# Patient Record
Sex: FEMALE | Race: WHITE | NOT HISPANIC OR LATINO | Employment: STUDENT | URBAN - METROPOLITAN AREA
[De-identification: names, ages, dates, MRNs, and addresses within clinical notes are randomized per-mention and may not be internally consistent; named-entity substitution may affect disease eponyms.]

---

## 2017-01-12 ENCOUNTER — ALLSCRIPTS OFFICE VISIT (OUTPATIENT)
Dept: OTHER | Facility: OTHER | Age: 27
End: 2017-01-12

## 2017-01-13 ENCOUNTER — LAB CONVERSION - ENCOUNTER (OUTPATIENT)
Dept: OTHER | Facility: OTHER | Age: 27
End: 2017-01-13

## 2017-01-13 LAB — 25(OH)D3 SERPL-MCNC: 34 NG/ML (ref 30–100)

## 2017-01-16 ENCOUNTER — ALLSCRIPTS OFFICE VISIT (OUTPATIENT)
Dept: OTHER | Facility: OTHER | Age: 27
End: 2017-01-16

## 2017-01-19 LAB — PAP (HISTORICAL): NORMAL

## 2017-03-09 ENCOUNTER — ALLSCRIPTS OFFICE VISIT (OUTPATIENT)
Dept: OTHER | Facility: OTHER | Age: 27
End: 2017-03-09

## 2017-03-23 ENCOUNTER — TRANSCRIBE ORDERS (OUTPATIENT)
Dept: ADMINISTRATIVE | Facility: HOSPITAL | Age: 27
End: 2017-03-23

## 2017-03-23 DIAGNOSIS — J32.9 UNSPECIFIED SINUSITIS (CHRONIC): Primary | ICD-10-CM

## 2017-04-03 ENCOUNTER — HOSPITAL ENCOUNTER (OUTPATIENT)
Dept: RADIOLOGY | Age: 27
Discharge: HOME | End: 2017-04-03
Payer: COMMERCIAL

## 2017-04-03 DIAGNOSIS — J32.9 UNSPECIFIED SINUSITIS (CHRONIC): ICD-10-CM

## 2017-04-03 PROCEDURE — 70486 CT MAXILLOFACIAL W/O DYE: CPT

## 2017-04-06 ENCOUNTER — ALLSCRIPTS OFFICE VISIT (OUTPATIENT)
Dept: OTHER | Facility: OTHER | Age: 27
End: 2017-04-06

## 2017-04-06 DIAGNOSIS — R00.0 TACHYCARDIA: ICD-10-CM

## 2017-05-10 ENCOUNTER — ALLSCRIPTS OFFICE VISIT (OUTPATIENT)
Dept: OTHER | Facility: OTHER | Age: 27
End: 2017-05-10

## 2017-05-31 ENCOUNTER — GENERIC CONVERSION - ENCOUNTER (OUTPATIENT)
Dept: OTHER | Facility: OTHER | Age: 27
End: 2017-05-31

## 2017-06-12 ENCOUNTER — ALLSCRIPTS OFFICE VISIT (OUTPATIENT)
Dept: OTHER | Facility: OTHER | Age: 27
End: 2017-06-12

## 2017-07-20 ENCOUNTER — GENERIC CONVERSION - ENCOUNTER (OUTPATIENT)
Dept: OTHER | Facility: OTHER | Age: 27
End: 2017-07-20

## 2017-10-13 ENCOUNTER — GENERIC CONVERSION - ENCOUNTER (OUTPATIENT)
Dept: OTHER | Facility: OTHER | Age: 27
End: 2017-10-13

## 2017-11-27 ENCOUNTER — ALLSCRIPTS OFFICE VISIT (OUTPATIENT)
Dept: OTHER | Facility: OTHER | Age: 27
End: 2017-11-27

## 2017-11-29 ENCOUNTER — TRANSCRIBE ORDERS (OUTPATIENT)
Dept: ADMINISTRATIVE | Facility: HOSPITAL | Age: 27
End: 2017-11-29

## 2017-11-29 ENCOUNTER — ALLSCRIPTS OFFICE VISIT (OUTPATIENT)
Dept: OTHER | Facility: OTHER | Age: 27
End: 2017-11-29

## 2017-11-29 DIAGNOSIS — R44.3 HALLUCINATIONS: Primary | ICD-10-CM

## 2017-11-30 NOTE — PROGRESS NOTES
Assessment    1  Hallucinations (780 1) (R44 3)    Plan  Hallucinations    · * CT HEAD WO CONTRAST; Status:Need Information - Financial Authorization; Requested for:29Nov2017;     Discussion/Summary    No clear etiology for visual hallucinations  Order placed for CT brain to rule out any obvious abnormalities  If symptoms persist, consider Neurology or Psychiatry evaluation  The patient was counseled regarding diagnostic results,-- instructions for management,-- risk factor reductions,-- prognosis,-- patient and family education,-- impressions,-- risks and benefits of treatment options,-- importance of compliance with treatment  Possible side effects of new medications were reviewed with the patient/guardian today  The treatment plan was reviewed with the patient/guardian  The patient/guardian understands and agrees with the treatment plan      Chief Complaint  Pt here c/o hallucinations and insomnia      History of Present Illness  HPI: Patient presents complaining of visual hallucinationsbegan about 2 months ago, worse the last 3-4 weeksthat she often sees âliving beingsâ while she is falling asleep or while she is waking upbeings are usually people or animalsvisions startle her, but she quickly realizes they are not realdoes not recall any recent change in medication that coincides with hallucinationsnot believe she is under any extreme stress at this timepatient denies tremors, ataxia, memory loss, aphasia, or headachesis adamant that she does not have these visions during normal waking hours      Review of Systems   Constitutional: No fever, no chills, feels well, no tiredness, no recent weight gain or loss  ENT: no ear ache, no loss of hearing, no nosebleeds or nasal discharge, no sore throat or hoarseness  Cardiovascular: no complaints of slow or fast heart rate, no chest pain, no palpitations, no leg claudication or lower extremity edema    Respiratory: no complaints of shortness of breath, no wheezing, no dyspnea on exertion, no orthopnea or PND  Gastrointestinal: no complaints of abdominal pain, no constipation, no nausea or diarrhea, no vomiting, no bloody stools  Genitourinary: no complaints of dysuria, no incontinence, no pelvic pain, no dysmenorrhea, no vaginal discharge or abnormal vaginal bleeding  Musculoskeletal: no complaints of arthralgia, no myalgia, no joint swelling or stiffness, no limb pain or swelling  Integumentary: no complaints of skin rash or lesion, no itching or dry skin, no skin wounds  Neurological: as noted in HPI  Active Problems    1  Acne (706 1) (L70 9)   2  Allergic rhinitis (477 9) (J30 9)   3  Atypical chest pain (786 59) (R07 89)   4  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)   5  Depression screen (V79 0) (Z13 89)   6  Dizziness (780 4) (R42)   7  Nia-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)   8  Encounter for gynecological examination without abnormal finding (V72 31) (Z01 419)   9  Hypokalemia (276 8) (E87 6)   10  Need for influenza vaccination (V04 81) (Z23)   11  Need for prophylactic vaccination and inoculation against bacterial diseases (V03 9)  (Z23)   12  Orthostatic hypotension (458 0) (I95 1)   13  Palpitations (785 1) (R00 2)   14  Pectus excavatum (754 81) (Q67 6)   15  POTS (postural orthostatic tachycardia syndrome) (427 89) (R00 0,I95 1)   16  Primary hypothyroidism (244 9) (E03 9)   17  Tachycardia (785 0) (R00 0)   18  Vitamin D deficiency (268 9) (E55 9)    Past Medical History  1  Acute sinusitis (461 9) (J01 90)   2  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)   3  History of Depression (311) (F32 9)   4  History of Pectus excavatum (754 81) (Q67 6)   5  History of Scoliosis (737 30) (M41 9)  Active Problems And Past Medical History Reviewed: The active problems and past medical history were reviewed and updated today  Family History  Mother    1  Family history of Asthma (V17 5)   2  Family history of Hyperlipidemia   3   Family history of Hypertension (V17 49)  Father    4  Family history of Hyperlipidemia   5  Family history of Hypertension (V17 49)  Family History    6  Family history of Amelanotic Melanoma Of The Skin   7  Family history of Hyperlipidemia   8  Family history of Hypertension (V17 49)   9  Family history of Malignant Prostatic Neoplasm G1   10  Family history of Varicose Veins Of Lower Extremities  Family History Reviewed: The family history was reviewed and updated today  Social History   · Currently In School   · Will be starting at [employer] in 1/2014   · Living With Parents   · Never A Smoker   · No drug use   · Single   · Social alcohol use (Z78 9)   · Unemployed  The social history was reviewed and updated today  The social history was reviewed and is unchanged  Surgical History    1  History of Acetabular Osteotomy  Surgical History Reviewed: The surgical history was reviewed and updated today  Current Meds   1  Aczone 5 % External Gel; apply BID; Therapy: (Recorded:63Lbj7534) to Recorded   2  Bystolic 5 MG Oral Tablet; Take 1 tablet daily; Therapy: 60IIS5428 to (Evaluate:02Apr2018)  Requested for: 05Oct2017; Last Rx:04Oct2017 Ordered   3  Cryselle-28 0 3-30 MG-MCG Oral Tablet; take 1 tablet by mouth every day; Therapy: 76QTE2017 to (Evaluate:11Jan2018)  Requested for: 82KJD5975; Last Rx:16Jan2017 Ordered   4  Desmopressin Acetate 0 2 MG Oral Tablet; Take 1 tablet twice daily; Therapy: 87Yyc1301 to (Evaluate:15Mar2018)  Requested for: 20Mar2017; Last Rx:20Mar2017 Ordered   5  Doryx 150 MG TBEC; Take 1 tablet daily; Therapy: (Recorded:21Prx3717) to Recorded   6  Fludrocortisone Acetate 0 1 MG Oral Tablet; take 1 tablet by mouth twice a day; Therapy: 63Jlj8895 to (Evaluate:81Jdz7551)  Requested for: 34Dwg4386; Last Rx:49Xnl9544 Ordered   7  Ibuprofen 400 MG Oral Tablet; TAKE 1 TABLET Twice daily Take for one week; Therapy: 05PQH8913 to (Evaluate:13Ozh9635); Last Rx:28Nov2016 Ordered   8  Levothyroxine Sodium 100 MCG Oral Tablet; TAKE 1 TABLET DAILY; Therapy: (151 957 346) to Recorded   9  Midodrine HCl - 5 MG Oral Tablet; TAKE 1 TABLET 2 TIMES DAILY; Therapy: 43Yzy2210-(Evaluate:48Wkt7976) Recorded   10  Naproxen 500 MG Oral Tablet; TAKE 1 TABLET TWICE DAILY WITH MEALS as needed; Therapy: 47VYW4696 to (Evaluate:82Eeu4851)  Requested for: 79Oip3414; Last  Rx:29Yte8102 Ordered   11  Potassium Chloride ER 10 MEQ Oral Capsule Extended Release; TAKE ONE CAPSULE  BY MOUTH EVERY DAY; Therapy: 86NHM8006 to (Evaluate:57Ufx5476)  Requested for: 19JIZ1094; Last  Rx:08Mar2017; Status: ACTIVE - Renewal Denied Ordered   12  Tramadol-Acetaminophen 37 5-325 MG Oral Tablet; TAKE ONE TABLET BY MOUTH  EVERY 6 HOURS AS NEEDED FOR PAIN; Last Rx:78Tny9341 Ordered   13  Vitamin D3 06832 UNIT Oral Capsule; ONE  A MONTH;  Therapy: 37VJX7358 to Recorded    The medication list was reviewed and updated today  Allergies  1  PROzac CAPS   2  Wellbutrin TABS    Vitals   Recorded: 03YFO3123 01:29PM   Temperature 98 8 F   Heart Rate 81   Systolic 187   Diastolic 82   Weight 531 lb    BMI Calculated 24 21   BSA Calculated 1 77   O2 Saturation 98       Physical Exam   Constitutional  General appearance: No acute distress, well appearing and well nourished  Eyes  Conjunctiva and lids: No swelling, erythema or discharge  Pupils and irises: Equal, round and reactive to light  Ears, Nose, Mouth, and Throat  External inspection of ears and nose: Normal    Oropharynx: Normal with no erythema, edema, exudate or lesions  Pulmonary  Respiratory effort: No increased work of breathing or signs of respiratory distress  Auscultation of lungs: Clear to auscultation  Cardiovascular  Auscultation of heart: Normal rate and rhythm, normal S1 and S2, without murmurs  Examination of extremities for edema and/or varicosities: Normal    Abdomen  Abdomen: Non-tender, no masses     Lymphatic  Palpation of lymph nodes in neck: No lymphadenopathy  Musculoskeletal  Gait and station: Normal    Skin  Skin and subcutaneous tissue: Normal without rashes or lesions  Neurologic  Cranial nerves: Cranial nerves 2-12 intact     Psychiatric  Orientation to person, place, and time: Normal    Mood and affect: Normal          Future Appointments    Date/Time Provider Specialty Site   02/05/2018 02:20 PM Joel Peterson [facility] Obstetrics/Gynecology [facility]   04/11/2018 02:00 PM Du Quevedo DO Family Medicine [facility]ILY [facility]       Signatures   Electronically signed by : Shara Schultz DO; Nov 29 2017  4:52PM EST                       (Author)

## 2017-12-01 ENCOUNTER — GENERIC CONVERSION - ENCOUNTER (OUTPATIENT)
Dept: OTHER | Facility: OTHER | Age: 27
End: 2017-12-01

## 2017-12-01 ENCOUNTER — HOSPITAL ENCOUNTER (OUTPATIENT)
Dept: RADIOLOGY | Age: 27
Discharge: HOME | End: 2017-12-01
Payer: COMMERCIAL

## 2017-12-01 DIAGNOSIS — R44.3 HALLUCINATIONS: ICD-10-CM

## 2017-12-01 PROCEDURE — 70450 CT HEAD/BRAIN W/O DYE: CPT

## 2017-12-04 ENCOUNTER — GENERIC CONVERSION - ENCOUNTER (OUTPATIENT)
Dept: OTHER | Facility: OTHER | Age: 27
End: 2017-12-04

## 2017-12-20 ENCOUNTER — TRANSCRIBE ORDERS (OUTPATIENT)
Dept: ADMINISTRATIVE | Facility: HOSPITAL | Age: 27
End: 2017-12-20

## 2017-12-20 DIAGNOSIS — R44.3 HALLUCINATION: Primary | ICD-10-CM

## 2018-01-11 NOTE — RESULT NOTES
Verified Results  (Q) CBC (INCLUDES DIFF/PLT) WITH SMEAR REVIEW 20Jan2016 12:09PM [facility]     Test Name Result Flag Reference   WHITE BLOOD CELL COUNT 7 0 Thousand/uL  3 8-10 8   RED BLOOD CELL COUNT 4 81 Million/uL  3 80-5 10   HEMOGLOBIN 13 3 g/dL  11 7-15 5   HEMATOCRIT 41 0 %  35 0-45 0   MCV 85 3 fL  80 0-100 0   MCH 27 6 pg  27 0-33 0   MCHC 32 4 g/dL  32 0-36 0   RDW 16 4 % H 11 0-15 0   PLATELET COUNT 860 Thousand/uL  140-400   MPV 10 4 fL  7 5-11 5   ABSOLUTE NEUTROPHILS 2359 cells/uL  0010-7596   ABSOLUTE LYMPHOCYTES 4144 cells/uL H 850-3900   ABSOLUTE MONOCYTES 427 cells/uL  200-950   ABSOLUTE EOSINOPHILS 70 cells/uL     ABSOLUTE BASOPHILS 0 cells/uL  0-200   NEUTROPHILS 33 7 %     LYMPHOCYTES 59 2 %     MONOCYTES 6 1 %     EOSINOPHILS 1 0 %     BASOPHILS 0 %       (1) COMPREHENSIVE METABOLIC PANEL 99RND7807 44:49GV [facility]     Test Name Result Flag Reference   GLUCOSE 85 mg/dL  65-99   Fasting reference interval   UREA NITROGEN (BUN) 11 mg/dL  7-25   CREATININE 0 69 mg/dL  0 50-1 10   eGFR NON-AFR   AMERICAN 123 mL/min/1 73m2  > OR = 60   eGFR AFRICAN AMERICAN 142 mL/min/1 73m2  > OR = 60   BUN/CREATININE RATIO   2-73   NOT APPLICABLE (calc)   SODIUM 141 mmol/L  135-146   POTASSIUM 3 9 mmol/L  3 5-5 3   CHLORIDE 106 mmol/L     CARBON DIOXIDE 25 mmol/L  19-30   CALCIUM 9 7 mg/dL  8 6-10 2   PROTEIN, TOTAL 7 0 g/dL  6 1-8 1   ALBUMIN 4 6 g/dL  3 6-5 1   GLOBULIN 2 4 g/dL (calc)  1 9-3 7   ALBUMIN/GLOBULIN RATIO 1 9 (calc)  1 0-2 5   BILIRUBIN, TOTAL 0 4 mg/dL  0 2-1 2   ALKALINE PHOSPHATASE 104 U/L     AST 22 U/L  10-30   ALT 27 U/L  6-29     (Q) TSH, 3RD GENERATION W/REFLEX TO FT4 20Jan2016 12:09PM [facility]   REPORT COMMENT:  ON HOLD  FASTING:YES PATIENT UNABLE TO VOID; ADVISED TO RETURN FOR CO     Test Name Result Flag Reference   TSH W/REFLEX TO FT4 0 43 mIU/L     Reference Range                         > or = 20 Years  0 40-4 50 Pregnancy Ranges            First trimester    0 26-2 66            Second trimester   0 55-2 73            Third trimester    0 43-2 91     *(Q) VITAMIN D, 25-HYDROXY, LC/MS/MS 20Jan2016 12:09PM Tanvir Duenas     Test Name Result Flag Reference   VITAMIN D, 25-OH, TOTAL 28 ng/mL L    Vitamin D Status         25-OH Vitamin D:     Deficiency:                    <20 ng/mL  Insufficiency:             20 - 29 ng/mL  Optimal:                 > or = 30 ng/mL     For 25-OH Vitamin D testing on patients on   D2-supplementation and patients for whom quantitation   of D2 and D3 fractions is required, the QuestAssureD(TM)  25-OH VIT D, (D2,D3), LC/MS/MS is recommended: order   code 15054 (patients >2yrs)  For more information on this test, go to:  http://Hotreader/faq/VTK093  (This link is being provided for   informational/educational purposes only )       Discussion/Summary   Everything looks OK  The vitamin D is borderline low and you should check with the doctor who put you on the replacement  Otherwise you can do the urine when you are home (and not menstruating ) Good luck with the semester   Dr Liana King

## 2018-01-13 VITALS
HEART RATE: 89 BPM | BODY MASS INDEX: 22.9 KG/M2 | DIASTOLIC BLOOD PRESSURE: 81 MMHG | TEMPERATURE: 99.5 F | HEIGHT: 66 IN | WEIGHT: 142.5 LBS | SYSTOLIC BLOOD PRESSURE: 116 MMHG

## 2018-01-13 VITALS
HEIGHT: 66 IN | BODY MASS INDEX: 22.52 KG/M2 | WEIGHT: 140.13 LBS | DIASTOLIC BLOOD PRESSURE: 70 MMHG | HEART RATE: 64 BPM | SYSTOLIC BLOOD PRESSURE: 116 MMHG

## 2018-01-14 VITALS
TEMPERATURE: 98.7 F | WEIGHT: 143.44 LBS | HEART RATE: 92 BPM | DIASTOLIC BLOOD PRESSURE: 64 MMHG | OXYGEN SATURATION: 98 % | SYSTOLIC BLOOD PRESSURE: 110 MMHG | BODY MASS INDEX: 23.15 KG/M2

## 2018-01-14 VITALS
WEIGHT: 150 LBS | OXYGEN SATURATION: 98 % | DIASTOLIC BLOOD PRESSURE: 82 MMHG | HEART RATE: 81 BPM | SYSTOLIC BLOOD PRESSURE: 118 MMHG | TEMPERATURE: 98.8 F | BODY MASS INDEX: 24.21 KG/M2

## 2018-01-14 VITALS
DIASTOLIC BLOOD PRESSURE: 60 MMHG | HEART RATE: 76 BPM | WEIGHT: 142 LBS | BODY MASS INDEX: 22.82 KG/M2 | SYSTOLIC BLOOD PRESSURE: 86 MMHG | HEIGHT: 66 IN

## 2018-01-14 NOTE — PROGRESS NOTES
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)   3  POTS (postural orthostatic tachycardia syndrome) (427 89) (R00 0,I95 1)   4  Primary hypothyroidism (244 9) (E03 9)   5  Allergic rhinitis (477 9) (J30 9)    Plan  Allergic rhinitis    · Bettie HAYES, Maryan Yan  (Allergy/Immunology) Physician Referral  Consult  Status: Hold For -  Scheduling  Requested for: 44DFB4288  Care Summary provided  : Yes  Allergic rhinitis, Dizziness, Nia-Danlos syndrome, Hypokalemia, Orthostatic  hypotension, Palpitations, Primary hypothyroidism, Vitamin D deficiency    · (1) COMPREHENSIVE METABOLIC PANEL; Status:Active; Requested DBK:92FDN8926;    · (Q) CBC (INCLUDES DIFF/PLT) WITH SMEAR REVIEW; Status:Active; Requested  JQW:49GFW8115;    · (Q) TSH, 3RD GENERATION W/REFLEX TO FT4; Status:Active; Requested  GJN:78HWF1373;    · (Q) URINALYSIS REFLEX; Status:Active; Requested LZF:83FJE6517;    · *(Q) VITAMIN D, 25-HYDROXY, LC/MS/MS; Status:Active; Requested QCK:62RTB8831; Health Maintenance    · Always use a seat belt and shoulder strap when riding or driving a motor vehicle ;  Status:Complete;   Done: 30UDM8195   · Decreasing the stress in your life may help your condition improve ; Status:Complete;    Done: 85YFD2443   · Eat a normal well-balanced diet ; Status:Complete;   Done: 24HNU2238   · Regular aerobic exercise can help reduce stress ; Status:Complete;   Done: 88QAI2907   · Use a sun block product with an SPF of 15 or more ; Status:Complete;   Done:  62DYU0403   · We recommend routine visits to a dentist ; Status:Complete;   Done: 27LLR1908   · We recommend that you follow these steps to lower your risk of osteoporosis  ;  Status:Complete;   Done: 13RAK0210   · Call [phone] if: You find a new or different kind of lump in your breast ;  Status:Complete;   Done: 79ZZO1714   · Call [phone] if: You have any warning signs of skin cancer  ; Status:Complete;    Done: 48BWU4881      She will decide on the next semester and call if she needs a letter  She will restart her Fluticasone and make allergy appt  Chief Complaint  Physical      History of Present Illness  HM, Adult Female: The patient is being seen for a health maintenance evaluation  The last health maintenance visit was 1 year(s) ago  Social History: Household members include mother and father  She is unmarried  Work status: occupation: student  The patient has never smoked cigarettes  She reports never drinking alcohol  She has never used illicit drugs  General Health: The patient's health since the last visit is described as poor  She has regular dental visits  She complains of vision problems  Vision care includes wearing glasses and an eye examination within the last year  She denies hearing loss  Immunizations status: up to date  Lifestyle:  She consumes a diverse and healthy diet  She does not have any weight concerns  She does not exercise regularly  Was at GYN yesterday  Screening: Cervical cancer screening includes a pap smear performed last year  Breast cancer screening includes no previous mammogram  Colorectal cancer screening includes no previous colonoscopy  Safety elements used: seat belt, safe driving habits, sunscreen, smoke detector and carbon monoxide detector  HPI: She is supposed to return to college in 1 5 weeks but she is still fatigued  She finished the semester with 3 As and 1 B+  This will be her last semester, then maybe a masters in [employer] translation  She has had intermittent runny nose with clear mucus, congestion, sore throat, headache, "somewhat of a cough" since September  She was on Fluticasone for about 4 -6 weeks starting in October into November but stopped it  Mom is here and is convinced her "immune system is compromised " Mom thinks she was good when she started college in September but this started 2 weeks later   She had allergy testing maybe eight years ago  Some days are better than others  Review of Systems    Constitutional: as noted in HPI  Eyes: no eye pain, no dryness of the eyes, eyes not red, no purulent discharge from the eyes and no itching of the eyes  ENT: as noted in HPI  Cardiovascular: no chest pain, no palpitations and no lower extremity edema  Respiratory: as noted in HPI, no shortness of breath and no wheezing  Gastrointestinal: no abdominal pain, no constipation, no diarrhea and no blood in stools  Genitourinary: no dysuria, no incontinence and no dysmenorrhea  Musculoskeletal: as noted in HPI  Neurological: dizziness, but as noted in HPI, no headache and no fainting  Psychiatric: no anxiety  Endocrine: muscle weakness, but no deepening of the voice  Hematologic/Lymphatic: no tendency for easy bleeding and no tendency for easy bruising  Active Problems    1  Acne (706 1) (L70 9)   2  Allergic rhinitis (477 9) (J30 9)   3  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)   4  Dizziness (780 4) (R42)   5  Nia-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)   6  Encounter for gynecological examination without abnormal finding (V72 31) (Z01 419)   7  Hypokalemia (276 8) (E87 6)   8  Oral contraceptive prescribed (V25 01) (Z30 011)   9  Orthostatic hypotension (458 0) (I95 1)   10  Palpitations (785 1) (R00 2)   11  Pectus excavatum (754 81) (Q67 6)   12  POTS (postural orthostatic tachycardia syndrome) (427 89) (R00 0,I95 1)   13  Primary hypothyroidism (244 9) (E03 9)   14  Tachycardia (785 0) (R00 0)   15   Vitamin D deficiency (268 9) (E55 9)    Past Medical History    · History of Acute maxillary sinusitis, recurrence not specified (461 0) (J01 00)   · Acute upper respiratory infection (465 9) (J06 9)   · History of Arthritis (V13 4)   · History of Coccydynia (724 79) (M53 3)   · 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)   · History of Depression (311) (F32 9)   · History of Dysmenorrhea (625 3) (N94 6) · History of Nia-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)   · History of acne (V13 3) (Z87 2)   · History of headache (V13 89) (Z87 898)   · History of hypothyroidism (V12 29) (Z86 39)   · History of menorrhagia (V13 29) (Z87 42)   · Oral contraceptive prescribed (V25 01) (Z30 011)   · History of Pectus excavatum (754 81) (Q67 6)   · History of Pneumonia (V12 61)   · History of Scoliosis (737 30) (M41 9)    Surgical History    · History of Acetabular Osteotomy    Family History    · Family history of Asthma (V17 5)   · Family history of Hyperlipidemia   · Family history of Hypertension (V17 49)    · Family history of Hyperlipidemia   · Family history of Hypertension (V17 49)    · Family history of Amelanotic Melanoma Of The Skin   · Family history of Hyperlipidemia   · Family history of Hypertension (V17 49)   · Family history of Malignant Prostatic Neoplasm G1   · Family history of Varicose Veins Of Lower Extremities    Social History    · Denied: History of Alcohol Use (History)   · Currently In School   · Will be starting at [employer] in 1/2014   · Living With Parents   · Never A Smoker   · Unemployed    Current Meds   1  Aczone 5 % External Gel; apply BID; Therapy: (Recorded:12Aug2014) to Recorded   2  Bystolic 5 MG Oral Tablet; Bystolic 3 4ZC in AM, 5mg in PM;   Therapy: 46XJM2241 to (Last Rx:08Jan2016) Ordered   3  Cryselle-28 0 3-30 MG-MCG Oral Tablet; take 1 tablet by mouth every day; Therapy: 75MBP7664 to (Evaluate:06Jan2017)  Requested for: 12Jan2016; Last   Rx:12Jan2016 Ordered   4  Desmopressin Acetate 0 2 MG Oral Tablet; PO BID; Therapy: 04Aug2012 to (Last Rx:30Sep2015)  Requested for: 30Sep2015 Ordered   5  Doryx 150 MG TBEC; Take 1 tablet daily; Therapy: (Recorded:59Llu0730) to Recorded   6  Fludrocortisone Acetate 0 1 MG Oral Tablet; PO BID; Therapy: 04Aug2012 to (Last Rx:12Aug2014)  Requested for: 12Aug2014 Ordered   7   Levothyroxine Sodium 100 MCG Oral Tablet; TAKE 1 TABLET DAILY; Therapy: (733 162 319) to Recorded   8  Midodrine HCl - 5 MG Oral Tablet; TAKE 1 TABLET 2 TIMES DAILY; Therapy: 20Xpn2748-(Evaluate:99Hoz9184) Recorded   9  Naproxen 500 MG Oral Tablet; TAKE 1 TABLET TWICE DAILY WITH MEALS as needed; Therapy: 74FCK7661 to (Evaluate:76Wfr3412)  Requested for: 52Vly6015; Last   Rx:02Iga7585 Ordered   10  Potassium Chloride ER 10 MEQ Oral Capsule Extended Release; TAKE ONE CAPSULE    BY MOUTH EVERY DAY; Therapy: 63GAJ8570 to (Evaluate:02Jan2016)  Requested for: 64JBP4178; Last    Rx:61Xjk6122 Ordered   11  Tramadol-Acetaminophen 37 5-325 MG Oral Tablet; TAKE 1 TABLET EVERY 6 HOURS    AS NEEDED; Therapy: 70DLE9860 to (Evaluate:68Wlv1457)  Requested for: 29Cxe0141; Last    Rx:29Weg3437 Ordered   12  Vitamin D3 23935 UNIT Oral Capsule; ONE  A MONTH;    Therapy: 81Wco5063 to Recorded    Allergies    1  PROzac CAPS   2  Wellbutrin TABS    Vitals   Recorded: 55XDB0158 01:05PM   Heart Rate 68   Respiration 16   Systolic 98   Diastolic 70   Height 5 ft 6 5 in   Weight 138 lb 6 08 oz   BMI Calculated 22   BSA Calculated 1 72     Physical Exam    Constitutional   General appearance: No acute distress, well appearing and well nourished  Head and Face   Head and face: Normal     Eyes   Conjunctiva and lids: No swelling, erythema or discharge  Pupils and irises: Equal, round, reactive to light  Ears, Nose, Mouth, and Throat   External inspection of ears and nose: Normal     Otoscopic examination: Tympanic membranes translucent with normal light reflex  Canals patent without erythema  Hearing: Normal     Nasal mucosa, septum, and turbinates: Normal without edema or erythema  Lips, teeth, and gums: Normal, good dentition  Oropharynx: Normal with no erythema, edema, exudate or lesions  Neck   Neck: Supple, symmetric, trachea midline, no masses  Thyroid: Normal, no thyromegaly      Pulmonary   Respiratory effort: No increased work of breathing or signs of respiratory distress  Auscultation of lungs: Clear to auscultation  Cardiovascular   Palpation of heart: Normal PMI, no thrills  Auscultation of heart: Normal rate and rhythm, normal S1 and S2, no murmurs  Carotid pulses: 2+ bilaterally  Abdominal aorta: Normal     Femoral pulses: 2+ bilaterally  Pedal pulses: 2+ bilaterally  Examination of extremities for edema and/or varicosities: Normal     Abdomen   Abdomen: Non-tender, no masses  Liver and spleen: No hepatomegaly or splenomegaly  Lymphatic   Palpation of lymph nodes in neck: No lymphadenopathy  Palpation of lymph nodes in groin: No lymphadenopathy  Musculoskeletal   Gait and station: Normal     Digits and nails: Normal without clubbing or cyanosis  Joints, bones, and muscles: Normal     Skin   Skin and subcutaneous tissue: Normal without rashes or lesions  Neurologic   Cranial nerves: Cranial nerves II-XII intact  Cortical function: Normal mental status  Reflexes: 2+ and symmetric  Psychiatric   Judgment and insight: Normal     Orientation to person, place, and time: Normal     Recent and remote memory: Intact      Mood and affect: Normal        Future Appointments    Date/Time Provider Specialty Site   01/17/2017 02:20 PM Gabino Akbar [facility] Obstetrics/Gynecology [facility]     Signatures   Electronically signed by : NGUYỄN Moreau ; Jan 13 2016  1:49PM EST                       (Author)

## 2018-01-15 VITALS
DIASTOLIC BLOOD PRESSURE: 68 MMHG | HEIGHT: 66 IN | BODY MASS INDEX: 22.34 KG/M2 | SYSTOLIC BLOOD PRESSURE: 116 MMHG | WEIGHT: 139 LBS

## 2018-01-16 NOTE — CONSULTS
I had the [facility]  My full evaluation follows:      Chief Complaint  Patient is here today for 4 mo f/u  Patient c/o headache and dizziness  Patient is here today for follow up of chronic conditions described in HPI  History of Present Illness  Ms Niki Brewer is a 24year old woman with Nia-Danlos Type III and POTS presents for follow up  Had a flare last week - Did see physician at [facility] for chronic rhinitis, without much improvement  Did try Zyrtec for several months, but no improvement  Is scheduled to see a cardiologist in [place]  Other complaint is possible hallucinations in the middle of the night  Review of Systems      Cardiac: experiencing fainting/blackouts and palpitations present   Skin: No complaints of nonhealing sores or skin rash  Genitourinary: No complaints of recurrent urinary tract infections, frequent urination at night, difficult urination, blood in urine, kidney stones, loss of bladder control, kidney problems, denies any birth control or hormone replacement, is not post menopausal, not currently pregnant  Psychological: No complaints of feeling depressed, anxiety, panic attacks, or difficulty concentrating  General: lack of energy/fatigue  Respiratory: No complaints of shortness of breath, cough with sputum, or wheezing  HEENT: No complaints of serious problems, hearing problems, nose problems, throat problems, or snoring  Gastrointestinal: No complaints of liver problems, nausea, vomiting, heartburn, constipation, bloody stools, diarrhea, problems swallowing, adbominal pain, or rectal bleeding  Hematologic: No complaints of bleeding disorders, anemia, blood clots, or excessive brusing  Neurological: dizziness   Musculoskeletal: No complaints of arthritis, back pain, or painfull swelling  Active Problems    1  Acne (706 1) (L70 9)   2  Allergic rhinitis (477 9) (J30 9)   3   Atypical chest pain (786 59) (R07 89)   4  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)   5  Depression screen (V79 0) (Z13 89)   6  Dizziness (780 4) (R42)   7  Nia-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)   8  Encounter for gynecological examination without abnormal finding (V72 31) (Z01 419)   9  Hypokalemia (276 8) (E87 6)   10  Need for influenza vaccination (V04 81) (Z23)   11  Need for prophylactic vaccination and inoculation against bacterial diseases (V03 9)    (Z23)   12  Orthostatic hypotension (458 0) (I95 1)   13  Palpitations (785 1) (R00 2)   14  Pectus excavatum (754 81) (Q67 6)   15  POTS (postural orthostatic tachycardia syndrome) (427 89) (R00 0,I95 1)   16  Primary hypothyroidism (244 9) (E03 9)   17  Tachycardia (785 0) (R00 0)   18  Vitamin D deficiency (268 9) (E55 9)    Past Medical History    · Acute sinusitis (461 9) (J01 90)   · 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)   · History of Depression (311) (F32 9)   · History of Pectus excavatum (754 81) (Q67 6)   · History of Scoliosis (737 30) (M41 9)    The active problems and past medical history were reviewed and updated today  Surgical History    · History of Acetabular Osteotomy    The surgical history was reviewed and updated today  Family History    · Family history of Asthma (V17 5)   · Family history of Hyperlipidemia   · Family history of Hypertension (V17 49)    · Family history of Hyperlipidemia   · Family history of Hypertension (V17 49)    · Family history of Amelanotic Melanoma Of The Skin   · Family history of Hyperlipidemia   · Family history of Hypertension (V17 49)   · Family history of Malignant Prostatic Neoplasm G1   · Family history of Varicose Veins Of Lower Extremities    The family history was reviewed and updated today  Social History    · Currently In School   · Living With Parents   · Never A Smoker   · No drug use   · Single   · Social alcohol use (Z78 9)   · Unemployed  The social history was reviewed and updated today   The social history was reviewed and is unchanged  Current Meds   1  Aczone 5 % External Gel; apply BID; Therapy: (Recorded:63Gnu1148) to Recorded   2  Bystolic 5 MG Oral Tablet; Take 1 tablet daily; Therapy: 70SZS6457 to (Evaluate:02Apr2018)  Requested for: 05Oct2017; Last   Rx:04Oct2017 Ordered   3  Cryselle-28 0 3-30 MG-MCG Oral Tablet; take 1 tablet by mouth every day; Therapy: 29KRP0838 to (Evaluate:11Jan2018)  Requested for: 45WFF3976; Last   Rx:16Jan2017 Ordered   4  Desmopressin Acetate 0 2 MG Oral Tablet; Take 1 tablet twice daily; Therapy: 08Ajl9388 to (Evaluate:15Mar2018)  Requested for: 20Mar2017; Last   Rx:20Mar2017 Ordered   5  Doryx 150 MG TBEC; Take 1 tablet daily; Therapy: (Recorded:34Xgu9443) to Recorded   6  Fludrocortisone Acetate 0 1 MG Oral Tablet; take 1 tablet by mouth twice a day; Therapy: 52Mfm6506 to (Evaluate:09Zvw4671)  Requested for: 05Sep2017; Last   Rx:05Sep2017 Ordered   7  Ibuprofen 400 MG Oral Tablet; TAKE 1 TABLET Twice daily Take for one week; Therapy: 71AJO4734 to (Evaluate:60Pkq2439); Last Rx:28Nov2016 Ordered   8  Levothyroxine Sodium 100 MCG Oral Tablet; TAKE 1 TABLET DAILY; Therapy: (0650 314 95 44) to Recorded   9  Midodrine HCl - 5 MG Oral Tablet; TAKE 1 TABLET 2 TIMES DAILY; Therapy: 02Dmp6924-(Evaluate:48Xcx7054) Recorded   10  Naproxen 500 MG Oral Tablet; TAKE 1 TABLET TWICE DAILY WITH MEALS as needed; Therapy: 44IWG0798 to (Evaluate:37Gga3102)  Requested for: 63Rke5452; Last    Rx:07Csi8557 Ordered   11  Potassium Chloride ER 10 MEQ Oral Capsule Extended Release; TAKE ONE CAPSULE    BY MOUTH EVERY DAY; Therapy: 60KZE5766 to (Evaluate:24Vrv7877)  Requested for: 28YGV8253; Last    Rx:08Mar2017; Status: ACTIVE - Renewal Denied Ordered   12  Tramadol-Acetaminophen 37 5-325 MG Oral Tablet; TAKE ONE TABLET BY MOUTH    EVERY 6 HOURS AS NEEDED FOR PAIN; Last Rx:39Dhz4270 Ordered   13   Vitamin D3 85801 UNIT Oral Capsule; ONE  A MONTH;    Therapy: 10CAM9943 to Recorded    The medication list was reviewed and updated today  Allergies    1  PROzac CAPS   2  Wellbutrin TABS    Vitals   Recorded: 42ZHH3874 02:25PM   Heart Rate 66   Systolic 463, LUE, Sitting   Diastolic 76, LUE, Sitting   Height 5 ft 6 in   Weight 149 lb    BMI Calculated 24 05   BSA Calculated 1 76     Physical Exam    Constitutional   General appearance: No acute distress, well appearing and well nourished  Eyes   Conjunctiva and Sclera examination: Conjunctiva pink, sclera anicteric  Ears, Nose, Mouth, and Throat - External inspection of ears and nose: Normal without deformities or discharge  Neck   Neck and thyroid: Normal, supple, trachea midline, no thyromegaly  Pulmonary   Respiratory effort: No increased work of breathing or signs of respiratory distress  Auscultation of lungs: Clear to auscultation, no rales, no rhonchi, no wheezing, good air movement  Cardiovascular   Auscultation of heart: Normal rate and rhythm, normal S1 and S2, no murmurs  Carotid pulses: Normal, 2+ bilaterally  Examination of extremities for edema and/or varicosities: Normal     Chest - Chest: Normal    Abdomen   Abdomen: Non-tender and no distention  Musculoskeletal Gait and station: Normal gait  Skin - Skin and subcutaneous tissue: Normal without rashes or lesions  Skin is warm and well perfused, normal turgor  Neurologic - Speech: Normal     Psychiatric - Orientation to person, place, and time: Normal  Mood and affect: Normal       Assessment    1  Nia-Danlos syndrome (906 83) (Q79 6)   2  POTS (postural orthostatic tachycardia syndrome) (427 89) (R00 0,I95 1)    Plan  Nia-Danlos syndrome, POTS (postural orthostatic tachycardia syndrome)    · Follow-up visit in 6 months Evaluation and Treatment  Follow-up  Status: Hold For -  Scheduling  Requested for: 96DUU9982   Ordered; For: Nia-Danlos syndrome, POTS (postural orthostatic tachycardia syndrome);  Ordered By: Eduardo Sunshine Performed:  Due: 02WFR6862    Discussion/Summary    24year old woman with Nia-Danlos Type III and POTS presents for follow up  Had a flare last week - Did see physician at [facility] for chronic rhinitis, without much improvement  Did try Zyrtec for several months, but no improvement  Is scheduled to see a cardiologist in [place]  Other complaint is possible hallucinations in the middle of the night  Impression:  1  POTS - stable  2  Nia-Danlos Type III  3  ? Mast Cell Activation - unclear if there is a component  Did have evidence of dermatographia and rhinitis, but no improvement with histamine blockade  Recommendations:  1  Continue current medications  2  Will give referral to cardiologist in [place]  3  Follow up in 6 months  Thank you very much for allowing me to participate in the care of this patient  If you have any questions, please do not hesitate to contact me        Future Appointments    Signatures   Electronically signed by : NGUYỄN Dalton ; Nov 27 2017  2:50PM EST                       (Author)

## 2018-01-17 NOTE — PROGRESS NOTES
Assessment    1  Acute sinusitis (461 9) (J01 90)    Plan  Acute sinusitis    · Amoxicillin 500 MG Oral Tablet; TAKE 1 TABLET 3 TIMES DAILY UNTIL GONE    Discussion/Summary    Empirically tx sinusitis with amoxil x 7 days  Recommend salt water gargles and saline nasal spray for supportive measures  RTO if not improving in 2-3 dyas  Possible side effects of new medications were reviewed with the patient/guardian today  The treatment plan was reviewed with the patient/guardian  The patient/guardian understands and agrees with the treatment plan   The patient was counseled regarding diagnostic results, instructions for management, risk factor reductions, prognosis, patient and family education, impressions, risks and benefits of treatment options, importance of compliance with treatment  Chief Complaint  Pt here c/o cough x about 3 weeks      History of Present Illness  HPI: Pt presents c/o cough   Began 3 wks ago   +runny nose, PND, generalized headache, sore throat  Cough is dry and persistent   Denies f/c, otalgia, night sweats, CP, SOB  Dad also sick, but not sure who brought the illness home  Pt has used only advil and chloraseptic -- mild sx relief        Review of Systems    Constitutional: as noted in HPI    ENT: as noted in HPI  Cardiovascular: no chest pain and no palpitations  Respiratory: as noted in HPI  Gastrointestinal: no complaints of abdominal pain, no constipation, no nausea or diarrhea, no vomiting, no bloody stools  Genitourinary: no dysuria and no incontinence  Musculoskeletal: no arthralgias and no myalgias  Integumentary: no rashes and no skin lesions  Neurological: headache, but no confusion  Active Problems    1  Acne (706 1) (L70 9)   2  Allergic rhinitis (477 9) (J30 9)   3  Atypical chest pain (786 59) (R07 89)   4  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)   5  Depression screen (V79 0) (Z13 89)   6  Dizziness (780 4) (R42)   7   Nia-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)   8  Encounter for gynecological examination without abnormal finding (V72 31) (Z01 419)   9  Hypokalemia (276 8) (E87 6)   10  Need for prophylactic vaccination and inoculation against bacterial diseases (V03 9)    (Z23)   11  Orthostatic hypotension (458 0) (I95 1)   12  Palpitations (785 1) (R00 2)   13  Pectus excavatum (754 81) (Q67 6)   14  POTS (postural orthostatic tachycardia syndrome) (427 89) (R00 0,I95 1)   15  Primary hypothyroidism (244 9) (E03 9)   16  Tachycardia (785 0) (R00 0)   17  Vitamin D deficiency (268 9) (E55 9)    Past Medical History    1  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)   2  History of Depression (311) (F32 9)   3  History of Pectus excavatum (754 81) (Q67 6)   4  History of Scoliosis (737 30) (M41 9)  Active Problems And Past Medical History Reviewed: The active problems and past medical history were reviewed and updated today  Family History  Mother    1  Family history of Asthma (V17 5)   2  Family history of Hyperlipidemia   3  Family history of Hypertension (V17 49)  Father    4  Family history of Hyperlipidemia   5  Family history of Hypertension (V17 49)  Family History    6  Family history of Amelanotic Melanoma Of The Skin   7  Family history of Hyperlipidemia   8  Family history of Hypertension (V17 49)   9  Family history of Malignant Prostatic Neoplasm G1   10  Family history of Varicose Veins Of Lower Extremities  Family History Reviewed: The family history was reviewed and updated today  Social History    · Currently In School   · Will be starting at [employer] in 1/2014   · Living With Parents   · Never A Smoker   · No drug use   · Single   · Social alcohol use (Z78 9)   · Unemployed  The social history was reviewed and updated today  The social history was reviewed and is unchanged  Surgical History    1  History of Acetabular Osteotomy  Surgical History Reviewed: The surgical history was reviewed and updated today  Current Meds   1  Aczone 5 % External Gel; apply BID; Therapy: (Recorded:66Cvp2916) to Recorded   2  Bystolic 5 MG Oral Tablet; Take 1 tablet daily; Therapy: 93HXD8976 to (Vanessa Isabel)  Requested for: 14UUW0229; Last   Rx:28Nov2016 Ordered   3  Cryselle-28 0 3-30 MG-MCG Oral Tablet; take 1 tablet by mouth every day; Therapy: 28TFU5601 to (Evaluate:11Jan2018)  Requested for: 21NVZ8105; Last   Rx:16Jan2017 Ordered   4  Desmopressin Acetate 0 2 MG Oral Tablet; Take 1 tablet twice daily; Therapy: 35Oro5768 to (Evaluate:92Soz2381)  Requested for: 38PXA0195; Last   Rx:74Nrm0110 Ordered   5  Doryx 150 MG TBEC; Take 1 tablet daily; Therapy: (Recorded:41Vkr6195) to Recorded   6  Fludrocortisone Acetate 0 1 MG Oral Tablet; PO BID; Therapy: 17Qxj5699 to (Last Rx:26Oct2016)  Requested for: 26Oct2016 Ordered   7  Ibuprofen 400 MG Oral Tablet; TAKE 1 TABLET Twice daily Take for one week; Therapy: 60PZN9202 to (Evaluate:33Odu8348); Last Rx:28Nov2016 Ordered   8  Levothyroxine Sodium 100 MCG Oral Tablet; TAKE 1 TABLET DAILY; Therapy: (0688 919 41 98) to Recorded   9  Midodrine HCl - 5 MG Oral Tablet; TAKE 1 TABLET 2 TIMES DAILY; Therapy: 19Fsg9244-(Evaluate:79Nvt3001) Recorded   10  Naproxen 500 MG Oral Tablet; TAKE 1 TABLET TWICE DAILY WITH MEALS as needed; Therapy: 32ZXI2836 to (Evaluate:96Lnk6423)  Requested for: 13Kzm5232; Last    Rx:71Zln4352 Ordered   11  Potassium Chloride ER 10 MEQ Oral Capsule Extended Release; TAKE ONE CAPSULE    BY MOUTH EVERY DAY; Therapy: 07LBG9133 to (Evaluate:98Zap8190)  Requested for: 62LNO0712; Last    Rx:08Mar2017 Ordered   12  Tramadol-Acetaminophen 37 5-325 MG Oral Tablet; TAKE 1 TABLET EVERY 6 HOURS    AS NEEDED; Therapy: 05OLY9453 to (Evaluate:39Lzh2990)  Requested for: 13Myv7101; Last    Rx:50Gur5636 Ordered   13  Vitamin D3 94022 UNIT Oral Capsule; ONE  A MONTH;    Therapy: 03REM1567 to Recorded    The medication list was reviewed and updated today  Allergies    1  PROzac CAPS   2  Wellbutrin TABS    Vitals   Recorded: 94BCB5502 11:02AM   Temperature 98 7 F, Tympanic   Heart Rate 93   Systolic 759, LUE, Sitting   Diastolic 70, LUE, Sitting   Weight 140 lb 3 oz   O2 Saturation 98     Physical Exam    Constitutional   General appearance: No acute distress, well appearing and well nourished  Eyes   Conjunctiva and lids: No swelling, erythema or discharge  Pupils and irises: Equal, round and reactive to light  Ears, Nose, Mouth, and Throat   External inspection of ears and nose: Normal     Otoscopic examination: Abnormal   The right tympanic membrane was retracted  The left tympanic membrane was retracted  Nasal mucosa, septum, and turbinates: Abnormal   The bilateral nasal mucosa was boggy, edematous and red  +mild bilateral sinus TTP  Oropharynx: Abnormal   The posterior pharynx was erythematous, but did not have an exudate  Pulmonary   Respiratory effort: No increased work of breathing or signs of respiratory distress  Auscultation of lungs: Clear to auscultation  Cardiovascular   Auscultation of heart: Normal rate and rhythm, normal S1 and S2, without murmurs  Examination of extremities for edema and/or varicosities: Normal     Abdomen   Abdomen: Non-tender, no masses  Lymphatic   Palpation of lymph nodes in neck: No lymphadenopathy  Musculoskeletal   Gait and station: Normal     Skin   Skin and subcutaneous tissue: Normal without rashes or lesions  Neurologic   Cranial nerves: Cranial nerves 2-12 intact  Psychiatric   Orientation to person, place, and time: Normal     Mood and affect: Normal          Future Appointments    Date/Time Provider Specialty Site   04/20/2017 02:40 PM NGUYỄN Krishnamurthy   Cardiology [address]   04/06/2017 02:15 PM Wallace Roblero DO Pain Management [facility] SPINE   02/05/2018 02:20 PM Jeannie Dhaliwal [facility] Obstetrics/Gynecology Colby Goncalves 04/18/2017 02:00 PM Joshua Hoffman DO Family Medicine [facility]     Signatures   Electronically signed by : Ambika Chase DO; Mar  9 2017 11:19AM EST                       (Author)

## 2018-01-18 NOTE — RESULT NOTES
Message   please inform pt that vitamin d level looks great -- continue 5111-0965 units of vitamin D daily      Verified Results  *(Q) VITAMIN D, 25-HYDROXY, LC/MS/MS 05PPK0266 12:25PM Brigitte Gilmore   REPORT COMMENT:  FASTING:NO     Test Name Result Flag Reference   VITAMIN D, 25-OH, TOTAL 34 ng/mL     Vitamin D Status         25-OH Vitamin D:     Deficiency:                    <20 ng/mL  Insufficiency:             20 - 29 ng/mL  Optimal:                 > or = 30 ng/mL     For 25-OH Vitamin D testing on patients on   D2-supplementation and patients for whom quantitation   of D2 and D3 fractions is required, the QuestAssureD(TM)  25-OH VIT D, (D2,D3), LC/MS/MS is recommended: order   code 73058 (patients >2yrs)  For more information on this test, go to:  http://education  [employer]/faq/DEW196  (This link is being provided for   informational/educational purposes only )       Plan  Vitamin D deficiency    · Vitamin D (Ergocalciferol) 77520 UNIT Oral Capsule

## 2018-01-18 NOTE — RESULT NOTES
Verified Results  (Q) URINALYSIS REFLEX [id number] 11:23AM Jesse Naidu   REPORT COMMENT:  SPLIT 01/20/2016 FROM [id number]  FASTING:NO     Test Name Result Flag Reference   COLOR YELLOW  YELLOW   APPEARANCE CLEAR  CLEAR   SPECIFIC GRAVITY 1 026  1 001-1 035   PH 5 5  5 0-8 0   GLUCOSE NEGATIVE  NEGATIVE   BILIRUBIN NEGATIVE  NEGATIVE   KETONES NEGATIVE  NEGATIVE   OCCULT BLOOD NEGATIVE  NEGATIVE   PROTEIN NEGATIVE  NEGATIVE   NITRITE NEGATIVE  NEGATIVE   LEUKOCYTE ESTERASE NEGATIVE  NEGATIVE       Discussion/Summary   Urine normal  Dr Crystal Simmons

## 2018-01-22 VITALS
HEART RATE: 93 BPM | DIASTOLIC BLOOD PRESSURE: 70 MMHG | TEMPERATURE: 98.7 F | SYSTOLIC BLOOD PRESSURE: 118 MMHG | OXYGEN SATURATION: 98 % | WEIGHT: 140.19 LBS | BODY MASS INDEX: 22.63 KG/M2

## 2018-01-22 VITALS
HEIGHT: 66 IN | SYSTOLIC BLOOD PRESSURE: 124 MMHG | WEIGHT: 149 LBS | BODY MASS INDEX: 23.95 KG/M2 | HEART RATE: 66 BPM | DIASTOLIC BLOOD PRESSURE: 76 MMHG

## 2018-01-22 VITALS
SYSTOLIC BLOOD PRESSURE: 110 MMHG | OXYGEN SATURATION: 97 % | HEIGHT: 66 IN | DIASTOLIC BLOOD PRESSURE: 70 MMHG | HEART RATE: 84 BPM | BODY MASS INDEX: 23.78 KG/M2 | TEMPERATURE: 99 F | WEIGHT: 148 LBS

## 2018-01-23 NOTE — RESULT NOTES
Discussion/Summary   Please inform patient that CT brain was unremarkable--if hallucinations persist, will refer to Neurology and/or Psychiatry for further input     Verified Results  * CT HEAD WO CONTRAST [id number] 01:48PM Hamzah Price     Test Name Result Flag Reference   CT HEAD WO CONTRAST (Report)     CT BRAIN - WITHOUT CONTRAST     INDICATION: Hallucinations  Dizziness  Pots syndrome  COMPARISON: Prior brain MRI November 24, 2006     TECHNIQUE: CT examination of the brain was performed  In addition to axial images, coronal reformatted images were created and submitted for interpretation  Radiation dose length product (DLP) for this visit: 1028 mGy-cm   This examination, like all CT scans performed in the [facility], was performed utilizing techniques to minimize radiation dose exposure, including the use of iterative    reconstruction and automated exposure control  IMAGE QUALITY: Diagnostic  FINDINGS:      PARENCHYMA: No intracranial mass, mass effect or midline shift  No CT signs of acute infarction  There is no parenchymal hemorrhage  VENTRICLES AND EXTRA-AXIAL SPACES: Normal for patient's age  VISUALIZED ORBITS AND PARANASAL SINUSES: Unremarkable  CALVARIUM AND EXTRACRANIAL SOFT TISSUES:  Normal        IMPRESSION:     No acute intracranial abnormality         Workstation performed: [id number]     Signed by:   Mitul Benavides DO   12/1/17

## 2018-01-25 DIAGNOSIS — Z30.09 BIRTH CONTROL COUNSELING: Primary | ICD-10-CM

## 2018-02-06 ENCOUNTER — OFFICE VISIT (OUTPATIENT)
Dept: SLEEP CENTER | Facility: CLINIC | Age: 28
End: 2018-02-06
Payer: COMMERCIAL

## 2018-02-06 VITALS
BODY MASS INDEX: 23.95 KG/M2 | WEIGHT: 149 LBS | HEIGHT: 66 IN | DIASTOLIC BLOOD PRESSURE: 72 MMHG | HEART RATE: 76 BPM | SYSTOLIC BLOOD PRESSURE: 118 MMHG

## 2018-02-06 DIAGNOSIS — R44.3 HALLUCINATION: ICD-10-CM

## 2018-02-06 DIAGNOSIS — R44.3 HALLUCINATIONS: Primary | ICD-10-CM

## 2018-02-06 PROCEDURE — 99243 OFF/OP CNSLTJ NEW/EST LOW 30: CPT | Performed by: INTERNAL MEDICINE

## 2018-02-06 RX ORDER — FLUDROCORTISONE ACETATE 0.1 MG/1
0.1 TABLET ORAL
COMMUNITY
End: 2018-07-08 | Stop reason: SDUPTHER

## 2018-02-06 RX ORDER — NEBIVOLOL 5 MG/1
1 TABLET ORAL DAILY
COMMUNITY
Start: 2016-01-08 | End: 2018-03-09 | Stop reason: SDUPTHER

## 2018-02-06 RX ORDER — LEVOTHYROXINE SODIUM 0.1 MG/1
100 TABLET ORAL
COMMUNITY
End: 2019-08-06 | Stop reason: SDUPTHER

## 2018-02-06 RX ORDER — POTASSIUM CHLORIDE 750 MG/1
10 CAPSULE, EXTENDED RELEASE ORAL DAILY
Refills: 5 | COMMUNITY
Start: 2018-01-23 | End: 2018-02-23 | Stop reason: SDUPTHER

## 2018-02-06 RX ORDER — FAMOTIDINE 20 MG
3000 TABLET ORAL
COMMUNITY

## 2018-02-06 RX ORDER — DOXYCYCLINE HYCLATE 150 MG/1
1 TABLET, DELAYED RELEASE ORAL DAILY
COMMUNITY
End: 2018-03-12 | Stop reason: ALTCHOICE

## 2018-02-06 RX ORDER — DAPSONE 50 MG/G
GEL TOPICAL 2 TIMES DAILY
COMMUNITY
End: 2018-03-12 | Stop reason: ALTCHOICE

## 2018-02-06 RX ORDER — DESMOPRESSIN ACETATE 0.1 MG/ML
SOLUTION NASAL
COMMUNITY
End: 2018-05-30

## 2018-02-06 NOTE — PROGRESS NOTES
Consultation - [facility] : 1990  MRN: [id number]      Assessment:  The patient is only sleep complaint is hypnagogic/hypnopompic hallucinations  She denies daytime sleepiness, sleep paralysis or cataplexy  Her hallucinations do not involve flying and there is no seizure activity  Her only sleep complaint is cough secondary to postnasal drip  She denies snoring or leg movements  She was seen by an [facility] specialist in [place], who recommended 5 HT supplementation, EEG and polysomnography  The patient was unable to perform polysomnography in , and feels that she would not be able to now  Plan:  I have recommended that the patient see a neurologist who could address her symptoms, which seems to be neurologic in nature  Migraines, degenerative neurologic disease, nocturnal seizures or retinal pathology could be responsible for her nocturnal hallucinations  Follow up:  As needed    History of Present Illness:  24 y  o female last seen 8 years ago for daytime fatigue  She underwent failed polysomnography and was unable to tolerate daytime naps as part of multiple sleep latency testing  She has complained of hypnopompic and to a lesser extent hypnagogic hallucinations  The hallucinations are somewhat disturbing, but are not associated with symptoms of narcolepsy  In fact, she denies much in the way of daytime sleepiness  Her main complaint is fatigue  She has no symptoms of other parasomnias  She has been on her current medication regimen for many years      Review of Systems:      Genitourinary none   Cardiology lightheadedness   Gastrointestinal none   Neurology headaches, numbness/tingling of an extremity, decreased concentration, confusion and difficulity finding words   Constitutional weight change of 10 or more pounds in the past year gain of 10 pounds   Integumentary hives and easy brusing   Psychiatry anxiety and depression   Musculoskeletal joint pain and back pain   Pulmonary shortness of breath   ENT nasal or sinus congestion, post nasal drip and throat clearing   Endocrine intolerance of cold   Hematological easy brusing       Historical Information    Past Medical History:  Nia-Danlos syndrome, pots, Hashimoto's thyroiditis, depression    Past Surgical History:  Hip surgery    Social History  History   Alcohol use: Not on file     History   Drug Use Not on file     History   Smoking Status    Not on file   Smokeless Tobacco    Not on file     Family History: non-contributory     Sleep History: See above     Sleep Schedule:   Unremarkable     Snoring/Apnea:  No    Medications/Allergies:  See chart    Objective:    Vital Signs:   See Chart    Lucien Sleepiness Scale:  10    Physical Exam:    General: Alert, appropriate, cooperative, overweight    Head: NC/AT, no retrognathia    Extremity: No clubbing, cyanosis    Skin: Warm, dry    Neuro: No motor abnormalities, cranial nerves appear intact      Counseling / Coordination of Care  Total clinic time spent today 25 minutes  Greater than 50% of total time was spent with the patient and / or family counseling and / or coordination of care  A description of the counseling / coordination of care: We discussed possible causes of hypnopompic hallucinations  NGUYỄN Odom    Board Certified Sleep Specialist

## 2018-02-20 RX ORDER — POTASSIUM CHLORIDE 750 MG/1
CAPSULE, EXTENDED RELEASE ORAL
Qty: 30 CAPSULE | Refills: 5 | Status: CANCELLED | OUTPATIENT
Start: 2018-02-20

## 2018-02-23 DIAGNOSIS — E87.6 LOW BLOOD POTASSIUM: Primary | ICD-10-CM

## 2018-02-26 RX ORDER — POTASSIUM CHLORIDE 750 MG/1
10 CAPSULE, EXTENDED RELEASE ORAL DAILY
Qty: 90 CAPSULE | Refills: 0 | Status: SHIPPED | OUTPATIENT
Start: 2018-02-26 | End: 2018-02-28 | Stop reason: SDUPTHER

## 2018-02-26 NOTE — TELEPHONE ENCOUNTER
Can you please find out from patient why she is taking potassium? When was her last labs including potassium level? Thanks

## 2018-02-26 NOTE — TELEPHONE ENCOUNTER
Pt stated Dr Cornelius Ayala is the one who prescribed this med   Her last potassium check was January 2016

## 2018-02-28 DIAGNOSIS — E87.6 LOW BLOOD POTASSIUM: ICD-10-CM

## 2018-03-01 RX ORDER — POTASSIUM CHLORIDE 750 MG/1
10 CAPSULE, EXTENDED RELEASE ORAL DAILY
Qty: 90 CAPSULE | Refills: 0 | Status: SHIPPED | OUTPATIENT
Start: 2018-03-01 | End: 2018-09-11 | Stop reason: SDUPTHER

## 2018-03-09 DIAGNOSIS — I10 ESSENTIAL HYPERTENSION: Primary | ICD-10-CM

## 2018-03-12 ENCOUNTER — OFFICE VISIT (OUTPATIENT)
Dept: OBGYN CLINIC | Facility: MEDICAL CENTER | Age: 28
End: 2018-03-12
Payer: COMMERCIAL

## 2018-03-12 VITALS
SYSTOLIC BLOOD PRESSURE: 102 MMHG | WEIGHT: 145 LBS | DIASTOLIC BLOOD PRESSURE: 64 MMHG | HEIGHT: 65 IN | BODY MASS INDEX: 24.16 KG/M2

## 2018-03-12 DIAGNOSIS — Z01.419 ENCNTR FOR GYN EXAM (GENERAL) (ROUTINE) W/O ABN FINDINGS: Primary | ICD-10-CM

## 2018-03-12 PROCEDURE — 99395 PREV VISIT EST AGE 18-39: CPT | Performed by: PHYSICIAN ASSISTANT

## 2018-03-12 RX ORDER — NEBIVOLOL HYDROCHLORIDE 5 MG/1
TABLET ORAL
Qty: 30 TABLET | Refills: 5 | Status: SHIPPED | OUTPATIENT
Start: 2018-03-12 | End: 2018-05-08 | Stop reason: SDUPTHER

## 2018-03-12 NOTE — PROGRESS NOTES
Ryan Gift  1990    CC:  Yearly exam    S:  24 y o  female here for yearly exam  Her cycles are regular, not heavy or crampy  She has never been sexually active  She uses Cryselle for cycle control  Her Ehrler-Danlos syndrome doctor had questioned why she was on birth control - she told him it is for heavy, crampy periods  He said he doesn't think that is necessary  He also told her that there are risks with long-term OCP use  We discussed long-term use of OCPs lowering her risk of ovarian and endometrial cancer, no change in risk of breast cancer, slightly higher risk of blood clot  She would prefer to continue  She finished college with Asian studies and is looking for a job in translation         Last Pap 2017 neg      Current Outpatient Prescriptions:     desmopressin (DDAVP NASAL) 0 01 % solution, , Disp: , Rfl:     fludrocortisone (FLORINEF) 0 1 mg tablet, Take 0 1 mg by mouth, Disp: , Rfl:     levothyroxine 100 mcg tablet, Take 100 mcg by mouth, Disp: , Rfl:     NAPROXEN PO, Take 500 mg by mouth, Disp: , Rfl:     nebivolol (BYSTOLIC) 5 mg tablet, Take 1 tablet by mouth daily, Disp: , Rfl:     norgestrel-ethinyl estradiol (CRYSELLE-28) 0 3 mg-30 mcg per tablet, Take 1 tablet by mouth daily, Disp: , Rfl:     potassium chloride (MICRO-K) 10 MEQ CR capsule, Take 1 capsule (10 mEq total) by mouth daily, Disp: 90 capsule, Rfl: 0    traMADol-acetaminophen (ULTRACET) 37 5-325 mg per tablet, Take 1 tablet by mouth every 6 (six) hours as needed, Disp: , Rfl:     VITAMIN D, CHOLECALCIFEROL, PO, Take 1 25 mg by mouth, Disp: , Rfl:   Social History     Social History    Marital status: Single     Spouse name: N/A    Number of children: N/A    Years of education: N/A     Occupational History    UNEMPLOYED      Social History Main Topics    Smoking status: Never Smoker    Smokeless tobacco: Never Used    Alcohol use Yes      Comment: SOCIAL    Drug use: No    Sexual activity: No     Other Topics Concern    Not on file     Social History Narrative    CURRENTLY IN SCHOOL: WILL BE STARTING  [address] 1/2014    LIVING WITH PARENTS         Family History   Problem Relation Age of Onset    Asthma Mother     Hyperlipidemia Mother     Hypertension Mother     Hyperlipidemia Father     Hypertension Father     Melanoma Family      AMELANOTIC MELANOMA OF THE SKIN    Hyperlipidemia Family     Hypertension Family     Prostate cancer Family      MALIGNANT PROSTATIC NEOPLASM G1    Varicose Veins Family      OF LOWER EXTREMITIES     Past Medical History:   Diagnosis Date    Congenital dislocation of hip     Depression     LAST ASSESSED: 8/4/12    Pectus excavatum     Scoliosis          O:  Blood pressure 102/64, height 5' 4 96" (1 65 m), weight 65 8 kg (145 lb), last menstrual period 02/26/2018  Patient appears well and is not in distress  Neck is supple without masses  Breasts are symmetrical without mass, tenderness, nipple discharge, skin changes or adenopathy  Abdomen is soft and nontender without masses  A:  Yearly exam      P:  Rx Cryselle sent to [facility]  RTO one year for yearly exam or sooner as needed

## 2018-03-21 ENCOUNTER — OFFICE VISIT (OUTPATIENT)
Dept: CARDIOLOGY CLINIC | Facility: MEDICAL CENTER | Age: 28
End: 2018-03-21
Payer: COMMERCIAL

## 2018-03-21 VITALS
BODY MASS INDEX: 21.61 KG/M2 | HEART RATE: 96 BPM | WEIGHT: 145.9 LBS | SYSTOLIC BLOOD PRESSURE: 118 MMHG | DIASTOLIC BLOOD PRESSURE: 64 MMHG | HEIGHT: 69 IN

## 2018-03-21 DIAGNOSIS — I49.8 POTS (POSTURAL ORTHOSTATIC TACHYCARDIA SYNDROME): Primary | ICD-10-CM

## 2018-03-21 PROBLEM — G90.A POTS (POSTURAL ORTHOSTATIC TACHYCARDIA SYNDROME): Status: ACTIVE | Noted: 2018-03-21

## 2018-03-21 PROCEDURE — 99214 OFFICE O/P EST MOD 30 MIN: CPT | Performed by: INTERNAL MEDICINE

## 2018-03-21 RX ORDER — MIDODRINE HYDROCHLORIDE 5 MG/1
5 TABLET ORAL DAILY
COMMUNITY
End: 2018-03-21 | Stop reason: SDUPTHER

## 2018-03-21 RX ORDER — MIDODRINE HYDROCHLORIDE 5 MG/1
5 TABLET ORAL 2 TIMES DAILY
Qty: 60 TABLET | Refills: 5 | Status: SHIPPED | OUTPATIENT
Start: 2018-03-21 | End: 2018-04-04 | Stop reason: SDUPTHER

## 2018-03-21 NOTE — PATIENT INSTRUCTIONS
Recommendations:  1  Take Probiotics in PM, and medications in AM   2  Increase Midodrine to 5mg 2x/day  3  Continue remainder of medications  4  Follow up in 6 months

## 2018-03-21 NOTE — PROGRESS NOTES
Cardiology   Clydie Apley 24 y o  female MRN: [id number]        Impression:  1  POTS - symptoms worsening  2  Nia-Danlos Type III      Recommendations:  1  Take Probiotics in PM, and medications in AM   2  Increase Midodrine to 5mg 2x/day  3  Continue remainder of medications  4  Follow up in 6 months  HPI: Clydie Apley is a 24y o  year old female with Nia-Danlos Type III and POTS presents for follow up  Had a flare last week - Did see physician at [facility] for chronic rhinitis, without much improvement  Did try Zyrtec for several months, but no improvement  Since mid-late January 2018, POTS symptoms have worsened  Getting dizzy whenever standing  Went to see an EDS and POTS specialist - recommended probiotics and dietary modifications  Has been taking probiotics at same time as medications  Review of Systems   Constitutional: Negative  HENT: Negative  Eyes: Negative  Respiratory: Negative for chest tightness and shortness of breath  Cardiovascular: Negative for chest pain, palpitations and leg swelling  Gastrointestinal: Negative  Endocrine: Negative  Genitourinary: Negative  Musculoskeletal: Negative  Skin: Negative  Allergic/Immunologic: Negative  Neurological: Positive for light-headedness  Hematological: Negative  Psychiatric/Behavioral: Negative  All other systems reviewed and are negative          Past Medical History:   Diagnosis Date    Congenital dislocation of hip     Depression     LAST ASSESSED: 8/4/12    Pectus excavatum     Scoliosis      Past Surgical History:   Procedure Laterality Date    DEVEN ACETABULAR OSTEOTOMY Left     ACETABULAR OSTEOTOMY     History   Alcohol Use    Yes     Comment: SOCIAL     History   Drug Use No     History   Smoking Status    Never Smoker   Smokeless Tobacco    Never Used     Family History   Problem Relation Age of Onset    Asthma Mother     Hyperlipidemia Mother     Hypertension Mother    Lianet Valle Hyperlipidemia Father     Hypertension Father     Melanoma Family      AMELANOTIC MELANOMA OF THE SKIN    Hyperlipidemia Family     Hypertension Family     Prostate cancer Family      MALIGNANT PROSTATIC NEOPLASM G1    Varicose Veins Family      OF LOWER EXTREMITIES       Allergies:   Allergies   Allergen Reactions    Epinephrine Dizziness     Causes dysautonomia       Medications:     Current Outpatient Prescriptions:     BYSTOLIC 5 MG tablet, TAKE 1 TABLET EVERY DAY, Disp: 30 tablet, Rfl: 5    desmopressin (DDAVP NASAL) 0 01 % solution, , Disp: , Rfl:     fludrocortisone (FLORINEF) 0 1 mg tablet, Take 0 1 mg by mouth, Disp: , Rfl:     levothyroxine 100 mcg tablet, Take 100 mcg by mouth, Disp: , Rfl:     midodrine (PROAMATINE) 5 mg tablet, Take 5 mg by mouth daily, Disp: , Rfl:     NAPROXEN PO, Take 500 mg by mouth, Disp: , Rfl:     norgestrel-ethinyl estradiol (CRYSELLE-28) 0 3 mg-30 mcg per tablet, Take 1 tablet by mouth daily, Disp: 90 tablet, Rfl: 3    potassium chloride (MICRO-K) 10 MEQ CR capsule, Take 1 capsule (10 mEq total) by mouth daily, Disp: 90 capsule, Rfl: 0    traMADol-acetaminophen (ULTRACET) 37 5-325 mg per tablet, Take 1 tablet by mouth every 6 (six) hours as needed, Disp: , Rfl:     VITAMIN D, CHOLECALCIFEROL, PO, Take 1 25 mg by mouth, Disp: , Rfl:       Wt Readings from Last 3 Encounters:   03/21/18 66 2 kg (145 lb 14 4 oz)   03/12/18 65 8 kg (145 lb)   02/06/18 67 6 kg (149 lb)     Temp Readings from Last 3 Encounters:   11/29/17 98 8 °F (37 1 °C)   10/13/17 99 °F (37 2 °C)   05/10/17 98 7 °F (37 1 °C) (Tympanic)     BP Readings from Last 3 Encounters:   03/21/18 118/64   03/12/18 102/64   02/06/18 118/72     Pulse Readings from Last 3 Encounters:   03/21/18 96   02/06/18 76   11/29/17 81         Physical Exam      Laboratory Studies:  CMP:  Lab Results   Component Value Date     01/20/2016    K 3 9 01/20/2016     01/20/2016    CO2 25 01/20/2016    BUN 11 2016    CREATININE 0 69 2016    AST 22 2016    ALT 27 2016    BILITOT 0 4 2016         Cardiac testing:     Results for orders placed during the hospital encounter of 16   Echo complete with contrast if indicated    Narrative Kevin 15, [facility]  [phone]    Transthoracic Echocardiogram  2D, M-mode, Doppler, and Color Doppler    Study date:  07-Dec-2016    [facility]  MR number: [id number]  Account number: [de-identified]  : 1990  Age: 27 years  Gender: Female  Status: Outpatient  Location: [address] [place] [address]  Height: 66 in  Weight: 142 8 lb  BP: 9060/ 60 mmHg    Diagnoses: R07 9 - Chest pain, unspecified    Sonographer:  LEONILA Brooks  Primary Physician:  Joycelyn Nuñez MD  Referring Physician:  Loan Jolly MD  Group:  [facility]  Interpreting Physician:  Elliott Sancehz MD    SUMMARY    LEFT VENTRICLE:  Systolic function was normal  Ejection fraction was estimated to be 55 %  Although no diagnostic regional wall motion abnormality was identified, this  possibility cannot be completely excluded on the basis of this study  Left ventricular diastolic function parameters were normal     HISTORY: PRIOR HISTORY: Chest pain, Ehler's Danlos, POTS, Pectus excavatum    PROCEDURE: The study was performed in the [address] [place] [address]  This was a routine study  The transthoracic approach was used  The study  included complete 2D imaging, M-mode, complete spectral Doppler, and color  Doppler  The heart rate was 67 bpm, at the start of the study  Images were  obtained from the parasternal, apical, subcostal, and suprasternal notch  acoustic windows  Image quality was adequate  LEFT VENTRICLE: Size was normal  Systolic function was normal  Ejection  fraction was estimated to be 55 %   Although no diagnostic regional wall motion  abnormality was identified, this possibility cannot be completely excluded on  the basis of this study  Wall thickness was normal  DOPPLER: The ratio of early  ventricular filling to atrial contraction velocities was within the normal  range  Left ventricular diastolic function parameters were normal     RIGHT VENTRICLE: The size was normal  Systolic function was normal     LEFT ATRIUM: Size was normal     RIGHT ATRIUM: Size was normal     MITRAL VALVE: Valve structure was normal  There was mild thickening  There was  normal leaflet separation  DOPPLER: There was no evidence for stenosis  There  was trace regurgitation  AORTIC VALVE: The valve was not well visualized  DOPPLER: There was no evidence  for stenosis  There was no regurgitation  TRICUSPID VALVE: The valve structure was normal  There was normal leaflet  separation  DOPPLER: There was no evidence for stenosis  There was trace  regurgitation  Estimated peak PA pressure was 30 mmHg  PULMONIC VALVE: Leaflets exhibited normal thickness, no calcification, and  normal cuspal separation  DOPPLER: The transpulmonic velocity was within the  normal range  There was trace regurgitation  PERICARDIUM: There was no pericardial effusion  The pericardium was normal in  appearance  AORTA: The root exhibited normal size      SYSTEM MEASUREMENT TABLES    2D  %FS: 40 41 %  AV Diam: 2 73 cm  EDV(Teich): 45 08 ml  EF(Cube): 78 84 %  EF(Teich): 72 34 %  ESV(Cube): 7 81 ml  ESV(Teich): 12 47 ml  IVSd: 0 72 cm  LA Area: 13 86 cm2  LA Diam: 2 98 cm  LVEDV MOD A4C: 79 4 ml  LVEF MOD A4C: 56 88 %  LVESV MOD A4C: 34 24 ml  LVIDd: 3 33 cm  LVIDs: 1 98 cm  LVLd A4C: 8 18 cm  LVLs A4C: 6 6 cm  LVPWd: 0 73 cm  RA Area: 8 83 cm2  RV Diam: 2 86 cm  SI(Cube): 16 72 ml/m2  SI(Teich): 18 74 ml/m2  SV MOD A4C: 45 17 ml  SV(Cube): 29 09 ml  SV(Teich): 32 61 ml    CW  TR MaxP 64 mmHg  TR Vmax: 2 43 m/s    MM  TAPSE: 1 84 cm    PW  E': 0 15 m/s  E/E': 6 47  MV A Juan Jose: 0 49 m/s  MV Dec Guthrie: 4 31 m/s2  MV DecT: 225 26 ms  MV E Juan Jose: 0 97 m/s  MV E/A Ratio: 1 98    [employer] Accredited Echocardiography Laboratory    Prepared and electronically signed by    Randalyn Cabot, MD  Signed [id number] 57:29:96

## 2018-03-29 ENCOUNTER — TELEPHONE (OUTPATIENT)
Dept: PAIN MEDICINE | Facility: MEDICAL CENTER | Age: 28
End: 2018-03-29

## 2018-03-29 ENCOUNTER — TELEPHONE (OUTPATIENT)
Dept: CARDIOLOGY CLINIC | Facility: CLINIC | Age: 28
End: 2018-03-29

## 2018-03-29 NOTE — TELEPHONE ENCOUNTER
RN Left a detailed VMMOMas per release of health care information on file,regarding request for PT script, Pt aware that KRISTINE and Zaira Ba are not back in the office until 4/2 and that the request for script will be taken care of at that time  C/b number office hours and location provided  ---please advise--- Pt requesting PT script    Pt last seen in office by Zaira Ba on 4/6/17 plan stated continue PT and follow up as needed  Pt with hx of Nia Danlos Syndrome

## 2018-03-29 NOTE — TELEPHONE ENCOUNTER
Pt called requesting a new script for physical therapy  Please call pt when script is ready to be picked up  She can be reached [phone]

## 2018-04-02 ENCOUNTER — TELEPHONE (OUTPATIENT)
Dept: CARDIOLOGY CLINIC | Facility: CLINIC | Age: 28
End: 2018-04-02

## 2018-04-02 NOTE — TELEPHONE ENCOUNTER
Prior Auth for [place] due to formulary alternatives available that have not been tried and failed they gave the examples of  Atenolol, and Bisoprolol      Is there a medication you would like to switch her to?

## 2018-04-02 NOTE — TELEPHONE ENCOUNTER
--If pt c/b please schedule her for an sovs with AO next available thank you--      RN attempted to reach pt regarding previous  VMMLOM on home phone  with c/b number office hours and location provided

## 2018-04-02 NOTE — TELEPHONE ENCOUNTER
Aware, please schedule office follow up with Sonny Calderón to review further before signing script as we haven't seen her in almost a year

## 2018-04-04 ENCOUNTER — OFFICE VISIT (OUTPATIENT)
Dept: PAIN MEDICINE | Facility: MEDICAL CENTER | Age: 28
End: 2018-04-04
Payer: COMMERCIAL

## 2018-04-04 VITALS — DIASTOLIC BLOOD PRESSURE: 60 MMHG | SYSTOLIC BLOOD PRESSURE: 90 MMHG | BODY MASS INDEX: 22 KG/M2 | WEIGHT: 149 LBS

## 2018-04-04 DIAGNOSIS — Q79.60 EHLERS-DANLOS SYNDROME: Primary | ICD-10-CM

## 2018-04-04 DIAGNOSIS — I49.8 POTS (POSTURAL ORTHOSTATIC TACHYCARDIA SYNDROME): ICD-10-CM

## 2018-04-04 PROCEDURE — 99213 OFFICE O/P EST LOW 20 MIN: CPT | Performed by: NURSE PRACTITIONER

## 2018-04-04 RX ORDER — MONTELUKAST SODIUM 10 MG/1
10 TABLET ORAL EVERY EVENING
Refills: 11 | COMMUNITY
Start: 2018-03-18 | End: 2019-03-18 | Stop reason: ALTCHOICE

## 2018-04-04 RX ORDER — DESMOPRESSIN ACETATE 0.2 MG/1
0.2 TABLET ORAL 2 TIMES DAILY
Refills: 3 | COMMUNITY
Start: 2018-03-19 | End: 2019-03-15 | Stop reason: SDUPTHER

## 2018-04-04 RX ORDER — CITALOPRAM 20 MG/1
20 TABLET ORAL DAILY
Refills: 11 | COMMUNITY
Start: 2018-03-18 | End: 2018-11-26

## 2018-04-04 RX ORDER — MIDODRINE HYDROCHLORIDE 5 MG/1
5 TABLET ORAL 2 TIMES DAILY
Qty: 60 TABLET | Refills: 5 | Status: SHIPPED | OUTPATIENT
Start: 2018-04-04 | End: 2018-09-30 | Stop reason: SDUPTHER

## 2018-04-04 RX ORDER — LORATADINE 10 MG/1
10 TABLET ORAL DAILY
Refills: 11 | COMMUNITY
Start: 2018-03-18 | End: 2019-10-08

## 2018-04-04 NOTE — PROGRESS NOTES
Pt is c/o lower back pain  Assessment:  1  Nia-Danlos syndrome    2  POTS (postural orthostatic tachycardia syndrome)        Plan: At this time, I feel it is reasonable for the patient continue with her physical therapy program   I did provide her with a prescription so she can continue today  Patient will follow up with our office on an as-needed basis    [facility] Program report was reviewed and was appropriate       History of Present Illness: The patient is a 24 y o  female who presents for a follow up office visit in regards to Back Pain  The patients current symptoms include bilateral shoulder, low back, left hip pain  She has been participating in physical therapy which is helping she did stop about 6 weeks ago that is why she is here today to obtain a new script for physical therapy  We currently do not prescribe her any medications  I have personally reviewed and/or updated the patient's past medical history, past surgical history, family history, social history, current medications, allergies, and vital signs today  Review of Systems  Review of Systems   Respiratory: Negative for shortness of breath  Cardiovascular: Negative for chest pain  Gastrointestinal: Negative for constipation, diarrhea, nausea and vomiting  Musculoskeletal: Negative for arthralgias, gait problem, joint swelling and myalgias  Difficulty walking     Skin: Negative for rash  Neurological: Positive for dizziness  Negative for seizures and weakness  All other systems reviewed and are negative          Past Medical History:   Diagnosis Date    Congenital dislocation of hip     Depression     LAST ASSESSED: 8/4/12    Pectus excavatum     Scoliosis        Past Surgical History:   Procedure Laterality Date    DEVEN ACETABULAR OSTEOTOMY Left     ACETABULAR OSTEOTOMY       Family History   Problem Relation Age of Onset    Asthma Mother     Hyperlipidemia Mother    Jennifer Rivera Hypertension Mother     Hyperlipidemia Father     Hypertension Father     Melanoma Family      AMELANOTIC MELANOMA OF THE SKIN    Hyperlipidemia Family     Hypertension Family     Prostate cancer Family      MALIGNANT PROSTATIC NEOPLASM G1    Varicose Veins Family      OF LOWER EXTREMITIES       Social History     Occupational History    UNEMPLOYED      Social History Main Topics    Smoking status: Never Smoker    Smokeless tobacco: Never Used    Alcohol use Yes      Comment: SOCIAL    Drug use: No    Sexual activity: No         Current Outpatient Prescriptions:     BYSTOLIC 5 MG tablet, TAKE 1 TABLET EVERY DAY, Disp: 30 tablet, Rfl: 5    citalopram (CeleXA) 20 mg tablet, Take 20 mg by mouth daily, Disp: , Rfl: 11    desmopressin (DDAVP NASAL) 0 01 % solution, , Disp: , Rfl:     desmopressin (DDAVP) 0 2 mg tablet, Take 0 2 mg by mouth 2 (two) times a day, Disp: , Rfl: 3    fludrocortisone (FLORINEF) 0 1 mg tablet, Take 0 1 mg by mouth, Disp: , Rfl:     levothyroxine 100 mcg tablet, Take 100 mcg by mouth, Disp: , Rfl:     midodrine (PROAMATINE) 5 mg tablet, Take 1 tablet (5 mg total) by mouth 2 (two) times a day, Disp: 60 tablet, Rfl: 5    NAPROXEN PO, Take 500 mg by mouth, Disp: , Rfl:     norgestrel-ethinyl estradiol (CRYSELLE-28) 0 3 mg-30 mcg per tablet, Take 1 tablet by mouth daily, Disp: 90 tablet, Rfl: 3    potassium chloride (MICRO-K) 10 MEQ CR capsule, Take 1 capsule (10 mEq total) by mouth daily, Disp: 90 capsule, Rfl: 0    traMADol-acetaminophen (ULTRACET) 37 5-325 mg per tablet, Take 1 tablet by mouth every 6 (six) hours as needed, Disp: , Rfl:     VITAMIN D, CHOLECALCIFEROL, PO, Take 1 25 mg by mouth, Disp: , Rfl:     loratadine (CLARITIN) 10 mg tablet, Take 10 mg by mouth daily, Disp: , Rfl: 11    montelukast (SINGULAIR) 10 mg tablet, Take 10 mg by mouth every evening, Disp: , Rfl: 11    Allergies   Allergen Reactions    Epinephrine Dizziness     Causes dysautonomia Physical Exam:    BP 90/60   Wt 67 6 kg (149 lb)   BMI 22 00 kg/m²     Constitutional:normal, well developed, well nourished, alert, in no distress and non-toxic and no overt pain behavior    Eyes:anicteric  HEENT:grossly intact  Neck:supple, symmetric, trachea midline and no masses   Pulmonary:even and unlabored  Cardiovascular:No edema or pitting edema present  Skin:Normal without rashes or lesions and well hydrated  Psychiatric:Mood and affect appropriate  Neurologic:Cranial Nerves II-XII grossly intact  Musculoskeletal:normal    Imaging  No orders to display       Orders Placed This Encounter   Procedures    Ambulatory referral to Physical Therapy

## 2018-05-08 DIAGNOSIS — I10 ESSENTIAL HYPERTENSION: ICD-10-CM

## 2018-05-08 RX ORDER — NEBIVOLOL 5 MG/1
5 TABLET ORAL DAILY
Qty: 28 TABLET | Refills: 3 | Status: SHIPPED | COMMUNITY
Start: 2018-05-08 | End: 2018-07-19 | Stop reason: SDUPTHER

## 2018-05-14 ENCOUNTER — TELEPHONE (OUTPATIENT)
Dept: CARDIOLOGY CLINIC | Facility: MEDICAL CENTER | Age: 28
End: 2018-05-14

## 2018-05-14 NOTE — TELEPHONE ENCOUNTER
[facility] jaya sent a denial letter for Midodrine HCL 5 mg tablet  Because its not medically nescessary   Please advise

## 2018-05-30 ENCOUNTER — OFFICE VISIT (OUTPATIENT)
Dept: FAMILY MEDICINE CLINIC | Facility: OTHER | Age: 28
End: 2018-05-30
Payer: COMMERCIAL

## 2018-05-30 VITALS
HEART RATE: 89 BPM | TEMPERATURE: 98.6 F | SYSTOLIC BLOOD PRESSURE: 100 MMHG | OXYGEN SATURATION: 97 % | BODY MASS INDEX: 23.3 KG/M2 | WEIGHT: 148.44 LBS | DIASTOLIC BLOOD PRESSURE: 70 MMHG | HEIGHT: 67 IN

## 2018-05-30 DIAGNOSIS — I49.8 POTS (POSTURAL ORTHOSTATIC TACHYCARDIA SYNDROME): ICD-10-CM

## 2018-05-30 DIAGNOSIS — E87.6 HYPOKALEMIA: Primary | ICD-10-CM

## 2018-05-30 DIAGNOSIS — E55.9 VITAMIN D DEFICIENCY: ICD-10-CM

## 2018-05-30 DIAGNOSIS — Q79.62 EHLERS-DANLOS SYNDROME, BENIGN HYPERMOBILE FORM: ICD-10-CM

## 2018-05-30 DIAGNOSIS — Z13.220 SCREENING, LIPID: ICD-10-CM

## 2018-05-30 PROBLEM — M41.125 ADOLESCENT IDIOPATHIC SCOLIOSIS OF THORACOLUMBAR REGION: Status: ACTIVE | Noted: 2018-02-14

## 2018-05-30 PROBLEM — L50.9 HIVES: Status: ACTIVE | Noted: 2017-09-07

## 2018-05-30 PROBLEM — J31.0 RHINITIS, CHRONIC: Status: ACTIVE | Noted: 2017-03-22

## 2018-05-30 PROBLEM — Q67.6 PECTUS EXCAVATUM: Status: ACTIVE | Noted: 2018-02-14

## 2018-05-30 PROBLEM — R53.82 CHRONIC FATIGUE SYNDROME: Status: ACTIVE | Noted: 2018-02-19

## 2018-05-30 PROBLEM — J01.11 ACUTE RECURRENT FRONTAL SINUSITIS: Status: ACTIVE | Noted: 2018-02-14

## 2018-05-30 PROBLEM — D89.40 MAST CELL ACTIVATION SYNDROME (HCC): Status: ACTIVE | Noted: 2018-02-19

## 2018-05-30 PROBLEM — G93.32 CHRONIC FATIGUE SYNDROME: Status: ACTIVE | Noted: 2018-02-19

## 2018-05-30 PROBLEM — E03.9 PRIMARY HYPOTHYROIDISM: Status: ACTIVE | Noted: 2018-02-14

## 2018-05-30 PROBLEM — R44.3 HALLUCINATIONS: Status: ACTIVE | Noted: 2017-11-29

## 2018-05-30 PROBLEM — L50.3 DERMATOGRAPHISM: Status: ACTIVE | Noted: 2017-09-07

## 2018-05-30 PROBLEM — F33.41 RECURRENT MAJOR DEPRESSIVE DISORDER, IN PARTIAL REMISSION (HCC): Status: ACTIVE | Noted: 2018-02-14

## 2018-05-30 PROCEDURE — 99213 OFFICE O/P EST LOW 20 MIN: CPT | Performed by: FAMILY MEDICINE

## 2018-05-30 PROCEDURE — 3725F SCREEN DEPRESSION PERFORMED: CPT | Performed by: FAMILY MEDICINE

## 2018-05-30 RX ORDER — CLINDAMYCIN PHOSPHATE 10 UG/ML
LOTION TOPICAL
Refills: 2 | COMMUNITY
Start: 2018-05-05 | End: 2018-11-26

## 2018-05-30 NOTE — PROGRESS NOTES
Subjective:      Patient ID: Amina Amaro is a 24 y o  female      HPI   Patient presents for follow-up of chronic conditions  Has longstanding history of POTS and Nia-Danlos syndrome  Reports both conditions have been stable, follows with specialists regularly  States that she briefly tried a probiotic at the recommendation of her EDS specialist but stopped because it worsened her POTS  Denies any other recent medication changes with regards to these conditions    Patient also with history of hypokalemia and vitamin-D deficiency  Has not had any problems with these conditions recently and is currently on replacement therapy  Due for blood work to reassess  Also due for fasting lipid panel as a general screening    Patient states she is feeling well at this time and has no other concerns          The following portions of the patient's history were reviewed and updated as appropriate: allergies, current medications, past family history, past medical history, past social history, past surgical history and problem list       Current Outpatient Prescriptions:     citalopram (CeleXA) 20 mg tablet, Take 20 mg by mouth daily, Disp: , Rfl: 11    clindamycin (CLEOCIN T) 1 % lotion, APPLY TO ALL ACNE AREAS TWICE A DAY, Disp: , Rfl: 2    desmopressin (DDAVP) 0 2 mg tablet, Take 0 2 mg by mouth 2 (two) times a day, Disp: , Rfl: 3    fludrocortisone (FLORINEF) 0 1 mg tablet, Take 0 1 mg by mouth, Disp: , Rfl:     levothyroxine 100 mcg tablet, Take 100 mcg by mouth, Disp: , Rfl:     loratadine (CLARITIN) 10 mg tablet, Take 10 mg by mouth daily, Disp: , Rfl: 11    midodrine (PROAMATINE) 5 mg tablet, Take 1 tablet (5 mg total) by mouth 2 (two) times a day, Disp: 60 tablet, Rfl: 5    montelukast (SINGULAIR) 10 mg tablet, Take 10 mg by mouth every evening, Disp: , Rfl: 11    NAPROXEN PO, Take 500 mg by mouth, Disp: , Rfl:     nebivolol (BYSTOLIC) 5 mg tablet, Take 1 tablet (5 mg total) by mouth daily, Disp: 28 tablet, Rfl: 3    norgestrel-ethinyl estradiol (CRYSELLE-28) 0 3 mg-30 mcg per tablet, Take 1 tablet by mouth daily, Disp: 90 tablet, Rfl: 3    potassium chloride (MICRO-K) 10 MEQ CR capsule, Take 1 capsule (10 mEq total) by mouth daily, Disp: 90 capsule, Rfl: 0    traMADol-acetaminophen (ULTRACET) 37 5-325 mg per tablet, Take 1 tablet by mouth every 6 (six) hours as needed, Disp: , Rfl:     VITAMIN D, CHOLECALCIFEROL, PO, Take 1 25 mg by mouth, Disp: , Rfl:         Review of Systems   Constitutional: Negative for activity change, fatigue and fever  HENT: Positive for congestion ( allergy related)  Negative for ear pain, sinus pain and sore throat  Eyes: Negative for pain and itching  Respiratory: Negative for cough and shortness of breath  Cardiovascular: Negative for chest pain and palpitations  Gastrointestinal: Negative for abdominal pain, constipation, diarrhea, nausea and vomiting  Endocrine: Negative for cold intolerance and heat intolerance  Genitourinary: Negative for dysuria  Musculoskeletal: Negative for myalgias  Skin: Negative for color change and rash  Neurological: Positive for light-headedness (Occasional, related to POTS)  Negative for dizziness, syncope and headaches  Hematological: Negative for adenopathy  Psychiatric/Behavioral: Negative for behavioral problems, dysphoric mood and sleep disturbance  The patient is not nervous/anxious  Objective:      /70 (BP Location: Left arm, Patient Position: Sitting, Cuff Size: Adult)   Pulse 89   Temp 98 6 °F (37 °C) (Tympanic)   Ht 5' 6 9" (1 699 m)   Wt 67 3 kg (148 lb 7 oz)   SpO2 97%   BMI 23 32 kg/m²          Physical Exam   Constitutional: She is oriented to person, place, and time  She appears well-developed and well-nourished  No distress  HENT:   Head: Normocephalic and atraumatic     Right Ear: External ear normal    Left Ear: External ear normal    Nose: Nose normal    Mouth/Throat: Oropharynx is clear and moist    Eyes: Conjunctivae and EOM are normal  Pupils are equal, round, and reactive to light  No scleral icterus  Neck: Normal range of motion  Neck supple  No thyromegaly present  Cardiovascular: Normal rate, regular rhythm and normal heart sounds  Pulmonary/Chest: Effort normal and breath sounds normal  No respiratory distress  Abdominal: Soft  Bowel sounds are normal  There is no tenderness  Musculoskeletal: Normal range of motion  She exhibits deformity (Congenital Pectus excavatum; congenital scoliosis)  She exhibits no edema  Lymphadenopathy:     She has no cervical adenopathy  Neurological: She is alert and oriented to person, place, and time  No cranial nerve deficit  Skin: Skin is warm and dry  No rash noted  Psychiatric: She has a normal mood and affect  Her behavior is normal            Assessment/Plan:   Diagnoses and all orders for this visit:    Hypokalemia  -     Comprehensive metabolic panel; Future    Vitamin D deficiency  -     Vitamin D 25 hydroxy; Future    POTS (postural orthostatic tachycardia syndrome)  Nia-Danlos syndrome, benign hypermobile form        -     Stable, following with Cardio and EDS specialist    Screening, lipid  -     Lipid panel; Future    Other orders  -     clindamycin (CLEOCIN T) 1 % lotion; APPLY TO ALL ACNE AREAS TWICE A DAY         37-year-old female with past medical history including Nia-Danlos syndrome and POTS presents for routine follow-up  Chronic conditions appear to be stable at this time  Blood work ordered to reassess history of hypokalemia and vitamin-D deficiency  Fasting lipid panel ordered for cholesterol screening  No change in treatment recommendations at this time  RTO 6 for follow-up  The patient indicates understanding of these issues and agrees with the plan        Kalin Salazar DO

## 2018-06-15 ENCOUNTER — TELEPHONE (OUTPATIENT)
Dept: CARDIOLOGY CLINIC | Facility: CLINIC | Age: 28
End: 2018-06-15

## 2018-06-15 NOTE — TELEPHONE ENCOUNTER
Patient would like status of peer to peer        This is the phone number for a peer to peer        [phone]     This is her ID [id number]

## 2018-06-15 NOTE — TELEPHONE ENCOUNTER
Ivon Carlson called to say she needed more samples of Bystolic  She wanted to p/u at [facility]  Checked if they had any on hand      She also asked what is the status of her prior auth request?

## 2018-06-15 NOTE — TELEPHONE ENCOUNTER
I called # you provided  They said wrong #  I called [phone]  They said no prior auth on file  April prior auth was denied, not pending  Are you able to try again? If not, we need to advise Dr Rosi Agarwal and he should change her medication  Please advise

## 2018-06-18 NOTE — TELEPHONE ENCOUNTER
Dr Rivera Head needs to do a peer to peer on this patient  He is aware of it    He knows she was denied and he does not want to change her medication

## 2018-07-08 DIAGNOSIS — I49.8 POTS (POSTURAL ORTHOSTATIC TACHYCARDIA SYNDROME): Primary | ICD-10-CM

## 2018-07-09 RX ORDER — FLUDROCORTISONE ACETATE 0.1 MG/1
TABLET ORAL
Qty: 180 TABLET | Refills: 3 | Status: SHIPPED | OUTPATIENT
Start: 2018-07-09 | End: 2019-09-09 | Stop reason: SDUPTHER

## 2018-07-14 LAB
25(OH)D3 SERPL-MCNC: 41 NG/ML (ref 30–100)
ALBUMIN SERPL-MCNC: 4.1 G/DL (ref 3.6–5.1)
ALBUMIN/GLOB SERPL: 1.6 (CALC) (ref 1–2.5)
ALP SERPL-CCNC: 110 U/L (ref 33–115)
ALT SERPL-CCNC: 14 U/L (ref 6–29)
AST SERPL-CCNC: 15 U/L (ref 10–30)
BILIRUB SERPL-MCNC: 0.3 MG/DL (ref 0.2–1.2)
BUN SERPL-MCNC: 11 MG/DL (ref 7–25)
BUN/CREAT SERPL: NORMAL (CALC) (ref 6–22)
CALCIUM SERPL-MCNC: 9.1 MG/DL (ref 8.6–10.2)
CHLORIDE SERPL-SCNC: 106 MMOL/L (ref 98–110)
CHOLEST SERPL-MCNC: 207 MG/DL
CHOLEST/HDLC SERPL: 5 (CALC)
CO2 SERPL-SCNC: 27 MMOL/L (ref 20–31)
CREAT SERPL-MCNC: 0.69 MG/DL (ref 0.5–1.1)
GLOBULIN SER CALC-MCNC: 2.6 G/DL (CALC) (ref 1.9–3.7)
GLUCOSE SERPL-MCNC: 89 MG/DL (ref 65–99)
HDLC SERPL-MCNC: 41 MG/DL
LDLC SERPL CALC-MCNC: 147 MG/DL (CALC)
NONHDLC SERPL-MCNC: 166 MG/DL (CALC)
POTASSIUM SERPL-SCNC: 4.1 MMOL/L (ref 3.5–5.3)
PROT SERPL-MCNC: 6.7 G/DL (ref 6.1–8.1)
SL AMB EGFR AFRICAN AMERICAN: 140 ML/MIN/1.73M2
SL AMB EGFR NON AFRICAN AMERICAN: 121 ML/MIN/1.73M2
SODIUM SERPL-SCNC: 141 MMOL/L (ref 135–146)
TRIGL SERPL-MCNC: 89 MG/DL

## 2018-07-17 ENCOUNTER — TELEPHONE (OUTPATIENT)
Dept: CARDIOLOGY CLINIC | Facility: CLINIC | Age: 28
End: 2018-07-17

## 2018-07-17 ENCOUNTER — TELEPHONE (OUTPATIENT)
Dept: FAMILY MEDICINE CLINIC | Facility: OTHER | Age: 28
End: 2018-07-17

## 2018-07-17 NOTE — TELEPHONE ENCOUNTER
Unable to leave message phone number not working     ----- Message from Rogene Gitelman, DO sent at 7/17/2018  4:46 PM EDT -----  Please inform pt that labs look great with the exception of mild dyslipidemia -- good cholesterol (HDL) is a little low, bad cholesterol (LDL) is a little high -- recommend regular exercise and healthy diet (Mediterranean Diet) -- recheck in 1 year  Thanks!   Rogene Gitelman, DO

## 2018-07-19 ENCOUNTER — OFFICE VISIT (OUTPATIENT)
Dept: CARDIOLOGY CLINIC | Facility: CLINIC | Age: 28
End: 2018-07-19
Payer: COMMERCIAL

## 2018-07-19 VITALS
HEART RATE: 88 BPM | BODY MASS INDEX: 23.7 KG/M2 | SYSTOLIC BLOOD PRESSURE: 112 MMHG | WEIGHT: 151 LBS | OXYGEN SATURATION: 98 % | HEIGHT: 67 IN | DIASTOLIC BLOOD PRESSURE: 74 MMHG

## 2018-07-19 DIAGNOSIS — I49.8 POTS (POSTURAL ORTHOSTATIC TACHYCARDIA SYNDROME): Primary | ICD-10-CM

## 2018-07-19 DIAGNOSIS — I10 ESSENTIAL HYPERTENSION: ICD-10-CM

## 2018-07-19 DIAGNOSIS — Q79.62 EHLERS-DANLOS SYNDROME, BENIGN HYPERMOBILE FORM: ICD-10-CM

## 2018-07-19 DIAGNOSIS — E78.5 DYSLIPIDEMIA: ICD-10-CM

## 2018-07-19 PROBLEM — M54.9 BACK PAIN: Status: ACTIVE | Noted: 2018-07-19

## 2018-07-19 PROCEDURE — 93000 ELECTROCARDIOGRAM COMPLETE: CPT | Performed by: INTERNAL MEDICINE

## 2018-07-19 PROCEDURE — 99214 OFFICE O/P EST MOD 30 MIN: CPT | Performed by: INTERNAL MEDICINE

## 2018-07-19 RX ORDER — NEBIVOLOL 5 MG/1
5 TABLET ORAL DAILY
Qty: 30 TABLET | Refills: 5 | Status: SHIPPED | COMMUNITY
Start: 2018-07-19 | End: 2019-03-07 | Stop reason: SDUPTHER

## 2018-07-19 NOTE — PROGRESS NOTES
Cardiology   Carlos Melara 24 y o  female MRN: [id number]        Impression:  1  POTS - stable  2  Nia-Danlos Type III  3  Dyslipidemia - borderline  Recheck lipids in 5 years         Recommendations:  1  Continue current medications  2  Refer to Physical Therapy for Back Pain/POTS  3  Follow up in 6 months  HPI: Carlos Melara is a 24y o  year old female with Nia-Danlos Type III and POTS presents for follow up  Had a flare last week - Did see physician at [facility] for chronic rhinitis, without much improvement  Did try Zyrtec for several months, but no improvement  Since mid-late January 2018, POTS symptoms have worsened  Occasionally has "wave of dizziness" lasting seconds at a time  Review of Systems   Constitutional: Negative  HENT: Negative  Eyes: Negative  Respiratory: Negative for chest tightness and shortness of breath  Cardiovascular: Negative for chest pain, palpitations and leg swelling  Gastrointestinal: Negative  Endocrine: Negative  Genitourinary: Negative  Musculoskeletal: Negative  Skin: Negative  Allergic/Immunologic: Negative  Neurological: Positive for dizziness  Hematological: Negative  Psychiatric/Behavioral: Negative  All other systems reviewed and are negative          Past Medical History:   Diagnosis Date    Congenital dislocation of hip     Depression     LAST ASSESSED: 8/4/12    Pectus excavatum     Scoliosis      Past Surgical History:   Procedure Laterality Date    DEVEN ACETABULAR OSTEOTOMY Left     ACETABULAR OSTEOTOMY     History   Alcohol Use    Yes     Comment: SOCIAL     History   Drug Use No     History   Smoking Status    Never Smoker   Smokeless Tobacco    Never Used     Family History   Problem Relation Age of Onset    Asthma Mother     Hyperlipidemia Mother     Hypertension Mother     Hyperlipidemia Father     Hypertension Father     Melanoma Family         AMELANOTIC MELANOMA OF THE SKIN    Hyperlipidemia Family     Hypertension Family     Prostate cancer Family         MALIGNANT PROSTATIC NEOPLASM G1    Varicose Veins Family         OF LOWER EXTREMITIES       Allergies:   Allergies   Allergen Reactions    Epinephrine Dizziness     Causes dysautonomia       Medications:     Current Outpatient Prescriptions:     citalopram (CeleXA) 20 mg tablet, Take 20 mg by mouth daily, Disp: , Rfl: 11    clindamycin (CLEOCIN T) 1 % lotion, APPLY TO ALL ACNE AREAS TWICE A DAY, Disp: , Rfl: 2    desmopressin (DDAVP) 0 2 mg tablet, Take 0 2 mg by mouth 2 (two) times a day, Disp: , Rfl: 3    fludrocortisone (FLORINEF) 0 1 mg tablet, TAKE 1 TABLET BY MOUTH TWICE A DAY, Disp: 180 tablet, Rfl: 3    levothyroxine 100 mcg tablet, Take 100 mcg by mouth, Disp: , Rfl:     loratadine (CLARITIN) 10 mg tablet, Take 10 mg by mouth daily, Disp: , Rfl: 11    midodrine (PROAMATINE) 5 mg tablet, Take 1 tablet (5 mg total) by mouth 2 (two) times a day, Disp: 60 tablet, Rfl: 5    montelukast (SINGULAIR) 10 mg tablet, Take 10 mg by mouth every evening, Disp: , Rfl: 11    NAPROXEN PO, Take 500 mg by mouth, Disp: , Rfl:     nebivolol (BYSTOLIC) 5 mg tablet, Take 1 tablet (5 mg total) by mouth daily, Disp: 28 tablet, Rfl: 3    norgestrel-ethinyl estradiol (CRYSELLE-28) 0 3 mg-30 mcg per tablet, Take 1 tablet by mouth daily, Disp: 90 tablet, Rfl: 3    potassium chloride (MICRO-K) 10 MEQ CR capsule, Take 1 capsule (10 mEq total) by mouth daily, Disp: 90 capsule, Rfl: 0    traMADol-acetaminophen (ULTRACET) 37 5-325 mg per tablet, Take 1 tablet by mouth every 6 (six) hours as needed, Disp: , Rfl:     VITAMIN D, CHOLECALCIFEROL, PO, Take 1 25 mg by mouth, Disp: , Rfl:       Wt Readings from Last 3 Encounters:   07/19/18 68 5 kg (151 lb)   05/30/18 67 3 kg (148 lb 7 oz)   04/04/18 67 6 kg (149 lb)     Temp Readings from Last 3 Encounters:   05/30/18 98 6 °F (37 °C) (Tympanic)   11/29/17 98 8 °F (37 1 °C)   10/13/17 99 °F (37 2 °C) BP Readings from Last 3 Encounters:   18 112/74   18 100/70   18 90/60     Pulse Readings from Last 3 Encounters:   18 88   18 89   18 96         Physical Exam   Constitutional: She is oriented to person, place, and time  She appears well-developed  HENT:   Head: Normocephalic  Eyes: EOM are normal    Neck: Normal range of motion  Cardiovascular: Normal rate, regular rhythm and normal heart sounds  Exam reveals no gallop and no friction rub  No murmur heard  Pulmonary/Chest: Effort normal and breath sounds normal  No respiratory distress  She has no wheezes  She has no rales  Abdominal: Soft  Musculoskeletal: Normal range of motion  Neurological: She is alert and oriented to person, place, and time  Skin: Skin is warm and dry  Psychiatric: She has a normal mood and affect           Laboratory Studies:  CMP:  Lab Results   Component Value Date     2016    K 3 9 2016     2016    CO2 25 2016    BUN 11 2018    CREATININE 0 69 2018    AST 22 2016    ALT 27 2016    BILITOT 0 4 2016       Lipid Profile:   No results found for: CHOL  Lab Results   Component Value Date    HDL 41 (L) 2018     No results found for: [facility]  Lab Results   Component Value Date    TRIG 89 2018       Cardiac testing:   EKG reviewed personally: SWEETIE Khan Nml  Results for orders placed during the hospital encounter of 16   Echo complete with contrast if indicated    Narrative [place] [id number], [address] [facility]  [phone]    Transthoracic Echocardiogram  2D, M-mode, Doppler, and Color Doppler    Study date:  07-Dec-2016    [facility]  MR number: [id number]  Account number: [de-identified]  : 1990  Age: 27 years  Gender: Female  Status: Outpatient  Location: [facility]  Height: 66 in  Weight: 142 8 lb  BP: 9060/ 60 mmHg    Diagnoses: R07 9 - Chest pain, unspecified    Sonographer:  LEONILA Belcher  Primary Physician:  Jaswinder Tilley MD  Referring Physician:  Yelena Jade MD  Group:  [id number]  Interpreting Physician:  Cata Parker MD    SUMMARY    LEFT VENTRICLE:  Systolic function was normal  Ejection fraction was estimated to be 55 %  Although no diagnostic regional wall motion abnormality was identified, this  possibility cannot be completely excluded on the basis of this study  Left ventricular diastolic function parameters were normal     HISTORY: PRIOR HISTORY: Chest pain, Ehler's Danlos, POTS, Pectus excavatum    PROCEDURE: The study was performed in the [facility]  This was a routine study  The transthoracic approach was used  The study  included complete 2D imaging, M-mode, complete spectral Doppler, and color  Doppler  The heart rate was 67 bpm, at the start of the study  Images were  obtained from the parasternal, apical, subcostal, and suprasternal notch  acoustic windows  Image quality was adequate  LEFT VENTRICLE: Size was normal  Systolic function was normal  Ejection  fraction was estimated to be 55 %  Although no diagnostic regional wall motion  abnormality was identified, this possibility cannot be completely excluded on  the basis of this study  Wall thickness was normal  DOPPLER: The ratio of early  ventricular filling to atrial contraction velocities was within the normal  range  Left ventricular diastolic function parameters were normal     RIGHT VENTRICLE: The size was normal  Systolic function was normal     LEFT ATRIUM: Size was normal     RIGHT ATRIUM: Size was normal     MITRAL VALVE: Valve structure was normal  There was mild thickening  There was  normal leaflet separation  DOPPLER: There was no evidence for stenosis  There  was trace regurgitation  AORTIC VALVE: The valve was not well visualized  DOPPLER: There was no evidence  for stenosis   There was no regurgitation  TRICUSPID VALVE: The valve structure was normal  There was normal leaflet  separation  DOPPLER: There was no evidence for stenosis  There was trace  regurgitation  Estimated peak PA pressure was 30 mmHg  PULMONIC VALVE: Leaflets exhibited normal thickness, no calcification, and  normal cuspal separation  DOPPLER: The transpulmonic velocity was within the  normal range  There was trace regurgitation  PERICARDIUM: There was no pericardial effusion  The pericardium was normal in  appearance  AORTA: The root exhibited normal size      SYSTEM MEASUREMENT TABLES    2D  %FS: 40 41 %  AV Diam: 2 73 cm  EDV(Teich): 45 08 ml  EF(Cube): 78 84 %  EF(Teich): 72 34 %  ESV(Cube): 7 81 ml  ESV(Teich): 12 47 ml  IVSd: 0 72 cm  LA Area: 13 86 cm2  LA Diam: 2 98 cm  LVEDV MOD A4C: 79 4 ml  LVEF MOD A4C: 56 88 %  LVESV MOD A4C: 34 24 ml  LVIDd: 3 33 cm  LVIDs: 1 98 cm  LVLd A4C: 8 18 cm  LVLs A4C: 6 6 cm  LVPWd: 0 73 cm  RA Area: 8 83 cm2  RV Diam: 2 86 cm  SI(Cube): 16 72 ml/m2  SI(Teich): 18 74 ml/m2  SV MOD A4C: 45 17 ml  SV(Cube): 29 09 ml  SV(Teich): 32 61 ml    CW  TR MaxP 64 mmHg  TR Vmax: 2 43 m/s    MM  TAPSE: 1 84 cm    PW  E': 0 15 m/s  E/E': 6 47  MV A Juan Jose: 0 49 m/s  MV Dec [place]: 4 31 m/s2  MV DecT: 225 26 ms  MV E Juan Jose: 0 97 m/s  MV E/A Ratio: 1 98    [employer] Accredited Echocardiography Laboratory    Prepared and electronically signed by    Ralf Wild MD  Signed [id number] 16:24:27

## 2018-07-19 NOTE — PATIENT INSTRUCTIONS
Recommendations:  1  Continue current medications  2  Refer to Physical Therapy for Back Pain/POTS  3  Follow up in 6 months

## 2018-07-26 DIAGNOSIS — Z01.419 ENCNTR FOR GYN EXAM (GENERAL) (ROUTINE) W/O ABN FINDINGS: Primary | ICD-10-CM

## 2018-08-01 ENCOUNTER — TELEPHONE (OUTPATIENT)
Dept: FAMILY MEDICINE CLINIC | Facility: OTHER | Age: 28
End: 2018-08-01

## 2018-08-01 ENCOUNTER — TELEPHONE (OUTPATIENT)
Dept: CARDIOLOGY CLINIC | Facility: CLINIC | Age: 28
End: 2018-08-01

## 2018-08-09 ENCOUNTER — TELEPHONE (OUTPATIENT)
Dept: CARDIOLOGY CLINIC | Facility: CLINIC | Age: 28
End: 2018-08-09

## 2018-08-15 ENCOUNTER — TELEPHONE (OUTPATIENT)
Dept: CARDIOLOGY CLINIC | Facility: CLINIC | Age: 28
End: 2018-08-15

## 2018-08-15 NOTE — TELEPHONE ENCOUNTER
Dorchester Creighton called and left an angry vmm on the triage line  She said she wants Bystolic samples  She did receive callbacks stating we do not carry samples anymore  She said that is unacceptable because in July when she saw Dr Isaac Murguia, he said, "just call for samples, we'll be happy to help you "      I called her back and reached her voicemail  I left a message that this is [place], not just cardiology  If she needs another medication, she should call the Rx line

## 2018-08-26 NOTE — TELEPHONE ENCOUNTER
Please let her know that there is a new policy that we can no longer give samples and no longer get samples  We will try metoprolol succinate 25mg daily until then  We are asking for an exception from [address] (along with a few other cardiac medications), and waiting for a decision on the medications  I have copay cards, and the insurance company is very resistant to approving the medication  Please give her my apologies  Thanks

## 2018-09-11 DIAGNOSIS — E87.6 LOW BLOOD POTASSIUM: ICD-10-CM

## 2018-09-11 RX ORDER — POTASSIUM CHLORIDE 750 MG/1
10 CAPSULE, EXTENDED RELEASE ORAL DAILY
Qty: 90 CAPSULE | Refills: 0 | Status: SHIPPED | OUTPATIENT
Start: 2018-09-11 | End: 2018-12-03 | Stop reason: SDUPTHER

## 2018-09-20 ENCOUNTER — CLINICAL SUPPORT (OUTPATIENT)
Dept: FAMILY MEDICINE CLINIC | Facility: OTHER | Age: 28
End: 2018-09-20
Payer: COMMERCIAL

## 2018-09-20 DIAGNOSIS — Z23 NEED FOR INFLUENZA VACCINATION: Primary | ICD-10-CM

## 2018-09-20 PROCEDURE — 90471 IMMUNIZATION ADMIN: CPT

## 2018-09-20 PROCEDURE — 90686 IIV4 VACC NO PRSV 0.5 ML IM: CPT

## 2018-09-30 DIAGNOSIS — I49.8 POTS (POSTURAL ORTHOSTATIC TACHYCARDIA SYNDROME): ICD-10-CM

## 2018-10-01 RX ORDER — MIDODRINE HYDROCHLORIDE 5 MG/1
TABLET ORAL
Qty: 60 TABLET | Refills: 5 | Status: SHIPPED | OUTPATIENT
Start: 2018-10-01 | End: 2019-05-04 | Stop reason: SDUPTHER

## 2018-11-14 ENCOUNTER — TELEPHONE (OUTPATIENT)
Dept: FAMILY MEDICINE CLINIC | Facility: OTHER | Age: 28
End: 2018-11-14

## 2018-11-14 DIAGNOSIS — Q79.62 EHLERS-DANLOS SYNDROME, BENIGN HYPERMOBILE FORM: Primary | ICD-10-CM

## 2018-11-14 NOTE — TELEPHONE ENCOUNTER
Radha Rahman (from pre cert dept of [facility]) [phone] called and they are requesting updated referral to continue PT for Nia-Danlos Syndrome 1 time a week for 6 wks  Pt does have an upcoming appointment with you 12/3/2018  Thank you

## 2018-11-21 RX ORDER — CLINDAMYCIN PHOSPHATE 10 MG/ML
SOLUTION TOPICAL
Refills: 5 | COMMUNITY
Start: 2018-10-29

## 2018-11-26 ENCOUNTER — OFFICE VISIT (OUTPATIENT)
Dept: FAMILY MEDICINE CLINIC | Facility: OTHER | Age: 28
End: 2018-11-26
Payer: COMMERCIAL

## 2018-11-26 VITALS
TEMPERATURE: 99.2 F | HEART RATE: 91 BPM | DIASTOLIC BLOOD PRESSURE: 80 MMHG | BODY MASS INDEX: 25.27 KG/M2 | OXYGEN SATURATION: 98 % | WEIGHT: 161 LBS | SYSTOLIC BLOOD PRESSURE: 100 MMHG | HEIGHT: 67 IN

## 2018-11-26 DIAGNOSIS — E03.9 PRIMARY HYPOTHYROIDISM: Primary | ICD-10-CM

## 2018-11-26 PROCEDURE — 3008F BODY MASS INDEX DOCD: CPT | Performed by: FAMILY MEDICINE

## 2018-11-26 PROCEDURE — 1036F TOBACCO NON-USER: CPT | Performed by: FAMILY MEDICINE

## 2018-11-26 PROCEDURE — 99213 OFFICE O/P EST LOW 20 MIN: CPT | Performed by: FAMILY MEDICINE

## 2018-11-26 NOTE — PROGRESS NOTES
Subjective:      Patient ID: Sushma Uriostegui is a 34 y o  female  Patient presents requesting we manage her thyroid medication  States that she sees endocrinologist yearly, hypothyroidism has been stable for while  Last labs checked in June of 2018      Thyroid Problem   Presents for initial visit  Symptoms include cold intolerance, depressed mood and fatigue  Patient reports no anxiety, constipation, diaphoresis, diarrhea, dry skin, hair loss, heat intolerance, hoarse voice, leg swelling, menstrual problem, nail problem, palpitations, tremors, visual change, weight gain or weight loss  The symptoms have been resolved  Past treatments include levothyroxine  The treatment provided significant relief  The following procedures have not been performed: thyroid FNA, thyroid ultrasound and thyroidectomy  Her past medical history is significant for hyperlipidemia  There is no history of atrial fibrillation, dementia, diabetes, Graves' ophthalmopathy, heart failure, neuropathy, obesity or osteopenia  There are no known risk factors         The following portions of the patient's history were reviewed and updated as appropriate: allergies, current medications, past family history, past medical history, past social history, past surgical history and problem list       Current Outpatient Prescriptions:     clindamycin (CLEOCIN T) 1 %, APPLY TO ACNE TWICE DAILY, Disp: , Rfl: 5    clindamycin (CLEOCIN T) 1 % lotion, APPLY TO ALL ACNE AREAS TWICE A DAY, Disp: , Rfl: 2    desmopressin (DDAVP) 0 2 mg tablet, Take 0 2 mg by mouth 2 (two) times a day, Disp: , Rfl: 3    fludrocortisone (FLORINEF) 0 1 mg tablet, TAKE 1 TABLET BY MOUTH TWICE A DAY, Disp: 180 tablet, Rfl: 3    levothyroxine 100 mcg tablet, Take 100 mcg by mouth, Disp: , Rfl:     loratadine (CLARITIN) 10 mg tablet, Take 10 mg by mouth daily, Disp: , Rfl: 11    midodrine (PROAMATINE) 5 mg tablet, TAKE 1 TABLET BY MOUTH TWICE A DAY, Disp: 60 tablet, Rfl: 5   montelukast (SINGULAIR) 10 mg tablet, Take 10 mg by mouth every evening, Disp: , Rfl: 11    NAPROXEN PO, Take 500 mg by mouth, Disp: , Rfl:     nebivolol (BYSTOLIC) 5 mg tablet, Take 1 tablet (5 mg total) by mouth daily, Disp: 30 tablet, Rfl: 5    norgestrel-ethinyl estradiol (CRYSELLE-28) 0 3 mg-30 mcg per tablet, Take 1 tablet by mouth daily, Disp: 90 tablet, Rfl: 1    potassium chloride (MICRO-K) 10 MEQ CR capsule, Take 1 capsule (10 mEq total) by mouth daily, Disp: 90 capsule, Rfl: 0    traMADol-acetaminophen (ULTRACET) 37 5-325 mg per tablet, Take 1 tablet by mouth every 6 (six) hours as needed, Disp: , Rfl:     VITAMIN D, CHOLECALCIFEROL, PO, Take 1 25 mg by mouth, Disp: , Rfl:       Review of Systems   Constitutional: Positive for fatigue  Negative for activity change, diaphoresis, fever, weight gain and weight loss  HENT: Negative for congestion, ear pain, hoarse voice, sinus pain and sore throat  Eyes: Negative for pain and itching  Respiratory: Negative for cough and shortness of breath  Cardiovascular: Negative for chest pain and palpitations  Gastrointestinal: Negative for abdominal pain, constipation, diarrhea, nausea and vomiting  Endocrine: Positive for cold intolerance  Negative for heat intolerance  Genitourinary: Negative for dysuria and menstrual problem  Musculoskeletal: Negative for myalgias  Skin: Negative for color change and rash  Neurological: Negative for dizziness, tremors, syncope and headaches  Hematological: Negative for adenopathy  Psychiatric/Behavioral: Negative for behavioral problems, dysphoric mood and sleep disturbance  The patient is not nervous/anxious            Objective:      /80 (BP Location: Left arm, Patient Position: Sitting, Cuff Size: Adult)   Pulse 91   Temp 99 2 °F (37 3 °C) (Tympanic)   Ht 5' 6 9" (1 699 m)   Wt 73 kg (161 lb)   SpO2 98%   BMI 25 29 kg/m²          Physical Exam   Constitutional: She is oriented to person, place, and time  She appears well-developed and well-nourished  No distress  HENT:   Head: Normocephalic and atraumatic  Right Ear: External ear normal    Left Ear: External ear normal    Nose: Nose normal    Mouth/Throat: Oropharynx is clear and moist    Eyes: Pupils are equal, round, and reactive to light  Conjunctivae and EOM are normal  No scleral icterus  Neck: Normal range of motion  Neck supple  No thyromegaly present  Cardiovascular: Normal rate, regular rhythm and normal heart sounds  No murmur heard  Pulmonary/Chest: Effort normal and breath sounds normal  No respiratory distress  She has no wheezes  Abdominal: Soft  Bowel sounds are normal  She exhibits no distension  There is no tenderness  Musculoskeletal: Normal range of motion  She exhibits no edema or tenderness  Lymphadenopathy:     She has no cervical adenopathy  Neurological: She is alert and oriented to person, place, and time  No cranial nerve deficit  Coordination normal    Skin: Skin is warm and dry  No rash noted  She is not diaphoretic  No erythema  Psychiatric: She has a normal mood and affect  Her behavior is normal    Vitals reviewed  Assessment/Plan:  Diagnoses and all orders for this visit:    Primary hypothyroidism  -     TSH, 3rd generation with Free T4 reflex; Future    Other orders  -     clindamycin (CLEOCIN T) 1 %; APPLY TO ACNE TWICE DAILY            16-year-old female presents for hypothyroidism follow-up  She wishes to transfer her care from the endocrinologist to our office  TSH ordered to reestablish baseline  If stable, will continue levothyroxine 100 mcg daily  Plan to check this every 6-12 months  Order for PT reprinted--patient will take to [facility] as per her earlier request     Return in about 6 months (around 5/26/2019) for Annual physical     The patient indicates understanding of these issues and agrees with the plan          Debra Cantu DO

## 2018-12-03 DIAGNOSIS — E87.6 LOW BLOOD POTASSIUM: ICD-10-CM

## 2018-12-03 RX ORDER — POTASSIUM CHLORIDE 750 MG/1
CAPSULE, EXTENDED RELEASE ORAL
Qty: 90 CAPSULE | Refills: 0 | Status: SHIPPED | OUTPATIENT
Start: 2018-12-03 | End: 2019-03-07 | Stop reason: SDUPTHER

## 2018-12-06 ENCOUNTER — TELEPHONE (OUTPATIENT)
Dept: FAMILY MEDICINE CLINIC | Facility: OTHER | Age: 28
End: 2018-12-06

## 2018-12-06 LAB — TSH SERPL-ACNC: 2.91 MIU/L

## 2018-12-06 NOTE — TELEPHONE ENCOUNTER
Left message    ----- Message from Silvana Hernandez DO sent at 12/6/2018  7:27 AM EST -----  Please inform pt that TSH is stable at 2 91  Will continue levothyroxine 100 mcg daily -- pt to let us know when she needs refills  Recheck labs every 6-12 months (with follow up visits)    Thanks!   Silvana Hernandez DO

## 2019-02-03 DIAGNOSIS — I10 ESSENTIAL HYPERTENSION: ICD-10-CM

## 2019-02-06 RX ORDER — NEBIVOLOL HYDROCHLORIDE 5 MG/1
TABLET ORAL
Qty: 30 TABLET | Refills: 5 | Status: SHIPPED | OUTPATIENT
Start: 2019-02-06 | End: 2019-03-07 | Stop reason: SDUPTHER

## 2019-03-07 ENCOUNTER — OFFICE VISIT (OUTPATIENT)
Dept: CARDIOLOGY CLINIC | Facility: CLINIC | Age: 29
End: 2019-03-07
Payer: COMMERCIAL

## 2019-03-07 VITALS
BODY MASS INDEX: 24.53 KG/M2 | WEIGHT: 156.3 LBS | DIASTOLIC BLOOD PRESSURE: 62 MMHG | SYSTOLIC BLOOD PRESSURE: 114 MMHG | HEIGHT: 67 IN | HEART RATE: 83 BPM | OXYGEN SATURATION: 98 %

## 2019-03-07 DIAGNOSIS — I10 ESSENTIAL HYPERTENSION: ICD-10-CM

## 2019-03-07 DIAGNOSIS — I49.8 POTS (POSTURAL ORTHOSTATIC TACHYCARDIA SYNDROME): Primary | ICD-10-CM

## 2019-03-07 DIAGNOSIS — E87.6 LOW BLOOD POTASSIUM: ICD-10-CM

## 2019-03-07 PROCEDURE — 99214 OFFICE O/P EST MOD 30 MIN: CPT | Performed by: INTERNAL MEDICINE

## 2019-03-07 RX ORDER — POTASSIUM CHLORIDE 750 MG/1
10 CAPSULE, EXTENDED RELEASE ORAL DAILY
Qty: 90 CAPSULE | Refills: 0 | Status: SHIPPED | OUTPATIENT
Start: 2019-03-07 | End: 2019-04-04 | Stop reason: SDUPTHER

## 2019-03-07 RX ORDER — NEBIVOLOL 5 MG/1
5 TABLET ORAL DAILY
Qty: 30 TABLET | Refills: 5 | Status: SHIPPED | OUTPATIENT
Start: 2019-03-07 | End: 2019-11-25 | Stop reason: SDUPTHER

## 2019-03-07 NOTE — PROGRESS NOTES
Cardiology   Jeff Carrion 34 y o  female MRN: [id number]        Impression:  1  POTS - stable  2  Nia-Danlos Type III  3  Dyslipidemia - borderline  Recheck lipids in 5 years      Recommendations:  1  Continue current medications  2  Refer to Physical Therapy for Back Pain/POTS  3  Follow up in 6 months  HPI: Jeff Carrion is a 34y o  year old female with Nia-Danlos Type III and POTS presents for follow up  Has plantar fasciitis  Does have occasional dizziness  Symptoms have been stable - only with minor symptoms  Review of Systems   Constitutional: Negative  HENT: Negative  Eyes: Negative  Respiratory: Negative for chest tightness and shortness of breath  Cardiovascular: Negative for chest pain, palpitations and leg swelling  Gastrointestinal: Negative  Endocrine: Negative  Genitourinary: Negative  Musculoskeletal: Negative  Skin: Negative  Allergic/Immunologic: Negative  Neurological: Positive for light-headedness  Hematological: Negative  Psychiatric/Behavioral: Negative  All other systems reviewed and are negative          Past Medical History:   Diagnosis Date    Congenital dislocation of hip     Depression     LAST ASSESSED: 8/4/12    Pectus excavatum     Scoliosis      Past Surgical History:   Procedure Laterality Date    DEVEN ACETABULAR OSTEOTOMY Left     ACETABULAR OSTEOTOMY     Social History     Substance and Sexual Activity   Alcohol Use Yes    Comment: SOCIAL     Social History     Substance and Sexual Activity   Drug Use No     Social History     Tobacco Use   Smoking Status Never Smoker   Smokeless Tobacco Never Used     Family History   Problem Relation Age of Onset    Asthma Mother     Hyperlipidemia Mother     Hypertension Mother     Diabetes type II Mother     Hyperlipidemia Father     Hypertension Father     Melanoma Family         AMELANOTIC MELANOMA OF THE SKIN    Hyperlipidemia Family     Hypertension Family     Prostate cancer Family         MALIGNANT PROSTATIC NEOPLASM G1    Varicose Veins Family         OF LOWER EXTREMITIES       Allergies:   Allergies   Allergen Reactions    Bupropion      Other reaction(s): Flushed    Epinephrine Dizziness     Causes dysautonomia    Fluoxetine      Other reaction(s): Flushed       Medications:     Current Outpatient Medications:     BYSTOLIC 5 MG tablet, TAKE 1 TABLET BY MOUTH EVERY DAY, Disp: 30 tablet, Rfl: 5    clindamycin (CLEOCIN T) 1 %, APPLY TO ACNE TWICE DAILY, Disp: , Rfl: 5    desmopressin (DDAVP) 0 2 mg tablet, Take 0 2 mg by mouth 2 (two) times a day, Disp: , Rfl: 3    fludrocortisone (FLORINEF) 0 1 mg tablet, TAKE 1 TABLET BY MOUTH TWICE A DAY, Disp: 180 tablet, Rfl: 3    levothyroxine 100 mcg tablet, Take 100 mcg by mouth, Disp: , Rfl:     loratadine (CLARITIN) 10 mg tablet, Take 10 mg by mouth daily, Disp: , Rfl: 11    midodrine (PROAMATINE) 5 mg tablet, TAKE 1 TABLET BY MOUTH TWICE A DAY, Disp: 60 tablet, Rfl: 5    NAPROXEN PO, Take 500 mg by mouth, Disp: , Rfl:     norgestrel-ethinyl estradiol (CRYSELLE-28) 0 3 mg-30 mcg per tablet, Take 1 tablet by mouth daily, Disp: 90 tablet, Rfl: 1    potassium chloride (MICRO-K) 10 MEQ CR capsule, TAKE 1 CAPSULE BY MOUTH EVERY DAY, Disp: 90 capsule, Rfl: 0    traMADol-acetaminophen (ULTRACET) 37 5-325 mg per tablet, Take 1 tablet by mouth every 6 (six) hours as needed, Disp: , Rfl:     VITAMIN D, CHOLECALCIFEROL, PO, Take 1 25 mg by mouth, Disp: , Rfl:     montelukast (SINGULAIR) 10 mg tablet, Take 10 mg by mouth every evening, Disp: , Rfl: 11      Wt Readings from Last 3 Encounters:   03/07/19 70 9 kg (156 lb 4 8 oz)   11/26/18 73 kg (161 lb)   07/19/18 68 5 kg (151 lb)     Temp Readings from Last 3 Encounters:   11/26/18 99 2 °F (37 3 °C) (Tympanic)   05/30/18 98 6 °F (37 °C) (Tympanic)   11/29/17 98 8 °F (37 1 °C)     BP Readings from Last 3 Encounters:   03/07/19 114/62   11/26/18 100/80   07/19/18 112/74     Pulse Readings from Last 3 Encounters:   19 83   18 91   18 88         Physical Exam   Constitutional: She is oriented to person, place, and time  She appears well-developed  HENT:   Head: Atraumatic  Eyes: EOM are normal    Neck: Normal range of motion  Cardiovascular: Normal rate, regular rhythm and normal heart sounds  Exam reveals no gallop and no friction rub  No murmur heard  Pulmonary/Chest: Effort normal and breath sounds normal  No stridor  No respiratory distress  She has no wheezes  Abdominal: Soft  Musculoskeletal: Normal range of motion  Neurological: She is alert and oriented to person, place, and time  Skin: Skin is warm and dry  Psychiatric: She has a normal mood and affect  Laboratory Studies:  CMP:  Lab Results   Component Value Date     2016    K 4 1 2018     2018    CO2 27 2018    BUN 11 2018    CREATININE 0 69 2016    AST 15 2018    ALT 14 2018    BILITOT 0 4 2016       Lipid Profile:   No results found for: CHOL  Lab Results   Component Value Date    HDL 41 (L) 2018     No results found for: [facility]  Lab Results   Component Value Date    TRIG 89 2018       Cardiac testing:     Results for orders placed during the hospital encounter of 16   Echo complete with contrast if indicated    N[facility]ative [facility] [address] [facility]  [phone]    Transthoracic Echocardiogram  2D, M-mode, Doppler, and Color Doppler    Study date:  07-Dec-2016    [facility]  MR number: [id number]  Account number: [de-identified]  : 1990  Age: 27 years  Gender: Female  Status: Outpatient  Location: [address] [place] [address] and Vascular Center  Height: 66 in  Weight: 142 8 lb  BP: 9060/ 60 mmHg    Diagnoses: R07 9 - Chest pain, unspecified    Sonographer:  LEONILA Canchola  Primary Physician:  Denis Antunez MD  Referring Physician: Rachel Macario MD  Group:  [facility]  Interpreting Physician:  Ari Hitchcock MD    SUMMARY    LEFT VENTRICLE:  Systolic function was normal  Ejection fraction was estimated to be 55 %  Although no diagnostic regional wall motion abnormality was identified, this  possibility cannot be completely excluded on the basis of this study  Left ventricular diastolic function parameters were normal     HISTORY: PRIOR HISTORY: Chest pain, Ehler's Danlos, POTS, Pectus excavatum    PROCEDURE: The study was performed in the [facility] and [facility] [place]  This was a routine study  The transthoracic approach was used  The study  included complete 2D imaging, M-mode, complete spectral Doppler, and color  Doppler  The heart rate was 67 bpm, at the start of the study  Images were  obtained from the parasternal, apical, subcostal, and suprasternal notch  acoustic windows  Image quality was adequate  LEFT VENTRICLE: Size was normal  Systolic function was normal  Ejection  fraction was estimated to be 55 %  Although no diagnostic regional wall motion  abnormality was identified, this possibility cannot be completely excluded on  the basis of this study  Wall thickness was normal  DOPPLER: The ratio of early  ventricular filling to atrial contraction velocities was within the normal  range  Left ventricular diastolic function parameters were normal     RIGHT VENTRICLE: The size was normal  Systolic function was normal     LEFT ATRIUM: Size was normal     RIGHT ATRIUM: Size was normal     MITRAL VALVE: Valve structure was normal  There was mild thickening  There was  normal leaflet separation  DOPPLER: There was no evidence for stenosis  There  was trace regurgitation  AORTIC VALVE: The valve was not well visualized  DOPPLER: There was no evidence  for stenosis  There was no regurgitation  TRICUSPID VALVE: The valve structure was normal  There was normal leaflet  separation   DOPPLER: There was no evidence for stenosis  There was trace  regurgitation  Estimated peak PA pressure was 30 mmHg  PULMONIC VALVE: Leaflets exhibited normal thickness, no calcification, and  normal cuspal separation  DOPPLER: The transpulmonic velocity was within the  normal range  There was trace regurgitation  PERICARDIUM: There was no pericardial effusion  The pericardium was normal in  appearance  AORTA: The root exhibited normal size      SYSTEM MEASUREMENT TABLES    2D  %FS: 40 41 %  AV Diam: 2 73 cm  EDV(Teich): 45 08 ml  EF(Cube): 78 84 %  EF(Teich): 72 34 %  ESV(Cube): 7 81 ml  ESV(Teich): 12 47 ml  IVSd: 0 72 cm  LA Area: 13 86 cm2  LA Diam: 2 98 cm  LVEDV MOD A4C: 79 4 ml  LVEF MOD A4C: 56 88 %  LVESV MOD A4C: 34 24 ml  LVIDd: 3 33 cm  LVIDs: 1 98 cm  LVLd A4C: 8 18 cm  LVLs A4C: 6 6 cm  LVPWd: 0 73 cm  RA Area: 8 83 cm2  RV Diam: 2 86 cm  SI(Cube): 16 72 ml/m2  SI(Teich): 18 74 ml/m2  SV MOD A4C: 45 17 ml  SV(Cube): 29 09 ml  SV(Teich): 32 61 ml    CW  TR MaxP 64 mmHg  TR Vmax: 2 43 m/s    MM  TAPSE: 1 84 cm    PW  E': 0 15 m/s  E/E': 6 47  MV A Juan Jose: 0 49 m/s  MV Dec [place]: 4 31 m/s2  MV DecT: 225 26 ms  MV E Juan Jose: 0 97 m/s  MV E/A Ratio: 1 98    [facility] Acc[facility]    Prepared and electronically signed by    Grzegorz Bearden MD  Signed [id number] 16:24:27

## 2019-03-15 DIAGNOSIS — I49.8 POTS (POSTURAL ORTHOSTATIC TACHYCARDIA SYNDROME): Primary | ICD-10-CM

## 2019-03-18 ENCOUNTER — ANNUAL EXAM (OUTPATIENT)
Dept: OBGYN CLINIC | Facility: MEDICAL CENTER | Age: 29
End: 2019-03-18
Payer: COMMERCIAL

## 2019-03-18 VITALS
SYSTOLIC BLOOD PRESSURE: 100 MMHG | BODY MASS INDEX: 24.17 KG/M2 | HEIGHT: 67 IN | DIASTOLIC BLOOD PRESSURE: 72 MMHG | WEIGHT: 154 LBS

## 2019-03-18 DIAGNOSIS — Z01.419 ENCNTR FOR GYN EXAM (GENERAL) (ROUTINE) W/O ABN FINDINGS: ICD-10-CM

## 2019-03-18 PROCEDURE — 99395 PREV VISIT EST AGE 18-39: CPT | Performed by: PHYSICIAN ASSISTANT

## 2019-03-18 RX ORDER — DESMOPRESSIN ACETATE 0.2 MG/1
TABLET ORAL
Qty: 180 TABLET | Refills: 1 | Status: SHIPPED | OUTPATIENT
Start: 2019-03-18 | End: 2019-09-09 | Stop reason: SDUPTHER

## 2019-03-18 NOTE — PROGRESS NOTES
Everett Lee  1990    CC:  Yearly exam    S:  34 y o  female here for yearly exam  Her cycles are regular, not heavy or crampy  She has never been sexually active  She uses Cryselle for contraception  She does not think she will ever want a pregnancy due to her Ehrler-Danlos syndrome       Last Pap 1/16/17 neg      Current Outpatient Medications:     clindamycin (CLEOCIN T) 1 %, APPLY TO ACNE TWICE DAILY, Disp: , Rfl: 5    desmopressin (DDAVP) 0 2 mg tablet, TAKE 1 TABLET TWICE DAILY, Disp: 180 tablet, Rfl: 1    fludrocortisone (FLORINEF) 0 1 mg tablet, TAKE 1 TABLET BY MOUTH TWICE A DAY, Disp: 180 tablet, Rfl: 3    levothyroxine 100 mcg tablet, Take 100 mcg by mouth, Disp: , Rfl:     loratadine (CLARITIN) 10 mg tablet, Take 10 mg by mouth daily, Disp: , Rfl: 11    midodrine (PROAMATINE) 5 mg tablet, TAKE 1 TABLET BY MOUTH TWICE A DAY, Disp: 60 tablet, Rfl: 5    NAPROXEN PO, Take 500 mg by mouth, Disp: , Rfl:     nebivolol (BYSTOLIC) 5 mg tablet, Take 1 tablet (5 mg total) by mouth daily, Disp: 30 tablet, Rfl: 5    norgestrel-ethinyl estradiol (CRYSELLE-28) 0 3 mg-30 mcg per tablet, Take 1 tablet by mouth daily, Disp: 90 tablet, Rfl: 1    potassium chloride (MICRO-K) 10 MEQ CR capsule, Take 1 capsule (10 mEq total) by mouth daily, Disp: 90 capsule, Rfl: 0    traMADol-acetaminophen (ULTRACET) 37 5-325 mg per tablet, Take 1 tablet by mouth every 6 (six) hours as needed, Disp: , Rfl:     VITAMIN D, CHOLECALCIFEROL, PO, Take 1 25 mg by mouth, Disp: , Rfl:   Social History     Socioeconomic History    Marital status: Single     Spouse name: Not on file    Number of children: Not on file    Years of education: Not on file    Highest education level: Not on file   Occupational History    Occupation: UNEMPLOYED   Social Needs    Financial resource strain: Not on file    Food insecurity:     Worry: Not on file     Inability: Not on file    Transportation needs:     Medical: Not on file Non-medical: Not on file   Tobacco Use    Smoking status: Never Smoker    Smokeless tobacco: Never Used   Substance and Sexual Activity    Alcohol use: Yes     Comment: SOCIAL    Drug use: No    Sexual activity: Never     Birth control/protection: OCP   Lifestyle    Physical activity:     Days per week: Not on file     Minutes per session: Not on file    Stress: Not on file   Relationships    Social connections:     Talks on phone: Not on file     Gets together: Not on file     Attends Tenriism service: Not on file     Active member of club or organization: Not on file     Attends meetings of clubs or organizations: Not on file     Relationship status: Not on file    Intimate partner violence:     Fear of current or ex partner: Not on file     Emotionally abused: Not on file     Physically abused: Not on file     Forced sexual activity: Not on file   Other Topics Concern    Not on file   Social History Narrative    CURRENTLY IN SCHOOL: WILL BE STARTING  [address] 1/2014    LIVING WITH PARENTS     Family History   Problem Relation Age of Onset    Asthma Mother     Hyperlipidemia Mother     Hypertension Mother     Diabetes type II Mother     Hyperlipidemia Father     Hypertension Father     Melanoma Family         AMELANOTIC MELANOMA OF THE SKIN    Hyperlipidemia Family     Hypertension Family     Prostate cancer Family         MALIGNANT PROSTATIC NEOPLASM G1    Varicose Veins Family         OF LOWER EXTREMITIES     Past Medical History:   Diagnosis Date    Congenital dislocation of hip     Depression     LAST ASSESSED: 8/4/12    Pectus excavatum     Scoliosis          O:  Blood pressure 100/72, height 5' 6 93" (1 7 m), weight 69 9 kg (154 lb), last menstrual period 03/06/2019  Patient appears well and is not in distress  Neck is supple without masses  Breasts are symmetrical without mass, tenderness, nipple discharge, skin changes or adenopathy     Abdomen is soft and nontender without masses  External genitals are normal without lesions or rashes  Pelvic exam declined as she has never been sexually active and denies vaginal discharge, itching, odor, or pelvic pain/cramping  A:  Yearly exam      P:   Pap 2020   Cryselle sent to pharmacy    RTO one year for yearly exam or sooner as needed

## 2019-04-04 DIAGNOSIS — E87.6 LOW BLOOD POTASSIUM: ICD-10-CM

## 2019-04-04 RX ORDER — POTASSIUM CHLORIDE 750 MG/1
10 CAPSULE, EXTENDED RELEASE ORAL DAILY
Qty: 90 CAPSULE | Refills: 1 | Status: SHIPPED | OUTPATIENT
Start: 2019-04-04 | End: 2019-08-06 | Stop reason: SDUPTHER

## 2019-05-04 DIAGNOSIS — I49.8 POTS (POSTURAL ORTHOSTATIC TACHYCARDIA SYNDROME): ICD-10-CM

## 2019-05-06 RX ORDER — MIDODRINE HYDROCHLORIDE 5 MG/1
TABLET ORAL
Qty: 60 TABLET | Refills: 0 | Status: SHIPPED | OUTPATIENT
Start: 2019-05-06 | End: 2019-06-15 | Stop reason: SDUPTHER

## 2019-06-15 DIAGNOSIS — I49.8 POTS (POSTURAL ORTHOSTATIC TACHYCARDIA SYNDROME): ICD-10-CM

## 2019-06-15 RX ORDER — MIDODRINE HYDROCHLORIDE 5 MG/1
TABLET ORAL
Qty: 60 TABLET | Refills: 0 | Status: SHIPPED | OUTPATIENT
Start: 2019-06-15 | End: 2019-07-14 | Stop reason: SDUPTHER

## 2019-07-14 DIAGNOSIS — I49.8 POTS (POSTURAL ORTHOSTATIC TACHYCARDIA SYNDROME): ICD-10-CM

## 2019-07-15 RX ORDER — FLUDROCORTISONE ACETATE 0.1 MG/1
TABLET ORAL
Qty: 60 TABLET | Refills: 11 | OUTPATIENT
Start: 2019-07-15

## 2019-07-22 RX ORDER — MIDODRINE HYDROCHLORIDE 5 MG/1
TABLET ORAL
Qty: 60 TABLET | Refills: 0 | Status: SHIPPED | OUTPATIENT
Start: 2019-07-22 | End: 2021-02-03 | Stop reason: SDUPTHER

## 2019-07-23 ENCOUNTER — TELEPHONE (OUTPATIENT)
Dept: FAMILY MEDICINE CLINIC | Facility: OTHER | Age: 29
End: 2019-07-23

## 2019-07-23 DIAGNOSIS — E78.5 DYSLIPIDEMIA: ICD-10-CM

## 2019-07-23 DIAGNOSIS — E03.9 PRIMARY HYPOTHYROIDISM: Primary | ICD-10-CM

## 2019-07-23 NOTE — TELEPHONE ENCOUNTER
Patient called and she is scheduled for physical on 8/6/19 she was wondering if she should get an order for her TSH to be checked for the appointment    Thank you

## 2019-08-02 LAB
CHOLEST SERPL-MCNC: 199 MG/DL
CHOLEST/HDLC SERPL: 5.5 (CALC)
HDLC SERPL-MCNC: 36 MG/DL
LDLC SERPL CALC-MCNC: 145 MG/DL (CALC)
NONHDLC SERPL-MCNC: 163 MG/DL (CALC)
T4 FREE SERPL-MCNC: 1.2 NG/DL (ref 0.8–1.8)
TRIGL SERPL-MCNC: 76 MG/DL
TSH SERPL-ACNC: 5.27 MIU/L

## 2019-08-06 ENCOUNTER — OFFICE VISIT (OUTPATIENT)
Dept: FAMILY MEDICINE CLINIC | Facility: OTHER | Age: 29
End: 2019-08-06
Payer: COMMERCIAL

## 2019-08-06 VITALS
TEMPERATURE: 98.9 F | WEIGHT: 151 LBS | HEIGHT: 67 IN | BODY MASS INDEX: 23.7 KG/M2 | SYSTOLIC BLOOD PRESSURE: 118 MMHG | OXYGEN SATURATION: 98 % | HEART RATE: 80 BPM | DIASTOLIC BLOOD PRESSURE: 80 MMHG

## 2019-08-06 DIAGNOSIS — M54.9 BACK PAIN, UNSPECIFIED BACK LOCATION, UNSPECIFIED BACK PAIN LATERALITY, UNSPECIFIED CHRONICITY: ICD-10-CM

## 2019-08-06 DIAGNOSIS — Z00.00 ANNUAL PHYSICAL EXAM: Primary | ICD-10-CM

## 2019-08-06 DIAGNOSIS — F33.2 SEVERE EPISODE OF RECURRENT MAJOR DEPRESSIVE DISORDER, WITHOUT PSYCHOTIC FEATURES (HCC): ICD-10-CM

## 2019-08-06 DIAGNOSIS — E87.6 LOW BLOOD POTASSIUM: ICD-10-CM

## 2019-08-06 DIAGNOSIS — E03.9 PRIMARY HYPOTHYROIDISM: ICD-10-CM

## 2019-08-06 DIAGNOSIS — Z23 ENCOUNTER FOR IMMUNIZATION: ICD-10-CM

## 2019-08-06 PROBLEM — M41.20 SCOLIOSIS (AND KYPHOSCOLIOSIS), IDIOPATHIC: Status: ACTIVE | Noted: 2019-08-06

## 2019-08-06 PROBLEM — Q65.00: Status: ACTIVE | Noted: 2019-08-06

## 2019-08-06 PROCEDURE — 90471 IMMUNIZATION ADMIN: CPT

## 2019-08-06 PROCEDURE — 99395 PREV VISIT EST AGE 18-39: CPT | Performed by: FAMILY MEDICINE

## 2019-08-06 PROCEDURE — 3725F SCREEN DEPRESSION PERFORMED: CPT | Performed by: FAMILY MEDICINE

## 2019-08-06 PROCEDURE — 90715 TDAP VACCINE 7 YRS/> IM: CPT

## 2019-08-06 RX ORDER — NAPROXEN 500 MG/1
500 TABLET ORAL DAILY PRN
Qty: 30 TABLET | Refills: 1 | Status: SHIPPED | OUTPATIENT
Start: 2019-08-06

## 2019-08-06 RX ORDER — POTASSIUM CHLORIDE 750 MG/1
10 CAPSULE, EXTENDED RELEASE ORAL DAILY
Qty: 90 CAPSULE | Refills: 1 | Status: SHIPPED | OUTPATIENT
Start: 2019-08-06 | End: 2020-04-28 | Stop reason: SDUPTHER

## 2019-08-06 RX ORDER — LEVOTHYROXINE SODIUM 0.1 MG/1
100 TABLET ORAL DAILY
Qty: 90 TABLET | Refills: 0 | Status: SHIPPED | OUTPATIENT
Start: 2019-08-06 | End: 2019-11-07 | Stop reason: SDUPTHER

## 2019-08-06 RX ORDER — DOXYCYCLINE 50 MG/1
50 TABLET ORAL DAILY
Refills: 2 | COMMUNITY
Start: 2019-07-30 | End: 2020-10-26 | Stop reason: ALTCHOICE

## 2019-08-06 NOTE — PATIENT INSTRUCTIONS

## 2019-08-06 NOTE — PROGRESS NOTES
ADULT ANNUAL PHYSICAL  [facility] [place]    NAME: David Chakraborty  AGE: 34 y o  SEX: female  : 1990     DATE: 2019       Chief Complaint:     Chief Complaint   Patient presents with    Annual Exam      History of Present Illness:     Adult Annual Physical   Patient here for a comprehensive physical exam  The patient reports no problems  Diet and Physical Activity  · Diet/Nutrition: well balanced diet, frequent junk food, consuming 3-5 servings of fruits/vegetables daily, adequate fiber intake and adequate whole grain intake  · Exercise: no formal exercise  Depression Screening  PHQ-9 Depression Screening    PHQ-9:    Frequency of the following problems over the past two weeks:       Little interest or pleasure in doing things:  2 - more than half the days  Feeling down, depressed, or hopeless:  2 - more than half the days  Trouble falling or staying asleep, or sleeping too much:  3 - nearly every day  Feeling tired or having little energy:  3 - nearly every day  Poor appetite or overeatin - several days  Feeling bad about yourself - or that you are a failure or have let yourself or your family down:  3 - nearly every day  Trouble concentrating on things, such as reading the newspaper or watching television:  2 - more than half the days  Moving or speaking so slowly that other people could have noticed  Or the opposite - being so fidgety or restless that you have been moving around a lot more than usual:  1 - several days  Thoughts that you would be better off dead, or of hurting yourself in some way:  1 - several days  PHQ-2 Score:  4  PHQ-9 Score:  18       General Health  · Sleep: sleeps well, gets more than 8 hours of sleep on average and unrefreshing sleep  · Hearing: normal - bilateral   · Vision: goes for regular eye exams and wears glasses  · Dental: regular dental visits         /GYN Health  · Last menstrual period: 8/1/19  · Contraceptive method: oral contraceptives  · History of STDs?: no      Review of Systems:     Review of Systems   Constitutional: Negative for appetite change, fatigue, fever and unexpected weight change  HENT: Negative for congestion, dental problem, ear pain, postnasal drip, sore throat and tinnitus  Eyes: Negative for pain, discharge and visual disturbance  Respiratory: Negative for cough, shortness of breath and wheezing  Cardiovascular: Negative for chest pain, palpitations and leg swelling  Gastrointestinal: Negative for abdominal pain, constipation, diarrhea, nausea and vomiting  Endocrine: Negative for cold intolerance and heat intolerance  Genitourinary: Negative for difficulty urinating, dysuria, flank pain and urgency  Musculoskeletal: Negative for arthralgias, back pain, joint swelling and myalgias  Skin: Negative for rash and wound  Allergic/Immunologic: Negative for immunocompromised state  Neurological: Negative for dizziness, syncope, speech difficulty, weakness and numbness  Hematological: Negative for adenopathy  Does not bruise/bleed easily  Psychiatric/Behavioral: Negative for confusion, dysphoric mood and sleep disturbance  The patient is not nervous/anxious         Past Medical History:     Past Medical History:   Diagnosis Date    Congenital dislocation of hip     Depression     LAST ASSESSED: 8/4/12    Disease of thyroid gland     Pectus excavatum     Scoliosis       Past Surgical History:     Past Surgical History:   Procedure Laterality Date    DEVEN ACETABULAR OSTEOTOMY Left     ACETABULAR OSTEOTOMY      Social History:     Social History     Socioeconomic History    Marital status: Single     Spouse name: None    Number of children: None    Years of education: None    Highest education level: None   Occupational History    Occupation: UNEMPLOYED   Social Needs    Financial resource strain: None    Food insecurity:     Worry: None Inability: None    Transportation needs:     Medical: None     Non-medical: None   Tobacco Use    Smoking status: Never Smoker    Smokeless tobacco: Never Used   Substance and Sexual Activity    Alcohol use: Yes     Comment: SOCIAL    Drug use: No    Sexual activity: Never     Birth control/protection: OCP   Lifestyle    Physical activity:     Days per week: None     Minutes per session: None    Stress: None   Relationships    Social connections:     Talks on phone: None     Gets together: None     Attends Oriental orthodox service: None     Active member of club or organization: None     Attends meetings of clubs or organizations: None     Relationship status: None    Intimate partner violence:     Fear of current or ex partner: None     Emotionally abused: None     Physically abused: None     Forced sexual activity: None   Other Topics Concern    None   Social History Narrative    CURRENTLY IN SCHOOL: WILL BE STARTING AT [employer] IN 1/2014    LIVING WITH PARENTS      Family History:     Family History   Problem Relation Age of Onset    Asthma Mother     Hyperlipidemia Mother     Hypertension Mother     Diabetes type II Mother     Hyperlipidemia Father     Hypertension Father     Melanoma Family         AMELANOTIC MELANOMA OF THE SKIN    Hyperlipidemia Family     Hypertension Family     Prostate cancer Family         MALIGNANT PROSTATIC NEOPLASM G1    Varicose Veins Family         OF LOWER EXTREMITIES      Current Medications:     Current Outpatient Medications   Medication Sig Dispense Refill    clindamycin (CLEOCIN T) 1 % APPLY TO ACNE TWICE DAILY  5    desmopressin (DDAVP) 0 2 mg tablet TAKE 1 TABLET TWICE DAILY 180 tablet 1    doxycycline (ADOXA) 50 MG tablet Take 50 mg by mouth daily With food  2    fludrocortisone (FLORINEF) 0 1 mg tablet TAKE 1 TABLET BY MOUTH TWICE A  tablet 3    levothyroxine 100 mcg tablet Take 1 tablet (100 mcg total) by mouth daily 90 tablet 0    loratadine (CLARITIN) 10 mg tablet Take 10 mg by mouth daily  11    midodrine (PROAMATINE) 5 mg tablet TAKE 1 TABLET BY MOUTH TWICE A DAY 60 tablet 0    nebivolol (BYSTOLIC) 5 mg tablet Take 1 tablet (5 mg total) by mouth daily 30 tablet 5    norgestrel-ethinyl estradiol (CRYSELLE-28) 0 3 mg-30 mcg per tablet Take 1 tablet by mouth daily 90 tablet 3    potassium chloride (MICRO-K) 10 MEQ CR capsule Take 1 capsule (10 mEq total) by mouth daily 90 capsule 1    traMADol-acetaminophen (ULTRACET) 37 5-325 mg per tablet Take 1 tablet by mouth every 6 (six) hours as needed for moderate pain 30 tablet 0    VITAMIN D, CHOLECALCIFEROL, PO Take 1 25 mg by mouth      naproxen (NAPROSYN) 500 mg tablet Take 1 tablet (500 mg total) by mouth daily as needed for moderate pain 30 tablet 1     No current facility-administered medications for this visit  Allergies: Allergies   Allergen Reactions    Bupropion      Other reaction(s): Flushed    Epinephrine Dizziness     Causes dysautonomia    Fluoxetine      Other reaction(s): Flushed      Physical Exam:     /80 (BP Location: Right arm, Patient Position: Sitting, Cuff Size: Large)   Pulse 80   Temp 98 9 °F (37 2 °C) (Tympanic)   Ht 5' 6 5" (1 689 m)   Wt 68 5 kg (151 lb)   SpO2 98%   BMI 24 01 kg/m²     Physical Exam   Constitutional: She is oriented to person, place, and time  She appears well-developed and well-nourished  No distress  Body mass index is 24 01 kg/m²  HENT:   Head: Normocephalic and atraumatic  Right Ear: Hearing, tympanic membrane, external ear and ear canal normal    Left Ear: Hearing, tympanic membrane, external ear and ear canal normal    Nose: Nose normal    Mouth/Throat: Uvula is midline, oropharynx is clear and moist and mucous membranes are normal  No oropharyngeal exudate  Eyes: Pupils are equal, round, and reactive to light  Conjunctivae and EOM are normal  No scleral icterus  Neck: Normal range of motion  Neck supple  No thyromegaly present  Cardiovascular: Normal rate, regular rhythm and normal heart sounds  No murmur heard  Pulmonary/Chest: Effort normal and breath sounds normal  No respiratory distress  She has no wheezes  She has no rales  She exhibits deformity (pectus excavatum)  Abdominal: Soft  Bowel sounds are normal  She exhibits no distension and no mass  There is no tenderness  Musculoskeletal: Normal range of motion  She exhibits deformity (thoracic scoliosis)  She exhibits no edema or tenderness  Lymphadenopathy:     She has no cervical adenopathy  Neurological: She is alert and oriented to person, place, and time  She has normal reflexes  No cranial nerve deficit  Coordination normal    Skin: Skin is warm and dry  No rash noted  No erythema  No pallor  Psychiatric: Her speech is normal and behavior is normal  Judgment normal  Her mood appears not anxious  Cognition and memory are normal  She exhibits a depressed mood  She expresses no homicidal and no suicidal ideation  Nursing note and vitals reviewed  Assessment and Plan:     Problem List Items Addressed This Visit        Endocrine    Primary hypothyroidism    Relevant Medications    levothyroxine 100 mcg tablet    Other Relevant Orders    TSH, 3rd generation with Free T4 reflex       Other    Back pain    Relevant Medications    traMADol-acetaminophen (ULTRACET) 37 5-325 mg per tablet    naproxen (NAPROSYN) 500 mg tablet    Severe episode of recurrent major depressive disorder, without psychotic features ([facility] [address] )      Other Visit Diagnoses     Annual physical exam    -  Primary    Encounter for immunization        Relevant Orders    TDAP VACCINE GREATER THAN OR EQUAL TO 8YO IM    Low blood potassium        Relevant Medications    potassium chloride (MICRO-K) 10 MEQ CR capsule          Immunizations and preventive care screenings were discussed with patient today   Appropriate education was printed on patient's after visit summary  Counseling:  Depression Screening Follow-up Plan: Patient's depression screening was positive with a PHQ-2 score of 4  Their PHQ-9 score was 18  Patient assessed for underlying major depression  They have no active suicidal ideations  Brief counseling provided and recommend additional follow-up/re-evaluation next office visit  Alcohol/drug use: discussed moderation in alcohol intake and avoidance of illicit drug use  Dental Health: discussed importance of regular tooth brushing, flossing, and dental visits  Injury prevention: discussed safety/seat belts, safety helmets, smoke detectors, carbon dioxide detectors, and smoking near bedding or upholstery  · Sexual health: discussed sexually transmitted diseases, partner selection, use of condoms, avoidance of unintended pregnancy, and contraceptive alternatives  Return in about 3 weeks (around 8/27/2019) for Recheck depression, discuss tx options           Brigitte Bare, DO   [facility] [address] [address],[facility]

## 2019-09-01 DIAGNOSIS — I49.8 POTS (POSTURAL ORTHOSTATIC TACHYCARDIA SYNDROME): ICD-10-CM

## 2019-09-03 RX ORDER — DESMOPRESSIN ACETATE 0.2 MG/1
TABLET ORAL
Qty: 60 TABLET | Refills: 5 | OUTPATIENT
Start: 2019-09-03

## 2019-09-05 ENCOUNTER — TELEPHONE (OUTPATIENT)
Dept: FAMILY MEDICINE CLINIC | Facility: OTHER | Age: 29
End: 2019-09-05

## 2019-09-05 NOTE — TELEPHONE ENCOUNTER
Pt called back stating her psychologist Dr Licha Jeffers would like to touch base regarding patient to give his recommendations [phone]

## 2019-09-05 NOTE — TELEPHONE ENCOUNTER
Pt states she would like a referral to psych because she does not feel that she is in her right state of mind  Pt denies harming herself and denies suicidal ideations and was told to go to ER if she does  She would like an earlier appt than Thursday to see you because she feels she cant wait that long  Please advise if you can squeeze her in somewhere   Thank you

## 2019-09-09 ENCOUNTER — OFFICE VISIT (OUTPATIENT)
Dept: FAMILY MEDICINE CLINIC | Facility: OTHER | Age: 29
End: 2019-09-09
Payer: COMMERCIAL

## 2019-09-09 VITALS
HEIGHT: 67 IN | WEIGHT: 146.5 LBS | HEART RATE: 67 BPM | DIASTOLIC BLOOD PRESSURE: 80 MMHG | SYSTOLIC BLOOD PRESSURE: 118 MMHG | TEMPERATURE: 98.6 F | BODY MASS INDEX: 22.99 KG/M2 | OXYGEN SATURATION: 98 %

## 2019-09-09 DIAGNOSIS — F33.2 SEVERE EPISODE OF RECURRENT MAJOR DEPRESSIVE DISORDER, WITHOUT PSYCHOTIC FEATURES (HCC): Primary | ICD-10-CM

## 2019-09-09 DIAGNOSIS — Z23 ENCOUNTER FOR IMMUNIZATION: ICD-10-CM

## 2019-09-09 DIAGNOSIS — I49.8 POTS (POSTURAL ORTHOSTATIC TACHYCARDIA SYNDROME): ICD-10-CM

## 2019-09-09 PROCEDURE — 99214 OFFICE O/P EST MOD 30 MIN: CPT

## 2019-09-09 PROCEDURE — 90471 IMMUNIZATION ADMIN: CPT

## 2019-09-09 PROCEDURE — 90686 IIV4 VACC NO PRSV 0.5 ML IM: CPT

## 2019-09-09 RX ORDER — ESCITALOPRAM OXALATE 5 MG/1
5 TABLET ORAL DAILY
Qty: 30 TABLET | Refills: 1 | Status: SHIPPED | OUTPATIENT
Start: 2019-09-09 | End: 2019-10-08 | Stop reason: SDUPTHER

## 2019-09-09 RX ORDER — FLUDROCORTISONE ACETATE 0.1 MG/1
0.1 TABLET ORAL 2 TIMES DAILY
Qty: 180 TABLET | Refills: 1 | Status: SHIPPED | OUTPATIENT
Start: 2019-09-09 | End: 2020-02-24

## 2019-09-09 RX ORDER — DESMOPRESSIN ACETATE 0.2 MG/1
0.2 TABLET ORAL 2 TIMES DAILY
Qty: 180 TABLET | Refills: 1 | Status: SHIPPED | OUTPATIENT
Start: 2019-09-09 | End: 2020-02-24

## 2019-09-09 NOTE — PROGRESS NOTES
Subjective:      Patient ID: Amina Amaro is a 34 y o  female      HPI   Pt presents to discuss depression  Feels that it is becoming more of an issue recently  +depressed mood, anhedonia, low energy, passive death wish, difficulty concentrating   She denies active suicidal and/or homicidal ideations  Previously, pt had been taking celexa with good response although it did make her feel tired  Prozac and Wellbutrin caused flushing and were discontinued  She is currently under the care of a local psychologist -- has no appt for psychiatry at this time        The following portions of the patient's history were reviewed and updated as appropriate: allergies, current medications, past family history, past medical history, past social history, past surgical history and problem list       Current Outpatient Medications:     clindamycin (CLEOCIN T) 1 %, APPLY TO ACNE TWICE DAILY, Disp: , Rfl: 5    desmopressin (DDAVP) 0 2 mg tablet, Take 1 tablet (0 2 mg total) by mouth 2 (two) times a day, Disp: 180 tablet, Rfl: 1    doxycycline (ADOXA) 50 MG tablet, Take 50 mg by mouth daily With food, Disp: , Rfl: 2    fludrocortisone (FLORINEF) 0 1 mg tablet, Take 1 tablet (0 1 mg total) by mouth 2 (two) times a day, Disp: 180 tablet, Rfl: 1    levothyroxine 100 mcg tablet, Take 1 tablet (100 mcg total) by mouth daily, Disp: 90 tablet, Rfl: 0    loratadine (CLARITIN) 10 mg tablet, Take 10 mg by mouth daily, Disp: , Rfl: 11    midodrine (PROAMATINE) 5 mg tablet, TAKE 1 TABLET BY MOUTH TWICE A DAY, Disp: 60 tablet, Rfl: 0    naproxen (NAPROSYN) 500 mg tablet, Take 1 tablet (500 mg total) by mouth daily as needed for moderate pain, Disp: 30 tablet, Rfl: 1    nebivolol (BYSTOLIC) 5 mg tablet, Take 1 tablet (5 mg total) by mouth daily, Disp: 30 tablet, Rfl: 5    norgestrel-ethinyl estradiol (CRYSELLE-28) 0 3 mg-30 mcg per tablet, Take 1 tablet by mouth daily, Disp: 90 tablet, Rfl: 3    potassium chloride (MICRO-K) 10 MEQ CR capsule, Take 1 capsule (10 mEq total) by mouth daily, Disp: 90 capsule, Rfl: 1    traMADol-acetaminophen (ULTRACET) 37 5-325 mg per tablet, Take 1 tablet by mouth every 6 (six) hours as needed for moderate pain, Disp: 30 tablet, Rfl: 0    VITAMIN D, CHOLECALCIFEROL, PO, Take 1 25 mg by mouth, Disp: , Rfl:     escitalopram (LEXAPRO) 5 mg tablet, Take 1 tablet (5 mg total) by mouth daily, Disp: 30 tablet, Rfl: 1      Review of Systems   Constitutional: Positive for fatigue  Negative for activity change and fever  HENT: Negative for congestion, ear pain, sinus pain and sore throat  Eyes: Negative for pain and itching  Respiratory: Negative for cough and shortness of breath  Cardiovascular: Negative for chest pain and palpitations  Gastrointestinal: Negative for abdominal pain, constipation, diarrhea, nausea and vomiting  Endocrine: Negative for cold intolerance and heat intolerance  Genitourinary: Negative for dysuria  Musculoskeletal: Negative for myalgias  Skin: Negative for color change and rash  Neurological: Negative for dizziness, syncope and headaches  Hematological: Negative for adenopathy  Psychiatric/Behavioral: Positive for decreased concentration, dysphoric mood and sleep disturbance  Negative for agitation, behavioral problems, confusion, hallucinations, self-injury and suicidal ideas  The patient is nervous/anxious  Objective:      /80 (BP Location: Left arm, Patient Position: Sitting, Cuff Size: Adult)   Pulse 67   Temp 98 6 °F (37 °C) (Tympanic)   Ht 5' 6 5" (1 689 m)   Wt 66 5 kg (146 lb 8 oz)   SpO2 98%   BMI 23 29 kg/m²          Physical Exam   Constitutional: She is oriented to person, place, and time  She appears well-developed and well-nourished  No distress  Body mass index is 23 29 kg/m²  HENT:   Head: Normocephalic and atraumatic     Right Ear: External ear normal    Left Ear: External ear normal    Nose: Nose normal    Mouth/Throat: Oropharynx is clear and moist    Eyes: Pupils are equal, round, and reactive to light  Conjunctivae and EOM are normal  No scleral icterus  Neck: Normal range of motion  Neck supple  No thyromegaly present  Cardiovascular: Normal rate, regular rhythm and normal heart sounds  No murmur heard  Pulmonary/Chest: Effort normal and breath sounds normal  No respiratory distress  She has no wheezes  Abdominal: Soft  Bowel sounds are normal  She exhibits no distension  There is no tenderness  Musculoskeletal: Normal range of motion  She exhibits no edema or tenderness  Lymphadenopathy:     She has no cervical adenopathy  Neurological: She is alert and oriented to person, place, and time  No cranial nerve deficit  Coordination normal    Skin: Skin is warm and dry  No rash noted  No erythema  No pallor  Psychiatric: Her speech is normal  Judgment normal  She is slowed  She is not actively hallucinating  Cognition and memory are normal  She exhibits a depressed mood  She expresses no homicidal and no suicidal ideation  She is attentive  Nursing note and vitals reviewed  Assessment/Plan:  Diagnoses and all orders for this visit:    Severe episode of recurrent major depressive disorder, without psychotic features ([facility] )  -     escitalopram (LEXAPRO) 5 mg tablet; Take 1 tablet (5 mg total) by mouth daily  -     Depression Screening Follow-up Plan: Patient's depression screening was positive with a PHQ-2 score of 4  Their PHQ-9 score was 17  Patient assessed for underlying major depression  They have no active suicidal ideations  Brief counseling provided and recommend additional follow-up/re-evaluation next office visit  Referral to [facility] offered, pt declines at this time in favor of trial of Lexapro and short-term follow up  POTS (postural orthostatic tachycardia syndrome)  -     desmopressin (DDAVP) 0 2 mg tablet;  Take 1 tablet (0 2 mg total) by mouth 2 (two) times a day  - fludrocortisone (FLORINEF) 0 1 mg tablet; Take 1 tablet (0 1 mg total) by mouth 2 (two) times a day    Encounter for immunization  -     SYRINGE/SINGLE-DOSE VIAL: influenza vaccine, [id number], quadrivalent, 0 5 mL, preservative-free (AFLURIA, FLUARIX, FLULAVAL, FLUZONE)          Return in about 3 weeks (around 9/30/2019) for Recheck mood, meds (15 min)  The patient indicates understanding of these issues and agrees with the plan            Dianne Caldwell DO

## 2019-09-09 NOTE — PROGRESS NOTES
PHQ-9 Depression Screening    PHQ-9:    Frequency of the following problems over the past two weeks:       Little interest or pleasure in doing things:  1 - several days  Feeling down, depressed, or hopeless:  3 - nearly every day  Trouble falling or staying asleep, or sleeping too much:  3 - nearly every day  Feeling tired or having little energy:  3 - nearly every day  Poor appetite or overeatin - more than half the days  Feeling bad about yourself - or that you are a failure or have let yourself or your family down:  3 - nearly every day  Trouble concentrating on things, such as reading the newspaper or watching television:  1 - several days  Moving or speaking so slowly that other people could have noticed  Or the opposite - being so fidgety or restless that you have been moving around a lot more than usual:  0 - not at all  Thoughts that you would be better off dead, or of hurting yourself in some way:  1 - several days  PHQ-2 Score:  4  PHQ-9 Score:  17       Depression Screening Follow-up Plan: Patient's depression screening was positive with a PHQ-2 score of 4  Their PHQ-9 score was 17  Patient assessed for underlying major depression  They have no active suicidal ideations  Brief counseling provided and recommend additional follow-up/re-evaluation next office visit

## 2019-09-27 DIAGNOSIS — M54.9 BACK PAIN, UNSPECIFIED BACK LOCATION, UNSPECIFIED BACK PAIN LATERALITY, UNSPECIFIED CHRONICITY: ICD-10-CM

## 2019-09-27 RX ORDER — NAPROXEN 500 MG/1
500 TABLET ORAL DAILY PRN
Qty: 30 TABLET | Refills: 1 | OUTPATIENT
Start: 2019-09-27

## 2019-10-08 ENCOUNTER — OFFICE VISIT (OUTPATIENT)
Dept: FAMILY MEDICINE CLINIC | Facility: OTHER | Age: 29
End: 2019-10-08
Payer: COMMERCIAL

## 2019-10-08 VITALS
DIASTOLIC BLOOD PRESSURE: 70 MMHG | OXYGEN SATURATION: 99 % | BODY MASS INDEX: 22.52 KG/M2 | HEIGHT: 67 IN | TEMPERATURE: 98.5 F | WEIGHT: 143.5 LBS | SYSTOLIC BLOOD PRESSURE: 100 MMHG | HEART RATE: 90 BPM

## 2019-10-08 DIAGNOSIS — F33.2 SEVERE EPISODE OF RECURRENT MAJOR DEPRESSIVE DISORDER, WITHOUT PSYCHOTIC FEATURES (HCC): ICD-10-CM

## 2019-10-08 PROCEDURE — 99214 OFFICE O/P EST MOD 30 MIN: CPT | Performed by: FAMILY MEDICINE

## 2019-10-08 RX ORDER — ESCITALOPRAM OXALATE 10 MG/1
10 TABLET ORAL DAILY
Qty: 30 TABLET | Refills: 1 | Status: SHIPPED | OUTPATIENT
Start: 2019-10-08 | End: 2019-11-07 | Stop reason: SDUPTHER

## 2019-10-08 NOTE — PROGRESS NOTES
PHQ-9 Depression Screening    PHQ-9:    Frequency of the following problems over the past two weeks:       Little interest or pleasure in doing things:  1 - several days  Feeling down, depressed, or hopeless:  2 - more than half the days  Trouble falling or staying asleep, or sleeping too much:  3 - nearly every day  Feeling tired or having little energy:  3 - nearly every day  Poor appetite or overeatin - more than half the days  Feeling bad about yourself - or that you are a failure or have let yourself or your family down:  3 - nearly every day  Trouble concentrating on things, such as reading the newspaper or watching television:  0 - not at all  Moving or speaking so slowly that other people could have noticed   Or the opposite - being so fidgety or restless that you have been moving around a lot more than usual:  0 - not at all  Thoughts that you would be better off dead, or of hurting yourself in some way:  0 - not at all  PHQ-2 Score:  3  PHQ-9 Score:  14

## 2019-10-08 NOTE — PROGRESS NOTES
Subjective:      Patient ID: Maria Guadalupe Zavala is a 34 y o  female      HPI   Pt presents for depression f/u  Reports feeling better with the addition of lexapro 5 mg daily  Good medication compliance and tolerance noted  She is feeling less depressed and is better able to concentrate/focus  No longer experiencing passive death wish  Continues to have mild anhedonia, low energy, and feels bad about herself  PHQ has decreased by 3 pts since starting SSRI  She continues counseling  Denies SI/HI, hallucinations         The following portions of the patient's history were reviewed and updated as appropriate: allergies, current medications, past family history, past medical history, past social history, past surgical history and problem list       Current Outpatient Medications:     clindamycin (CLEOCIN T) 1 %, APPLY TO ACNE TWICE DAILY, Disp: , Rfl: 5    desmopressin (DDAVP) 0 2 mg tablet, Take 1 tablet (0 2 mg total) by mouth 2 (two) times a day, Disp: 180 tablet, Rfl: 1    doxycycline (ADOXA) 50 MG tablet, Take 50 mg by mouth daily With food, Disp: , Rfl: 2    escitalopram (LEXAPRO) 10 mg tablet, Take 1 tablet (10 mg total) by mouth daily, Disp: 30 tablet, Rfl: 1    fludrocortisone (FLORINEF) 0 1 mg tablet, Take 1 tablet (0 1 mg total) by mouth 2 (two) times a day, Disp: 180 tablet, Rfl: 1    levothyroxine 100 mcg tablet, Take 1 tablet (100 mcg total) by mouth daily, Disp: 90 tablet, Rfl: 0    midodrine (PROAMATINE) 5 mg tablet, TAKE 1 TABLET BY MOUTH TWICE A DAY, Disp: 60 tablet, Rfl: 0    naproxen (NAPROSYN) 500 mg tablet, Take 1 tablet (500 mg total) by mouth daily as needed for moderate pain, Disp: 30 tablet, Rfl: 1    nebivolol (BYSTOLIC) 5 mg tablet, Take 1 tablet (5 mg total) by mouth daily, Disp: 30 tablet, Rfl: 5    norgestrel-ethinyl estradiol (CRYSELLE-28) 0 3 mg-30 mcg per tablet, Take 1 tablet by mouth daily, Disp: 90 tablet, Rfl: 3    potassium chloride (MICRO-K) 10 MEQ CR capsule, Take 1 capsule (10 mEq total) by mouth daily, Disp: 90 capsule, Rfl: 1    traMADol-acetaminophen (ULTRACET) 37 5-325 mg per tablet, Take 1 tablet by mouth every 6 (six) hours as needed for moderate pain, Disp: 30 tablet, Rfl: 0    VITAMIN D, CHOLECALCIFEROL, PO, Take 1 25 mg by mouth, Disp: , Rfl:       Review of Systems   Constitutional: Positive for fatigue  Negative for activity change and fever  HENT: Negative for congestion, ear pain, sinus pain and sore throat  Eyes: Negative for pain and itching  Respiratory: Negative for cough and shortness of breath  Cardiovascular: Negative for chest pain and palpitations  Gastrointestinal: Negative for abdominal pain, constipation, diarrhea, nausea and vomiting  Endocrine: Negative for cold intolerance and heat intolerance  Genitourinary: Negative for dysuria  Musculoskeletal: Negative for myalgias  Skin: Negative for color change and rash  Neurological: Negative for dizziness, syncope and headaches  Hematological: Negative for adenopathy  Psychiatric/Behavioral: Positive for dysphoric mood and sleep disturbance  Negative for behavioral problems, decreased concentration, hallucinations, self-injury and suicidal ideas  The patient is not nervous/anxious  Objective:      /70 (BP Location: Left arm, Patient Position: Sitting, Cuff Size: Adult)   Pulse 90   Temp 98 5 °F (36 9 °C) (Tympanic)   Ht 5' 6 5" (1 689 m)   Wt 65 1 kg (143 lb 8 oz)   SpO2 99%   BMI 22 81 kg/m²          Physical Exam   Constitutional: She is oriented to person, place, and time  She appears well-developed and well-nourished  No distress  Body mass index is 22 81 kg/m²  HENT:   Head: Normocephalic and atraumatic  Right Ear: External ear normal    Left Ear: External ear normal    Nose: Nose normal    Mouth/Throat: Oropharynx is clear and moist    Eyes: Pupils are equal, round, and reactive to light  Conjunctivae and EOM are normal  No scleral icterus  Neck: Normal range of motion  Neck supple  No thyromegaly present  Cardiovascular: Normal rate, regular rhythm and normal heart sounds  No murmur heard  Pulmonary/Chest: Effort normal and breath sounds normal  No respiratory distress  She has no wheezes  Abdominal: Soft  Bowel sounds are normal  She exhibits no distension  There is no tenderness  Musculoskeletal: Normal range of motion  She exhibits no edema or tenderness  Lymphadenopathy:     She has no cervical adenopathy  Neurological: She is alert and oriented to person, place, and time  No cranial nerve deficit  Coordination normal    Skin: Skin is warm and dry  No rash noted  No erythema  No pallor  Psychiatric: Her speech is normal and behavior is normal  Cognition and memory are normal  She expresses no homicidal and no suicidal ideation  Flat affect   Nursing note and vitals reviewed  Assessment/Plan:  Diagnoses and all orders for this visit:    Severe episode of recurrent major depressive disorder, without psychotic features ([facility] [address] )  -     escitalopram (LEXAPRO) 10 mg tablet; Take 1 tablet (10 mg total) by mouth daily  -     Depression Screening Follow-up Plan: Patient's depression screening was positive with a PHQ-2 score of 3  Their PHQ-9 score was 14  Patient assessed for underlying major depression  They have no active suicidal ideations  Brief counseling provided and recommend additional follow-up/re-evaluation next office visit  Increase lexapro for better sx response and f/u in 4 wks to reassess  Consider [facility] referral if remains refractory to tx  Return in about 4 weeks (around 11/5/2019) for Recheck mood, meds (15 mins)  The patient indicates understanding of these issues and agrees with the plan            Kwabena Atwood DO

## 2019-11-07 ENCOUNTER — OFFICE VISIT (OUTPATIENT)
Dept: FAMILY MEDICINE CLINIC | Facility: OTHER | Age: 29
End: 2019-11-07
Payer: COMMERCIAL

## 2019-11-07 VITALS
WEIGHT: 144.5 LBS | TEMPERATURE: 98.1 F | SYSTOLIC BLOOD PRESSURE: 100 MMHG | HEART RATE: 56 BPM | BODY MASS INDEX: 22.68 KG/M2 | OXYGEN SATURATION: 96 % | DIASTOLIC BLOOD PRESSURE: 70 MMHG | HEIGHT: 67 IN

## 2019-11-07 DIAGNOSIS — E03.9 PRIMARY HYPOTHYROIDISM: ICD-10-CM

## 2019-11-07 DIAGNOSIS — F33.2 SEVERE EPISODE OF RECURRENT MAJOR DEPRESSIVE DISORDER, WITHOUT PSYCHOTIC FEATURES (HCC): Primary | ICD-10-CM

## 2019-11-07 PROCEDURE — 99213 OFFICE O/P EST LOW 20 MIN: CPT | Performed by: FAMILY MEDICINE

## 2019-11-07 RX ORDER — LEVOTHYROXINE SODIUM 0.1 MG/1
100 TABLET ORAL DAILY
Qty: 90 TABLET | Refills: 0 | Status: SHIPPED | OUTPATIENT
Start: 2019-11-07 | End: 2020-01-31 | Stop reason: SDUPTHER

## 2019-11-07 RX ORDER — ESCITALOPRAM OXALATE 10 MG/1
10 TABLET ORAL DAILY
Qty: 90 TABLET | Refills: 0 | Status: SHIPPED | OUTPATIENT
Start: 2019-11-07 | End: 2020-02-12 | Stop reason: SDUPTHER

## 2019-11-07 NOTE — PROGRESS NOTES
Subjective:      Patient ID: Eren Knowles is a 34 y o  female      HPI   Pt presents for depression recheck  Was started on lexapro 8 wks ago  Initially started on 5mg daily, increased to 10mg daily about 4 wks ago  Pt reports excellent medication compliance and tolerance  States that her moods are much better now -- far less anhedonia and poor concentration  Still occasionally feels down and bad about herself  No SI/HI at this time  Continues to follow with counselor        The following portions of the patient's history were reviewed and updated as appropriate: allergies, current medications, past family history, past medical history, past social history, past surgical history and problem list       Current Outpatient Medications:     clindamycin (CLEOCIN T) 1 %, APPLY TO ACNE TWICE DAILY, Disp: , Rfl: 5    desmopressin (DDAVP) 0 2 mg tablet, Take 1 tablet (0 2 mg total) by mouth 2 (two) times a day, Disp: 180 tablet, Rfl: 1    doxycycline (ADOXA) 50 MG tablet, Take 50 mg by mouth daily With food, Disp: , Rfl: 2    escitalopram (LEXAPRO) 10 mg tablet, Take 1 tablet (10 mg total) by mouth daily, Disp: 90 tablet, Rfl: 0    fludrocortisone (FLORINEF) 0 1 mg tablet, Take 1 tablet (0 1 mg total) by mouth 2 (two) times a day, Disp: 180 tablet, Rfl: 1    levothyroxine 100 mcg tablet, Take 1 tablet (100 mcg total) by mouth daily, Disp: 90 tablet, Rfl: 0    midodrine (PROAMATINE) 5 mg tablet, TAKE 1 TABLET BY MOUTH TWICE A DAY, Disp: 60 tablet, Rfl: 0    naproxen (NAPROSYN) 500 mg tablet, Take 1 tablet (500 mg total) by mouth daily as needed for moderate pain, Disp: 30 tablet, Rfl: 1    nebivolol (BYSTOLIC) 5 mg tablet, Take 1 tablet (5 mg total) by mouth daily, Disp: 30 tablet, Rfl: 5    norgestrel-ethinyl estradiol (CRYSELLE-28) 0 3 mg-30 mcg per tablet, Take 1 tablet by mouth daily, Disp: 90 tablet, Rfl: 3    potassium chloride (MICRO-K) 10 MEQ CR capsule, Take 1 capsule (10 mEq total) by mouth daily, Disp: 90 capsule, Rfl: 1    traMADol-acetaminophen (ULTRACET) 37 5-325 mg per tablet, Take 1 tablet by mouth every 6 (six) hours as needed for moderate pain, Disp: 30 tablet, Rfl: 0    VITAMIN D, CHOLECALCIFEROL, PO, Take 1 25 mg by mouth, Disp: , Rfl:      Review of Systems   Constitutional: Negative for activity change, fatigue and fever  HENT: Negative for congestion, ear pain, sinus pain and sore throat  Eyes: Negative for pain and itching  Respiratory: Negative for cough and shortness of breath  Cardiovascular: Negative for chest pain and palpitations  Gastrointestinal: Negative for abdominal pain, constipation, diarrhea, nausea and vomiting  Endocrine: Negative for cold intolerance and heat intolerance  Genitourinary: Negative for dysuria  Musculoskeletal: Negative for myalgias  Skin: Negative for color change and rash  Neurological: Negative for dizziness, syncope and headaches  Hematological: Negative for adenopathy  Psychiatric/Behavioral: Negative for behavioral problems, dysphoric mood and sleep disturbance  The patient is not nervous/anxious  Objective:      /70 (BP Location: Right arm, Patient Position: Sitting, Cuff Size: Adult)   Pulse 56   Temp 98 1 °F (36 7 °C) (Tympanic)   Ht 5' 6 5" (1 689 m)   Wt 65 5 kg (144 lb 8 oz)   SpO2 96%   BMI 22 97 kg/m²          Physical Exam   Constitutional: She is oriented to person, place, and time  She appears well-developed and well-nourished  No distress  Body mass index is 22 97 kg/m²  HENT:   Head: Normocephalic and atraumatic  Right Ear: External ear normal    Left Ear: External ear normal    Nose: Nose normal    Mouth/Throat: Oropharynx is clear and moist    Eyes: Pupils are equal, round, and reactive to light  Conjunctivae and EOM are normal  No scleral icterus  Neck: Normal range of motion  Neck supple  No thyromegaly present  Cardiovascular: Normal rate, regular rhythm and normal heart sounds  No murmur heard  Pulmonary/Chest: Effort normal and breath sounds normal  No respiratory distress  She has no wheezes  Abdominal: Soft  Bowel sounds are normal  She exhibits no distension  There is no tenderness  Musculoskeletal: Normal range of motion  She exhibits no edema or tenderness  Lymphadenopathy:     She has no cervical adenopathy  Neurological: She is alert and oriented to person, place, and time  No cranial nerve deficit  Coordination normal    Skin: Skin is warm and dry  No rash noted  No erythema  No pallor  Psychiatric: She has a normal mood and affect  Her behavior is normal    Nursing note and vitals reviewed  Assessment/Plan:  Diagnoses and all orders for this visit:    Severe episode of recurrent major depressive disorder, without psychotic features ([facility] [address] )  -     escitalopram (LEXAPRO) 10 mg tablet; Take 1 tablet (10 mg total) by mouth daily  -     Pt doing well -- will continue this dose for at least the next 3 months    Primary hypothyroidism  -     levothyroxine 100 mcg tablet; Take 1 tablet (100 mcg total) by mouth daily          Depression Screening and Follow-up Plan: Patient's depression screening was positive with a PHQ-2 score of 2  Their PHQ-9 score was 7  Patient assessed for underlying major depression  Brief counseling provided and recommend additional follow-up/re-evaluation next office visit  Return in about 3 months (around 2/7/2020) for Recheck mood, meds  The patient indicates understanding of these issues and agrees with the plan          Stephenie Barrientos DO

## 2019-11-07 NOTE — PROGRESS NOTES
PHQ-9 Depression Screening    PHQ-9:    Frequency of the following problems over the past two weeks:       Little interest or pleasure in doing things:  0 - not at all  Feeling down, depressed, or hopeless:  2 - more than half the days  Trouble falling or staying asleep, or sleeping too much:  0 - not at all  Feeling tired or having little energy:  3 - nearly every day  Poor appetite or overeatin - not at all  Feeling bad about yourself - or that you are a failure or have let yourself or your family down:  2 - more than half the days  Trouble concentrating on things, such as reading the newspaper or watching television:  0 - not at all  Moving or speaking so slowly that other people could have noticed   Or the opposite - being so fidgety or restless that you have been moving around a lot more than usual:  0 - not at all  Thoughts that you would be better off dead, or of hurting yourself in some way:  0 - not at all  PHQ-2 Score:  2  PHQ-9 Score:  7

## 2019-11-11 ENCOUNTER — TELEPHONE (OUTPATIENT)
Dept: FAMILY MEDICINE CLINIC | Facility: OTHER | Age: 29
End: 2019-11-11

## 2019-11-11 NOTE — TELEPHONE ENCOUNTER
Please offer pt sick visit this week with me or another available provider  Thanks!   Yolanda Erazo, DO

## 2019-11-11 NOTE — TELEPHONE ENCOUNTER
Patient called and stated that she thinks that she is having a side effect of the Escitalopram   She said that her pulse was low (56) at her visit last week, shortness of breath and as of yesterday, she's been feeling off balance  Please advise

## 2019-11-13 ENCOUNTER — OFFICE VISIT (OUTPATIENT)
Dept: FAMILY MEDICINE CLINIC | Facility: OTHER | Age: 29
End: 2019-11-13
Payer: COMMERCIAL

## 2019-11-13 VITALS
DIASTOLIC BLOOD PRESSURE: 60 MMHG | HEART RATE: 80 BPM | OXYGEN SATURATION: 97 % | SYSTOLIC BLOOD PRESSURE: 100 MMHG | TEMPERATURE: 98.9 F | HEIGHT: 67 IN | BODY MASS INDEX: 22.66 KG/M2 | WEIGHT: 144.38 LBS

## 2019-11-13 DIAGNOSIS — F33.41 RECURRENT MAJOR DEPRESSIVE DISORDER, IN PARTIAL REMISSION (HCC): ICD-10-CM

## 2019-11-13 DIAGNOSIS — Z11.3 SCREEN FOR STD (SEXUALLY TRANSMITTED DISEASE): ICD-10-CM

## 2019-11-13 DIAGNOSIS — R06.02 SHORTNESS OF BREATH: Primary | ICD-10-CM

## 2019-11-13 PROCEDURE — 99214 OFFICE O/P EST MOD 30 MIN: CPT | Performed by: FAMILY MEDICINE

## 2019-11-13 NOTE — PROGRESS NOTES
Subjective:      Patient ID: Echo Koch is a 34 y o  female  Patient is a 34year old with PMHx of MDD, POTS and EDS who presents with 2 weeks of lightheadedness, "faint heart beat and a slow heart rate"  Patient is attributing this to medication dosage change 4 weeks ago when her Lexapro was increased from 5mg to 10mg  The following portions of the patient's history were reviewed and updated as appropriate: allergies, current medications, past family history, past medical history, past social history, past surgical history and problem list     Review of Systems   Constitutional: Positive for appetite change (ALEJA )  Negative for activity change, fatigue, fever and unexpected weight change  HENT: Negative for congestion and sinus pain  Eyes: Negative for pain and visual disturbance  Respiratory: Positive for shortness of breath (  with exertion)  Negative for cough, chest tightness and wheezing  Cardiovascular: Negative for chest pain and palpitations  Gastrointestinal: Positive for nausea  Negative for abdominal pain, blood in stool, constipation, diarrhea and vomiting  Genitourinary: Negative for difficulty urinating, dysuria and menstrual problem  Musculoskeletal: Positive for arthralgias (PMHx EDS ) and gait problem  Skin: Negative for color change, pallor and rash  Neurological: Positive for light-headedness (PMHx POTS)  Negative for dizziness, tremors, seizures, syncope and weakness  Psychiatric/Behavioral: Negative for confusion, sleep disturbance and suicidal ideas  The patient is nervous/anxious  Objective:  /60 (BP Location: Left arm, Patient Position: Sitting, Cuff Size: Adult)   Pulse 80   Temp 98 9 °F (37 2 °C) (Tympanic)   Ht 5' 6 5" (1 689 m)   Wt 65 5 kg (144 lb 6 oz)   SpO2 97%   BMI 22 95 kg/m²          Physical Exam   Constitutional: She is oriented to person, place, and time  She appears well-developed and well-nourished  No distress  HENT:   Head: Normocephalic and atraumatic  Right Ear: External ear normal    Left Ear: External ear normal    Nose: Nose normal    Mouth/Throat: Oropharynx is clear and moist  No oropharyngeal exudate  Eyes: Conjunctivae and EOM are normal  Right eye exhibits no discharge  Left eye exhibits no discharge  No scleral icterus  Neck: Normal range of motion  Neck supple  Cardiovascular: Normal rate, regular rhythm and normal heart sounds  Pulmonary/Chest: Effort normal and breath sounds normal  No respiratory distress  She has no wheezes  Musculoskeletal: Normal range of motion  Neurological: She is alert and oriented to person, place, and time  Skin: Skin is warm and dry  She is not diaphoretic  Psychiatric: She has a normal mood and affect  Her behavior is normal  Thought content normal    Vitals reviewed  Assessment/Plan:    No problem-specific Assessment & Plan notes found for this encounter  Diagnoses and all orders for this visit:    Shortness of breath  -     XR chest pa & lateral; Future  -     CBC and differential; Future    Screen for STD (sexually transmitted disease)  -     Human Immunodeficiency Virus 1/2 Antigen / Antibody ( Fourth Generation) with Reflex Testing; Future        Depression Screening Follow-up Plan:  Patient assessed for underlying major depression  They have no active suicidal ideations  Brief counseling provided and recommend additional follow-up/re-evaluation next office visit  Patient is a healthy appearing 34year old female with a PMHx of POTS, EDS and MDD who presents today to discuss possible medication side effects  Patient reports that she is experiencing symptoms which she attributes to Lexapro which was increased from 5mg to 10mg 1 month ago  Patient was provided reassurance that the Lexapro is most likely not contributing to the symptoms she is experiencing  Patient will continue 10mg of Lexapro   Advised to increase sodium intake due to low blood pressure at today's visit  Patient advised that she may need no contact her cardiologist to possibly adjust dose of Midodrine if BP fails to increase despite diet changes  Will get CXR and CBC to due to experiencing SOB with exertion  Will follow up with patient in 2 weeks  Return in about 2 weeks (around 11/27/2019) for recheck HR, SOB with exertion  The patient indicates understanding of these issues and agrees with the plan        Rola Reynoso MD

## 2019-11-20 ENCOUNTER — APPOINTMENT (OUTPATIENT)
Dept: RADIOLOGY | Age: 29
End: 2019-11-20
Payer: COMMERCIAL

## 2019-11-20 ENCOUNTER — TRANSCRIBE ORDERS (OUTPATIENT)
Dept: ADMINISTRATIVE | Age: 29
End: 2019-11-20

## 2019-11-20 DIAGNOSIS — R06.02 SHORTNESS OF BREATH: ICD-10-CM

## 2019-11-20 PROCEDURE — 71046 X-RAY EXAM CHEST 2 VIEWS: CPT

## 2019-11-22 LAB
BASOPHILS # BLD AUTO: 32 CELLS/UL (ref 0–200)
BASOPHILS NFR BLD AUTO: 0.6 %
EOSINOPHIL # BLD AUTO: 130 CELLS/UL (ref 15–500)
EOSINOPHIL NFR BLD AUTO: 2.4 %
ERYTHROCYTE [DISTWIDTH] IN BLOOD BY AUTOMATED COUNT: 14.2 % (ref 11–15)
HCT VFR BLD AUTO: 37.8 % (ref 35–45)
HGB BLD-MCNC: 12.5 G/DL (ref 11.7–15.5)
HIV 1+2 AB+HIV1 P24 AG SERPL QL IA: NORMAL
LYMPHOCYTES # BLD AUTO: 2284 CELLS/UL (ref 850–3900)
LYMPHOCYTES NFR BLD AUTO: 42.3 %
MCH RBC QN AUTO: 30.2 PG (ref 27–33)
MCHC RBC AUTO-ENTMCNC: 33.1 G/DL (ref 32–36)
MCV RBC AUTO: 91.3 FL (ref 80–100)
MONOCYTES # BLD AUTO: 313 CELLS/UL (ref 200–950)
MONOCYTES NFR BLD AUTO: 5.8 %
NEUTROPHILS # BLD AUTO: 2641 CELLS/UL (ref 1500–7800)
NEUTROPHILS NFR BLD AUTO: 48.9 %
PLATELET # BLD AUTO: 204 THOUSAND/UL (ref 140–400)
PMV BLD REES-ECKER: 11.8 FL (ref 7.5–12.5)
RBC # BLD AUTO: 4.14 MILLION/UL (ref 3.8–5.1)
TSH SERPL-ACNC: 2.51 MIU/L
WBC # BLD AUTO: 5.4 THOUSAND/UL (ref 3.8–10.8)

## 2019-11-25 ENCOUNTER — OFFICE VISIT (OUTPATIENT)
Dept: CARDIOLOGY CLINIC | Facility: CLINIC | Age: 29
End: 2019-11-25
Payer: COMMERCIAL

## 2019-11-25 VITALS
SYSTOLIC BLOOD PRESSURE: 96 MMHG | HEIGHT: 67 IN | HEART RATE: 64 BPM | DIASTOLIC BLOOD PRESSURE: 72 MMHG | BODY MASS INDEX: 22.91 KG/M2 | WEIGHT: 146 LBS

## 2019-11-25 DIAGNOSIS — I10 ESSENTIAL HYPERTENSION: ICD-10-CM

## 2019-11-25 DIAGNOSIS — I49.8 POTS (POSTURAL ORTHOSTATIC TACHYCARDIA SYNDROME): Primary | ICD-10-CM

## 2019-11-25 DIAGNOSIS — R42 DIZZINESS: ICD-10-CM

## 2019-11-25 PROCEDURE — 99214 OFFICE O/P EST MOD 30 MIN: CPT | Performed by: INTERNAL MEDICINE

## 2019-11-25 RX ORDER — NEBIVOLOL 2.5 MG/1
2.5 TABLET ORAL DAILY
Qty: 30 TABLET | Refills: 11 | Status: SHIPPED | OUTPATIENT
Start: 2019-11-25 | End: 2020-02-03 | Stop reason: SDUPTHER

## 2019-11-25 NOTE — PROGRESS NOTES
Cardiology   Martina Long 34 y o  female MRN: [id number]        Impression:  1  POTS - stable  Does have some dyspnea with exertion and bradycardia  2  Nia-Danlos Type III  3  Dyslipidemia - borderline  Recheck lipids in 5 years      Recommendations:  1  Decrease Bystolic to 2 3GO daily  2  Continue remainder of medications  3  Follow up in 3 months  HPI: Martina Long is a 34y o  year old female with Nia-Danlos Type III and POTS presents for follow up  Does have dyspnea with physical activity, particularly walking up stairs  Does have occasional dizziness  Review of Systems   Constitutional: Negative  HENT: Negative  Eyes: Negative  Respiratory: Positive for shortness of breath  Negative for chest tightness  Cardiovascular: Negative for chest pain, palpitations and leg swelling  Gastrointestinal: Negative  Endocrine: Negative  Genitourinary: Negative  Musculoskeletal: Negative  Skin: Negative  Allergic/Immunologic: Negative  Neurological: Positive for dizziness  Hematological: Negative  Psychiatric/Behavioral: Negative  All other systems reviewed and are negative          Past Medical History:   Diagnosis Date    Congenital dislocation of hip     Depression     LAST ASSESSED: 8/4/12    Disease of thyroid gland     Pectus excavatum     Scoliosis      Past Surgical History:   Procedure Laterality Date    DEVEN ACETABULAR OSTEOTOMY Left     ACETABULAR OSTEOTOMY     Social History     Substance and Sexual Activity   Alcohol Use Yes    Comment: SOCIAL     Social History     Substance and Sexual Activity   Drug Use No     Social History     Tobacco Use   Smoking Status Never Smoker   Smokeless Tobacco Never Used     Family History   Problem Relation Age of Onset    Asthma Mother     Hyperlipidemia Mother     Hypertension Mother     Diabetes type II Mother     Hyperlipidemia Father     Hypertension Father     Melanoma Family AMELANOTIC MELANOMA OF THE SKIN    Hyperlipidemia Family     Hypertension Family     Prostate cancer Family         MALIGNANT PROSTATIC NEOPLASM G1    Varicose Veins Family         OF LOWER EXTREMITIES       Allergies:   Allergies   Allergen Reactions    Bupropion      Other reaction(s): Flushed    Epinephrine Dizziness     Causes dysautonomia    Fluoxetine      Other reaction(s): Flushed       Medications:     Current Outpatient Medications:     clindamycin (CLEOCIN T) 1 %, APPLY TO ACNE TWICE DAILY, Disp: , Rfl: 5    desmopressin (DDAVP) 0 2 mg tablet, Take 1 tablet (0 2 mg total) by mouth 2 (two) times a day, Disp: 180 tablet, Rfl: 1    doxycycline (ADOXA) 50 MG tablet, Take 50 mg by mouth daily With food, Disp: , Rfl: 2    escitalopram (LEXAPRO) 10 mg tablet, Take 1 tablet (10 mg total) by mouth daily, Disp: 90 tablet, Rfl: 0    fludrocortisone (FLORINEF) 0 1 mg tablet, Take 1 tablet (0 1 mg total) by mouth 2 (two) times a day, Disp: 180 tablet, Rfl: 1    levothyroxine 100 mcg tablet, Take 1 tablet (100 mcg total) by mouth daily, Disp: 90 tablet, Rfl: 0    midodrine (PROAMATINE) 5 mg tablet, TAKE 1 TABLET BY MOUTH TWICE A DAY, Disp: 60 tablet, Rfl: 0    nebivolol (BYSTOLIC) 5 mg tablet, Take 1 tablet (5 mg total) by mouth daily, Disp: 30 tablet, Rfl: 5    norgestrel-ethinyl estradiol (CRYSELLE-28) 0 3 mg-30 mcg per tablet, Take 1 tablet by mouth daily, Disp: 90 tablet, Rfl: 3    potassium chloride (MICRO-K) 10 MEQ CR capsule, Take 1 capsule (10 mEq total) by mouth daily, Disp: 90 capsule, Rfl: 1    VITAMIN D, CHOLECALCIFEROL, PO, Take 1 25 mg by mouth, Disp: , Rfl:     naproxen (NAPROSYN) 500 mg tablet, Take 1 tablet (500 mg total) by mouth daily as needed for moderate pain (Patient not taking: Reported on 11/25/2019), Disp: 30 tablet, Rfl: 1    traMADol-acetaminophen (ULTRACET) 37 5-325 mg per tablet, Take 1 tablet by mouth every 6 (six) hours as needed for moderate pain (Patient not taking: Reported on 11/25/2019), Disp: 30 tablet, Rfl: 0      Wt Readings from Last 3 Encounters:   11/25/19 66 2 kg (146 lb)   11/13/19 65 5 kg (144 lb 6 oz)   11/07/19 65 5 kg (144 lb 8 oz)     Temp Readings from Last 3 Encounters:   11/13/19 98 9 °F (37 2 °C) (Tympanic)   11/07/19 98 1 °F (36 7 °C) (Tympanic)   10/08/19 98 5 °F (36 9 °C) (Tympanic)     BP Readings from Last 3 Encounters:   11/25/19 96/72   11/13/19 100/60   11/07/19 100/70     Pulse Readings from Last 3 Encounters:   11/25/19 64   11/13/19 80   11/07/19 56         Physical Exam   Constitutional: She is oriented to person, place, and time  She appears well-developed  HENT:   Head: Atraumatic  Eyes: EOM are normal    Neck: Normal range of motion  Cardiovascular: Normal rate, regular rhythm and normal heart sounds  Exam reveals no friction rub  No murmur heard  Pulmonary/Chest: Effort normal and breath sounds normal    Abdominal: Soft  Musculoskeletal: Normal range of motion  Neurological: She is alert and oriented to person, place, and time  Skin: Skin is warm and dry  Psychiatric: She has a normal mood and affect           Laboratory Studies:  CMP:  Lab Results   Component Value Date     01/20/2016    K 4 1 07/13/2018     07/13/2018    CO2 27 07/13/2018    BUN 11 07/13/2018    CREATININE 0 69 07/13/2018    AST 15 07/13/2018    ALT 14 07/13/2018    BILITOT 0 4 01/20/2016       Lipid Profile:   No results found for: CHOL  Lab Results   Component Value Date    HDL 36 (L) 08/01/2019     No results found for: [facility]  Lab Results   Component Value Date    TRIG 76 08/01/2019       Cardiac testing:     Results for orders placed during the hospital encounter of 12/07/16   Echo complete with contrast if indicated    N[facility]ative [facility] [address] [facility]  [phone]    Transthoracic Echocardiogram  2D, M-mode, Doppler, and Color Doppler    Study date:  07-Dec-2016    [id number]  Account number: [de-identified]  : 1990  Age: 27 years  Gender: Female  Status: Outpatient  Location: [address] [place] [address]  Height: 66 in  Weight: 142 8 lb  BP: 9060/ 60 mmHg    Diagnoses: R07 9 - Chest pain, unspecified    Sonographer:  LEONILA Loaiza  Primary Physician:  Mitra Andrews MD  Referring Physician:  Olive Reynolds MD  Group:  [facility]  Interpreting Physician:  Roseanne Flores MD    SUMMARY    LEFT VENTRICLE:  Systolic function was normal  Ejection fraction was estimated to be 55 %  Although no diagnostic regional wall motion abnormality was identified, this  possibility cannot be completely excluded on the basis of this study  Left ventricular diastolic function parameters were normal     HISTORY: PRIOR HISTORY: Chest pain, Ehler's Danlos, POTS, Pectus excavatum    PROCEDURE: The study was performed in the [address] [place] [address]  This was a routine study  The transthoracic approach was used  The study  included complete 2D imaging, M-mode, complete spectral Doppler, and color  Doppler  The heart rate was 67 bpm, at the start of the study  Images were  obtained from the parasternal, apical, subcostal, and suprasternal notch  acoustic windows  Image quality was adequate  LEFT VENTRICLE: Size was normal  Systolic function was normal  Ejection  fraction was estimated to be 55 %  Although no diagnostic regional wall motion  abnormality was identified, this possibility cannot be completely excluded on  the basis of this study  Wall thickness was normal  DOPPLER: The ratio of early  ventricular filling to atrial contraction velocities was within the normal  range   Left ventricular diastolic function parameters were normal     RIGHT VENTRICLE: The size was normal  Systolic function was normal     LEFT ATRIUM: Size was normal     RIGHT ATRIUM: Size was normal     MITRAL VALVE: Valve structure was normal  There was mild thickening  There was  normal leaflet separation  DOPPLER: There was no evidence for stenosis  There  was trace regurgitation  AORTIC VALVE: The valve was not well visualized  DOPPLER: There was no evidence  for stenosis  There was no regurgitation  TRICUSPID VALVE: The valve structure was normal  There was normal leaflet  separation  DOPPLER: There was no evidence for stenosis  There was trace  regurgitation  Estimated peak PA pressure was 30 mmHg  PULMONIC VALVE: Leaflets exhibited normal thickness, no calcification, and  normal cuspal separation  DOPPLER: The transpulmonic velocity was within the  normal range  There was trace regurgitation  PERICARDIUM: There was no pericardial effusion  The pericardium was normal in  appearance  AORTA: The root exhibited normal size      SYSTEM MEASUREMENT TABLES    2D  %FS: 40 41 %  AV Diam: 2 73 cm  EDV(Teich): 45 08 ml  EF(Cube): 78 84 %  EF(Teich): 72 34 %  ESV(Cube): 7 81 ml  ESV(Teich): 12 47 ml  IVSd: 0 72 cm  LA Area: 13 86 cm2  LA Diam: 2 98 cm  LVEDV MOD A4C: 79 4 ml  LVEF MOD A4C: 56 88 %  LVESV MOD A4C: 34 24 ml  LVIDd: 3 33 cm  LVIDs: 1 98 cm  LVLd A4C: 8 18 cm  LVLs A4C: 6 6 cm  LVPWd: 0 73 cm  RA Area: 8 83 cm2  RV Diam: 2 86 cm  SI(Cube): 16 72 ml/m2  SI(Teich): 18 74 ml/m2  SV MOD A4C: 45 17 ml  SV(Cube): 29 09 ml  SV(Teich): 32 61 ml    CW  TR MaxP 64 mmHg  TR Vmax: 2 43 m/s    MM  TAPSE: 1 84 cm    PW  E': 0 15 m/s  E/E': 6 47  MV A Juan Jose: 0 49 m/s  MV Dec Bartholomew: 4 31 m/s2  MV DecT: 225 26 ms  MV E Juan Jose: 0 97 m/s  MV E/A Ratio: 1 98    [employer] Accredited Echocardiography Laboratory    Prepared and electronically signed by    Diana Mcmahon MD  Signed [id number] 16:24:27

## 2019-11-25 NOTE — PATIENT INSTRUCTIONS
Recommendations:  1  Decrease Bystolic to 5 9HE daily  2  Continue remainder of medications  3  Follow up in 3 months

## 2019-11-29 ENCOUNTER — TELEPHONE (OUTPATIENT)
Dept: FAMILY MEDICINE CLINIC | Facility: OTHER | Age: 29
End: 2019-11-29

## 2019-11-29 NOTE — TELEPHONE ENCOUNTER
Patient called and said she wasn't sure if she still needed to be seen on 12/3 since she went to her cardiologist  I did keep the patient on our schedule, please advise if she should cx that appointment or if she should still come in  Thank you

## 2019-12-05 ENCOUNTER — TELEPHONE (OUTPATIENT)
Dept: FAMILY MEDICINE CLINIC | Facility: OTHER | Age: 29
End: 2019-12-05

## 2019-12-05 NOTE — TELEPHONE ENCOUNTER
----- Message from Merlinda Roses, MD sent at 12/4/2019  4:55 PM EST -----  Left a message on patient's voicemail asking her to call the office for her results  Also released results through [address]  HIV test is non-reactive (normal)    Blood counts are all normal

## 2020-01-31 DIAGNOSIS — E03.9 PRIMARY HYPOTHYROIDISM: ICD-10-CM

## 2020-01-31 RX ORDER — LEVOTHYROXINE SODIUM 0.1 MG/1
100 TABLET ORAL DAILY
Qty: 90 TABLET | Refills: 0 | Status: SHIPPED | OUTPATIENT
Start: 2020-01-31 | End: 2020-04-28 | Stop reason: SDUPTHER

## 2020-02-03 ENCOUNTER — TELEPHONE (OUTPATIENT)
Dept: CARDIOLOGY CLINIC | Facility: CLINIC | Age: 30
End: 2020-02-03

## 2020-02-03 DIAGNOSIS — I10 ESSENTIAL HYPERTENSION: ICD-10-CM

## 2020-02-03 RX ORDER — NEBIVOLOL 5 MG/1
5 TABLET ORAL DAILY
Qty: 90 TABLET | Refills: 3 | Status: SHIPPED | OUTPATIENT
Start: 2020-02-03 | End: 2021-02-03 | Stop reason: SDUPTHER

## 2020-02-03 NOTE — TELEPHONE ENCOUNTER
Pt asking if she can increase Bystolic back up to 5mg? Pt experiencing episodes of tachycardia since she decreased to 2 5mg on 11/25

## 2020-02-05 ENCOUNTER — TELEPHONE (OUTPATIENT)
Dept: CARDIOLOGY CLINIC | Facility: CLINIC | Age: 30
End: 2020-02-05

## 2020-02-12 ENCOUNTER — OFFICE VISIT (OUTPATIENT)
Dept: FAMILY MEDICINE CLINIC | Facility: OTHER | Age: 30
End: 2020-02-12
Payer: COMMERCIAL

## 2020-02-12 VITALS
TEMPERATURE: 99.9 F | BODY MASS INDEX: 23.45 KG/M2 | WEIGHT: 149.38 LBS | SYSTOLIC BLOOD PRESSURE: 100 MMHG | HEART RATE: 82 BPM | DIASTOLIC BLOOD PRESSURE: 70 MMHG | HEIGHT: 67 IN | OXYGEN SATURATION: 100 %

## 2020-02-12 DIAGNOSIS — F33.2 SEVERE EPISODE OF RECURRENT MAJOR DEPRESSIVE DISORDER, WITHOUT PSYCHOTIC FEATURES (HCC): ICD-10-CM

## 2020-02-12 PROCEDURE — 99213 OFFICE O/P EST LOW 20 MIN: CPT | Performed by: FAMILY MEDICINE

## 2020-02-12 PROCEDURE — 3078F DIAST BP <80 MM HG: CPT | Performed by: FAMILY MEDICINE

## 2020-02-12 PROCEDURE — 3074F SYST BP LT 130 MM HG: CPT | Performed by: FAMILY MEDICINE

## 2020-02-12 PROCEDURE — 3008F BODY MASS INDEX DOCD: CPT | Performed by: FAMILY MEDICINE

## 2020-02-12 PROCEDURE — 1036F TOBACCO NON-USER: CPT | Performed by: FAMILY MEDICINE

## 2020-02-12 RX ORDER — ESCITALOPRAM OXALATE 10 MG/1
20 TABLET ORAL DAILY
Qty: 180 TABLET | Refills: 0 | Status: SHIPPED | OUTPATIENT
Start: 2020-02-12 | End: 2020-04-01 | Stop reason: SDUPTHER

## 2020-02-12 NOTE — TELEPHONE ENCOUNTER
Bystolic 5 mg tablet prior auth   approved from 2/11/20 to 8/11/2020   through [employer]      See last prior authorization paperwork filed under media   Must submit with primary dx of HTN,secondary DX POTS, and Nia-danlos Type III, also sent Study on Nia-getachews

## 2020-02-12 NOTE — PROGRESS NOTES
Assessment/Plan:     Diagnoses and all orders for this visit:    Severe episode of recurrent major depressive disorder, without psychotic features ([facility] [address] )  -     escitalopram (LEXAPRO) 10 mg tablet; Take 2 tablets (20 mg total) by mouth daily        Patient is a well appearing 34year old female who presents for follow up of her mood in the setting of MDD  Patient was started on 10mg Lexapro once daily in November of 2019 and feels that it has improved her mood slightly  She is willing to increase the dosage of Lexapro  Will start patient on 20mg Lexapro once daily  Recommend following up in 1 month, but due to patient's time constraints we will follow up in 3 months or sooner as necessary  Return in about 3 months (around 5/12/2020) for Recheck mood   The patient indicates understanding of these issues and agrees with the plan  Subjective:      Patient ID: Patricia Anderson is a 34 y o  female  HPI  Patient presents for follow up of MDD  Patient reports that her moods are better since beginning Lexapro despite PHQ9 score increasing from a 7 at our last visit to 10 today  Patient denies increased sleep disturbance  She does however report decreased appetite  Patient denies unexpected weight loss  Patient denies suicidal or homicidal ideations  She reports feelings of guilt and worthlessness, but denies anhedonia  Patient sees her psychologist Dr Mayank Schultz once every 2 weeks for therapy and feels that this is helpful  Review of Systems   Constitutional: Positive for appetite change  Negative for activity change and unexpected weight change  HENT: Negative for sneezing  Eyes: Negative for visual disturbance  Respiratory: Negative for chest tightness and wheezing  Cardiovascular: Negative for palpitations  Gastrointestinal: Negative for blood in stool, constipation and diarrhea  Endocrine: Negative for polyuria     Genitourinary: Negative for difficulty urinating, dysuria and urgency  Musculoskeletal: Negative for neck stiffness  Skin: Negative for color change, pallor and wound  Allergic/Immunologic: Negative for environmental allergies, food allergies and immunocompromised state  Neurological: Positive for dizziness (  POTS ) and light-headedness (  POTS )  Negative for tremors and syncope  Hematological: Does not bruise/bleed easily  Psychiatric/Behavioral: Positive for sleep disturbance  Negative for agitation, behavioral problems, confusion and decreased concentration  The patient is nervous/anxious  Objective:  /70 (BP Location: Left arm, Patient Position: Sitting, Cuff Size: Large)   Pulse 82   Temp 99 9 °F (37 7 °C) (Tympanic)   Ht 5' 6 5" (1 689 m)   Wt 67 8 kg (149 lb 6 oz)   SpO2 100%   BMI 23 75 kg/m²          Physical Exam   Constitutional: She is oriented to person, place, and time  She appears well-developed and well-nourished  No distress  HENT:   Head: Normocephalic and atraumatic  Right Ear: External ear normal    Left Ear: External ear normal    Nose: Nose normal    Mouth/Throat: Oropharynx is clear and moist  No oropharyngeal exudate  Eyes: Conjunctivae and EOM are normal  Right eye exhibits no discharge  Left eye exhibits no discharge  No scleral icterus  Neck: Normal range of motion  Neck supple  Cardiovascular: Normal rate, regular rhythm and normal heart sounds  Pulmonary/Chest: Effort normal and breath sounds normal  No respiratory distress  She has no wheezes  Musculoskeletal: Normal range of motion  She exhibits no edema, tenderness or deformity  Neurological: She is alert and oriented to person, place, and time  No cranial nerve deficit  Coordination normal    Skin: Skin is warm and dry  She is not diaphoretic  Psychiatric: She has a normal mood and affect   Her behavior is normal  Judgment and thought content normal

## 2020-02-20 ENCOUNTER — OFFICE VISIT (OUTPATIENT)
Dept: CARDIOLOGY CLINIC | Facility: MEDICAL CENTER | Age: 30
End: 2020-02-20
Payer: COMMERCIAL

## 2020-02-20 VITALS
DIASTOLIC BLOOD PRESSURE: 76 MMHG | HEIGHT: 67 IN | WEIGHT: 145 LBS | HEART RATE: 64 BPM | OXYGEN SATURATION: 98 % | SYSTOLIC BLOOD PRESSURE: 102 MMHG | BODY MASS INDEX: 22.76 KG/M2

## 2020-02-20 DIAGNOSIS — R42 DIZZINESS: ICD-10-CM

## 2020-02-20 DIAGNOSIS — Q79.62 EHLERS-DANLOS SYNDROME, BENIGN HYPERMOBILE FORM: ICD-10-CM

## 2020-02-20 DIAGNOSIS — I10 ESSENTIAL HYPERTENSION: ICD-10-CM

## 2020-02-20 DIAGNOSIS — I49.8 POTS (POSTURAL ORTHOSTATIC TACHYCARDIA SYNDROME): Primary | ICD-10-CM

## 2020-02-20 PROCEDURE — 1036F TOBACCO NON-USER: CPT | Performed by: INTERNAL MEDICINE

## 2020-02-20 PROCEDURE — 3074F SYST BP LT 130 MM HG: CPT | Performed by: INTERNAL MEDICINE

## 2020-02-20 PROCEDURE — 3078F DIAST BP <80 MM HG: CPT | Performed by: INTERNAL MEDICINE

## 2020-02-20 PROCEDURE — 99214 OFFICE O/P EST MOD 30 MIN: CPT | Performed by: INTERNAL MEDICINE

## 2020-02-20 PROCEDURE — 93000 ELECTROCARDIOGRAM COMPLETE: CPT | Performed by: INTERNAL MEDICINE

## 2020-02-20 PROCEDURE — 3008F BODY MASS INDEX DOCD: CPT | Performed by: INTERNAL MEDICINE

## 2020-02-20 NOTE — PATIENT INSTRUCTIONS
Recommendations:  1  Continue Bystolic 5mg daily  2  Continue remainder of medications  3  Follow up in 6 months

## 2020-02-20 NOTE — PROGRESS NOTES
Cardiology   Leander Whitaker 34 y o  female MRN: [id number]           Impression:  1  POTS - stable  Does have some dyspnea with exertion and bradycardia  2  Nia-Danlos Type III  3  Dyslipidemia - borderline  Recheck lipids in 5 years      Recommendations:  1  Continue Bystolic 5mg daily  2  Continue remainder of medications  3  Follow up in 6 months  HPI: Leander Whitaker is a 34y o  year old female with Nia-Danlos Type III and POTS presents for follow up  Does have dyspnea with physical activity, particularly walking up stairs   Had some increasing tachycardia, so increased the Bystolic to 5mg daily  Review of Systems   Constitutional: Negative  HENT: Negative  Eyes: Negative  Respiratory: Negative for chest tightness and shortness of breath  Cardiovascular: Negative for chest pain, palpitations and leg swelling  Gastrointestinal: Negative  Endocrine: Negative  Genitourinary: Negative  Musculoskeletal: Negative  Skin: Negative  Allergic/Immunologic: Negative  Neurological: Negative  Hematological: Negative  Psychiatric/Behavioral: Negative  All other systems reviewed and are negative          Past Medical History:   Diagnosis Date    Congenital dislocation of hip     Depression     LAST ASSESSED: 8/4/12    Disease of thyroid gland     Pectus excavatum     Scoliosis      Past Surgical History:   Procedure Laterality Date    DEVEN ACETABULAR OSTEOTOMY Left     ACETABULAR OSTEOTOMY     Social History     Substance and Sexual Activity   Alcohol Use Yes    Comment: SOCIAL     Social History     Substance and Sexual Activity   Drug Use No     Social History     Tobacco Use   Smoking Status Never Smoker   Smokeless Tobacco Never Used     Family History   Problem Relation Age of Onset    Asthma Mother     Hyperlipidemia Mother     Hypertension Mother     Diabetes type II Mother     Hyperlipidemia Father     Hypertension Father     Melanoma Family         AMELANOTIC MELANOMA OF THE SKIN    Hyperlipidemia Family     Hypertension Family     Prostate cancer Family         MALIGNANT PROSTATIC NEOPLASM G1    Varicose Veins Family         OF LOWER EXTREMITIES       Allergies:   Allergies   Allergen Reactions    Bupropion      Other reaction(s): Flushed    Epinephrine Dizziness     Causes dysautonomia    Fluoxetine      Other reaction(s): Flushed       Medications:     Current Outpatient Medications:     clindamycin (CLEOCIN T) 1 %, APPLY TO ACNE TWICE DAILY, Disp: , Rfl: 5    desmopressin (DDAVP) 0 2 mg tablet, Take 1 tablet (0 2 mg total) by mouth 2 (two) times a day, Disp: 180 tablet, Rfl: 1    doxycycline (ADOXA) 50 MG tablet, Take 50 mg by mouth daily With food, Disp: , Rfl: 2    escitalopram (LEXAPRO) 10 mg tablet, Take 2 tablets (20 mg total) by mouth daily, Disp: 180 tablet, Rfl: 0    fludrocortisone (FLORINEF) 0 1 mg tablet, Take 1 tablet (0 1 mg total) by mouth 2 (two) times a day, Disp: 180 tablet, Rfl: 1    levothyroxine 100 mcg tablet, Take 1 tablet (100 mcg total) by mouth daily, Disp: 90 tablet, Rfl: 0    midodrine (PROAMATINE) 5 mg tablet, TAKE 1 TABLET BY MOUTH TWICE A DAY, Disp: 60 tablet, Rfl: 0    naproxen (NAPROSYN) 500 mg tablet, Take 1 tablet (500 mg total) by mouth daily as needed for moderate pain, Disp: 30 tablet, Rfl: 1    nebivolol (BYSTOLIC) 5 mg tablet, Take 1 tablet (5 mg total) by mouth daily, Disp: 90 tablet, Rfl: 3    norgestrel-ethinyl estradiol (CRYSELLE-28) 0 3 mg-30 mcg per tablet, Take 1 tablet by mouth daily, Disp: 90 tablet, Rfl: 3    potassium chloride (MICRO-K) 10 MEQ CR capsule, Take 1 capsule (10 mEq total) by mouth daily, Disp: 90 capsule, Rfl: 1    traMADol-acetaminophen (ULTRACET) 37 5-325 mg per tablet, Take 1 tablet by mouth every 6 (six) hours as needed for moderate pain, Disp: 30 tablet, Rfl: 0    VITAMIN D, CHOLECALCIFEROL, PO, Take 1 25 mg by mouth, Disp: , Rfl:       Wt Readings from Last 3 Encounters:   20 65 8 kg (145 lb)   20 67 8 kg (149 lb 6 oz)   19 66 2 kg (146 lb)     Temp Readings from Last 3 Encounters:   20 99 9 °F (37 7 °C) (Tympanic)   19 98 9 °F (37 2 °C) (Tympanic)   19 98 1 °F (36 7 °C) (Tympanic)     BP Readings from Last 3 Encounters:   20 102/76   20 100/70   19 96/72     Pulse Readings from Last 3 Encounters:   20 64   20 82   19 64         Physical Exam   Constitutional: She is oriented to person, place, and time  She appears well-developed  HENT:   Head: Atraumatic  Eyes: EOM are normal    Neck: Normal range of motion  Cardiovascular: Normal rate, regular rhythm and normal heart sounds  Exam reveals no gallop  No murmur heard  Pulmonary/Chest: Effort normal and breath sounds normal    Abdominal: Soft  Musculoskeletal: Normal range of motion  Neurological: She is alert and oriented to person, place, and time           Laboratory Studies:  CMP:  Lab Results   Component Value Date     2016    K 4 1 2018     2018    CO2 27 2018    BUN 11 2018    CREATININE 0 69 2018    AST 15 2018    ALT 14 2018    BILITOT 0 4 2016       Lipid Profile:   No results found for: CHOL  Lab Results   Component Value Date    HDL 36 (L) 2019     No results found for: [facility]  Lab Results   Component Value Date    TRIG 76 2019       Cardiac testing:   EKG reviewed personally: NSR 64 Nml  Results for orders placed during the hospital encounter of 16   Echo complete with contrast if indicated    Narrative [facility], [address] [facility]  [phone]    Transthoracic Echocardiogram  2D, M-mode, Doppler, and Color Doppler    Study date:  07-Dec-2016    [facility]  MR number: [id number]  Account number: [de-identified]  : 1990  Age: 27 years  Gender: Female  Status: Outpatient  Location: [address] [place] [address]  Height: 66 in  Weight: 142 8 lb  BP: 9060/ 60 mmHg    Diagnoses: R07 9 - Chest pain, unspecified    Sonographer:  LEONILA Judge  Primary Physician:  Penelope Oppenheim Korene Arrow, MD  Referring Physician:  Byron Huddleston MD  Group:  [facility]  Interpreting Physician:  Jossie Mcmillan MD    SUMMARY    LEFT VENTRICLE:  Systolic function was normal  Ejection fraction was estimated to be 55 %  Although no diagnostic regional wall motion abnormality was identified, this  possibility cannot be completely excluded on the basis of this study  Left ventricular diastolic function parameters were normal     HISTORY: PRIOR HISTORY: Chest pain, Ehler's Danlos, POTS, Pectus excavatum    PROCEDURE: The study was performed in the [address] [place] [address]  This was a routine study  The transthoracic approach was used  The study  included complete 2D imaging, M-mode, complete spectral Doppler, and color  Doppler  The heart rate was 67 bpm, at the start of the study  Images were  obtained from the parasternal, apical, subcostal, and suprasternal notch  acoustic windows  Image quality was adequate  LEFT VENTRICLE: Size was normal  Systolic function was normal  Ejection  fraction was estimated to be 55 %  Although no diagnostic regional wall motion  abnormality was identified, this possibility cannot be completely excluded on  the basis of this study  Wall thickness was normal  DOPPLER: The ratio of early  ventricular filling to atrial contraction velocities was within the normal  range  Left ventricular diastolic function parameters were normal     RIGHT VENTRICLE: The size was normal  Systolic function was normal     LEFT ATRIUM: Size was normal     RIGHT ATRIUM: Size was normal     MITRAL VALVE: Valve structure was normal  There was mild thickening  There was  normal leaflet separation  DOPPLER: There was no evidence for stenosis   There  was trace regurgitation  AORTIC VALVE: The valve was not well visualized  DOPPLER: There was no evidence  for stenosis  There was no regurgitation  TRICUSPID VALVE: The valve structure was normal  There was normal leaflet  separation  DOPPLER: There was no evidence for stenosis  There was trace  regurgitation  Estimated peak PA pressure was 30 mmHg  PULMONIC VALVE: Leaflets exhibited normal thickness, no calcification, and  normal cuspal separation  DOPPLER: The transpulmonic velocity was within the  normal range  There was trace regurgitation  PERICARDIUM: There was no pericardial effusion  The pericardium was normal in  appearance  AORTA: The root exhibited normal size      SYSTEM MEASUREMENT TABLES    2D  %FS: 40 41 %  AV Diam: 2 73 cm  EDV(Teich): 45 08 ml  EF(Cube): 78 84 %  EF(Teich): 72 34 %  ESV(Cube): 7 81 ml  ESV(Teich): 12 47 ml  IVSd: 0 72 cm  LA Area: 13 86 cm2  LA Diam: 2 98 cm  LVEDV MOD A4C: 79 4 ml  LVEF MOD A4C: 56 88 %  LVESV MOD A4C: 34 24 ml  LVIDd: 3 33 cm  LVIDs: 1 98 cm  LVLd A4C: 8 18 cm  LVLs A4C: 6 6 cm  LVPWd: 0 73 cm  RA Area: 8 83 cm2  RV Diam: 2 86 cm  SI(Cube): 16 72 ml/m2  SI(Teich): 18 74 ml/m2  SV MOD A4C: 45 17 ml  SV(Cube): 29 09 ml  SV(Teich): 32 61 ml    CW  TR MaxP 64 mmHg  TR Vmax: 2 43 m/s    MM  TAPSE: 1 84 cm    PW  E': 0 15 m/s  E/E': 6 47  MV A Juan Jose: 0 49 m/s  MV Dec [place]: 4 31 m/s2  MV DecT: 225 26 ms  MV E Juan Jose: 0 97 m/s  MV E/A Ratio: 1 98    [facility] Accredited Echocardiography Laboratory    Prepared and electronically signed by    Mindi Linn MD  Signed [id number] 16:24:27

## 2020-02-23 DIAGNOSIS — I49.8 POTS (POSTURAL ORTHOSTATIC TACHYCARDIA SYNDROME): ICD-10-CM

## 2020-02-24 RX ORDER — FLUDROCORTISONE ACETATE 0.1 MG/1
TABLET ORAL
Qty: 60 TABLET | Refills: 5 | Status: SHIPPED | OUTPATIENT
Start: 2020-02-24 | End: 2022-07-05 | Stop reason: SDUPTHER

## 2020-02-24 RX ORDER — DESMOPRESSIN ACETATE 0.2 MG/1
0.2 TABLET ORAL 2 TIMES DAILY
Qty: 60 TABLET | Refills: 5 | Status: SHIPPED | OUTPATIENT
Start: 2020-02-24 | End: 2021-02-11 | Stop reason: SDUPTHER

## 2020-03-24 DIAGNOSIS — Z01.419 ENCNTR FOR GYN EXAM (GENERAL) (ROUTINE) W/O ABN FINDINGS: ICD-10-CM

## 2020-03-25 ENCOUNTER — TELEPHONE (OUTPATIENT)
Dept: OBGYN CLINIC | Facility: CLINIC | Age: 30
End: 2020-03-25

## 2020-04-01 DIAGNOSIS — F33.2 SEVERE EPISODE OF RECURRENT MAJOR DEPRESSIVE DISORDER, WITHOUT PSYCHOTIC FEATURES (HCC): ICD-10-CM

## 2020-04-01 RX ORDER — ESCITALOPRAM OXALATE 10 MG/1
20 TABLET ORAL DAILY
Qty: 180 TABLET | Refills: 0 | Status: SHIPPED | OUTPATIENT
Start: 2020-04-01 | End: 2020-05-06 | Stop reason: SINTOL

## 2020-04-19 DIAGNOSIS — E87.6 LOW BLOOD POTASSIUM: ICD-10-CM

## 2020-04-20 RX ORDER — POTASSIUM CHLORIDE 750 MG/1
CAPSULE, EXTENDED RELEASE ORAL
Qty: 30 CAPSULE | Refills: 5 | OUTPATIENT
Start: 2020-04-20

## 2020-04-21 DIAGNOSIS — F33.2 SEVERE EPISODE OF RECURRENT MAJOR DEPRESSIVE DISORDER, WITHOUT PSYCHOTIC FEATURES (HCC): ICD-10-CM

## 2020-04-21 DIAGNOSIS — M54.9 BACK PAIN, UNSPECIFIED BACK LOCATION, UNSPECIFIED BACK PAIN LATERALITY, UNSPECIFIED CHRONICITY: ICD-10-CM

## 2020-04-21 DIAGNOSIS — E87.6 LOW BLOOD POTASSIUM: ICD-10-CM

## 2020-04-21 RX ORDER — NAPROXEN 500 MG/1
500 TABLET ORAL DAILY PRN
Qty: 30 TABLET | Refills: 1 | OUTPATIENT
Start: 2020-04-21

## 2020-04-21 RX ORDER — POTASSIUM CHLORIDE 750 MG/1
CAPSULE, EXTENDED RELEASE ORAL
Qty: 30 CAPSULE | Refills: 5 | OUTPATIENT
Start: 2020-04-21

## 2020-04-21 RX ORDER — ESCITALOPRAM OXALATE 5 MG/1
TABLET ORAL
Qty: 30 TABLET | Refills: 1 | OUTPATIENT
Start: 2020-04-21

## 2020-04-28 DIAGNOSIS — E03.9 PRIMARY HYPOTHYROIDISM: ICD-10-CM

## 2020-04-28 DIAGNOSIS — E87.6 LOW BLOOD POTASSIUM: ICD-10-CM

## 2020-04-28 RX ORDER — LEVOTHYROXINE SODIUM 0.1 MG/1
100 TABLET ORAL DAILY
Qty: 90 TABLET | Refills: 1 | Status: SHIPPED | OUTPATIENT
Start: 2020-04-28 | End: 2020-10-15 | Stop reason: SDUPTHER

## 2020-04-28 RX ORDER — POTASSIUM CHLORIDE 750 MG/1
10 CAPSULE, EXTENDED RELEASE ORAL DAILY
Qty: 90 CAPSULE | Refills: 1 | Status: SHIPPED | OUTPATIENT
Start: 2020-04-28 | End: 2020-10-23 | Stop reason: SDUPTHER

## 2020-05-06 ENCOUNTER — TELEMEDICINE (OUTPATIENT)
Dept: FAMILY MEDICINE CLINIC | Facility: OTHER | Age: 30
End: 2020-05-06
Payer: COMMERCIAL

## 2020-05-06 DIAGNOSIS — F33.2 SEVERE EPISODE OF RECURRENT MAJOR DEPRESSIVE DISORDER, WITHOUT PSYCHOTIC FEATURES (HCC): Primary | ICD-10-CM

## 2020-05-06 PROCEDURE — 99213 OFFICE O/P EST LOW 20 MIN: CPT | Performed by: FAMILY MEDICINE

## 2020-05-06 RX ORDER — VENLAFAXINE HYDROCHLORIDE 75 MG/1
75 TABLET, EXTENDED RELEASE ORAL
Qty: 30 TABLET | Refills: 1 | Status: SHIPPED | OUTPATIENT
Start: 2020-05-06 | End: 2020-06-11

## 2020-05-12 ENCOUNTER — TELEMEDICINE (OUTPATIENT)
Dept: PSYCHOLOGY | Facility: CLINIC | Age: 30
End: 2020-05-12
Payer: COMMERCIAL

## 2020-05-12 ENCOUNTER — TELEPHONE (OUTPATIENT)
Dept: CARDIOLOGY CLINIC | Facility: CLINIC | Age: 30
End: 2020-05-12

## 2020-05-12 ENCOUNTER — OFFICE VISIT (OUTPATIENT)
Dept: PSYCHOLOGY | Facility: CLINIC | Age: 30
End: 2020-05-12
Payer: COMMERCIAL

## 2020-05-12 DIAGNOSIS — F33.2 SEVERE EPISODE OF RECURRENT MAJOR DEPRESSIVE DISORDER, WITHOUT PSYCHOTIC FEATURES (HCC): Primary | ICD-10-CM

## 2020-05-12 PROCEDURE — 90791 PSYCH DIAGNOSTIC EVALUATION: CPT

## 2020-05-12 PROCEDURE — 99203 OFFICE O/P NEW LOW 30 MIN: CPT | Performed by: HOSPITALIST

## 2020-05-13 ENCOUNTER — OFFICE VISIT (OUTPATIENT)
Dept: PSYCHOLOGY | Facility: CLINIC | Age: 30
End: 2020-05-13
Payer: COMMERCIAL

## 2020-05-13 DIAGNOSIS — F33.2 SEVERE RECURRENT MAJOR DEPRESSION WITHOUT PSYCHOTIC FEATURES (HCC): Primary | ICD-10-CM

## 2020-05-13 PROCEDURE — 90832 PSYTX W PT 30 MINUTES: CPT

## 2020-05-13 PROCEDURE — H0035 MH PARTIAL HOSP TX UNDER 24H: HCPCS

## 2020-05-13 PROCEDURE — NC001 PR NO CHARGE: Performed by: HOSPITALIST

## 2020-05-14 ENCOUNTER — OFFICE VISIT (OUTPATIENT)
Dept: PSYCHOLOGY | Facility: CLINIC | Age: 30
End: 2020-05-14
Payer: COMMERCIAL

## 2020-05-14 DIAGNOSIS — F33.2 SEVERE EPISODE OF RECURRENT MAJOR DEPRESSIVE DISORDER, WITHOUT PSYCHOTIC FEATURES (HCC): Primary | ICD-10-CM

## 2020-05-14 PROCEDURE — H0035 MH PARTIAL HOSP TX UNDER 24H: HCPCS

## 2020-05-14 PROCEDURE — NC001 PR NO CHARGE: Performed by: HOSPITALIST

## 2020-05-15 ENCOUNTER — OFFICE VISIT (OUTPATIENT)
Dept: PSYCHOLOGY | Facility: CLINIC | Age: 30
End: 2020-05-15
Payer: COMMERCIAL

## 2020-05-15 DIAGNOSIS — F33.2 SEVERE EPISODE OF RECURRENT MAJOR DEPRESSIVE DISORDER, WITHOUT PSYCHOTIC FEATURES (HCC): Primary | ICD-10-CM

## 2020-05-15 PROCEDURE — NC001 PR NO CHARGE: Performed by: HOSPITALIST

## 2020-05-15 PROCEDURE — H0035 MH PARTIAL HOSP TX UNDER 24H: HCPCS

## 2020-05-15 PROCEDURE — 90832 PSYTX W PT 30 MINUTES: CPT

## 2020-05-18 ENCOUNTER — OFFICE VISIT (OUTPATIENT)
Dept: PSYCHOLOGY | Facility: CLINIC | Age: 30
End: 2020-05-18
Payer: COMMERCIAL

## 2020-05-18 DIAGNOSIS — F33.2 SEVERE EPISODE OF RECURRENT MAJOR DEPRESSIVE DISORDER, WITHOUT PSYCHOTIC FEATURES (HCC): Primary | ICD-10-CM

## 2020-05-18 DIAGNOSIS — Z01.419 ENCNTR FOR GYN EXAM (GENERAL) (ROUTINE) W/O ABN FINDINGS: ICD-10-CM

## 2020-05-18 PROCEDURE — H0035 MH PARTIAL HOSP TX UNDER 24H: HCPCS

## 2020-05-18 PROCEDURE — NC001 PR NO CHARGE: Performed by: HOSPITALIST

## 2020-05-18 PROCEDURE — 90832 PSYTX W PT 30 MINUTES: CPT

## 2020-05-18 RX ORDER — NORGESTREL-ETHINYL ESTRADIOL 0.3-0.03MG
TABLET ORAL
Qty: 28 TABLET | Refills: 1 | OUTPATIENT
Start: 2020-05-18

## 2020-05-19 ENCOUNTER — OFFICE VISIT (OUTPATIENT)
Dept: PSYCHOLOGY | Facility: CLINIC | Age: 30
End: 2020-05-19
Payer: COMMERCIAL

## 2020-05-19 DIAGNOSIS — F33.2 SEVERE EPISODE OF RECURRENT MAJOR DEPRESSIVE DISORDER, WITHOUT PSYCHOTIC FEATURES (HCC): Primary | ICD-10-CM

## 2020-05-19 PROCEDURE — H0035 MH PARTIAL HOSP TX UNDER 24H: HCPCS

## 2020-05-19 PROCEDURE — NC001 PR NO CHARGE: Performed by: HOSPITALIST

## 2020-05-20 ENCOUNTER — TELEMEDICINE (OUTPATIENT)
Dept: FAMILY MEDICINE CLINIC | Facility: OTHER | Age: 30
End: 2020-05-20
Payer: COMMERCIAL

## 2020-05-20 ENCOUNTER — OFFICE VISIT (OUTPATIENT)
Dept: PSYCHOLOGY | Facility: CLINIC | Age: 30
End: 2020-05-20
Payer: COMMERCIAL

## 2020-05-20 DIAGNOSIS — F33.2 SEVERE EPISODE OF RECURRENT MAJOR DEPRESSIVE DISORDER, WITHOUT PSYCHOTIC FEATURES (HCC): Primary | ICD-10-CM

## 2020-05-20 PROCEDURE — NC001 PR NO CHARGE: Performed by: HOSPITALIST

## 2020-05-20 PROCEDURE — 99213 OFFICE O/P EST LOW 20 MIN: CPT | Performed by: FAMILY MEDICINE

## 2020-05-20 PROCEDURE — H0035 MH PARTIAL HOSP TX UNDER 24H: HCPCS

## 2020-05-20 PROCEDURE — 90832 PSYTX W PT 30 MINUTES: CPT

## 2020-05-21 ENCOUNTER — OFFICE VISIT (OUTPATIENT)
Dept: PSYCHOLOGY | Facility: CLINIC | Age: 30
End: 2020-05-21
Payer: COMMERCIAL

## 2020-05-21 DIAGNOSIS — F33.2 SEVERE EPISODE OF RECURRENT MAJOR DEPRESSIVE DISORDER, WITHOUT PSYCHOTIC FEATURES (HCC): Primary | ICD-10-CM

## 2020-05-21 PROCEDURE — H0035 MH PARTIAL HOSP TX UNDER 24H: HCPCS

## 2020-05-21 PROCEDURE — NC001 PR NO CHARGE: Performed by: HOSPITALIST

## 2020-05-22 ENCOUNTER — OFFICE VISIT (OUTPATIENT)
Dept: PSYCHOLOGY | Facility: CLINIC | Age: 30
End: 2020-05-22
Payer: COMMERCIAL

## 2020-05-22 DIAGNOSIS — F33.2 SEVERE EPISODE OF RECURRENT MAJOR DEPRESSIVE DISORDER, WITHOUT PSYCHOTIC FEATURES (HCC): Primary | ICD-10-CM

## 2020-05-22 PROCEDURE — 90832 PSYTX W PT 30 MINUTES: CPT

## 2020-05-22 PROCEDURE — NC001 PR NO CHARGE: Performed by: HOSPITALIST

## 2020-05-22 PROCEDURE — H0035 MH PARTIAL HOSP TX UNDER 24H: HCPCS

## 2020-05-26 ENCOUNTER — OFFICE VISIT (OUTPATIENT)
Dept: PSYCHOLOGY | Facility: CLINIC | Age: 30
End: 2020-05-26
Payer: COMMERCIAL

## 2020-05-26 DIAGNOSIS — F33.2 SEVERE EPISODE OF RECURRENT MAJOR DEPRESSIVE DISORDER, WITHOUT PSYCHOTIC FEATURES (HCC): Primary | ICD-10-CM

## 2020-05-26 PROCEDURE — H0035 MH PARTIAL HOSP TX UNDER 24H: HCPCS

## 2020-05-26 PROCEDURE — NC001 PR NO CHARGE: Performed by: PSYCHIATRY & NEUROLOGY

## 2020-05-27 ENCOUNTER — TELEMEDICINE (OUTPATIENT)
Dept: PSYCHIATRY | Facility: CLINIC | Age: 30
End: 2020-05-27
Payer: COMMERCIAL

## 2020-05-27 ENCOUNTER — OFFICE VISIT (OUTPATIENT)
Dept: PSYCHOLOGY | Facility: CLINIC | Age: 30
End: 2020-05-27
Payer: COMMERCIAL

## 2020-05-27 DIAGNOSIS — F33.2 SEVERE EPISODE OF RECURRENT MAJOR DEPRESSIVE DISORDER, WITHOUT PSYCHOTIC FEATURES (HCC): Primary | ICD-10-CM

## 2020-05-27 PROCEDURE — 99213 OFFICE O/P EST LOW 20 MIN: CPT | Performed by: NURSE PRACTITIONER

## 2020-05-27 PROCEDURE — 90832 PSYTX W PT 30 MINUTES: CPT

## 2020-05-27 PROCEDURE — H0035 MH PARTIAL HOSP TX UNDER 24H: HCPCS

## 2020-05-27 PROCEDURE — NC001 PR NO CHARGE: Performed by: PSYCHIATRY & NEUROLOGY

## 2020-05-28 ENCOUNTER — OFFICE VISIT (OUTPATIENT)
Dept: PSYCHOLOGY | Facility: CLINIC | Age: 30
End: 2020-05-28
Payer: COMMERCIAL

## 2020-05-28 DIAGNOSIS — F33.2 SEVERE EPISODE OF RECURRENT MAJOR DEPRESSIVE DISORDER, WITHOUT PSYCHOTIC FEATURES (HCC): Primary | ICD-10-CM

## 2020-05-28 PROCEDURE — NC001 PR NO CHARGE: Performed by: PSYCHIATRY & NEUROLOGY

## 2020-05-28 PROCEDURE — H0035 MH PARTIAL HOSP TX UNDER 24H: HCPCS

## 2020-05-29 ENCOUNTER — OFFICE VISIT (OUTPATIENT)
Dept: PSYCHOLOGY | Facility: CLINIC | Age: 30
End: 2020-05-29
Payer: COMMERCIAL

## 2020-05-29 DIAGNOSIS — F33.2 SEVERE EPISODE OF RECURRENT MAJOR DEPRESSIVE DISORDER, WITHOUT PSYCHOTIC FEATURES (HCC): Primary | ICD-10-CM

## 2020-05-29 PROCEDURE — NC001 PR NO CHARGE: Performed by: PSYCHIATRY & NEUROLOGY

## 2020-05-29 PROCEDURE — 90832 PSYTX W PT 30 MINUTES: CPT

## 2020-05-29 PROCEDURE — H0035 MH PARTIAL HOSP TX UNDER 24H: HCPCS

## 2020-06-01 ENCOUNTER — OFFICE VISIT (OUTPATIENT)
Dept: PSYCHOLOGY | Facility: CLINIC | Age: 30
End: 2020-06-01
Payer: COMMERCIAL

## 2020-06-01 DIAGNOSIS — F33.2 SEVERE EPISODE OF RECURRENT MAJOR DEPRESSIVE DISORDER, WITHOUT PSYCHOTIC FEATURES (HCC): Primary | ICD-10-CM

## 2020-06-01 PROCEDURE — 90832 PSYTX W PT 30 MINUTES: CPT

## 2020-06-01 PROCEDURE — NC001 PR NO CHARGE: Performed by: PSYCHIATRY & NEUROLOGY

## 2020-06-01 PROCEDURE — H0035 MH PARTIAL HOSP TX UNDER 24H: HCPCS

## 2020-06-02 ENCOUNTER — TELEMEDICINE (OUTPATIENT)
Dept: PSYCHIATRY | Facility: CLINIC | Age: 30
End: 2020-06-02
Payer: COMMERCIAL

## 2020-06-02 ENCOUNTER — OFFICE VISIT (OUTPATIENT)
Dept: PSYCHOLOGY | Facility: CLINIC | Age: 30
End: 2020-06-02
Payer: COMMERCIAL

## 2020-06-02 DIAGNOSIS — F33.2 SEVERE EPISODE OF RECURRENT MAJOR DEPRESSIVE DISORDER, WITHOUT PSYCHOTIC FEATURES (HCC): Primary | ICD-10-CM

## 2020-06-02 PROCEDURE — 99213 OFFICE O/P EST LOW 20 MIN: CPT | Performed by: NURSE PRACTITIONER

## 2020-06-02 PROCEDURE — H0035 MH PARTIAL HOSP TX UNDER 24H: HCPCS

## 2020-06-02 PROCEDURE — NC001 PR NO CHARGE: Performed by: PSYCHIATRY & NEUROLOGY

## 2020-06-03 ENCOUNTER — OFFICE VISIT (OUTPATIENT)
Dept: PSYCHOLOGY | Facility: CLINIC | Age: 30
End: 2020-06-03
Payer: COMMERCIAL

## 2020-06-03 DIAGNOSIS — F33.2 SEVERE EPISODE OF RECURRENT MAJOR DEPRESSIVE DISORDER, WITHOUT PSYCHOTIC FEATURES (HCC): Primary | ICD-10-CM

## 2020-06-03 PROCEDURE — NC001 PR NO CHARGE: Performed by: HOSPITALIST

## 2020-06-03 PROCEDURE — H0035 MH PARTIAL HOSP TX UNDER 24H: HCPCS

## 2020-06-03 PROCEDURE — 90832 PSYTX W PT 30 MINUTES: CPT

## 2020-06-04 ENCOUNTER — OFFICE VISIT (OUTPATIENT)
Dept: PSYCHOLOGY | Facility: CLINIC | Age: 30
End: 2020-06-04
Payer: COMMERCIAL

## 2020-06-04 DIAGNOSIS — F33.2 SEVERE EPISODE OF RECURRENT MAJOR DEPRESSIVE DISORDER, WITHOUT PSYCHOTIC FEATURES (HCC): Primary | ICD-10-CM

## 2020-06-04 PROCEDURE — NC001 PR NO CHARGE: Performed by: HOSPITALIST

## 2020-06-04 PROCEDURE — H0035 MH PARTIAL HOSP TX UNDER 24H: HCPCS

## 2020-06-05 ENCOUNTER — OFFICE VISIT (OUTPATIENT)
Dept: PSYCHOLOGY | Facility: CLINIC | Age: 30
End: 2020-06-05
Payer: COMMERCIAL

## 2020-06-05 DIAGNOSIS — F33.2 SEVERE EPISODE OF RECURRENT MAJOR DEPRESSIVE DISORDER, WITHOUT PSYCHOTIC FEATURES (HCC): Primary | ICD-10-CM

## 2020-06-05 PROCEDURE — 90832 PSYTX W PT 30 MINUTES: CPT

## 2020-06-05 PROCEDURE — H0035 MH PARTIAL HOSP TX UNDER 24H: HCPCS

## 2020-06-05 PROCEDURE — NC001 PR NO CHARGE: Performed by: HOSPITALIST

## 2020-06-08 ENCOUNTER — OFFICE VISIT (OUTPATIENT)
Dept: PSYCHOLOGY | Facility: CLINIC | Age: 30
End: 2020-06-08
Payer: COMMERCIAL

## 2020-06-08 DIAGNOSIS — F33.2 SEVERE EPISODE OF RECURRENT MAJOR DEPRESSIVE DISORDER, WITHOUT PSYCHOTIC FEATURES (HCC): Primary | ICD-10-CM

## 2020-06-08 DIAGNOSIS — Z01.419 ENCNTR FOR GYN EXAM (GENERAL) (ROUTINE) W/O ABN FINDINGS: ICD-10-CM

## 2020-06-08 PROCEDURE — NC001 PR NO CHARGE: Performed by: HOSPITALIST

## 2020-06-08 PROCEDURE — H0035 MH PARTIAL HOSP TX UNDER 24H: HCPCS

## 2020-06-09 ENCOUNTER — OFFICE VISIT (OUTPATIENT)
Dept: PSYCHOLOGY | Facility: CLINIC | Age: 30
End: 2020-06-09
Payer: COMMERCIAL

## 2020-06-09 DIAGNOSIS — F33.2 SEVERE EPISODE OF RECURRENT MAJOR DEPRESSIVE DISORDER, WITHOUT PSYCHOTIC FEATURES (HCC): Primary | ICD-10-CM

## 2020-06-09 PROCEDURE — NC001 PR NO CHARGE: Performed by: HOSPITALIST

## 2020-06-09 PROCEDURE — H0035 MH PARTIAL HOSP TX UNDER 24H: HCPCS

## 2020-06-09 PROCEDURE — 90834 PSYTX W PT 45 MINUTES: CPT

## 2020-06-09 RX ORDER — NORGESTREL-ETHINYL ESTRADIOL 0.3-0.03MG
TABLET ORAL
Qty: 28 TABLET | Refills: 1 | OUTPATIENT
Start: 2020-06-09

## 2020-06-10 ENCOUNTER — OFFICE VISIT (OUTPATIENT)
Dept: PSYCHOLOGY | Facility: CLINIC | Age: 30
End: 2020-06-10
Payer: COMMERCIAL

## 2020-06-10 DIAGNOSIS — F33.2 SEVERE EPISODE OF RECURRENT MAJOR DEPRESSIVE DISORDER, WITHOUT PSYCHOTIC FEATURES (HCC): Primary | ICD-10-CM

## 2020-06-10 PROCEDURE — H0035 MH PARTIAL HOSP TX UNDER 24H: HCPCS

## 2020-06-10 PROCEDURE — NC001 PR NO CHARGE: Performed by: HOSPITALIST

## 2020-06-11 ENCOUNTER — TELEMEDICINE (OUTPATIENT)
Dept: PSYCHIATRY | Facility: CLINIC | Age: 30
End: 2020-06-11
Payer: COMMERCIAL

## 2020-06-11 ENCOUNTER — OFFICE VISIT (OUTPATIENT)
Dept: PSYCHOLOGY | Facility: CLINIC | Age: 30
End: 2020-06-11
Payer: COMMERCIAL

## 2020-06-11 DIAGNOSIS — F33.2 SEVERE EPISODE OF RECURRENT MAJOR DEPRESSIVE DISORDER, WITHOUT PSYCHOTIC FEATURES (HCC): ICD-10-CM

## 2020-06-11 DIAGNOSIS — F33.2 SEVERE EPISODE OF RECURRENT MAJOR DEPRESSIVE DISORDER, WITHOUT PSYCHOTIC FEATURES (HCC): Primary | ICD-10-CM

## 2020-06-11 PROCEDURE — 99213 OFFICE O/P EST LOW 20 MIN: CPT | Performed by: NURSE PRACTITIONER

## 2020-06-11 PROCEDURE — H0035 MH PARTIAL HOSP TX UNDER 24H: HCPCS

## 2020-06-11 PROCEDURE — NC001 PR NO CHARGE: Performed by: HOSPITALIST

## 2020-06-11 PROCEDURE — 90832 PSYTX W PT 30 MINUTES: CPT

## 2020-06-11 RX ORDER — VENLAFAXINE HYDROCHLORIDE 150 MG/1
150 TABLET, EXTENDED RELEASE ORAL
Qty: 30 TABLET | Refills: 1 | Status: SHIPPED | OUTPATIENT
Start: 2020-06-11 | End: 2021-02-26 | Stop reason: SDUPTHER

## 2020-06-12 ENCOUNTER — APPOINTMENT (OUTPATIENT)
Dept: PSYCHOLOGY | Facility: CLINIC | Age: 30
End: 2020-06-12
Payer: COMMERCIAL

## 2020-06-18 ENCOUNTER — TELEPHONE (OUTPATIENT)
Dept: PSYCHIATRY | Facility: CLINIC | Age: 30
End: 2020-06-18

## 2020-06-22 ENCOUNTER — OFFICE VISIT (OUTPATIENT)
Dept: FAMILY MEDICINE CLINIC | Facility: OTHER | Age: 30
End: 2020-06-22
Payer: COMMERCIAL

## 2020-06-22 VITALS
DIASTOLIC BLOOD PRESSURE: 70 MMHG | WEIGHT: 158 LBS | TEMPERATURE: 98.2 F | SYSTOLIC BLOOD PRESSURE: 104 MMHG | HEIGHT: 67 IN | OXYGEN SATURATION: 96 % | BODY MASS INDEX: 24.8 KG/M2 | HEART RATE: 84 BPM

## 2020-06-22 DIAGNOSIS — F33.2 SEVERE EPISODE OF RECURRENT MAJOR DEPRESSIVE DISORDER, WITHOUT PSYCHOTIC FEATURES (HCC): Primary | ICD-10-CM

## 2020-06-22 PROCEDURE — 3078F DIAST BP <80 MM HG: CPT | Performed by: FAMILY MEDICINE

## 2020-06-22 PROCEDURE — 3074F SYST BP LT 130 MM HG: CPT | Performed by: FAMILY MEDICINE

## 2020-06-22 PROCEDURE — 99213 OFFICE O/P EST LOW 20 MIN: CPT | Performed by: FAMILY MEDICINE

## 2020-06-22 PROCEDURE — 1036F TOBACCO NON-USER: CPT | Performed by: FAMILY MEDICINE

## 2020-06-22 PROCEDURE — 3008F BODY MASS INDEX DOCD: CPT | Performed by: FAMILY MEDICINE

## 2020-06-26 DIAGNOSIS — F33.2 SEVERE EPISODE OF RECURRENT MAJOR DEPRESSIVE DISORDER, WITHOUT PSYCHOTIC FEATURES (HCC): ICD-10-CM

## 2020-06-26 RX ORDER — VENLAFAXINE HYDROCHLORIDE 75 MG/1
CAPSULE, EXTENDED RELEASE ORAL
Qty: 30 CAPSULE | Refills: 1 | Status: SHIPPED | OUTPATIENT
Start: 2020-06-26 | End: 2020-10-05 | Stop reason: ALTCHOICE

## 2020-07-08 ENCOUNTER — OFFICE VISIT (OUTPATIENT)
Dept: FAMILY MEDICINE CLINIC | Facility: OTHER | Age: 30
End: 2020-07-08
Payer: COMMERCIAL

## 2020-07-08 VITALS
HEIGHT: 67 IN | DIASTOLIC BLOOD PRESSURE: 80 MMHG | BODY MASS INDEX: 24.64 KG/M2 | WEIGHT: 157 LBS | OXYGEN SATURATION: 96 % | HEART RATE: 87 BPM | TEMPERATURE: 97.6 F | SYSTOLIC BLOOD PRESSURE: 118 MMHG

## 2020-07-08 DIAGNOSIS — H81.10 BENIGN PAROXYSMAL POSITIONAL VERTIGO, UNSPECIFIED LATERALITY: Primary | ICD-10-CM

## 2020-07-08 PROCEDURE — 3008F BODY MASS INDEX DOCD: CPT | Performed by: FAMILY MEDICINE

## 2020-07-08 PROCEDURE — 3074F SYST BP LT 130 MM HG: CPT | Performed by: FAMILY MEDICINE

## 2020-07-08 PROCEDURE — 99213 OFFICE O/P EST LOW 20 MIN: CPT | Performed by: FAMILY MEDICINE

## 2020-07-08 PROCEDURE — 3079F DIAST BP 80-89 MM HG: CPT | Performed by: FAMILY MEDICINE

## 2020-07-08 PROCEDURE — 1036F TOBACCO NON-USER: CPT | Performed by: FAMILY MEDICINE

## 2020-07-08 RX ORDER — MECLIZINE HYDROCHLORIDE 25 MG/1
25 TABLET ORAL EVERY 8 HOURS PRN
Qty: 30 TABLET | Refills: 0 | Status: SHIPPED | OUTPATIENT
Start: 2020-07-08 | End: 2021-11-01 | Stop reason: ALTCHOICE

## 2020-07-08 NOTE — PROGRESS NOTES
Assessment/Plan:    Diagnoses and all orders for this visit:    Benign paroxysmal positional vertigo, unspecified laterality  -     meclizine (ANTIVERT) 25 mg tablet; Take 1 tablet (25 mg total) by mouth every 8 (eight) hours as needed for dizziness or nausea  - Perform Epley Maneuvers at home to resolves symptoms  - Call office if symptoms are unresolved         Subjective:      Patient ID: Abbi Marie is a 34 y o  female  Patient is a 35 y/o female here c/o vertigo symptoms that occurred about 3 times over the last couple weeks and they last for up to three days at a time  She reports her symptoms occurring when she would move her eyes left or right she gets a spinning sensation and loss of balance  She also experiences nausea but no vomiting  She has never had this happen before  She reports a history of ARMANDO which she also has dizziness with but that is different than this dizziness  She denies headaches  She denies light-headedness during these episodes  She has found that lying down and closing her eyes for a couple hours improved her symptoms  She reports these symptoms possibly started after increasing the dose of her Effexor recently  The following portions of the patient's history were reviewed and updated as appropriate: allergies, current medications, past medical history, past social history, past surgical history and problem list     Review of Systems   Constitutional: Negative for chills and fever  HENT: Positive for congestion (chronic), hearing loss (decreased hearing when in crowds or lots of other noises around), rhinorrhea (chronic) and sore throat (chronic?)  Negative for ear discharge, ear pain and tinnitus  Eyes: Negative for pain and visual disturbance  Respiratory: Negative for shortness of breath  Cardiovascular: Negative for chest pain  Gastrointestinal: Positive for nausea  Negative for vomiting  Neurological: Positive for dizziness   Negative for seizures, light-headedness, numbness and headaches  Objective:  /80 (BP Location: Right arm, Patient Position: Sitting, Cuff Size: Large)   Pulse 87   Temp 97 6 °F (36 4 °C) (Temporal)   Ht 5' 6 5" (1 689 m)   Wt 71 2 kg (157 lb)   SpO2 96%   BMI 24 96 kg/m²        Physical Exam   Constitutional: She appears well-developed and well-nourished  No distress  HENT:   Head: Normocephalic and atraumatic  Right Ear: External ear normal    Left Ear: External ear normal    Nose: Nose normal    Mouth/Throat: Oropharynx is clear and moist    Eyes: Conjunctivae and EOM are normal    (-) Bisi Halpike   No nystagmus elicited with rapid eye-movements in the office   Neck: Normal range of motion  Neck supple  No thyromegaly present  Cardiovascular: Normal rate, regular rhythm and normal heart sounds  Pulmonary/Chest: Effort normal and breath sounds normal    Lymphadenopathy:     She has no cervical adenopathy  Psychiatric: She has a normal mood and affect

## 2020-07-08 NOTE — PATIENT INSTRUCTIONS
Benign Paroxysmal Positional Vertigo   AMBULATORY CARE:   Benign paroxysmal positional vertigo (BPPV)  is an inner ear condition that causes you to suddenly feel dizzy  Benign means it is not serious or life-threatening  BPPV is caused by a problem with the nerves and structure of your inner ear  BPPV happens when small pieces of calcium break loose and lump together in one of your inner ear canals  Common symptoms include the following: You may feel that you or the room is moving or spinning  Turning your head, rolling over in bed, getting up or lying down may lead to sudden vertigo  You may also have any of the following symptoms:  · Nystagmus (quick shaky eye movement that you cannot control)    · Nausea    · Poor balance and feeling unsteady when you walk  Seek care immediately if:   · You fall during a BPPV episode and are injured  · You have a severe headache that does not go away  · You have new changes in your vision or feel weak or confused  · You have problems hearing, or you have ringing or buzzing in your ears  Contact your healthcare provider if:   · Your BPPV symptoms do not go away or they return  · You have problems with your balance, or you are falling often  · You have new or increased nausea or vomiting with vertigo  · You feel anxious or depressed and do not want to leave your home  · You have questions or concerns about your condition or care  Management of BPPV:   · Your healthcare provider will teach you how to move your head and body to prevent symptoms  For example, he or she may teach you certain ways to move your head or body  These movements usually help relieve your symptoms and keep the dizziness from returning  The exercises help move the calcium pieces to a different part of your ear  Do the movements only as directed  · Vestibular and balance rehabilitation therapy (VBRT)  is used to teach you exercises to improve your balance and strength   VBRT may help decrease your dizziness and prevent injuries if you are at risk for falls  · Medicines  may be recommended or prescribed to treat dizziness or nausea  Prevent your symptoms:   · Try to avoid sudden head movements  Stand up and lie down slowly  · Raise and support your head when you lie down  Place pillows under your upper back and head or rest in a recliner  · Change your position often when you are lying down  Try not to lie with your head on the same side for long periods of time  Roll over slowly  · Wear protective gear  when you ride a bike or play sports  A helmet helps protect your head from injury  Follow up with your healthcare provider as directed: You may need to return in 1 month to check the progress of your treatment  Write down your questions so you remember to ask them during your visits  © 2017 [facility] [place] Information is for End User's use only and may not be sold, redistributed or otherwise used for commercial purposes  All illustrations and images included in CareNotes® are the copyrighted property of A D A M [employer] Inc  or Serge Fine  The above information is an  only  It is not intended as medical advice for individual conditions or treatments  Talk to your doctor, nurse or pharmacist before following any medical regimen to see if it is safe and effective for you

## 2020-08-11 ENCOUNTER — TELEPHONE (OUTPATIENT)
Dept: CARDIOLOGY CLINIC | Facility: CLINIC | Age: 30
End: 2020-08-11

## 2020-08-14 ENCOUNTER — TELEPHONE (OUTPATIENT)
Dept: CARDIOLOGY CLINIC | Facility: CLINIC | Age: 30
End: 2020-08-14

## 2020-08-14 NOTE — TELEPHONE ENCOUNTER
[facility]        PA submitted for [employer]olic

## 2020-08-18 ENCOUNTER — OFFICE VISIT (OUTPATIENT)
Dept: FAMILY MEDICINE CLINIC | Facility: OTHER | Age: 30
End: 2020-08-18
Payer: COMMERCIAL

## 2020-08-18 VITALS
HEART RATE: 100 BPM | OXYGEN SATURATION: 98 % | HEIGHT: 67 IN | BODY MASS INDEX: 24.21 KG/M2 | SYSTOLIC BLOOD PRESSURE: 104 MMHG | TEMPERATURE: 98 F | WEIGHT: 154.25 LBS | DIASTOLIC BLOOD PRESSURE: 60 MMHG

## 2020-08-18 DIAGNOSIS — E03.9 PRIMARY HYPOTHYROIDISM: ICD-10-CM

## 2020-08-18 DIAGNOSIS — F33.41 RECURRENT MAJOR DEPRESSIVE DISORDER, IN PARTIAL REMISSION (HCC): ICD-10-CM

## 2020-08-18 DIAGNOSIS — Z00.00 ANNUAL PHYSICAL EXAM: Primary | ICD-10-CM

## 2020-08-18 PROCEDURE — 3725F SCREEN DEPRESSION PERFORMED: CPT | Performed by: FAMILY MEDICINE

## 2020-08-18 PROCEDURE — 1036F TOBACCO NON-USER: CPT | Performed by: FAMILY MEDICINE

## 2020-08-18 PROCEDURE — 3008F BODY MASS INDEX DOCD: CPT | Performed by: FAMILY MEDICINE

## 2020-08-18 PROCEDURE — 99395 PREV VISIT EST AGE 18-39: CPT | Performed by: FAMILY MEDICINE

## 2020-08-18 NOTE — PROGRESS NOTES
[address] [place]    NAME: Abbi Marie  AGE: 31 y o  SEX: female  : 1990     DATE: 2020     Assessment and Plan:     Problem List Items Addressed This Visit        Endocrine    Primary hypothyroidism    Relevant Orders    TSH, 3rd generation with Free T4 reflex      Other Visit Diagnoses     Annual physical exam    -  Primary          Immunizations and preventive care screenings were discussed with patient today  Appropriate education was printed on patient's after visit summary  Counseling:  Alcohol/drug use: discussed moderation in alcohol intake, the recommendations for healthy alcohol use, and avoidance of illicit drug use  Dental Health: discussed importance of regular tooth brushing, flossing, and dental visits  Injury prevention: discussed safety/seat belts, safety helmets, smoke detectors, carbon dioxide detectors, and smoking near bedding or upholstery  Sexual health: discussed sexually transmitted diseases, partner selection, use of condoms, avoidance of unintended pregnancy, and contraceptive alternatives  · Exercise: the importance of regular exercise/physical activity was discussed  Recommend exercise 3-5 times per week for at least 30 minutes  Depression Screening Follow-up Plan: Patient's depression screening was positive with a PHQ-2 score of 4  Their PHQ-9 score was 13  Patient assessed for underlying major depression  They have no active suicidal ideations  Brief counseling provided and recommend additional follow-up/re-evaluation next office visit  Continue regular follow-up with their psychologist/therapist/psychiatrist who is managing their mental health condition(s)  Patient is a well appearing 31year old woman who presents for annual physical  Patient has no complaints or concerns at this time  Patient due for Pap smear as last Pap smear was 2017   Will check TSH prior to next visit  Return in about 6 months (around 2021) for Recheck mood   The patient indicates understanding of these issues and agrees with the plan  Chief Complaint:     Chief Complaint   Patient presents with    Annual Exam      History of Present Illness:     Adult Annual Physical   Patient here for a comprehensive physical exam  The patient reports no problems, but is currently being treated for MDD  Patient is currently undergoing therapy every 2 weeks and is also complaint with Effexor daily  Patient feels she is doing well on this regimen  Diet and Physical Activity  · Diet/Nutrition: poor diet, frequent junk food and limited fruits/vegetables  · Exercise: no formal exercise  Depression Screening  PHQ-9 Depression Screening    PHQ-9:    Frequency of the following problems over the past two weeks:       Little interest or pleasure in doing things:  2 - more than half the days  Feeling down, depressed, or hopeless:  2 - more than half the days  Trouble falling or staying asleep, or sleeping too much:  3 - nearly every day  Feeling tired or having little energy:  3 - nearly every day  Poor appetite or overeatin - several days  Feeling bad about yourself - or that you are a failure or have let yourself or your family down:  2 - more than half the days  Trouble concentrating on things, such as reading the newspaper or watching television:  0 - not at all  Moving or speaking so slowly that other people could have noticed  Or the opposite - being so fidgety or restless that you have been moving around a lot more than usual:  0 - not at all  Thoughts that you would be better off dead, or of hurting yourself in some way:  0 - not at all  PHQ-2 Score:  4  PHQ-9 Score:  13       General Health  · Sleep: gets more than 8 hours of sleep on average and unrefreshing sleep  · Hearing: normal - bilateral   · Vision: goes for regular eye exams and wears glasses     · Dental: regular dental visits and brushes teeth twice daily  /GYN Health  · Last menstrual period: 8/13/2020 (regular according to patient)  · Contraceptive method: oral contraceptives  Not currently sexually active  · History of STDs?: no      Review of Systems:     Review of Systems   Constitutional: Positive for fatigue (  chronic issue )  Negative for appetite change, chills, diaphoresis and fever  HENT: Negative for congestion, rhinorrhea, sneezing and sore throat  Eyes: Negative for visual disturbance  Respiratory: Negative for cough, chest tightness, shortness of breath and wheezing  Cardiovascular: Positive for palpitations (  chronic issue )  Negative for chest pain and leg swelling  Gastrointestinal: Negative for abdominal distention, abdominal pain, constipation, diarrhea, nausea and vomiting  Genitourinary: Negative for difficulty urinating, dysuria and menstrual problem  Musculoskeletal: Negative for arthralgias and myalgias  Skin: Negative for color change and rash  Neurological: Positive for dizziness (  chronic issue ) and light-headedness (  chronic issue )  Negative for tremors, syncope, weakness, numbness and headaches  Psychiatric/Behavioral: Positive for decreased concentration (  2/2/ MDD)  Negative for sleep disturbance        Past Medical History:     Past Medical History:   Diagnosis Date    Congenital dislocation of hip     Depression     LAST ASSESSED: 8/4/12    Disease of thyroid gland     Pectus excavatum     Scoliosis       Past Surgical History:     Past Surgical History:   Procedure Laterality Date    DEVEN ACETABULAR OSTEOTOMY Left     ACETABULAR OSTEOTOMY      Social History:     E-Cigarette/Vaping    E-Cigarette Use Never User      E-Cigarette/Vaping Substances    Nicotine No     THC No     CBD No     Flavoring No     Other No     Unknown No      Social History     Socioeconomic History    Marital status: Single     Spouse name: None    Number of children: None    Years of education: None    Highest education level: None   Occupational History    Occupation: UNEMPLOYED   Social Needs    Financial resource strain: None    Food insecurity     Worry: None     Inability: None    Transportation needs     Medical: None     Non-medical: None   Tobacco Use    Smoking status: Never Smoker    Smokeless tobacco: Never Used   Substance and Sexual Activity    Alcohol use: Yes     Comment: SOCIAL    Drug use: No    Sexual activity: Never     Birth control/protection: OCP   Lifestyle    Physical activity     Days per week: None     Minutes per session: None    Stress: None   Relationships    Social connections     Talks on phone: None     Gets together: None     Attends Judaism service: None     Active member of club or organization: None     Attends meetings of clubs or organizations: None     Relationship status: None    Intimate partner violence     Fear of current or ex partner: None     Emotionally abused: None     Physically abused: None     Forced sexual activity: None   Other Topics Concern    None   Social History Narrative    CURRENTLY IN SCHOOL: WILL BE STARTING AT [employer] IN 1/2014    LIVING WITH PARENTS      Family History:     Family History   Problem Relation Age of Onset    Asthma Mother     Hyperlipidemia Mother     Hypertension Mother     Diabetes type II Mother     Hyperlipidemia Father     Hypertension Father     Melanoma Family         AMELANOTIC MELANOMA OF THE SKIN    Hyperlipidemia Family     Hypertension Family     Prostate cancer Family         MALIGNANT PROSTATIC NEOPLASM G1    Varicose Veins Family         OF LOWER EXTREMITIES      Current Medications:     Current Outpatient Medications   Medication Sig Dispense Refill    clindamycin (CLEOCIN T) 1 % APPLY TO ACNE TWICE DAILY  5    desmopressin (DDAVP) 0 2 mg tablet TAKE 1 TABLET (0 2 MG TOTAL) BY MOUTH 2 (TWO) TIMES A DAY 60 tablet 5    doxycycline (ADOXA) 50 MG tablet Take 50 mg by mouth daily With food  2    fludrocortisone (FLORINEF) 0 1 mg tablet TAKE 1 TABLET BY MOUTH TWICE A DAY 60 tablet 5    levothyroxine 100 mcg tablet Take 1 tablet (100 mcg total) by mouth daily 90 tablet 1    meclizine (ANTIVERT) 25 mg tablet Take 1 tablet (25 mg total) by mouth every 8 (eight) hours as needed for dizziness or nausea 30 tablet 0    midodrine (PROAMATINE) 5 mg tablet TAKE 1 TABLET BY MOUTH TWICE A DAY 60 tablet 0    naproxen (NAPROSYN) 500 mg tablet Take 1 tablet (500 mg total) by mouth daily as needed for moderate pain 30 tablet 1    nebivolol (BYSTOLIC) 5 mg tablet Take 1 tablet (5 mg total) by mouth daily 90 tablet 3    norgestrel-ethinyl estradiol (Cryselle-28) 0 3 mg-30 mcg per tablet Take 1 tablet by mouth daily 30 tablet 1    potassium chloride (MICRO-K) 10 MEQ CR capsule Take 1 capsule (10 mEq total) by mouth daily 90 capsule 1    traMADol-acetaminophen (ULTRACET) 37 5-325 mg per tablet Take 1 tablet by mouth every 6 (six) hours as needed for moderate pain 30 tablet 0    venlafaxine (EFFEXOR-XR) 75 mg 24 hr capsule TAKE 1 CAPSULE BY MOUTH EVERY DAY WITH BREAKFAST 30 capsule 1    venlafaxine 150 MG TB24 Take 1 tablet (150 mg total) by mouth daily with breakfast 30 tablet 1    VITAMIN D, CHOLECALCIFEROL, PO Take 1 25 mg by mouth       No current facility-administered medications for this visit  Allergies: Allergies   Allergen Reactions    Bupropion      Other reaction(s): Flushed    Epinephrine Dizziness     Causes dysautonomia    Fluoxetine      Other reaction(s): Flushed      Physical Exam:     /60 (BP Location: Left arm, Patient Position: Sitting, Cuff Size: Large)   Pulse 100   Temp 98 °F (36 7 °C) (Temporal)   Ht 5' 6 5" (1 689 m)   Wt 70 kg (154 lb 4 oz)   SpO2 98%   BMI 24 52 kg/m²     Physical Exam  Vitals signs reviewed  Constitutional:       General: She is not in acute distress       Appearance: Normal appearance  She is normal weight  She is not ill-appearing, toxic-appearing or diaphoretic  HENT:      Head: Normocephalic and atraumatic  Right Ear: Tympanic membrane, ear canal and external ear normal       Left Ear: Tympanic membrane, ear canal and external ear normal       Nose: Nose normal       Mouth/Throat:      Mouth: Mucous membranes are moist       Pharynx: Oropharynx is clear  No oropharyngeal exudate or posterior oropharyngeal erythema  Eyes:      General: No scleral icterus  Right eye: No discharge  Left eye: No discharge  Extraocular Movements: Extraocular movements intact  Conjunctiva/sclera: Conjunctivae normal       Pupils: Pupils are equal, round, and reactive to light  Neck:      Musculoskeletal: Normal range of motion and neck supple  No neck rigidity or muscular tenderness  Cardiovascular:      Rate and Rhythm: Normal rate and regular rhythm  Pulses: Normal pulses  Heart sounds: Normal heart sounds  Pulmonary:      Effort: Pulmonary effort is normal       Breath sounds: Normal breath sounds  Abdominal:      General: Abdomen is flat  Bowel sounds are normal  There is no distension  Palpations: Abdomen is soft  Tenderness: There is no abdominal tenderness  Musculoskeletal: Normal range of motion  Right lower leg: No edema  Left lower leg: No edema  Skin:     General: Skin is warm and dry  Capillary Refill: Capillary refill takes less than 2 seconds  Neurological:      General: No focal deficit present  Mental Status: She is alert and oriented to person, place, and time  Cranial Nerves: No cranial nerve deficit  Sensory: No sensory deficit  Motor: No weakness  Coordination: Coordination normal       Gait: Gait normal       Deep Tendon Reflexes: Reflexes normal    Psychiatric:         Mood and Affect: Mood normal          Behavior: Behavior normal          Thought Content:  Thought content normal          Judgment: Judgment normal           Joshua Goncalves MD   [place] [id number]

## 2020-09-24 ENCOUNTER — IMMUNIZATIONS (OUTPATIENT)
Dept: FAMILY MEDICINE CLINIC | Facility: OTHER | Age: 30
End: 2020-09-24
Payer: COMMERCIAL

## 2020-09-24 VITALS — TEMPERATURE: 98 F

## 2020-09-24 DIAGNOSIS — Z23 ENCOUNTER FOR IMMUNIZATION: ICD-10-CM

## 2020-09-24 PROCEDURE — 90686 IIV4 VACC NO PRSV 0.5 ML IM: CPT

## 2020-09-24 PROCEDURE — 90471 IMMUNIZATION ADMIN: CPT

## 2020-10-03 DIAGNOSIS — E87.6 LOW BLOOD POTASSIUM: ICD-10-CM

## 2020-10-05 ENCOUNTER — OFFICE VISIT (OUTPATIENT)
Dept: CARDIOLOGY CLINIC | Facility: CLINIC | Age: 30
End: 2020-10-05
Payer: COMMERCIAL

## 2020-10-05 VITALS
SYSTOLIC BLOOD PRESSURE: 104 MMHG | DIASTOLIC BLOOD PRESSURE: 76 MMHG | HEIGHT: 67 IN | WEIGHT: 162 LBS | BODY MASS INDEX: 25.43 KG/M2 | HEART RATE: 80 BPM | TEMPERATURE: 98.3 F

## 2020-10-05 DIAGNOSIS — I49.8 POTS (POSTURAL ORTHOSTATIC TACHYCARDIA SYNDROME): Primary | ICD-10-CM

## 2020-10-05 DIAGNOSIS — E78.5 DYSLIPIDEMIA: ICD-10-CM

## 2020-10-05 DIAGNOSIS — Q79.62 EHLERS-DANLOS SYNDROME, BENIGN HYPERMOBILE FORM: ICD-10-CM

## 2020-10-05 PROCEDURE — 3078F DIAST BP <80 MM HG: CPT | Performed by: INTERNAL MEDICINE

## 2020-10-05 PROCEDURE — 99214 OFFICE O/P EST MOD 30 MIN: CPT | Performed by: INTERNAL MEDICINE

## 2020-10-05 RX ORDER — POTASSIUM CHLORIDE 750 MG/1
CAPSULE, EXTENDED RELEASE ORAL
Qty: 30 CAPSULE | Refills: 5 | OUTPATIENT
Start: 2020-10-05

## 2020-10-15 DIAGNOSIS — E03.9 PRIMARY HYPOTHYROIDISM: ICD-10-CM

## 2020-10-15 RX ORDER — LEVOTHYROXINE SODIUM 0.1 MG/1
100 TABLET ORAL DAILY
Qty: 90 TABLET | Refills: 1 | Status: SHIPPED | OUTPATIENT
Start: 2020-10-15 | End: 2021-04-05 | Stop reason: SDUPTHER

## 2020-10-23 DIAGNOSIS — E87.6 LOW BLOOD POTASSIUM: ICD-10-CM

## 2020-10-26 ENCOUNTER — ANNUAL EXAM (OUTPATIENT)
Dept: OBGYN CLINIC | Facility: MEDICAL CENTER | Age: 30
End: 2020-10-26
Payer: COMMERCIAL

## 2020-10-26 VITALS
HEIGHT: 66 IN | TEMPERATURE: 97.7 F | BODY MASS INDEX: 26.84 KG/M2 | SYSTOLIC BLOOD PRESSURE: 120 MMHG | DIASTOLIC BLOOD PRESSURE: 64 MMHG | WEIGHT: 167 LBS

## 2020-10-26 DIAGNOSIS — Z01.419 ENCNTR FOR GYN EXAM (GENERAL) (ROUTINE) W/O ABN FINDINGS: ICD-10-CM

## 2020-10-26 PROCEDURE — G0145 SCR C/V CYTO,THINLAYER,RESCR: HCPCS | Performed by: PHYSICIAN ASSISTANT

## 2020-10-26 PROCEDURE — 3008F BODY MASS INDEX DOCD: CPT | Performed by: PHYSICIAN ASSISTANT

## 2020-10-26 PROCEDURE — 3074F SYST BP LT 130 MM HG: CPT | Performed by: PHYSICIAN ASSISTANT

## 2020-10-26 PROCEDURE — 99395 PREV VISIT EST AGE 18-39: CPT | Performed by: PHYSICIAN ASSISTANT

## 2020-10-26 PROCEDURE — 3078F DIAST BP <80 MM HG: CPT | Performed by: PHYSICIAN ASSISTANT

## 2020-10-26 RX ORDER — POTASSIUM CHLORIDE 750 MG/1
10 CAPSULE, EXTENDED RELEASE ORAL DAILY
Qty: 90 CAPSULE | Refills: 1 | Status: SHIPPED | OUTPATIENT
Start: 2020-10-26 | End: 2021-05-03 | Stop reason: SDUPTHER

## 2020-10-26 RX ORDER — NORGESTREL-ETHINYL ESTRADIOL 0.3-0.03MG
1 TABLET ORAL DAILY
Qty: 90 TABLET | Refills: 3 | Status: SHIPPED | OUTPATIENT
Start: 2020-10-26 | End: 2021-09-15

## 2020-11-02 LAB
LAB AP GYN PRIMARY INTERPRETATION: NORMAL
Lab: NORMAL

## 2020-11-05 ENCOUNTER — TELEPHONE (OUTPATIENT)
Dept: OBGYN CLINIC | Facility: CLINIC | Age: 30
End: 2020-11-05

## 2020-11-05 LAB — TSH SERPL-ACNC: 1.64 MIU/L

## 2021-02-03 ENCOUNTER — OFFICE VISIT (OUTPATIENT)
Dept: CARDIOLOGY CLINIC | Facility: MEDICAL CENTER | Age: 31
End: 2021-02-03
Payer: COMMERCIAL

## 2021-02-03 VITALS
OXYGEN SATURATION: 98 % | SYSTOLIC BLOOD PRESSURE: 100 MMHG | WEIGHT: 159.4 LBS | HEART RATE: 78 BPM | HEIGHT: 66 IN | BODY MASS INDEX: 25.62 KG/M2 | TEMPERATURE: 97.9 F | DIASTOLIC BLOOD PRESSURE: 60 MMHG

## 2021-02-03 DIAGNOSIS — I10 ESSENTIAL HYPERTENSION: ICD-10-CM

## 2021-02-03 DIAGNOSIS — I49.8 POTS (POSTURAL ORTHOSTATIC TACHYCARDIA SYNDROME): Primary | ICD-10-CM

## 2021-02-03 PROCEDURE — 99214 OFFICE O/P EST MOD 30 MIN: CPT | Performed by: INTERNAL MEDICINE

## 2021-02-03 PROCEDURE — 93000 ELECTROCARDIOGRAM COMPLETE: CPT | Performed by: INTERNAL MEDICINE

## 2021-02-03 RX ORDER — NEBIVOLOL 5 MG/1
5 TABLET ORAL DAILY
Qty: 90 TABLET | Refills: 3 | Status: SHIPPED | OUTPATIENT
Start: 2021-02-03 | End: 2021-02-17 | Stop reason: DRUGHIGH

## 2021-02-03 RX ORDER — MIDODRINE HYDROCHLORIDE 5 MG/1
5 TABLET ORAL 3 TIMES DAILY
Qty: 270 TABLET | Refills: 3 | Status: SHIPPED | OUTPATIENT
Start: 2021-02-03 | End: 2021-09-15 | Stop reason: SDUPTHER

## 2021-02-03 NOTE — PROGRESS NOTES
Cardiology   Keon Bennett 31 y o  female MRN: [id number]        Impression:  1  POTS - stable   Does have some dyspnea with exertion  2  Nia-Danlos Type III  3  Dyslipidemia - borderline  Recheck lipids in 5 years  Recommendations:  1  Increase midodrine to 5mg 3x/day  2  Continue remainder of medications  3  Slowly ramp up exercise program   4  Follow up in 3 months  HPI: Keon Bennett is a 31y o  year old female with Nia-Danlos Type III and POTS presents for follow up  Developed COVID-19 in 12/20, and has had worsening in fatigue, dyspnea, and concentration issues since  COVID-19 symptoms improved  Review of Systems   Constitutional: Positive for fatigue  HENT: Negative  Eyes: Negative  Respiratory: Negative for chest tightness and shortness of breath  Cardiovascular: Negative for chest pain, palpitations and leg swelling  Gastrointestinal: Negative  Endocrine: Negative  Genitourinary: Negative  Musculoskeletal: Negative  Skin: Negative  Allergic/Immunologic: Negative  Neurological: Negative  Hematological: Negative  Psychiatric/Behavioral: Negative  All other systems reviewed and are negative          Past Medical History:   Diagnosis Date    Congenital dislocation of hip     Depression     LAST ASSESSED: 8/4/12    Disease of thyroid gland     Pectus excavatum     Scoliosis      Past Surgical History:   Procedure Laterality Date    DEVEN ACETABULAR OSTEOTOMY Left     ACETABULAR OSTEOTOMY     Social History     Substance and Sexual Activity   Alcohol Use Yes    Frequency: 2-4 times a month    Drinks per session: 1 or 2    Binge frequency: Never    Comment: SOCIAL     Social History     Substance and Sexual Activity   Drug Use No     Social History     Tobacco Use   Smoking Status Never Smoker   Smokeless Tobacco Never Used     Family History   Problem Relation Age of Onset    Asthma Mother     Hyperlipidemia Mother    Anai Splinter Hypertension Mother     Diabetes type II Mother     Hyperlipidemia Father     Hypertension Father     Melanoma Family         AMELANOTIC MELANOMA OF THE SKIN    Hyperlipidemia Family     Hypertension Family     Prostate cancer Family         MALIGNANT PROSTATIC NEOPLASM G1    Varicose Veins Family         OF LOWER EXTREMITIES       Allergies:   Allergies   Allergen Reactions    Bupropion      Other reaction(s): Flushed    Epinephrine Dizziness     Causes dysautonomia    Fluoxetine      Other reaction(s): Flushed       Medications:     Current Outpatient Medications:     clindamycin (CLEOCIN T) 1 %, APPLY TO ACNE TWICE DAILY, Disp: , Rfl: 5    desmopressin (DDAVP) 0 2 mg tablet, TAKE 1 TABLET (0 2 MG TOTAL) BY MOUTH 2 (TWO) TIMES A DAY, Disp: 60 tablet, Rfl: 5    fludrocortisone (FLORINEF) 0 1 mg tablet, TAKE 1 TABLET BY MOUTH TWICE A DAY, Disp: 60 tablet, Rfl: 5    levothyroxine 100 mcg tablet, Take 1 tablet (100 mcg total) by mouth daily, Disp: 90 tablet, Rfl: 1    meclizine (ANTIVERT) 25 mg tablet, Take 1 tablet (25 mg total) by mouth every 8 (eight) hours as needed for dizziness or nausea, Disp: 30 tablet, Rfl: 0    midodrine (PROAMATINE) 5 mg tablet, TAKE 1 TABLET BY MOUTH TWICE A DAY, Disp: 60 tablet, Rfl: 0    naproxen (NAPROSYN) 500 mg tablet, Take 1 tablet (500 mg total) by mouth daily as needed for moderate pain, Disp: 30 tablet, Rfl: 1    nebivolol (BYSTOLIC) 5 mg tablet, Take 1 tablet (5 mg total) by mouth daily, Disp: 90 tablet, Rfl: 3    norgestrel-ethinyl estradiol (Cryselle-28) 0 3 mg-30 mcg per tablet, Take 1 tablet by mouth daily, Disp: 90 tablet, Rfl: 3    potassium chloride (MICRO-K) 10 MEQ CR capsule, Take 1 capsule (10 mEq total) by mouth daily, Disp: 90 capsule, Rfl: 1    traMADol-acetaminophen (ULTRACET) 37 5-325 mg per tablet, Take 1 tablet by mouth every 6 (six) hours as needed for moderate pain, Disp: 30 tablet, Rfl: 0    venlafaxine 150 MG TB24, Take 1 tablet (150 mg total) by mouth daily with breakfast, Disp: 30 tablet, Rfl: 1    Vitamin D, Cholecalciferol, 25 MCG (1000 UT) CAPS, Take 3,000 Units by mouth , Disp: , Rfl:       Wt Readings from Last 3 Encounters:   02/03/21 72 3 kg (159 lb 6 4 oz)   10/26/20 75 8 kg (167 lb)   10/05/20 73 5 kg (162 lb)     Temp Readings from Last 3 Encounters:   02/03/21 97 9 °F (36 6 °C) (Tympanic)   10/26/20 97 7 °F (36 5 °C) (Tympanic)   10/05/20 98 3 °F (36 8 °C)     BP Readings from Last 3 Encounters:   02/03/21 100/60   10/26/20 120/64   10/05/20 104/76     Pulse Readings from Last 3 Encounters:   02/03/21 78   10/05/20 80   08/18/20 100         Physical Exam  HENT:      Head: Atraumatic  Mouth/Throat:      Mouth: Mucous membranes are moist    Eyes:      Extraocular Movements: Extraocular movements intact  Cardiovascular:      Rate and Rhythm: Normal rate and regular rhythm  Heart sounds: Normal heart sounds  Pulmonary:      Effort: Pulmonary effort is normal       Breath sounds: Normal breath sounds  Abdominal:      General: Abdomen is flat  Musculoskeletal: Normal range of motion  Skin:     General: Skin is warm and dry  Neurological:      General: No focal deficit present  Mental Status: She is alert and oriented to person, place, and time     Psychiatric:         Mood and Affect: Mood normal          Behavior: Behavior normal            Laboratory Studies:  CMP:  Lab Results   Component Value Date     01/20/2016    K 4 1 07/13/2018     07/13/2018    CO2 27 07/13/2018    BUN 11 07/13/2018    CREATININE 0 69 07/13/2018    AST 15 07/13/2018    ALT 14 07/13/2018    BILITOT 0 4 01/20/2016       Lipid Profile:   No results found for: CHOL  Lab Results   Component Value Date    HDL 36 (L) 08/01/2019     Lab Results   Component Value Date    LDLCALC 145 (H) 08/01/2019     Lab Results   Component Value Date    TRIG 76 08/01/2019       Cardiac testing:   EKG reviewed personally: NSR Pedro Pablo NSSTTWA  Results for orders placed during the hospital encounter of 16   Echo complete with contrast if indicated    Narrative Kevin [address], [address] [facility]  [phone]    Transthoracic Echocardiogram  2D, M-mode, Doppler, and Color Doppler    Study date:  07-Dec-2016    [facility]  MR number: [id number]  Account number: [de-identified]  : 1990  Age: 27 years  Gender: Female  Status: Outpatient  Location: [facility]  Height: 66 in  Weight: 142 8 lb  BP: 9060/ 60 mmHg    Diagnoses: R07 9 - Chest pain, unspecified    Sonographer:  LEONILA Brasher  Primary Physician:  Shorty Kirk MD  Referring Physician:  Presley Santos MD  [facility]  [facility]  Interpreting Physician:  Rosi Gonzalez MD    SUMMARY    LEFT VENTRICLE:  Systolic function was normal  Ejection fraction was estimated to be 55 %  Although no diagnostic regional wall motion abnormality was identified, this  possibility cannot be completely excluded on the basis of this study  Left ventricular diastolic function parameters were normal     HISTORY: PRIOR HISTORY: Chest pain, Ehler's Danlos, POTS, Pectus excavatum    PROCEDURE: The study was performed in the [facility]  This was a routine study  The transthoracic approach was used  The study  included complete 2D imaging, M-mode, complete spectral Doppler, and color  Doppler  The heart rate was 67 bpm, at the start of the study  Images were  obtained from the parasternal, apical, subcostal, and suprasternal notch  acoustic windows  Image quality was adequate  LEFT VENTRICLE: Size was normal  Systolic function was normal  Ejection  fraction was estimated to be 55 %  Although no diagnostic regional wall motion  abnormality was identified, this possibility cannot be completely excluded on  the basis of this study   Wall thickness was normal  DOPPLER: The ratio of early  ventricular filling to atrial contraction velocities was within the normal  range  Left ventricular diastolic function parameters were normal     RIGHT VENTRICLE: The size was normal  Systolic function was normal     LEFT ATRIUM: Size was normal     RIGHT ATRIUM: Size was normal     MITRAL VALVE: Valve structure was normal  There was mild thickening  There was  normal leaflet separation  DOPPLER: There was no evidence for stenosis  There  was trace regurgitation  AORTIC VALVE: The valve was not well visualized  DOPPLER: There was no evidence  for stenosis  There was no regurgitation  TRICUSPID VALVE: The valve structure was normal  There was normal leaflet  separation  DOPPLER: There was no evidence for stenosis  There was trace  regurgitation  Estimated peak PA pressure was 30 mmHg  PULMONIC VALVE: Leaflets exhibited normal thickness, no calcification, and  normal cuspal separation  DOPPLER: The transpulmonic velocity was within the  normal range  There was trace regurgitation  PERICARDIUM: There was no pericardial effusion  The pericardium was normal in  appearance  AORTA: The root exhibited normal size      SYSTEM MEASUREMENT TABLES    2D  %FS: 40 41 %  AV Diam: 2 73 cm  EDV(Teich): 45 08 ml  EF(Cube): 78 84 %  EF(Teich): 72 34 %  ESV(Cube): 7 81 ml  ESV(Teich): 12 47 ml  IVSd: 0 72 cm  LA Area: 13 86 cm2  LA Diam: 2 98 cm  LVEDV MOD A4C: 79 4 ml  LVEF MOD A4C: 56 88 %  LVESV MOD A4C: 34 24 ml  LVIDd: 3 33 cm  LVIDs: 1 98 cm  LVLd A4C: 8 18 cm  LVLs A4C: 6 6 cm  LVPWd: 0 73 cm  RA Area: 8 83 cm2  RV Diam: 2 86 cm  SI(Cube): 16 72 ml/m2  SI(Teich): 18 74 ml/m2  SV MOD A4C: 45 17 ml  SV(Cube): 29 09 ml  SV(Teich): 32 61 ml    CW  TR MaxP 64 mmHg  TR Vmax: 2 43 m/s    MM  TAPSE: 1 84 cm    PW  E': 0 15 m/s  E/E': 6 47  MV A Juan Jose: 0 49 m/s  MV Dec Woodward: 4 31 m/s2  MV DecT: 225 26 ms  MV E Juan Jose: 0 97 m/s  MV E/A Ratio: 1 98    [facility] Accredited Echocardiography Laboratory    Prepared and electronically signed by    Partha Shaw MD  Signed 07-Dec-2016 16:24:27       No results found for this or any previous visit  No results found for this or any previous visit  No results found for this or any previous visit

## 2021-02-03 NOTE — PATIENT INSTRUCTIONS
Recommendations:  1  Increase midodrine to 5mg 3x/day  2  Continue remainder of medications  3  Slowly ramp up exercise program   4  Follow up in 3 months

## 2021-02-05 ENCOUNTER — TELEPHONE (OUTPATIENT)
Dept: CARDIOLOGY CLINIC | Facility: CLINIC | Age: 31
End: 2021-02-05

## 2021-02-11 DIAGNOSIS — I49.8 POTS (POSTURAL ORTHOSTATIC TACHYCARDIA SYNDROME): ICD-10-CM

## 2021-02-12 ENCOUNTER — TELEPHONE (OUTPATIENT)
Dept: CARDIOLOGY CLINIC | Facility: CLINIC | Age: 31
End: 2021-02-12

## 2021-02-12 RX ORDER — DESMOPRESSIN ACETATE 0.2 MG/1
0.2 TABLET ORAL 2 TIMES DAILY
Qty: 60 TABLET | Refills: 5 | Status: SHIPPED | OUTPATIENT
Start: 2021-02-12 | End: 2021-07-21 | Stop reason: SDUPTHER

## 2021-02-12 NOTE — TELEPHONE ENCOUNTER
Does she feel poorly with the increased heart rate? Continue current dosing and bring her in for a nurse visit next week  Thanks

## 2021-02-12 NOTE — TELEPHONE ENCOUNTER
Pt called stating that since increasing Midodrine to 5mg 3x/day she has noticed an increase in her heart rate  Did not have a number to report  Last seen 2/3/21  Please advise

## 2021-02-15 ENCOUNTER — TELEPHONE (OUTPATIENT)
Dept: OTHER | Facility: OTHER | Age: 31
End: 2021-02-15

## 2021-02-16 NOTE — TELEPHONE ENCOUNTER
I am sorry, when I spoke with the pt she never said she spoke with you  Do you still want her to have nurse office visit tomorrow in [address] [place] [address]

## 2021-02-16 NOTE — TELEPHONE ENCOUNTER
Pt returned this call  MD instructions given  Pt does not have any symptoms other then elevated HR  Appt made for nurse visit in [address] [place] [address] per pt request due to transportation  Is this OK she go to [facility] [place] and if so what direction would you like me to give the staff?  EKG?

## 2021-02-17 ENCOUNTER — CLINICAL SUPPORT (OUTPATIENT)
Dept: CARDIOLOGY CLINIC | Facility: MEDICAL CENTER | Age: 31
End: 2021-02-17
Payer: COMMERCIAL

## 2021-02-17 VITALS
BODY MASS INDEX: 25.55 KG/M2 | HEIGHT: 66 IN | WEIGHT: 159 LBS | SYSTOLIC BLOOD PRESSURE: 110 MMHG | DIASTOLIC BLOOD PRESSURE: 68 MMHG | OXYGEN SATURATION: 97 % | HEART RATE: 82 BPM | RESPIRATION RATE: 18 BRPM

## 2021-02-17 DIAGNOSIS — I10 ESSENTIAL HYPERTENSION: ICD-10-CM

## 2021-02-17 DIAGNOSIS — R03.0 ELEVATED HEART RATE WITH ELEVATED BLOOD PRESSURE WITHOUT DIAGNOSIS OF HYPERTENSION: Primary | ICD-10-CM

## 2021-02-17 DIAGNOSIS — R00.9 ELEVATED HEART RATE WITH ELEVATED BLOOD PRESSURE WITHOUT DIAGNOSIS OF HYPERTENSION: Primary | ICD-10-CM

## 2021-02-17 PROCEDURE — 1036F TOBACCO NON-USER: CPT

## 2021-02-17 PROCEDURE — 3008F BODY MASS INDEX DOCD: CPT

## 2021-02-17 PROCEDURE — 99211 OFF/OP EST MAY X REQ PHY/QHP: CPT

## 2021-02-17 NOTE — TELEPHONE ENCOUNTER
Medication: Bystolic 5mg 1 tab bid #[id number]  LOV 02/03/2021  Lab 11/21/2019    To be sent to the [facility] pharmacy on file

## 2021-02-18 RX ORDER — NEBIVOLOL 5 MG/1
5 TABLET ORAL 2 TIMES DAILY
Qty: 180 TABLET | Refills: 1 | Status: SHIPPED | OUTPATIENT
Start: 2021-02-18 | End: 2021-03-31 | Stop reason: SDUPTHER

## 2021-02-23 ENCOUNTER — TELEPHONE (OUTPATIENT)
Dept: FAMILY MEDICINE CLINIC | Facility: OTHER | Age: 31
End: 2021-02-23

## 2021-02-23 NOTE — TELEPHONE ENCOUNTER
Left a few messages for patient also sent message via [employer] to patient to confirm appointment for today   Patient no showed letter sent

## 2021-02-26 ENCOUNTER — TELEPHONE (OUTPATIENT)
Dept: PSYCHIATRY | Facility: CLINIC | Age: 31
End: 2021-02-26

## 2021-02-26 ENCOUNTER — TELEPHONE (OUTPATIENT)
Dept: CARDIOLOGY CLINIC | Facility: CLINIC | Age: 31
End: 2021-02-26

## 2021-02-26 RX ORDER — VENLAFAXINE HYDROCHLORIDE 150 MG/1
150 TABLET, EXTENDED RELEASE ORAL
Qty: 30 TABLET | Refills: 2 | Status: SHIPPED | OUTPATIENT
Start: 2021-02-26 | End: 2021-03-05

## 2021-02-26 NOTE — TELEPHONE ENCOUNTER
Behavorial Health Outpatient Intake Questions    Referred by: [facility]    Please advised interviewee that they need to answer all questions truthfully to allow for best care and any misrepresentations of information may affect their ability to be seen at this clinic   => Was this discussed? Yes     [facility] -     Presenting Problem (in patient's words): Diagnosed with MDD looking for medication mgmt  Completely out of Villafaxine 100 or 150 mg doesn't remember  Are there any developmental disabilities? ? If yes, can they speak to you on the phone? If they are too limited to speak to you on phone, refer out No    Are you taking any psychiatric medications? No    => If yes, who prescribes? If yes, are they injectable medications? Does the patient have a language barrier or hearing impairment? No    Have you been treated at [facility] by a therapist or a doctor in the past? If yes, who? No    Has the patient been hospitalized for mental health? No   If yes, how long ago was last hospitalization and where was it? Do you actively use alcohol or marijuana or illegal substances? If yes, what and how much - refer out to Drug and alcohol treatment if use is excessive or daily use of illegal substances No concerns of substance abuse are reported  Do you have a community treatment team or ? No    Legal History-     Does the patient have any history of arrests, group home/group home time, or DUIs? No  If Yes-  1) What types of charges? 2) When were they last incarcerated? 3) Are they currently on parole or probation? Minor Child-    Who has custody of the child? Is there a custody agreement? If there is a custody agreement remind parent that they must bring a copy to the first appt or they will not be seen       Intake Team, please check with provider before scheduling if flags come up such as:  - complex case  - legal history (other than DUI)  - communication barrier concerns are present  - if, in your judgment, this needs further review    ACCEPTED as a patient Yes  => Appointment Date: Wednesday May 26,2021 at 11:00am with LEYDI Santamaria    Referred Elsewhere? No    Name of Insurance Co: [employer] ID# [id number]  Insurance Phone #  If ins is primary or secondary Primary  If patient is a minor, parents information such as Name, D  O B of guarantor

## 2021-02-26 NOTE — TELEPHONE ENCOUNTER
Shaunna Knowles called and would like to set up an appointment with Ashlie Wade  She saw him in [facility]

## 2021-02-26 NOTE — TELEPHONE ENCOUNTER
Recommend this patient follow up with PCP to bridge medication coverage until intake appointment  Sudden discontinuation of Effexor is not advised

## 2021-03-02 ENCOUNTER — TELEPHONE (OUTPATIENT)
Dept: PSYCHIATRY | Facility: CLINIC | Age: 31
End: 2021-03-02

## 2021-03-02 DIAGNOSIS — F33.2 SEVERE EPISODE OF RECURRENT MAJOR DEPRESSIVE DISORDER, WITHOUT PSYCHOTIC FEATURES (HCC): Primary | ICD-10-CM

## 2021-03-02 NOTE — TELEPHONE ENCOUNTER
Fax from [facility] for Prior Auth for Venlafaxine  Prior Authorization submitted to [employer] via [address] for Venlafaxine 150 MG ER tablets  Decision pending  Will refer to Sindy Jacobs for his information

## 2021-03-03 NOTE — TELEPHONE ENCOUNTER
Spoke with RX plan they reach out to patient on 3/1 @ 3:15,member has not called back    Patient needs to call Members services   about the appeal on Bystolic increase before a decision  is made    A message was left on her machine      DALIA

## 2021-03-04 NOTE — TELEPHONE ENCOUNTER
Denial from [address] [place] [address] for Prior Authorization submitted for Venlafaxine 150 MG ER Tablet  Denied due to insufficient medication trials  Faxed over copy of office notes showing medication allergies to Bupropion and Fluoxetine requesting they review and reconsider denial       Will refer to Ricardo Ramírez for his information

## 2021-03-05 RX ORDER — VENLAFAXINE HYDROCHLORIDE 150 MG/1
150 CAPSULE, EXTENDED RELEASE ORAL DAILY
Qty: 30 CAPSULE | Refills: 1 | Status: SHIPPED | OUTPATIENT
Start: 2021-03-05 | End: 2021-05-17 | Stop reason: SDUPTHER

## 2021-03-05 NOTE — TELEPHONE ENCOUNTER
Fax from [phone] with denial for Venlafaxine 150 MG ER tablet  Called [facility] after detailed review of numerous office notes and found additional medication trials  Newsome Marek has tried Citalopram, Prozac, Bupropion and Fluoxeine  Also previously on a Venlafaxine capsule at 75 MG  Provided representative with this information  She has referred it for review  Provided me with reference # [id number]  Will refer to Marrion Crigler for his information

## 2021-03-05 NOTE — TELEPHONE ENCOUNTER
Fax from [phone] with denial of Venlafaxine 150 MG ER tablets  Denied completely  One of their preferred drugs is listed as Venlafaxine ER capsule 150 MG  Will refer to Jesse Gonzales for review

## 2021-03-26 DIAGNOSIS — I10 ESSENTIAL HYPERTENSION: ICD-10-CM

## 2021-03-26 RX ORDER — NEBIVOLOL 10 MG/1
5 TABLET ORAL 2 TIMES DAILY
Qty: 90 TABLET | Refills: 3 | OUTPATIENT
Start: 2021-03-26 | End: 2021-04-25

## 2021-03-29 RX ORDER — NEBIVOLOL 10 MG/1
5 TABLET ORAL 2 TIMES DAILY
Qty: 90 TABLET | Refills: 1 | OUTPATIENT
Start: 2021-03-29 | End: 2021-04-28

## 2021-03-30 DIAGNOSIS — Z23 ENCOUNTER FOR IMMUNIZATION: ICD-10-CM

## 2021-03-31 DIAGNOSIS — I10 ESSENTIAL HYPERTENSION: ICD-10-CM

## 2021-03-31 RX ORDER — NEBIVOLOL 10 MG/1
5 TABLET ORAL 2 TIMES DAILY
Qty: 90 TABLET | Refills: 3 | Status: SHIPPED | OUTPATIENT
Start: 2021-03-31 | End: 2021-11-01 | Stop reason: ALTCHOICE

## 2021-04-02 DIAGNOSIS — E03.9 PRIMARY HYPOTHYROIDISM: ICD-10-CM

## 2021-04-05 RX ORDER — LEVOTHYROXINE SODIUM 0.1 MG/1
100 TABLET ORAL DAILY
Qty: 90 TABLET | Refills: 1 | Status: SHIPPED | OUTPATIENT
Start: 2021-04-05 | End: 2021-05-03 | Stop reason: SDUPTHER

## 2021-04-05 RX ORDER — LEVOTHYROXINE SODIUM 0.1 MG/1
TABLET ORAL
Qty: 90 TABLET | Refills: 1 | OUTPATIENT
Start: 2021-04-05

## 2021-04-11 ENCOUNTER — IMMUNIZATIONS (OUTPATIENT)
Dept: FAMILY MEDICINE CLINIC | Facility: HOSPITAL | Age: 31
End: 2021-04-11

## 2021-04-11 DIAGNOSIS — Z23 ENCOUNTER FOR IMMUNIZATION: Primary | ICD-10-CM

## 2021-04-11 PROCEDURE — 0001A SARS-COV-2 / COVID-19 MRNA VACCINE (PFIZER-BIONTECH) 30 MCG: CPT

## 2021-04-11 PROCEDURE — 91300 SARS-COV-2 / COVID-19 MRNA VACCINE (PFIZER-BIONTECH) 30 MCG: CPT

## 2021-04-14 DIAGNOSIS — E87.6 LOW BLOOD POTASSIUM: ICD-10-CM

## 2021-04-14 RX ORDER — POTASSIUM CHLORIDE 750 MG/1
CAPSULE, EXTENDED RELEASE ORAL
Qty: 30 CAPSULE | Refills: 5 | OUTPATIENT
Start: 2021-04-14

## 2021-05-03 ENCOUNTER — IMMUNIZATIONS (OUTPATIENT)
Dept: FAMILY MEDICINE CLINIC | Facility: HOSPITAL | Age: 31
End: 2021-05-03

## 2021-05-03 ENCOUNTER — OFFICE VISIT (OUTPATIENT)
Dept: FAMILY MEDICINE CLINIC | Facility: OTHER | Age: 31
End: 2021-05-03
Payer: COMMERCIAL

## 2021-05-03 VITALS
TEMPERATURE: 97.6 F | SYSTOLIC BLOOD PRESSURE: 108 MMHG | OXYGEN SATURATION: 98 % | HEIGHT: 66 IN | RESPIRATION RATE: 18 BRPM | WEIGHT: 153.6 LBS | DIASTOLIC BLOOD PRESSURE: 78 MMHG | HEART RATE: 99 BPM | BODY MASS INDEX: 24.68 KG/M2

## 2021-05-03 DIAGNOSIS — I49.8 POTS (POSTURAL ORTHOSTATIC TACHYCARDIA SYNDROME): ICD-10-CM

## 2021-05-03 DIAGNOSIS — Z20.822 SUSPECTED COVID-19 VIRUS INFECTION: ICD-10-CM

## 2021-05-03 DIAGNOSIS — E03.9 PRIMARY HYPOTHYROIDISM: ICD-10-CM

## 2021-05-03 DIAGNOSIS — Z23 ENCOUNTER FOR IMMUNIZATION: Primary | ICD-10-CM

## 2021-05-03 DIAGNOSIS — E87.6 LOW BLOOD POTASSIUM: ICD-10-CM

## 2021-05-03 DIAGNOSIS — E55.9 VITAMIN D DEFICIENCY: ICD-10-CM

## 2021-05-03 DIAGNOSIS — R53.82 CHRONIC FATIGUE SYNDROME: Primary | ICD-10-CM

## 2021-05-03 DIAGNOSIS — F33.41 RECURRENT MAJOR DEPRESSIVE DISORDER, IN PARTIAL REMISSION (HCC): ICD-10-CM

## 2021-05-03 PROCEDURE — 0002A SARS-COV-2 / COVID-19 MRNA VACCINE (PFIZER-BIONTECH) 30 MCG: CPT

## 2021-05-03 PROCEDURE — 91300 SARS-COV-2 / COVID-19 MRNA VACCINE (PFIZER-BIONTECH) 30 MCG: CPT

## 2021-05-03 PROCEDURE — 99214 OFFICE O/P EST MOD 30 MIN: CPT | Performed by: FAMILY MEDICINE

## 2021-05-03 PROCEDURE — 1036F TOBACCO NON-USER: CPT | Performed by: FAMILY MEDICINE

## 2021-05-03 RX ORDER — LEVOTHYROXINE SODIUM 0.1 MG/1
100 TABLET ORAL DAILY
Qty: 90 TABLET | Refills: 1 | Status: SHIPPED | OUTPATIENT
Start: 2021-05-03 | End: 2021-09-08 | Stop reason: SDUPTHER

## 2021-05-03 RX ORDER — POTASSIUM CHLORIDE 750 MG/1
10 CAPSULE, EXTENDED RELEASE ORAL DAILY
Qty: 90 CAPSULE | Refills: 1 | Status: SHIPPED | OUTPATIENT
Start: 2021-05-03 | End: 2021-10-22 | Stop reason: SDUPTHER

## 2021-05-03 NOTE — PROGRESS NOTES
Patient Name: Hayder Brownlee     : 1990     MRN: [id number]      Assessment/Plan:    Problem List Items Addressed This Visit        Endocrine    Primary hypothyroidism    Relevant Medications    levothyroxine 100 mcg tablet    Other Relevant Orders    TSH, 3rd generation with Free T4 reflex       Cardiovascular and Mediastinum    POTS (postural orthostatic tachycardia syndrome)    Relevant Orders    Echo complete with contrast if indicated    Ambulatory referral to Cardiology    Ambulatory referral to Physical Therapy    Ambulatory referral to Occupational Therapy    Ambulatory referral to Physical Medicine Rehab    Ambulatory referral to Psychiatry       Nervous and Auditory    Chronic fatigue syndrome - Primary    Relevant Orders    XR chest pa & lateral    Echo complete with contrast if indicated    Ambulatory referral to Physical Therapy    Ambulatory referral to Occupational Therapy    Ambulatory referral to Speech Therapy    Ambulatory referral to Physical Medicine Rehab    Ambulatory referral to Psychiatry    CBC and differential       Other    Recurrent major depressive disorder, in partial remission ([facility] [place] )    Relevant Orders    Ambulatory referral to Psychiatry    TSH, 3rd generation with Free T4 reflex    Vitamin D deficiency      Other Visit Diagnoses     Low blood potassium        Relevant Medications    potassium chloride (MICRO-K) 10 MEQ CR capsule    Other Relevant Orders    Comprehensive metabolic panel    Suspected COVID-19 virus infection        Relevant Orders    XR chest pa & lateral    Echo complete with contrast if indicated    Ambulatory referral to Cardiology    Ambulatory referral to Physical Therapy    Ambulatory referral to Occupational Therapy    Ambulatory referral to Speech Therapy    Ambulatory referral to Physical Medicine Rehab    Ambulatory referral to Psychiatry    CBC and differential    Comprehensive metabolic panel    TSH, 3rd generation with Free T4 reflex Discussion:     Labs recommended:  CBC, CMP and TSH    Diagnostic testing recommended:  Chest x-ray and Echocardiogram    Referrals recommended:  Cardiology, Psychiatry, Physical therapy, Physiatry and Behavorial health therapist    Other management recommendations:     Fatigue, poor endurance, and functional status:   Encouraged adequate rest, proper hydration/nutrition, and good sleep hygiene  Psychological & emotional issues:   Patient was assessed for anxiety, depression, and/or PTSD  Brief counseling was provided  Referral to mental health specialist was recommended  Depression Screening and Follow-up Plan: Continue regular follow-up with their mental health provider who is managing their mental health condition(s)  I spent 28 minutes directly with the patient during this visit     Return in about 6 months (around 11/3/2021) for Annual physical     The patient indicates understanding of these issues and agrees with the plan  Subjective:    COVID-19 Infection:  Date of symptom onset: 12/22/2020    Initial symptoms with acute illness:  Initial symptoms included: fever (Tmax 100), headache, fatigue and malaise  Patient denied: chills, cough, rhinorrhea, shortness of breath, chest tightness, nasal congestion, sore throat, diarrhea, muscle aches and loss of smell    New or persistent symptoms:  Patient complains of: fatigue and depression  Patient denies: weakness, poor endurance, shortness of breath, cough, chest discomfort, altered taste, altered smell, anxiety, PTSD, poor concentration, memory problems, joint pain, headache, dry eyes, dry mouth, difficulty swallowing, rhinitis, appetite change, dizziness, muscle aches, insomnia, alopecia, sweating, diarrhea and nausea  Patient rates severity of current symptoms as moderate  Other symptoms: hypotension, tachycardia       Inpatient treatment:      Was patient hospitalized?: No      Outpatient treatment:      Did patient receive monoclonal antibody therapy?: No      Pt states that mother tested COVID+ in December and, since she experienced similar sx, was never tested  She did not follow with any physician at the time of her suspected COVID infection and received no medical care  Review of Systems   Constitutional: Positive for fatigue  Negative for activity change, appetite change and fever  HENT: Negative for congestion, ear pain, rhinorrhea, sinus pain, sore throat and trouble swallowing  Eyes: Negative for pain and itching  Respiratory: Negative for cough and shortness of breath  Cardiovascular: Negative for chest pain and palpitations  Hypotension, tachycardia   Gastrointestinal: Negative for abdominal pain, constipation, diarrhea, nausea and vomiting  Endocrine: Negative for cold intolerance and heat intolerance  Genitourinary: Negative for dysuria  Musculoskeletal: Negative for arthralgias and myalgias  Skin: Negative for color change and rash  Neurological: Negative for dizziness, syncope, weakness and headaches  Hematological: Negative for adenopathy  Psychiatric/Behavioral: Positive for depression  Negative for behavioral problems, decreased concentration, dysphoric mood and sleep disturbance  The patient is not nervous/anxious           Patient Active Problem List   Diagnosis    POTS (postural orthostatic tachycardia syndrome)    Nia-Danlos syndrome, benign hypermobile form    Acne    Acute recurrent frontal sinusitis    Adolescent idiopathic scoliosis of thoracolumbar region    Allergic rhinitis    Chronic fatigue syndrome    Dermatographism    Dizziness    Hallucinations    Hives    Mast cell activation syndrome (HCC)    Palpitations    Pectus excavatum    Primary hypothyroidism    Recurrent major depressive disorder, in partial remission (HCC)    Rhinitis, chronic    Tachycardia    Vitamin D deficiency    Hypokalemia    Dyslipidemia    Back pain    Diffuse connective tissue disease ([address] )    Scoliosis (and kyphoscoliosis), idiopathic    Unilateral congenital dislocation of hip    Severe episode of recurrent major depressive disorder, without psychotic features (HCC)    Benign paroxysmal positional vertigo     Social History     Tobacco Use    Smoking status: Never Smoker    Smokeless tobacco: Never Used   Substance Use Topics    Alcohol use: Yes     Frequency: 2-4 times a month     Drinks per session: 1 or 2     Binge frequency: Never     Comment: SOCIAL    Drug use: No      Objective:  /78   Pulse 99   Temp 97 6 °F (36 4 °C)   Resp 18   Ht 5' 6 14" (1 68 m)   Wt 69 7 kg (153 lb 9 6 oz)   SpO2 98%   BMI 24 69 kg/m²      Physical Exam  Vitals signs and nursing note reviewed  Constitutional:       General: She is not in acute distress  Appearance: Normal appearance  She is well-developed  She is not ill-appearing  HENT:      Head: Normocephalic and atraumatic  Right Ear: External ear normal       Left Ear: External ear normal       Nose: Nose normal    Eyes:      General: No scleral icterus  Extraocular Movements: Extraocular movements intact  Conjunctiva/sclera: Conjunctivae normal       Pupils: Pupils are equal, round, and reactive to light  Neck:      Musculoskeletal: Normal range of motion and neck supple  Cardiovascular:      Rate and Rhythm: Normal rate and regular rhythm  Heart sounds: No murmur  Pulmonary:      Effort: Pulmonary effort is normal  No respiratory distress  Breath sounds: Normal breath sounds  Abdominal:      General: Abdomen is flat  Bowel sounds are normal       Palpations: Abdomen is soft  Tenderness: There is no abdominal tenderness  Musculoskeletal:      Right lower leg: No edema  Left lower leg: No edema  Skin:     General: Skin is warm and dry  Coloration: Skin is not jaundiced  Neurological:      General: No focal deficit present        Mental Status: She is alert and oriented to person, place, and time  Cranial Nerves: No cranial nerve deficit  Gait: Gait normal    Psychiatric:         Attention and Perception: Attention normal          Mood and Affect: Mood is not anxious or depressed  Affect is flat  Speech: Speech normal          Behavior: Behavior normal          Thought Content: Thought content normal  Thought content does not include homicidal or suicidal ideation           Cognition and Memory: Cognition and memory normal          Judgment: Judgment normal          Brigitte Gilmore, DO

## 2021-05-14 LAB
ALBUMIN SERPL-MCNC: 4.1 G/DL (ref 3.6–5.1)
ALBUMIN/GLOB SERPL: 1.5 (CALC) (ref 1–2.5)
ALP SERPL-CCNC: 108 U/L (ref 31–125)
ALT SERPL-CCNC: 16 U/L (ref 6–29)
AST SERPL-CCNC: 18 U/L (ref 10–30)
BASOPHILS # BLD AUTO: 11 CELLS/UL (ref 0–200)
BASOPHILS NFR BLD AUTO: 0.1 %
BILIRUB SERPL-MCNC: 0.4 MG/DL (ref 0.2–1.2)
BUN SERPL-MCNC: 9 MG/DL (ref 7–25)
BUN/CREAT SERPL: NORMAL (CALC) (ref 6–22)
CALCIUM SERPL-MCNC: 9.3 MG/DL (ref 8.6–10.2)
CHLORIDE SERPL-SCNC: 106 MMOL/L (ref 98–110)
CO2 SERPL-SCNC: 24 MMOL/L (ref 20–32)
CREAT SERPL-MCNC: 0.76 MG/DL (ref 0.5–1.1)
EOSINOPHIL # BLD AUTO: 0 CELLS/UL (ref 15–500)
EOSINOPHIL NFR BLD AUTO: 0 %
ERYTHROCYTE [DISTWIDTH] IN BLOOD BY AUTOMATED COUNT: 12.4 % (ref 11–15)
GLOBULIN SER CALC-MCNC: 2.8 G/DL (CALC) (ref 1.9–3.7)
GLUCOSE SERPL-MCNC: 97 MG/DL (ref 65–99)
HCT VFR BLD AUTO: 43 % (ref 35–45)
HGB BLD-MCNC: 14.3 G/DL (ref 11.7–15.5)
LYMPHOCYTES # BLD AUTO: 5616 CELLS/UL (ref 850–3900)
LYMPHOCYTES NFR BLD AUTO: 52 %
MCH RBC QN AUTO: 30.8 PG (ref 27–33)
MCHC RBC AUTO-ENTMCNC: 33.3 G/DL (ref 32–36)
MCV RBC AUTO: 92.7 FL (ref 80–100)
MONOCYTES # BLD AUTO: 475 CELLS/UL (ref 200–950)
MONOCYTES NFR BLD AUTO: 4.4 %
NEUTROPHILS # BLD AUTO: 4698 CELLS/UL (ref 1500–7800)
NEUTROPHILS NFR BLD AUTO: 43.5 %
PLATELET # BLD AUTO: 293 THOUSAND/UL (ref 140–400)
PMV BLD REES-ECKER: 11.2 FL (ref 7.5–12.5)
POTASSIUM SERPL-SCNC: 3.6 MMOL/L (ref 3.5–5.3)
PROT SERPL-MCNC: 6.9 G/DL (ref 6.1–8.1)
RBC # BLD AUTO: 4.64 MILLION/UL (ref 3.8–5.1)
SL AMB EGFR AFRICAN AMERICAN: 124 ML/MIN/1.73M2
SL AMB EGFR NON AFRICAN AMERICAN: 107 ML/MIN/1.73M2
SODIUM SERPL-SCNC: 141 MMOL/L (ref 135–146)
TSH SERPL-ACNC: 2.85 MIU/L
WBC # BLD AUTO: 10.8 THOUSAND/UL (ref 3.8–10.8)

## 2021-05-17 DIAGNOSIS — F33.2 SEVERE EPISODE OF RECURRENT MAJOR DEPRESSIVE DISORDER, WITHOUT PSYCHOTIC FEATURES (HCC): ICD-10-CM

## 2021-05-17 RX ORDER — VENLAFAXINE HYDROCHLORIDE 150 MG/1
150 CAPSULE, EXTENDED RELEASE ORAL DAILY
Qty: 30 CAPSULE | Refills: 1 | Status: SHIPPED | OUTPATIENT
Start: 2021-05-17 | End: 2021-05-26

## 2021-05-17 NOTE — TELEPHONE ENCOUNTER
Ángela Abhay left a message hopping to get a refill on her Effexor  She is completley out of her medication  Alia Her has only seen patient in [facility] and never in outpatient  Ángela Blank has a New Patient appointment with Argelia Lion on 5/26

## 2021-05-26 ENCOUNTER — TELEPHONE (OUTPATIENT)
Dept: FAMILY MEDICINE CLINIC | Facility: OTHER | Age: 31
End: 2021-05-26

## 2021-05-26 ENCOUNTER — OFFICE VISIT (OUTPATIENT)
Dept: PSYCHIATRY | Facility: CLINIC | Age: 31
End: 2021-05-26
Payer: COMMERCIAL

## 2021-05-26 DIAGNOSIS — F33.2 SEVERE EPISODE OF RECURRENT MAJOR DEPRESSIVE DISORDER, WITHOUT PSYCHOTIC FEATURES (HCC): ICD-10-CM

## 2021-05-26 PROCEDURE — 90792 PSYCH DIAG EVAL W/MED SRVCS: CPT | Performed by: NURSE PRACTITIONER

## 2021-05-26 RX ORDER — VENLAFAXINE HYDROCHLORIDE 37.5 MG/1
37.5 CAPSULE, EXTENDED RELEASE ORAL DAILY
Qty: 30 CAPSULE | Refills: 2 | Status: SHIPPED | OUTPATIENT
Start: 2021-05-26 | End: 2021-07-26

## 2021-05-26 RX ORDER — VENLAFAXINE HYDROCHLORIDE 150 MG/1
150 CAPSULE, EXTENDED RELEASE ORAL DAILY
Qty: 30 CAPSULE | Refills: 2 | Status: SHIPPED | OUTPATIENT
Start: 2021-05-26 | End: 2021-07-26 | Stop reason: SDUPTHER

## 2021-05-26 NOTE — BH TREATMENT PLAN
TREATMENT PLAN (Medication Management Only)        [facility]    Name and Date of Birth:  Tayo Faust 31 y o  1990  Date of Treatment Plan: May 26, 2021  Diagnosis/Diagnoses:    1  Severe episode of recurrent major depressive disorder, without psychotic features [facility])      Strengths/Personal Resources for Self-Care: ability to listen, ability to reason, well educated  Area/Areas of need (in own words): depressive symptoms  1  Long Term Goal: improve depression  Target Date: 6 months - 11/26/2021  Person/Persons responsible for completion of goal: Courtney  2  Short Term Objective (s) - How will we reach this goal?:   A  Provider new recommended medication/dosage changes and/or continue medication(s): continue current medications as prescribed  B   Attend medication management appointments regularly  C   Attend psychotherapy regularly  Target Date: 6 months - 11/26/2021  Person/Persons Responsible for Completion of Goal: Courtney  Progress Towards Goals: starting treatment, continuing treatment  Treatment Modality: medication management with psychotherapy every 2 months  Review due 90 to 120 days from date of this plan: 6 months - 11/26/2021  Expected length of service: maintenance unless revised  My Physician/PA/NP and I have developed this plan together and I agree to work on the goals and objectives  I understand the treatment goals that were developed for my treatment    Treatment Plan Verbal Consent - Due to COVID

## 2021-05-26 NOTE — TELEPHONE ENCOUNTER
----- Message from Rhoda Moran MD sent at 5/26/2021  2:59 PM EDT -----  The blood work result is normal   Continue with current regimen and follow up with PCP routine

## 2021-05-26 NOTE — PSYCH
[address]    Name and Date of Birth:  Krisetn Harvey 31 y o  1990 MRN: [id number]    Date of Visit: May 26, 2021    Reason for visit (CC):  "After finished PHP I lapsed a bit  I restarted Effexor in February "    Allergies   Allergen Reactions    Bupropion      Other reaction(s): Flushed    Epinephrine Dizziness     Causes dysautonomia    Fluoxetine      Other reaction(s): Flushed     HPI     Richard Rendon is a 31 y o  female with a history of Major Depressive Disorder who presents for psychiatric evaluation due to depressive symptoms  Symptoms first started gradually many years ago and followed a fluctuating course over the last Several years  Stressors preceding visit included health issues, medical problems and everyday stressors  Richard Rendon is [facility] to the providers [facility]   Patient has a long history of depression starting at 8years old  States that depression has been intermittent throughout life  Feels that medical illnesses a major factor contributing to depression  Addition autoimmune condition Nia-Danlos syndrome can mimic some the symptoms of depression complicating diagnosis  States that following PHP she was feeling better and stop taking Effexor  Then started to feel more depressed again with disruptions to sleep, energy, motivation and hopefulness  Restarted Effexor in February " it has been helping "  Presently patient feels depressed, has chronic fatigue, sleeping too much, low motivation, but is able to find enjoyment in activities she enjoys like video games and writing, has difficulty with feeling hopeful about the future but does feel more hopeful in recent months, low energy, difficulty with concentration and some psychomotor retardation  Appetite is intact    Patient had some fleeting suicidal thoughts a few weeks ago and prior to restarting Effexor however no current suicidal ideation and no plan or intent  Denies history of symptoms of indigo, psychosis, PTSD, OCD or eating disorders  States she has had panic attacks in the past but not for several years  States that anxiety is currently well controlled  No new specific stressors  Patient has had outpatient psychiatry in the past and is currently in individual therapy  No history of inpatient hospitalizations, suicide attempts or cutting  was previously trialed on Prozac which disrupted sleep, Wellbutrin which caused unknown side effect and Celexa which caused excessive sedation  Patient drinks on occasion, drinking 1-3 drinks approximately once per week  Family history of depression anxiety  Mother and brother both suffer with depression anxiety  Medical illnesses include Nia-Danlos syndrome, pots and hypothyroidism  Patient has a bachelor's degree in [employer], is unemployed and lives with her parents  No children, not currently in a relationship  No legal issues, no weapons in the home, no history of [employer]  No history of abuse      Current Outpatient Medications on File Prior to Visit   Medication Sig Dispense Refill    clindamycin (CLEOCIN T) 1 % APPLY TO ACNE TWICE DAILY  5    desmopressin (DDAVP) 0 2 mg tablet Take 1 tablet (0 2 mg total) by mouth 2 (two) times a day 60 tablet 5    fludrocortisone (FLORINEF) 0 1 mg tablet TAKE 1 TABLET BY MOUTH TWICE A DAY 60 tablet 5    levothyroxine 100 mcg tablet Take 1 tablet (100 mcg total) by mouth daily 90 tablet 1    meclizine (ANTIVERT) 25 mg tablet Take 1 tablet (25 mg total) by mouth every 8 (eight) hours as needed for dizziness or nausea 30 tablet 0    midodrine (PROAMATINE) 5 mg tablet Take 1 tablet (5 mg total) by mouth 3 (three) times a day 270 tablet 3    naproxen (NAPROSYN) 500 mg tablet Take 1 tablet (500 mg total) by mouth daily as needed for moderate pain 30 tablet 1    nebivolol (BYSTOLIC) 10 mg tablet Take 0 5 tablets (5 mg total) by mouth 2 (two) times a day 90 tablet 3    norgestrel-ethinyl estradiol (Cryselle-28) 0 3 mg-30 mcg per tablet Take 1 tablet by mouth daily 90 tablet 3    potassium chloride (MICRO-K) 10 MEQ CR capsule Take 1 capsule (10 mEq total) by mouth daily 90 capsule 1    traMADol-acetaminophen (ULTRACET) 37 5-325 mg per tablet Take 1 tablet by mouth every 6 (six) hours as needed for moderate pain 30 tablet 0    Vitamin D, Cholecalciferol, 25 MCG (1000 UT) CAPS Take 3,000 Units by mouth       [DISCONTINUED] venlafaxine (EFFEXOR-XR) 150 mg 24 hr capsule Take 1 capsule (150 mg total) by mouth daily 30 capsule 1     No current facility-administered medications on file prior to visit  Psychiatric Review Of Systems:    Sleep changes: increased sleep  Appetite changes: normal appetite  Weight changes: no weight change  Energy/anergy: low energy  Interest/pleasure/anhedonia: yes, decreased  Somatic symptoms: no  Anxiety/panic: worrying  Xuan: no  Guilty/hopeless: Negative outlook of the future  Self injurious behavior/risky behavior: Patient denies current risk or intent to self harm  Suicidal ideation: Denies suicidal ideation  Homicidal ideation: Patient denies homicidal ideations  Auditory hallucinations: no  Visual hallucinations: no  Other hallucinations: no  Delusional thinking: no  Eating disorder history: no  Obsessive/compulsive symptoms: no    Review Of Systems:    General emotional problems, sleep disturbances and decreased functioning   Personality no change in personality   Constitutional negative   ENT negative   Cardiovascular negative   Respiratory negative   Gastrointestinal negative   Genitourinary negative   Musculoskeletal negative   Integumentary negative   Neurological negative   Endocrine negative   Other Symptoms none, all other systems are negative       OBJECTIVE:    Vital signs in last 24 hours: There were no vitals filed for this visit      Mental Status Evaluation:      Appearance Adequate hygiene and grooming and Poor eye contact   Behavior calm and cooperative   Mood depressed  Depression Scale -  of 10 (0 = No depression)  Anxiety Scale -  of 10 (0 = No anxiety)   Speech Normal rate and volume   Affect mood-congruent and constricted   Thought Processes Goal directed and coherent   Thought Content Does not verbalize delusional material   Associations Tightly connected   Perceptual Disturbances Denies hallucinations and does not appear to be responding to internal stimuli   Risk Potential Suicidal/Homicidal Ideation - No evidence of suicidal or homicidal ideation and Patient does not verbalize suicidal or homicidal ideation  Risk of Violence - No evidence of risk for violence found on assessment  Risk of Self Mutilation - No evidence of risk for self mutilation found on assessment   Orientation oriented to person, place, time/date and situation   Memory recent and remote memory grossly intact   Consciousness alert and awake   Attention/Concentration attention span and concentration are age appropriate   Intellect appears to be of average intelligence   Insight fair   Judgement fair   Muscle Strength and Gait normal muscle strength and normal muscle tone, normal gait/station and normal balance   Motor Activity no abnormal movements   Language no difficulty naming common objects, no difficulty repeating a phrase, no difficulty writing a sentence   Fund of Knowledge adequate knowledge of current events  adequate fund of knowledge regarding past history  adequate fund of knowledge regarding vocabulary    Pain none   Pain Scale 0       Laboratory Results: I have personally reviewed all pertinent laboratory/tests results  Historical Information     History Review:     The following portions of the patient's history were reviewed and updated as appropriate: allergies, current medications, past family history, past medical history, past social history, past surgical history and problem list     Past Psychiatric History:     Past Inpatient Psychiatric Treatment:   No history of past inpatient psychiatric admissions  Past Outpatient Psychiatric Treatment:    Was in outpatient psychiatric treatment in the past with a psychiatrist  Was in outpatient psychiatric treatment in the past with a therapist  Past Suicide Attempts: No evidence of past suicide attempts  Past Violent Behavior: No evidence of past violent behavior and Patient denies history of violent behavior  Past Psychiatric Medication Trials: Prozac, Celexa, Effexor and Wellbutrin    Traumatic History:     Abuse: no history of physical or sexual abuse  Other Traumatic Events: none     Family Psychiatric History:     Family History   Problem Relation Age of Onset    Asthma Mother     Hyperlipidemia Mother     Hypertension Mother     Diabetes type II Mother     Hyperlipidemia Father     Hypertension Father     Melanoma Family         AMELANOTIC MELANOMA OF THE SKIN    Hyperlipidemia Family     Hypertension Family     Prostate cancer Family         MALIGNANT PROSTATIC NEOPLASM G1    Varicose Veins Family         OF LOWER EXTREMITIES     Substance Use History:    Social History     Substance and Sexual Activity   Alcohol Use Yes    Frequency: 2-4 times a month    Drinks per session: 1 or 2    Binge frequency: Never    Comment: SOCIAL     Social History     Substance and Sexual Activity   Drug Use No     Social History:    Social History     Socioeconomic History    Marital status: Single     Spouse name: Not on file    Number of children: Not on file    Years of education: Not on file    Highest education level: Not on file   Occupational History    Occupation: UNEMPLOYED   Social Needs    Financial resource strain: Not on file    Food insecurity     Worry: Not on file     Inability: Not on file   [employer] needs     Medical: Not on file     Non-medical: Not on file   Tobacco Use    Smoking status: Never Smoker    Smokeless tobacco: Never Used   Substance and Sexual Activity    Alcohol use: Yes     Frequency: 2-4 times a month     Drinks per session: 1 or 2     Binge frequency: Never     Comment: SOCIAL    Drug use: No    Sexual activity: Never     Birth control/protection: OCP   Lifestyle    Physical activity     Days per week: Not on file     Minutes per session: Not on file    Stress: Not on file   Relationships    Social connections     Talks on phone: Not on file     Gets together: Not on file     Attends Latter day service: Not on file     Active member of club or organization: Not on file     Attends meetings of clubs or organizations: Not on file     Relationship status: Not on file    Intimate partner violence     Fear of current or ex partner: Not on file     Emotionally abused: Not on file     Physically abused: Not on file     Forced sexual activity: Not on file   Other Topics Concern    Not on file   Social History Narrative    CURRENTLY IN SCHOOL: WILL  [address] 1/2014    LIVING WITH PARENTS     Past Medical History:    Past Medical History:   Diagnosis Date    Congenital dislocation of hip     Depression     LAST ASSESSED: 8/4/12    Disease of thyroid gland     Pectus excavatum     Scoliosis      No past medical history pertinent negatives  Past Surgical History:   Procedure Laterality Date    DEVEN ACETABULAR OSTEOTOMY Left     ACETABULAR OSTEOTOMY     Suicide/Homicide Risk Assessment:    Risk of Harm to Self:   The following ratings are based on assessment at the time of the interview   Demographic risk factors include:    Historical Risk Factors include: chronic depressive symptoms    Risk of Harm to Others:   The following ratings are based on assessment at the time of the interview   Demographic Risk Factors include: none   Historical Risk Factors include: none      The following interventions are recommended: no intervention changes needed    Medications Risks/Benefits:      Risks, Benefits And Possible Side Effects Of Medications:    Discussed risks and benefits of treatment with patient including risk of suicidality, serotonin syndrome and SIADH related to treatment with antidepressants; Risk of induction of manic symptoms in certain patient populations     Controlled Medication Discussion:     Not applicable     Diagnoses and all orders for this visit:    Severe episode of recurrent major depressive disorder, without psychotic features (HCC)  -     venlafaxine (EFFEXOR-XR) 150 mg 24 hr capsule; Take 1 capsule (150 mg total) by mouth daily  -     venlafaxine (EFFEXOR-XR) 37 5 mg 24 hr capsule; Take 1 capsule (37 5 mg total) by mouth daily       Assessment/Plan:     Patient's mood and depressive symptoms have improved since restarting Effexor  Patient would like to see if his increased benefit a higher dose  Patient does appear to have some residual depressive symptoms such as low mood however that does appear to be some overlap between depressive symptoms such as low energy and low motivation with medical causes  Will increase Effexor to 187 5 mg daily  Discussed risk of serotonin syndrome with coadministration of tramadol  Patient states that she takes tramadol on rare occasion  Advised to limit repeat dosing and to discuss alternative pain medication treatments with treating physician  Patient should also use caution with use of NSAIDs due to increased risk of bleeding     -  Increase venlafaxine to 187 5 mg p o  daily  - continue with individual therapy  - follow-up in 2 months  Aware of 24 hour and weekend coverage for urgent situations accessed by calling [facility] main practice number    Treatment Plan:    Completed and signed during the session: Yes - Treatment Plan done but not signed at time of office visit due to:  Plan reviewed by video and verbal consent given due to [address] porsha Webb 05/26/21    This note was shared with patient

## 2021-06-01 ENCOUNTER — TELEPHONE (OUTPATIENT)
Dept: PSYCHIATRY | Facility: CLINIC | Age: 31
End: 2021-06-01

## 2021-06-03 ENCOUNTER — TELEPHONE (OUTPATIENT)
Dept: NEUROLOGY | Facility: CLINIC | Age: 31
End: 2021-06-03

## 2021-06-03 NOTE — TELEPHONE ENCOUNTER
PM & R REFERRAL for-Dr Lamonte Resendez, in the 2 [facility]  Dx :    R53 82 (ICD-10-CM) - Chronic fatigue syndrome  I49 8 (ICD-10-CM) - POTS (postural orthostatic tachycardia syndrome)  Z20 822 (ICD-10-CM) - Suspected COVID-19 virus infection      Please reach out to patient  She did decline services in 2018, she might be interested now  Thank you!      -Natasha Wright the above via staff message to [facility]

## 2021-06-08 ENCOUNTER — EVALUATION (OUTPATIENT)
Dept: PHYSICAL THERAPY | Facility: CLINIC | Age: 31
End: 2021-06-08
Payer: COMMERCIAL

## 2021-06-08 ENCOUNTER — EVALUATION (OUTPATIENT)
Dept: OCCUPATIONAL THERAPY | Facility: CLINIC | Age: 31
End: 2021-06-08
Payer: COMMERCIAL

## 2021-06-08 VITALS — DIASTOLIC BLOOD PRESSURE: 84 MMHG | SYSTOLIC BLOOD PRESSURE: 122 MMHG

## 2021-06-08 DIAGNOSIS — Z20.822 SUSPECTED COVID-19 VIRUS INFECTION: ICD-10-CM

## 2021-06-08 DIAGNOSIS — R53.82 CHRONIC FATIGUE SYNDROME: ICD-10-CM

## 2021-06-08 DIAGNOSIS — I49.8 POTS (POSTURAL ORTHOSTATIC TACHYCARDIA SYNDROME): ICD-10-CM

## 2021-06-08 PROCEDURE — 97162 PT EVAL MOD COMPLEX 30 MIN: CPT | Performed by: PHYSICAL THERAPIST

## 2021-06-08 NOTE — PROGRESS NOTES
PT Evaluation     Today's date: 2021  Patient name: Daniela Armijo  : 1990  MRN: [id number]  Referring provider: Margie Marks DO  Dx:   Encounter Diagnosis     ICD-10-CM    1  Chronic fatigue syndrome  R53 82 Ambulatory referral to Physical Therapy   2  POTS (postural orthostatic tachycardia syndrome)  I49 8 Ambulatory referral to Physical Therapy   3  Suspected COVID-19 virus infection  Z20 822 Ambulatory referral to Physical Therapy                  Assessment  Assessment details: Patient is a 30 y/o female who presents to PT after COVID diagnosis  She reports her positive COVID test "at the end of December" and has been having problems with fatigue, SOB, headaches, and dizziness  Upon arrival, her vitals measured 122/84, 70 bpm, and 99% SpO2  Her LE MMT rated 5/5, and her 5xSTS time of 8 91 sec show good functional strength  Her oculomotor screen was unremarkable, with no increase in headaches or dizziness  During her 6 MWT, she was able to complete 1350 ft with subjective reports of SOB, and immediately following her HR was 119 bpm and 99% SpO2  She completed her 10 meter walk test at a pace of 1 16 m/s, slightly below normative value of 1 2 m/s  She is already being evaluated by occupational therapy, and her SLUMS score of 21/30 is below normative values  Due to time constraints, Balke Protocol was not performed  She will benefit from skilled PT services to improve her endurance, reduce her symptoms, and help her return to PLOF  Impairments: abnormal gait, abnormal movement, activity intolerance, lacks appropriate home exercise program and safety issue    Goals  STG (4 weeks)  1  Patient will be independent with HEP  2  Patient will report 50% reduction in subjective SOB during ADLs for increased independence at home  3  Patient will increase her gait speed to over 1 2 m/s to demonstrate community ambulation speed    LTG (12 weeks)  1  Patient will be independent with comprehensive HEP   2  Patient will report 80% reduction in subjective SOB during ADLs for increased independence at home  3  Patient will increase 6 MWT from 1350 ft to 1700 ft to show improvement with her functional endurance  4  Patient will return to walking outside without LOB or SOB to facilitate return to [facility]    Plan  Planned therapy interventions: abdominal trunk stabilization, ADL retraining, ADL training, balance, balance/weight bearing training, coordination, flexibility, functional ROM exercises, gait training, graded activity, graded exercise, home exercise program, manual therapy, neuromuscular re-education, patient education, postural training, strengthening, stretching, therapeutic activities, therapeutic exercise and therapeutic training  Frequency: 2x week  Duration in weeks: 12  Plan of Care beginning date: 6/8/2021  Plan of Care expiration date: 8/31/2021        Subjective Evaluation    History of Present Illness  Mechanism of injury: Patient reports having COVID in late December  Patient reports that currently her biggest symptoms are fatigue, SOB, headaches, and balance  She states that she is independent with ADLs but requires rest breaks  Pain  No pain reported    Social Support  Steps to enter house: no  Stairs in house: yes   Lives in: multiple-level home  Lives with: parents    Employment status: not working  Hand dominance: right    Treatments  Previous treatment: physical therapy  Patient Goals  Patient goals for therapy: return to work, independence with ADLs/IADLs and return to sport/leisure activities  Patient goal: improved endurnace         Objective     Yellow flag question:   Were you in the ICU? No  Were you on a ventilator? No               (yes continue with  PTSD, PHQ -9 screenings)     Do you feel that your voice has changed? Are you having difficulty with swallowing?  No    Difficulty with participation in ADL and IADL - No    Breathing Difficulty? - with exertion  Has pulmonary function test been completed? No     Patient Specific Functional Scale score: Bowel/Bladder changes: (referral for pelvic therapy)    1  Have you had any new onset of urinary or bowel leakage, EVEN JUST A LITTLE BIT, since onset of Covid?- No  2  Have you had any new difficulty with starting a urine stream or a bowel movement since onset of Covid?- No       Objective:      Vitals:   - BP: 122/84  - HR: 70 bpm  - P02: 99%    MMT: 5/5    Vestibulocular:   Oculomotor ROM : WNL  Resting nystagmus: No  Gaze holding nystagmus No   Smooth pursuit Normal    Vertical Saccades:Normal  Horizontal Saccades:Normal  Convergence: Normal          Functional Outcome Measures:    6 minute walk test: 1350 ft, subjective SOB, , SpO2 99%    Gait Speed : 1 16 m/s    Balance :  5x Sit to stand:  8 91 sec  FGA  score: Additional Screening Based on Yellow flag or if determined appropriate:     SLUMS:  21/30    Impact of events scale revised:    PHQ-9:       Lorrie VILLEDA Protocol: Next Session  Recommended Intensity Dyspnea Scale: 3-5 (moderate to heavy)     Rate of perceived exertion:3-4/10 (moderate to heavy)      HR: Araya Young Minutes Incline/LVL RPE Dyspnea Heart Rate PO2 BP   1 min 0%        2 min 1%        3min  2%        4 min  3%        5 min  4%        6 min 5%        7 min 6%        8 min 7%        9 min 8%        10 min 9%        11 min 10%        12 min 11%        13 min 12%        14 min 13%        15 min  14%            Breathing evaluation:- Next Session    Describe breathing: via: room air, NC (___L 02)  Breathing pattern: WNL, nasal flaring, subcostal retractions, apical      Capillary refill: ___ seconds  Clubbing: Present/absent  Nasal flaring: Present/absent  02 saturation: supine/seated/standing/with activity       Rib cage expansion/Chest wall mobility    Supine normal breathe (tidal)/supine deep breathe(vital capacity) Seated normal breathe (tidal)/supine deep breathe (vital capacity) Standing normal breathe (tidal)/supine deep breathe (vital capacity)   Axilla      xiphoid      Half way between xiphoid and umbilicus            Is chest wall expansion symmetrical? Y/N        Precautions:   Past Medical History:   Diagnosis Date    Congenital dislocation of hip     Depression     LAST ASSESSED: 8/4/12    Disease of thyroid gland     Pectus excavatum     Scoliosis            Manuals                                                                 Neuro Re-Ed                                                                                                        Ther Ex                                                                                                                     Ther Activity                                       Gait Training                                       Modalities

## 2021-06-08 NOTE — PROGRESS NOTES
OCCUPATIONAL THERAPY INITIAL EVALUATION:      Tom Brian  1990  [id number]  Leanne Donald DO   Diagnosis ICD-10-CM Associated Orders   1  Chronic fatigue syndrome  R53 82 Ambulatory referral to Occupational Therapy   2  POTS (postural orthostatic tachycardia syndrome)  I49 8 Ambulatory referral to Occupational Therapy   3  Suspected COVID-19 virus infection  Z20 822 Ambulatory referral to Occupational Therapy         Assessment/Plan    SKILLED ANALYSIS:  Pt has been diagnosed with suspected COVID-19 in December 2020 and reporting cognitive deficts  Pt presents with cognitive deficits due to effects of COVID-19 causing cognitive affecting her function  Patient scored WNL for cognitive skills, however, patient reporting decreased attention, organizing, and planning tasks which is affecting their ability to work which affects ability to complete working tasks  Pt reports difficulties in completing working tasks  Educated patient on effects of COVID on cognitive function All questions answered  Pt will benefit from Occupational Therapy 2x/week for 8-12 weeks with focus on EF skills and  skills  SUBJECTIVE:  Pt's Goal "to be able ot focus and speak smoothly"    Pt reports she has not been working due to [employer] in the past year and she is a delivery services for [employer]  PAIN:  At rest  0/10  After activity  0/10     Olfactory Testing:   Pt reports no olfactory deficits               Assessments  The Repeatable Battery for the Assessment of Neuropsychological Status (RBANS) is a brief, individually-administered assessment which measures attention, language, visuospatial/constructional abilities, and immediate & delayed memory  The RBANS is intended for use with adolescents to adults, ages 15 to 80 years  The following results were obtained during the administration of the assessment      Form: A    Cognitive Domain/Subtest: Index Score: Percentile Rank: Classification: IE: Status:   IMMEDIATE MEMORY 103 58%ile average 6/9         1  List Learning (15/40)          2  Story Memory (23/24)           VISUOSPATIAL/  CONSTRUCTIONAL 84 14%ile Low average 6/9         3  Figure Copy (10/20)          4  Line Orientation (22/20)           LANGUAGE 101 53%ile average 6/9         5  Picture Naming (10/10)          6  Semantic Fluency (22/40)           ATTENTION 132 98%ile Very superior 6/9         7  Digit Span (14/16)          8  Coding (76/89)           DELAYED MEMORY 109 73%ile average 6/9         9  List Recall (6/10)          10  List Recognition (19/20)          11  Story Recall (12/12)          12  Figure Recall (14/20)           Sum of Index Scores:  529  6/9    Total Score:  107      Percentile: 68%ile      Classification: Average           IE indicates the scores from the initial evaluation (6/8/21)  Form: A    PLEASE COMPLETE ADEXI       LONG TERM GOALS: 6-8 weeks    Memory     EF skills  Pt will complete organizational skills with 90% accuracy in multimodal environment to return to working roles  Pt will increase Visual spatial skills to average on RBANs for working tasks  · Attention     o  Pt will demo ability to participate in dual tasking/divided attention task with 90% accuracy in multimodal environment to simulate return to work roles  o Pt will demo ability to alternate attention between 2 tasks with cog loading and 90% accuracy with G retention of task directions in multimodal environment to simulate return to work  roles  SHORT TERM GOALS 4 weeks      Memory     EF skills  Pt will complete organizational skills with 75% accuracy in multimodal environment to return to working roles  Pt will increase Visual spatial skills to average on RBANs for working tasks          · Attention     o  Pt will demo ability to participate in dual tasking/divided attention task with 75% accuracy in multimodal environment to simulate return to work roles  o Pt will demo ability to alternate attention between 2 tasks with cog loading and 75% accuracy with G retention of task directions in multimodal environment to simulate return to work  roles                PLANNED THERAPY INTERVENTIONS:  Internal and external memory aides  Hypersensitivity strategies education  Multi-modal environment  Sustained/alternating/divided attention  Temporal Awareness: Organize the Hour activities  Memory and mental manipulation  Auditory processing with immediate recall  Memory retention with immediate and delayed recall  Edu on cog/vision apps

## 2021-06-09 PROCEDURE — 96125 COGNITIVE TEST BY HC PRO: CPT

## 2021-06-09 PROCEDURE — 97166 OT EVAL MOD COMPLEX 45 MIN: CPT

## 2021-06-15 ENCOUNTER — OFFICE VISIT (OUTPATIENT)
Dept: OCCUPATIONAL THERAPY | Facility: CLINIC | Age: 31
End: 2021-06-15
Payer: COMMERCIAL

## 2021-06-15 ENCOUNTER — OFFICE VISIT (OUTPATIENT)
Dept: PHYSICAL THERAPY | Facility: CLINIC | Age: 31
End: 2021-06-15
Payer: COMMERCIAL

## 2021-06-15 DIAGNOSIS — R53.82 CHRONIC FATIGUE SYNDROME: Primary | ICD-10-CM

## 2021-06-15 DIAGNOSIS — I49.8 POTS (POSTURAL ORTHOSTATIC TACHYCARDIA SYNDROME): ICD-10-CM

## 2021-06-15 DIAGNOSIS — Z20.822 SUSPECTED COVID-19 VIRUS INFECTION: ICD-10-CM

## 2021-06-15 PROCEDURE — 97530 THERAPEUTIC ACTIVITIES: CPT

## 2021-06-15 PROCEDURE — 97112 NEUROMUSCULAR REEDUCATION: CPT

## 2021-06-15 NOTE — PROGRESS NOTES
Daily Note     Today's date: 6/15/2021  Patient name: Gwen Dakins  : 1990  MRN: [id number]  Referring provider: Mohit Fraire DO  Dx:   Encounter Diagnosis     ICD-10-CM    1  Chronic fatigue syndrome  R53 82    2  Suspected COVID-19 virus infection  Z20 822        Start Time: 1615  Stop Time: 1657  Total time in clinic (min): 42 minutes    Subjective: "I can feel that" educated on results of assessment and discussed 1 month of OT   Educated on cognitive applications for cogntiive skills, attention and compensatory strategies for attention       Objective: performed mental manipulation task of alphabet order with NBOS standing for dual tasking with pt able to complete independently  Performed switching attention task of switching between sustained atteention task and EF/organizing skills ie decoding and jans closet focusing on divided attention with independence       Assessment: Tolerated treatment well  Patient would benefit from continued OT  Pt reports her attention may be affected secondary to she was able to sustain attention on working tasks for a few hours and now able to attend for 30 minutes or so  Discussed cogntiive rest breaks  Plan: Continue per plan of care        Precautions:   Past Medical History:   Diagnosis Date    Congenital dislocation of hip     Depression     LAST ASSESSED: 12    Disease of thyroid gland     Pectus excavatum     Scoliosis            Manuals                                                                 Neuro Re-Ed                                                                                                        Ther Ex                                                                                                                     Ther Activity                                       Gait Training                                       Modalities

## 2021-06-24 ENCOUNTER — OFFICE VISIT (OUTPATIENT)
Dept: PHYSICAL THERAPY | Facility: CLINIC | Age: 31
End: 2021-06-24
Payer: COMMERCIAL

## 2021-06-24 ENCOUNTER — OFFICE VISIT (OUTPATIENT)
Dept: OCCUPATIONAL THERAPY | Facility: CLINIC | Age: 31
End: 2021-06-24
Payer: COMMERCIAL

## 2021-06-24 DIAGNOSIS — R53.82 CHRONIC FATIGUE SYNDROME: Primary | ICD-10-CM

## 2021-06-24 DIAGNOSIS — Z20.822 SUSPECTED COVID-19 VIRUS INFECTION: ICD-10-CM

## 2021-06-24 DIAGNOSIS — R53.81 PHYSICAL DECONDITIONING: ICD-10-CM

## 2021-06-24 DIAGNOSIS — I49.8 POTS (POSTURAL ORTHOSTATIC TACHYCARDIA SYNDROME): Primary | ICD-10-CM

## 2021-06-24 DIAGNOSIS — R53.82 CHRONIC FATIGUE SYNDROME: ICD-10-CM

## 2021-06-24 PROCEDURE — 97530 THERAPEUTIC ACTIVITIES: CPT

## 2021-06-24 PROCEDURE — 97110 THERAPEUTIC EXERCISES: CPT

## 2021-06-24 NOTE — PROGRESS NOTES
Daily Note     Today's date: 2021  Patient name: Kenneth Camp  : 1990  MRN: [id number]  Referring provider: Alvin Donis DO  Dx:   Encounter Diagnoses   Name Primary?  Chronic fatigue syndrome Yes    Suspected COVID-19 virus infection                   Precautions: POTS  Visit 3    PN due     Subjective: "That was difficult "      Objective: See treatment below  Session focused on EF and attention  Completed keeping in mind-alternating attention, 2+ direction follow word search, and fill in the vowel worksheet to increase patients ability to return to her worker role  Assessment: Tolerated treatment well  Patient noted with increased difficulty with fill in the vowel worksheet due to attention and higher level problem solving  Plan: Continued skilled OT per POC

## 2021-06-24 NOTE — PROGRESS NOTES
Daily Note  IE: 2021 (POC: 2021)    Today's date: 2021  Patient name: Heidy Chan  : 1990  MRN: [id number]  Referring provider: Otilio Munroe DO  Dx:   Encounter Diagnosis     ICD-10-CM    1  POTS (postural orthostatic tachycardia syndrome)  I49 8    2  Chronic fatigue syndrome  R53 82    3  Suspected COVID-19 virus infection  Z20 822    4  Physical deconditioning  R53 81                   Subjective: Patient arrives to PT today with no new changes, complaints, or falls  Denies SOB or falls  Objective: See treatment diary below  BP pre 106/78 stand, HR 91 BPM, O2 sat 99%    Recommended Intensity Dyspnea Scale: 3-5 (moderate to heavy)     Rate of perceived exertion:3-4/10 (moderate to heavy)      HR: 99 bpm  SPO2: 99%    Performed on Recumbent Bike: 60-70 rpm                                                                     - Recumbent bike (Polar monitor on): level 2, 10 minutes, 60-70 rpm, 3-4/ 10 RPE; Dyspnea scale 3-4/10  -123 BPM t/o session     - STS: 2 sets, 20 reps, HR: 108-131 bpm, SPO2: 98%, RPE: 2  - FGA= 30  - PNF D2 flexion/ extension w/ deep breathing 2x5  - UE Bicep curl/ tricep press 2 sets of 12/ 2 sets of 10,  each 3-6/10 RPE  - 1/4 squats 15x 5/10 RPE,  BPM  - Treadmill 2 1-2 3 mph 5 min 3/10 RPE, 2/10 dyspnea -136 BPM  -NT- ECHTFA  -NT- Ball toss on foam     Monitored HR t/o session via Polar Monitor, HR avg 118 BPM, HR max 139 BPM  Assessment: Patient able to tolerate treatment session well today with increased focus on endurance  Normal HR response to activity, no dizziness t/o session and started on basic POTS protocol ex's- patient tolerated well  No sx's post session  Will advance balance ex's next session ( FGA)  Continue PT  Plan: Continue per plan of care        Precautions:   Past Medical History:   Diagnosis Date    Congenital dislocation of hip     Depression     LAST ASSESSED: 12    Disease of thyroid gland  Pectus excavatum     Scoliosis            Manuals                                                                 Neuro Re-Ed                                                                                                        Ther Ex                                                                                                                     Ther Activity                                       Gait Training                                       Modalities

## 2021-06-28 ENCOUNTER — OFFICE VISIT (OUTPATIENT)
Dept: PHYSICAL THERAPY | Facility: CLINIC | Age: 31
End: 2021-06-28
Payer: COMMERCIAL

## 2021-06-28 ENCOUNTER — OFFICE VISIT (OUTPATIENT)
Dept: OCCUPATIONAL THERAPY | Facility: CLINIC | Age: 31
End: 2021-06-28
Payer: COMMERCIAL

## 2021-06-28 DIAGNOSIS — I49.8 POTS (POSTURAL ORTHOSTATIC TACHYCARDIA SYNDROME): ICD-10-CM

## 2021-06-28 DIAGNOSIS — R53.81 PHYSICAL DECONDITIONING: ICD-10-CM

## 2021-06-28 DIAGNOSIS — Z20.822 SUSPECTED COVID-19 VIRUS INFECTION: ICD-10-CM

## 2021-06-28 DIAGNOSIS — R53.82 CHRONIC FATIGUE SYNDROME: Primary | ICD-10-CM

## 2021-06-28 PROCEDURE — 97112 NEUROMUSCULAR REEDUCATION: CPT

## 2021-06-28 PROCEDURE — 97530 THERAPEUTIC ACTIVITIES: CPT

## 2021-06-28 PROCEDURE — 97110 THERAPEUTIC EXERCISES: CPT

## 2021-06-28 NOTE — PROGRESS NOTES
Daily Note     Today's date: 2021  Patient name: Eddie Chauhan  : 1990  MRN: [id number]  Referring provider: Adeola Figueroa DO  Dx:   Encounter Diagnosis     ICD-10-CM    1  Chronic fatigue syndrome  R53 82    2  Suspected COVID-19 virus infection  Z20 822        Start Time: 1530  Stop Time: 1614  Total time in clinic (min): 44 minutes    Subjective: "its a little challenging"       Objective:  Performed cross word puzzle of directions focusing on EF skills in multimodal environment to simulate working environment in preparation for working tasks  Performed word circles moderate to complex with missing letter focusing on processing and sustained attention in multimodal environment to simulate working environment  Performed decoding answers with numbers and alphabet focusing on EF and organizing skills; Performed organizing/EF skills task of talent show act and tour of [place] with independence     Assessment: Tolerated treatment well  Patient would benefit from continued OT  Pt able to complete  Word circles and cross word puzzle with independence with no further additional time needed  All tasks performed in multimodal environment  Plan: Continue per plan of care        Precautions:   Past Medical History:   Diagnosis Date    Congenital dislocation of hip     Depression     LAST ASSESSED: 12    Disease of thyroid gland     Pectus excavatum     Scoliosis            Manuals                                                                 Neuro Re-Ed                                                                                                        Ther Ex                                                                                                                     Ther Activity                                       Gait Training                                       Modalities

## 2021-06-28 NOTE — PROGRESS NOTES
Daily Note  IE: 2021 (POC: 2021)    Today's date: 2021  Patient name: Margy Green  : 1990  MRN: [id number]  Referring provider: Rekha Ortega DO  Dx:   Encounter Diagnosis     ICD-10-CM    1  Chronic fatigue syndrome  R53 82    2  Physical deconditioning  R53 81    3  POTS (postural orthostatic tachycardia syndrome)  I49 8                   Subjective: Patient arrives to PT today with no new changes, complaints, or falls  Denies SOB or falls  Reports feeling tired after last session, but overall "pretty good"  Objective: See treatment diary below  BP pre 110/70 stand,  BPM, O2 sat 97%  Post 110/66 stand,  BPM, 99% sat    Recommended Intensity Dyspnea Scale: 3-5 (moderate to heavy)     Rate of perceived exertion:3-4/10 (moderate to heavy)      HR: 99 bpm  SPO2: 99%    Performed on Recumbent Bike: 60-70 rpm                                                                     - Recumbent bike (Polar monitor on): level 2-3, 10 minutes, 60-70 rpm, 4-5/ 10 RPE; Dyspnea scale 3-4/10  -140 BPM t/o session     - STS: 2 sets, 20 reps, HR: 117- 145 bpm, SPO2: 98%, RPE: 2  - FGA= 24/30  - PNF D2 flexion/ extension w/ deep breathing 2x5, 3 # B/L  - UE Bicep curl/ tricep press 2 sets of 12/ 2 sets of 12,  each 3-6/10 RPE, 3# dumbell  - 1/4 squats 3# weight each hand 15x 5/10 RPE,  BPM  - Treadmill 2 1-2 3 mph 5 min 3/10 RPE, 2/10 dyspnea -136 BPM  - ECHTFC Blue Foam 10x V/H, no LOB  - ECHTFT 30"x3, no LOB  -NT- Ball toss on foam   - LE Heel Raises 3# B/L UE wall 2x15     Monitored HR t/o session via Polar Monitor, HR avg 118 BPM, HR max 139 BPM  Assessment: Patient able to tolerate treatment session well today with increased focus on endurance  No c/o symptoms post session  Normal HR response to activity, no dizziness t/o session and started on basic POTS protocol ex's- patient tolerated well  No sx's post session  Continue PT, advance as appropriate         Plan: Continue per plan of care        Precautions:   Past Medical History:   Diagnosis Date    Congenital dislocation of hip     Depression     LAST ASSESSED: 8/4/12    Disease of thyroid gland     Pectus excavatum     Scoliosis            Manuals                                                                 Neuro Re-Ed                                                                                                        Ther Ex                                                                                                                     Ther Activity                                       Gait Training                                       Modalities

## 2021-06-29 ENCOUNTER — TELEPHONE (OUTPATIENT)
Dept: FAMILY MEDICINE CLINIC | Facility: OTHER | Age: 31
End: 2021-06-29

## 2021-06-29 DIAGNOSIS — R53.82 CHRONIC FATIGUE SYNDROME: Primary | ICD-10-CM

## 2021-06-29 DIAGNOSIS — Z20.822 SUSPECTED COVID-19 VIRUS INFECTION: ICD-10-CM

## 2021-06-29 DIAGNOSIS — I49.8 POTS (POSTURAL ORTHOSTATIC TACHYCARDIA SYNDROME): ICD-10-CM

## 2021-06-29 DIAGNOSIS — R00.0 TACHYCARDIA: ICD-10-CM

## 2021-06-29 NOTE — TELEPHONE ENCOUNTER
Patient needs Dr  To    Referral to neurology with Dx  E04 24, I49 8, Z20 822   Patient is scheduled 7/1/2021

## 2021-07-01 ENCOUNTER — TELEPHONE (OUTPATIENT)
Dept: NEUROLOGY | Facility: CLINIC | Age: 31
End: 2021-07-01

## 2021-07-01 ENCOUNTER — TELEMEDICINE (OUTPATIENT)
Dept: NEUROLOGY | Facility: CLINIC | Age: 31
End: 2021-07-01
Payer: COMMERCIAL

## 2021-07-01 DIAGNOSIS — U09.9 POST-COVID SYNDROME: Primary | ICD-10-CM

## 2021-07-01 DIAGNOSIS — R00.0 TACHYCARDIA: ICD-10-CM

## 2021-07-01 DIAGNOSIS — R53.82 CHRONIC FATIGUE SYNDROME: ICD-10-CM

## 2021-07-01 DIAGNOSIS — I49.8 POTS (POSTURAL ORTHOSTATIC TACHYCARDIA SYNDROME): ICD-10-CM

## 2021-07-01 DIAGNOSIS — Z20.822 SUSPECTED COVID-19 VIRUS INFECTION: ICD-10-CM

## 2021-07-01 PROCEDURE — 99214 OFFICE O/P EST MOD 30 MIN: CPT

## 2021-07-01 PROCEDURE — 1036F TOBACCO NON-USER: CPT

## 2021-07-01 NOTE — PROGRESS NOTES
PHYSICAL MEDICINE AND REHABILITATION   POST COVID-19 CLINIC    Virtual Regular Visit - thru [facility]     Requested by (Physician/Service): Aura Pena DO  Reason for Consultation:  Post-Covid 19 syndrome evaluation     Providers:  PCP: Aura Pena DO  Cards:  Joselito  Psychiatry:  Georgi  Psychologist as well  Renetta Bronson specialist but not seen in years    Assessment:  Rehabilitation Diagnosis:    Post Covid-19 Syndrome    POTS    Chronic fatigue syndrome   Depression    Cognitive changes     Recommendations:  Rehabilitation Plan:  · Continue PT/OT for activity intolerance, chronic fatigue, post-covid functional sequale, cognitive changes  · Begin and transition to home exercise/activity program    · Follow-up Primary Care Physician for overall medical management  · Follow-up with Cardiology for POTS  · Follow-up with Psychiatry/psychology for depression      Aerobic exercise (may need to start slow 5-10 minutes a few times per day 3 days a week but working up to 30 minutes per day 5 days a week) - start gradual until you build up a tolerance  Take breaks when needed  Seek medical attention for any significant concerns   Recommend Mediterranean-style meals - healthy diet including olive oil, fruits, vegetables, nuts, beans, and whole grains has been proven to improve thinking, memory, and brain health   Limit saturated fats, high calorie diets, and excessive carbs    Avoid alcohol    Avoid drugs   Participate in and pursue beneficial activities such as engaging in a novel, cognitively stimulating activities, listening to music, practicing mindfulness or meditation, staying positive     · If symptoms do not adequately improve follow-up in Post Covid-19 clinic in 2 months      History of Present Illness:    31year old F with PMH of POTS, chronic fatigue syndrome, depression, and hypothyroidism        Patient developed fever, headache, fatigue, and malaise in 12/2020 at the same time her mother who she lives with tested + but she was not tested at that time  She was tested 1/12/21 which was negative but nevertheless is suspected of having had Covid-19 infection  Patient noted since infection her POTS symptoms had been much more unstable overall  She states cardiology made multiple adjustments to medications since which has helped quite a bit  She reports her chronic fatigue syndrome and endurance have been way worse since after her infection as well  She states she did have flares of worsening of CFS in past but it was usually only a few days but now it has been pretty consistent since the infection  Patient also noting increased sweating when she does activity that she used to not do as much as well  She had TSH checked in May and was wnl and she remains on levothyroxine  She reports difficulty concentration and brain fog as well now  Patient reports her depression was moderately severe but recent increase in SNRI dose by psychiatry has been helpful  She denies significant anxiety  Patient reports she needs about 10 hours of sleep and usually gets that  She reports usually short nap during day  Patient reports bilateral hip surgeries as a child and she does walk with a limp  She reports no significant pain recently or increased difficulty ambulating  She denies focal changes in strength  Patient graduated [employer] and then was working 30 hour work weeks as a  for about 1 year until she stopped working in March 2020 with the start of the pandemic  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  Patient lives in 2 story home with both parents  She reports that she can get around the house largely without difficulty but will occasionally get tired by the time she gets to the top of her stairs  She is currently denying SOB or CP but reports she can get SOB with activity at times    She reports she is able to go shopping without much difficulty but has been largely sedentary at home during the pandemic  Patient states she knows she should do some physical activity at home but has not done much recently but would be willing to try again  Review of Systems:   (Per medical assistant)  "Constitutional: Positive for fatigue  Negative for appetite change and fever  HENT: Negative  Negative for hearing loss, tinnitus, trouble swallowing and voice change  Sweating    Eyes: Negative  Negative for photophobia and pain  Respiratory: Positive for shortness of breath  Cardiovascular: Negative  Negative for palpitations  Gastrointestinal: Negative  Negative for nausea and vomiting  Endocrine: Negative  Negative for cold intolerance  Genitourinary: Negative  Negative for dysuria, frequency and urgency  Musculoskeletal: Negative  Negative for myalgias and neck pain  Skin: Negative  Negative for rash  Neurological: Negative  Negative for dizziness, tremors, seizures, syncope, facial asymmetry, speech difficulty, weakness, light-headedness, numbness and headaches  Hematological: Negative  Does not bruise/bleed easily  Psychiatric/Behavioral: Negative  Negative for confusion, hallucinations and sleep disturbance  Brain fog, hard time concentrating for long periods of time longer than an hour  All other systems reviewed and are negative "    Video (Physical) Exam:  Full vitals:  Unavailable  Gen: Sitting in no acute distress, well-nourished, well-appearing       HEENT:  Normocephalic/Atraumatic, moist mucus membranes, sclera not injected, no eyelid swelling, no nasal discharge, hearing grossly adequate during conversation during virtual visit  Cardiopulmonary (Cardiac/Pulmonary) : Nonlabored breathing with unremarkable respiratory rate, no obvious use of accessory muscles, no significant coughing, no audible wheezing or hoarseness  GI:  Non-distended  Neuro: Orientation intact; able to follow commands appropriately, attention and social interaction grossly appropriate  Speech clear   No significant word-finding difficulty or obvious pathologic word substitution   Psych: Euthymic     Social History (historically documented in EMR):    Social History     Socioeconomic History    Marital status: Single     Spouse name: Not on file    Number of children: Not on file    Years of education: Not on file    Highest education level: Not on file   Occupational History    Occupation: UNEMPLOYED   Tobacco Use    Smoking status: Never Smoker    Smokeless tobacco: Never Used   Vaping Use    Vaping Use: Never used   Substance and Sexual Activity    Alcohol use: Yes     Comment: SOCIAL    Drug use: No    Sexual activity: Never     Birth control/protection: OCP   Other Topics Concern    Not on file   Social History Narrative    CURRENTLY IN SCHOOL: [address] [place] [address] 1/2014    LIVING WITH PARENTS     Social Determinants of Health     Financial Resource Strain:     Difficulty of Paying Living Expenses:    Food Insecurity:     Worried About Running Out of Food in the Last Year:     [address] in the Last Year:    Transportation Needs:     Lack of Transportation (Medical):      Lack of Transportation (Non-Medical):    Physical Activity:     Days of Exercise per Week:     Minutes of Exercise per Session:    Stress:     Feeling of Stress :    Social Connections:     Frequency of Communication with Friends and Family:     Frequency of Social Gatherings with Friends and Family:     Attends [employer] Services:     Active Member of Clubs or Organizations:     Attends Club or Organization Meetings:     Marital Status:    Intimate Partner Violence:     Fear of Current or Ex-Partner:     Emotionally Abused:     Physically Abused:     Sexually Abused:         Family History:    Family History   Problem Relation Age of Onset    Asthma Mother     Hyperlipidemia Mother     Hypertension Mother     Diabetes type II Mother     Hyperlipidemia Father     Hypertension Father     Melanoma Family         AMELANOTIC MELANOMA OF THE SKIN    Hyperlipidemia Family     Hypertension Family     Prostate cancer Family         MALIGNANT PROSTATIC NEOPLASM G1    Varicose Veins Family         OF LOWER EXTREMITIES       Medications:     Current Outpatient Medications:     clindamycin (CLEOCIN T) 1 %, APPLY TO ACNE TWICE DAILY, Disp: , Rfl: 5    desmopressin (DDAVP) 0 2 mg tablet, Take 1 tablet (0 2 mg total) by mouth 2 (two) times a day, Disp: 60 tablet, Rfl: 5    fludrocortisone (FLORINEF) 0 1 mg tablet, TAKE 1 TABLET BY MOUTH TWICE A DAY, Disp: 60 tablet, Rfl: 5    levothyroxine 100 mcg tablet, Take 1 tablet (100 mcg total) by mouth daily, Disp: 90 tablet, Rfl: 1    meclizine (ANTIVERT) 25 mg tablet, Take 1 tablet (25 mg total) by mouth every 8 (eight) hours as needed for dizziness or nausea, Disp: 30 tablet, Rfl: 0    midodrine (PROAMATINE) 5 mg tablet, Take 1 tablet (5 mg total) by mouth 3 (three) times a day, Disp: 270 tablet, Rfl: 3    naproxen (NAPROSYN) 500 mg tablet, Take 1 tablet (500 mg total) by mouth daily as needed for moderate pain, Disp: 30 tablet, Rfl: 1    norgestrel-ethinyl estradiol (Cryselle-28) 0 3 mg-30 mcg per tablet, Take 1 tablet by mouth daily, Disp: 90 tablet, Rfl: 3    potassium chloride (MICRO-K) 10 MEQ CR capsule, Take 1 capsule (10 mEq total) by mouth daily, Disp: 90 capsule, Rfl: 1    traMADol-acetaminophen (ULTRACET) 37 5-325 mg per tablet, Take 1 tablet by mouth every 6 (six) hours as needed for moderate pain, Disp: 30 tablet, Rfl: 0    venlafaxine (EFFEXOR-XR) 150 mg 24 hr capsule, Take 1 capsule (150 mg total) by mouth daily, Disp: 30 capsule, Rfl: 2    venlafaxine (EFFEXOR-XR) 37 5 mg 24 hr capsule, Take 1 capsule (37 5 mg total) by mouth daily, Disp: 30 capsule, Rfl: 2    Vitamin D, Cholecalciferol, 25 MCG (1000 UT) CAPS, Take 3,000 Units by mouth , Disp: , Rfl:     nebivolol (BYSTOLIC) 10 mg tablet, Take 0 5 tablets (5 mg total) by mouth 2 (two) times a day, Disp: 90 tablet, Rfl: 3    Past Medical History:     Past Medical History:   Diagnosis Date    Congenital dislocation of hip     Depression     LAST ASSESSED: 8/4/12    Disease of thyroid gland     Pectus excavatum     Scoliosis         Past Surgical History:     Past Surgical History:   Procedure Laterality Date    DEVEN ACETABULAR OSTEOTOMY Left     ACETABULAR OSTEOTOMY         Allergies: Allergies   Allergen Reactions    Bupropion      Other reaction(s): Flushed    Epinephrine Dizziness     Causes dysautonomia    Fluoxetine      Other reaction(s): Flushed           LABORATORY RESULTS:      Lab Results   Component Value Date    HGB 14 3 05/13/2021    HGB 13 3 01/20/2016    HCT 43 0 05/13/2021    HCT 41 0 01/20/2016    WBC 10 8 05/13/2021    WBC 7 0 01/20/2016     Lab Results   Component Value Date    BUN 9 05/13/2021     01/20/2016    K 3 6 05/13/2021     05/13/2021    CREATININE 0 76 05/13/2021    CREATININE 0 69 01/20/2016     No results found for: PROTIME, INR     DIAGNOSTIC STUDIES: Reviewed  CT head wo contrast    Result Date: 12/1/2017  Impression: No acute intracranial abnormality  Workstation performed: [id number]           The patient was identified by name and date of birth  Eva Tiwari was informed that this is a telemedicine visit and that the visit is being conducted through [address] [place] [address] Now and patient was informed that this is a secure, HIPAA-compliant platform  she agrees to proceed     My office door was closed  No one else was in the room  she acknowledged consent and understanding of privacy and security of the video platform  The patient has agreed to participate and understands they can discontinue the visit at any time  Patient is aware this is a billable service         VIRTUAL VISIT DISCLAIMER    Eva Tiwari  acknowledges that she has consented to an online visit or consultation  she understands that the online visit is based solely on information provided by themself , and that, in the absence of a face-to-face physical evaluation by the physician, the diagnosis rafives is both limited and provisional in terms of accuracy and completeness  This is not intended to replace a full medical face-to-face evaluation by the physician  Marquis Todd  understands and accepts these terms  Patient is aware this is a billable service       I spent 30 minutes directly with the patient during this visit    Encounter provider King Resendez MD    Provider located at [address] [place] [phone]

## 2021-07-01 NOTE — PATIENT INSTRUCTIONS
· Follow-up with Primary Care Physician for overall medical management  · Follow-up with Cardiology for POTS  · Follow-up with Psychiatry for depression     · Continue PT/OT for activity intolerance, chronic fatigues, post-covid functional sequale   · Begin and transition to home exercise/activity program     Aerobic exercise (may need to start slow 2-3 minutes a few times per day 3 days a week but working up to 30 minutes per day 5 days a week) - start gradual until you build up a tolerance  Take breaks when needed  Seek medical attention for any significant concerns       Mediterranean-style meals - healthy diet including olive oil, fruits, vegetables, nuts, beans, and whole grains has been proven to improve thinking, memory, and brain health   Limit saturated fats, high calorie diets, and excessive carbs    Avoid alcohol    Avoid drugs   Participate in and pursue beneficial activities such as engaging in a novel, cognitively stimulating activities, listening to music, practicing mindfulness or meditation, staying positive     · If symptoms do not adequately improve follow-up in Post Covid-19 clinic in 2 months

## 2021-07-06 ENCOUNTER — OFFICE VISIT (OUTPATIENT)
Dept: PHYSICAL THERAPY | Facility: CLINIC | Age: 31
End: 2021-07-06
Payer: COMMERCIAL

## 2021-07-06 ENCOUNTER — EVALUATION (OUTPATIENT)
Dept: OCCUPATIONAL THERAPY | Facility: CLINIC | Age: 31
End: 2021-07-06
Payer: COMMERCIAL

## 2021-07-06 DIAGNOSIS — R53.82 CHRONIC FATIGUE SYNDROME: Primary | ICD-10-CM

## 2021-07-06 DIAGNOSIS — R53.81 PHYSICAL DECONDITIONING: ICD-10-CM

## 2021-07-06 DIAGNOSIS — Z20.822 SUSPECTED COVID-19 VIRUS INFECTION: Primary | ICD-10-CM

## 2021-07-06 DIAGNOSIS — I49.8 POTS (POSTURAL ORTHOSTATIC TACHYCARDIA SYNDROME): ICD-10-CM

## 2021-07-06 PROCEDURE — 97530 THERAPEUTIC ACTIVITIES: CPT

## 2021-07-06 PROCEDURE — 97112 NEUROMUSCULAR REEDUCATION: CPT

## 2021-07-06 PROCEDURE — 97110 THERAPEUTIC EXERCISES: CPT

## 2021-07-06 NOTE — PROGRESS NOTES
Daily Note  IE: 2021 (POC: 2021)    Today's date: 2021  Patient name: Angelika Hardin  : 1990  MRN: [id number]  Referring provider: Mackenzie David DO  Dx:   Encounter Diagnosis     ICD-10-CM    1  Chronic fatigue syndrome  R53 82    2  Physical deconditioning  R53 81    3  POTS (postural orthostatic tachycardia syndrome)  I49 8                   Subjective: Patient arrives to PT today with no new changes, complaints, or falls  Denies SOB or falls  She notes that she is feeling good today overall  Objective: See treatment diary below  Vitals pre: 116/78 mmHg seated,  BPM, O2 sat 99%  Vitals post: 110/66 mmHg seated,  BPM, 99% sat    Recommended Intensity Dyspnea Scale: 3-5 (moderate to heavy)     Rate of perceived exertion: 3-4/10 (moderate to heavy)      HR: 100 bpm  SPO2: 99%    Performed on Recumbent Bike: 60-70 rpm  Minutes Incline/Lvl RPE Dyspnea Heart Rate SPO2 BP   1 min 1 1/10 1/10 113 bpm 97%    2 min 1 2/10 1/10 116 bpm 97%    3 min 1 2/10 2/10 113 bpm 98%    4 min 2 3/10 3/10 115 bpm 98%    5 min 2 3/10 3/10 126 bpm 97%    6 min 2 4/10 3/10 130 bpm 97%    7 min 3 4/10 4/10 131 bpm 97%    8 min 3 4/10 4/10 131 bpm 97%    9 min 3 5/10 5/10 134 bpm 97%    10 min 3 5/10 5/10 135 bpm 96%      NMR:   - STS: 1 set EO 1 set EC, 20 reps, HR: 132, SPO2: 97%, 3-4/10 RPE  - UE Bicep curl > tricep press: 3# dumbbell, 2 sets of 12, 3/10 RPE  - 1/2 squats: 3# weight each hand, 2 x 15 reps, 4-5/10 RPE   - LE Heel Raises: 3# dumbbell each hand, 2 x 15, 2/10 RPE       *Progress weight next session  - ECHTFC: on foam, 2 x 10 eahc, V/H, no LOB, 0/10 dizziness    TE:  - Treadmill: 2 1-2 4 mph, 6 min, 3/10 RPE, 2/10 dyspnea, -144 bpm    Assessment: Patient able to tolerate treatment session well today with increased focus on endurance  Vitals demonstrated appropriate and safe response to exercise throughout session  STS progressed to [facility] this session, performed well with no LoB  Squats also progressed this session from 1/4 squats to 1/2 squats, with good body mechanics  No c/o dizziness throughout session  Good somatosensory awareness noted during ECHTFC  Tolerated treadmill progression well this session, continued focus on increasing functional endurance  Patient would benefit from continued skilled PT to improve endurance and QOL, progress as able  Plan: Continue per plan of care        Precautions:   Past Medical History:   Diagnosis Date    Congenital dislocation of hip     Depression     LAST ASSESSED: 8/4/12    Disease of thyroid gland     Pectus excavatum     Scoliosis            Manuals                                                                 Neuro Re-Ed                                                                                                        Ther Ex                                                                                                                     Ther Activity                                       Gait Training                                       Modalities

## 2021-07-06 NOTE — PROGRESS NOTES
Daily Note     Today's date: 2021  Patient name: Ashley Keyes  : 1990  MRN: [id number]  Referring provider: Claudette Ordonez DO  Dx:   Encounter Diagnosis     ICD-10-CM    1  Suspected COVID-19 virus infection  [id number] [id number]                   Subjective: "Its getting there"       Objective:           Assessments  The Repeatable Battery for the Assessment of Neuropsychological Status (RBANS) is a brief, individually-administered assessment which measures attention, language, visuospatial/constructional abilities, and immediate & delayed memory  The RBANS is intended for use with adolescents to adults, ages 15 to 80 years  The following results were obtained during the administration of the assessment      Form: B     Cognitive Domain/Subtest: Index Score: Percentile Rank: Classification: IE: Status:   IMMEDIATE MEMORY 129 97%ile superior           1  List Learning ()            2  Story Memory ()               VISUOSPATIAL/  CONSTRUCTIONAL 87 19%ile Low average           3  Figure Copy ()            4  Line Orientation ()               LANGUAGE 78 7%ile borderline           5  Picture Naming (10/10)            6  Semantic Fluency ()               ATTENTION 112 79%ile High average           7  Digit Span (16)            8  Coding (69/89)               DELAYED MEMORY 97 42%ile average           9  List Recall (8/10)            10  List Recognition ()            11  Story Recall ()            12  Figure Recall ()                Sum of Index Scores:  503        Total Score:  100         Percentile: 53%ile         Classification: Average                IE indicates the scores from the initial evaluation (21)   Form: B     PLEASE COMPLETE ADEXI         LONG TERM GOALS: 6-8 weeks     Memory     EF skills  Pt will complete organizational skills with 90% accuracy in multimodal environment to return to working roles      Pt will increase Visual spatial skills to average on RBANs for working tasks          · Attention     ?  Pt will demo ability to participate in dual tasking/divided attention task with 90% accuracy in multimodal environment to simulate return to work roles  ? Pt will demo ability to alternate attention between 2 tasks with cog loading and 90% accuracy with G retention of task directions in multimodal environment to simulate return to work  roles  SHORT TERM GOALS 4 weeks        Memory     EF skills  Pt will complete organizational skills with 75% accuracy in multimodal environment to return to working roles  ACHIEVED      Pt will increase Visual spatial skills to average on RBANs for working tasks  PROGRESSING        · Attention     ?  Pt will demo ability to participate in dual tasking/divided attention task with 75% accuracy in multimodal environment to simulate return to work roles ACHIEVED   ? Pt will demo ability to alternate attention between 2 tasks with cog loading and 75% accuracy with G retention of task directions in multimodal environment to simulate return to work  roles ACHIEVED       TREATMENT  Performed mental manipulation of saying outloud of x3 words in reverse order with min VCs for recall,  Performed mental manipulation saying outloud forming sentence with 2 VCs for recall and problem solving       TIME SPENT  62 min for administration, documentation, interpretation, scoring adn POC development RBANS      Assessment: Tolerated treatment well  Patient would benefit from continued OT  Pt reports improvements in attention and memory  Pt reports impairments in sustained attention in longer periods of time and still difficult performing working tasks  Pt demonstrating improvements in immediate memory and  skills  Pt continues to demonstrate impairments in  skills, delayed memory, visual memory, processing ie semantic fluency  Pt achieved 3 STGs   Pt would benefit from continued OT services focusing on impairments for 4 more weeks  Pt educated on progress/therapy process and pt in understanding and agreement of recommendations  Recommending to continue HEP       Plan: Continue per plan of care        Precautions:   Past Medical History:   Diagnosis Date    Congenital dislocation of hip     Depression     LAST ASSESSED: 8/4/12    Disease of thyroid gland     Pectus excavatum     Scoliosis            Manuals                                                                 Neuro Re-Ed                                                                                                        Ther Ex                                                                                                                     Ther Activity                                       Gait Training                                       Modalities

## 2021-07-07 PROCEDURE — 96125 COGNITIVE TEST BY HC PRO: CPT

## 2021-07-08 ENCOUNTER — APPOINTMENT (OUTPATIENT)
Dept: PHYSICAL THERAPY | Facility: CLINIC | Age: 31
End: 2021-07-08
Payer: COMMERCIAL

## 2021-07-08 ENCOUNTER — APPOINTMENT (OUTPATIENT)
Dept: OCCUPATIONAL THERAPY | Facility: CLINIC | Age: 31
End: 2021-07-08
Payer: COMMERCIAL

## 2021-07-12 ENCOUNTER — OFFICE VISIT (OUTPATIENT)
Dept: PHYSICAL THERAPY | Facility: CLINIC | Age: 31
End: 2021-07-12
Payer: COMMERCIAL

## 2021-07-12 ENCOUNTER — OFFICE VISIT (OUTPATIENT)
Dept: OCCUPATIONAL THERAPY | Facility: CLINIC | Age: 31
End: 2021-07-12
Payer: COMMERCIAL

## 2021-07-12 DIAGNOSIS — R53.81 PHYSICAL DECONDITIONING: ICD-10-CM

## 2021-07-12 DIAGNOSIS — R53.82 CHRONIC FATIGUE SYNDROME: Primary | ICD-10-CM

## 2021-07-12 DIAGNOSIS — Z20.822 SUSPECTED COVID-19 VIRUS INFECTION: Primary | ICD-10-CM

## 2021-07-12 DIAGNOSIS — I49.8 POTS (POSTURAL ORTHOSTATIC TACHYCARDIA SYNDROME): ICD-10-CM

## 2021-07-12 PROCEDURE — 97110 THERAPEUTIC EXERCISES: CPT | Performed by: PHYSICAL THERAPIST

## 2021-07-12 PROCEDURE — 97530 THERAPEUTIC ACTIVITIES: CPT

## 2021-07-12 PROCEDURE — 97112 NEUROMUSCULAR REEDUCATION: CPT | Performed by: PHYSICAL THERAPIST

## 2021-07-12 NOTE — PROGRESS NOTES
PT Re-Evaluation     Today's date: 2021  Patient name: Lalita Carmona  : 1990  MRN: [id number]  Referring provider: Olivier Espitia DO  Dx:   Encounter Diagnosis     ICD-10-CM    1  Chronic fatigue syndrome  R53 82    2  Physical deconditioning  R53 81    3  POTS (postural orthostatic tachycardia syndrome)  I49 8                 Assessment  Assessment details: Patient is a 32 y/o female who has been presenting to PT after COVID diagnosis  She reports her positive COVID test "at the end of December" and has been having problems with fatigue, SOB, headaches, and dizziness  Upon reassessment, patient demonstrated improvements in all outcome measures performed including 5xSTS, 6 minute walk, 10 meter walk, FGA, modified Balk C Protocol illustrating improvements in LE strength and power, functional cardiovascular endurance, gait speed, dynamic balance, respectively  At this time, she has met 1/3 of her short term goals and is progressing well to the remaining goals  Per subjective reporting, she feels that she has made improvements since starting skilled PT  She will continue to benefit from skilled PT services to further improve her endurance, reduce her symptoms, and help her return to PLOF  Impairments: abnormal gait, abnormal movement, activity intolerance, lacks appropriate home exercise program and safety issue    Goals  STG (4 weeks)  1  Patient will be independent with HEP - NOT MET  2  Patient will report 50% reduction in subjective SOB during ADLs for increased independence at home - NOT MET, progressing  3  Patient will increase her gait speed to over 1 2 m/s to demonstrate community ambulation speed - MET    LTG (12 weeks)  1  Patient will be independent with comprehensive HEP   2  Patient will report 80% reduction in subjective SOB during ADLs for increased independence at home  3  Patient will increase 6 MWT from 1350 ft to 1700 ft to show improvement with her functional endurance  4  Patient will return to walking outside without LOB or SOB to facilitate return to PLOF  5  Patient will be able to bake again with minimal difficulty     Plan  Plan details: Will review exercise guidelines for POTs with focus on exercise miriam  Planned therapy interventions: abdominal trunk stabilization, ADL retraining, ADL training, balance, balance/weight bearing training, coordination, flexibility, functional ROM exercises, gait training, graded activity, graded exercise, home exercise program, manual therapy, neuromuscular re-education, patient education, postural training, strengthening, stretching, therapeutic activities, therapeutic exercise and therapeutic training  Frequency: 2x week  Duration in weeks: 12  Plan of Care beginning date: 6/8/2021  Plan of Care expiration date: 8/31/2021        Subjective Evaluation    History of Present Illness  Mechanism of injury: Patient reports having COVID in late December  Patient reports that currently her biggest symptoms are fatigue, SOB, headaches, and balance  She states that she is independent with ADLs but requires rest breaks  Update: 7-  Patient reports that she noticed some improvements since starting PT  She states that walking and going upstairs does not feel as exhausting  She reports that she would like to start working towards being able to bake again with less difficulty  Pain  No pain reported    Social Support  Steps to enter house: no  Stairs in house: yes   Lives in: multiple-level home  Lives with: parents    Employment status: not working  Hand dominance: right    Treatments  Previous treatment: physical therapy  Patient Goals  Patient goals for therapy: return to work, independence with ADLs/IADLs and return to sport/leisure activities  Patient goal: improved endurnace         Objective     Yellow flag question:   Were you in the ICU? No  Were you on a ventilator?  No               (yes continue with  PTSD, PHQ -9 screenings)     Do you feel that your voice has changed? Are you having difficulty with swallowing? No    Difficulty with participation in ADL and IADL - No    Breathing Difficulty? - with exertion  Has pulmonary function test been completed? No     Patient Specific Functional Scale score: Bowel/Bladder changes: (referral for pelvic therapy)    1  Have you had any new onset of urinary or bowel leakage, EVEN JUST A LITTLE BIT, since onset of Covid?- No  2   Have you had any new difficulty with starting a urine stream or a bowel movement since onset of Covid?- No       Objective:      Vitals:   - HR: 68 bpm  - SP02: 98%    MMT: 5/5    Vestibulocular:   Oculomotor ROM : WNL  Resting nystagmus: No  Gaze holding nystagmus No   Smooth pursuit Normal    Vertical Saccades:Normal  Horizontal Saccades:Normal  Convergence: Normal      Functional Outcome Measures:    6 minute walk test: 1350 ft, subjective SOB, , SpO2 99%    Gait Speed : 1 16 m/s    Balance :  5x Sit to stand:  8 91 sec  FGA  score: 24/30     Update: 7-  6 minute walk test: 1420 ft, subjective SOB,  bpm, SPO2 99%    Gait Speed : 1 90 m/s    Balance :  5x Sit to stand:  6 37 sec  FGA  score: 27/30      Additional Screening Based on Yellow flag or if determined appropriate:     SLUMS:  21/30    Impact of events scale revised:    PHQ-9:       Lorrie VILLEDA Protocol: on Recumbent bike  Recommended Intensity Dyspnea Scale: 3-5 (moderate to heavy)     Rate of perceived exertion:3-4/10 (moderate to heavy)      Performed on Recumbent Bike: 60-70 rpm  Minutes Incline/LVL RPE Dyspnea Heart Rate PO2 BP   1 min 0% 0/10 0/10 94 99%    2 min 1% 1/10 0/10 128 96%    3 min  2% 1/10 1/10 133 98%    4 min  3% 3/10 3/10 139 96%    5 min  4% 3/10 4/10 144 97%    6 min 5% 6/10 5/10 146 98%    7 min 6% 7/10 6/10 159 97%           Precautions:   Past Medical History:   Diagnosis Date    Congenital dislocation of hip     Depression     LAST ASSESSED: 8/4/12    Disease of thyroid gland     Pectus excavatum     Scoliosis            Manuals                                                                 Neuro Re-Ed                                                                                                        Ther Ex                                                                                                                     Ther Activity                                       Gait Training                                       Modalities

## 2021-07-12 NOTE — PROGRESS NOTES
Spoke to patient and he is ready to schedule his 5 year repeat colonoscopy (due Oct 16th) please place colonoscopy order for [place].    Colette Serrano, CMA     Daily Note     Today's date: 2021  Patient name: Marko Juarez  : 1990  MRN: [id number]  Referring provider: Florencio Martino DO  Dx:   Encounter Diagnosis   Name Primary?  Suspected COVID-19 virus infection Yes                  Precautions: POTS  Visit    PN due     Subjective: "That was difficult "      Objective: See treatment below  Engaged patient in delayed recall and visual perceptual retraining task while in a multimodal environement  Provided patient with boxed information, then 3 minute distraction time with visual perceptual worksheet, then recalling and drawing information  Assessment: Tolerated treatment well  Patient was able to upgrade her visual study time from 30 sec to 15 sec with sustained accuracy  Some difficulty with visual perceptual task due to clutter and smaller font  Plan: Continued skilled OT per POC

## 2021-07-15 ENCOUNTER — OFFICE VISIT (OUTPATIENT)
Dept: PHYSICAL THERAPY | Facility: CLINIC | Age: 31
End: 2021-07-15
Payer: COMMERCIAL

## 2021-07-15 ENCOUNTER — OFFICE VISIT (OUTPATIENT)
Dept: OCCUPATIONAL THERAPY | Facility: CLINIC | Age: 31
End: 2021-07-15
Payer: COMMERCIAL

## 2021-07-15 DIAGNOSIS — Z20.822 SUSPECTED COVID-19 VIRUS INFECTION: ICD-10-CM

## 2021-07-15 DIAGNOSIS — R53.82 CHRONIC FATIGUE SYNDROME: ICD-10-CM

## 2021-07-15 DIAGNOSIS — Z20.822 SUSPECTED COVID-19 VIRUS INFECTION: Primary | ICD-10-CM

## 2021-07-15 DIAGNOSIS — I49.8 POTS (POSTURAL ORTHOSTATIC TACHYCARDIA SYNDROME): ICD-10-CM

## 2021-07-15 DIAGNOSIS — R53.82 CHRONIC FATIGUE SYNDROME: Primary | ICD-10-CM

## 2021-07-15 DIAGNOSIS — R53.81 PHYSICAL DECONDITIONING: ICD-10-CM

## 2021-07-15 PROCEDURE — 97110 THERAPEUTIC EXERCISES: CPT | Performed by: PHYSICAL THERAPIST

## 2021-07-15 PROCEDURE — 97530 THERAPEUTIC ACTIVITIES: CPT

## 2021-07-15 NOTE — PROGRESS NOTES
Daily Note  IE: 2021 (POC: 2021)    Today's date: 7/15/2021  Patient name: Piotr Hernandez  : 1990  MRN: [id number]  Referring provider: Mina Laura DO  Dx:   Encounter Diagnosis     ICD-10-CM    1  Chronic fatigue syndrome  R53 82    2  Physical deconditioning  R53 81    3  POTS (postural orthostatic tachycardia syndrome)  I49 8    4  Suspected COVID-19 virus infection  Z20 822                   Subjective: Patient reports to therapy today not feeling great, stating she had a lot of dizziness yesterday        Objective: See treatment diary below    Vitals pre: 106/76 mmHg seated, HR 96 BPM, O2 sat 99%      POTS Cardio Guidelines: Recumbent Bike  - 10 minute Warm up (-122)  - 3 minutes Base pace (-148)  - 2 minute Recovery (-134)  - 3 minutes Base Pace (-150)  - 10 minute Cool Down ()      NMR:   - STS: 20x, 2 sets      Assessment: Patient tolerated treatment well today  Recumbent bike followed POTS Global training guidelines for cardio  Patient displayed safe response to exercise, except slight increase in HR to 150 (guidelines 125-145 HR) during base pace  Patient showed appropriate reduction in HR during cool down and post-training  Discussed with patient POTS protocol, gave patient protocol to take home  She will benefit from skilled PT services to improve endurance and QOL  Plan: Continue per plan of care        Precautions:   Past Medical History:   Diagnosis Date    Congenital dislocation of hip     Depression     LAST ASSESSED: 12    Disease of thyroid gland     Pectus excavatum     Scoliosis            Manuals                                                                 Neuro Re-Ed                                                                                                        Ther Ex                                                                                                                     Ther Activity Gait Training                                       Modalities

## 2021-07-15 NOTE — PROGRESS NOTES
Daily Note     Today's date: 7/15/2021  Patient name: Fani Delgado  : 1990  MRN: [id number]  Referring provider: Neva Hodgson DO  Dx:   Encounter Diagnosis     ICD-10-CM    1  Suspected COVID-19 virus infection  Z20 822    2  Chronic fatigue syndrome  R53 82        Start Time: 3779  Stop Time: 3561  Total time in clinic (min): 40 minutes    Subjective: "It is hard to remember the directions without having them next to the map"    Objective:     -ADEXI: 31, implying executive functioning deficits for age    -Letter grid with focus on sustained attention and direction following in multi modal environment  Completes with mildly inc time, no cues for problem solving    -Map EF worksheet with focus on working memory, EF, sustained attn in multi modal environment  Requires increased time 2* decreased recall of directions presented on back of page  -IQ puzzle moderate difficulty level with focus on EF, sustained attention, problem solving  Completes with inc time, 1 cue for problem solving    -Family tree EF worksheet completed within reasonable time frame without assist      Assessment: Tolerated treatment well  Demonstrating increased tolerance and attention in multi modal environment  Patient would benefit from continued OT      Plan: Continue per plan of care        Precautions:   Past Medical History:   Diagnosis Date    Congenital dislocation of hip     Depression     LAST ASSESSED: 12    Disease of thyroid gland     Pectus excavatum     Scoliosis

## 2021-07-20 ENCOUNTER — APPOINTMENT (OUTPATIENT)
Dept: OCCUPATIONAL THERAPY | Facility: CLINIC | Age: 31
End: 2021-07-20
Payer: COMMERCIAL

## 2021-07-20 ENCOUNTER — APPOINTMENT (OUTPATIENT)
Dept: PHYSICAL THERAPY | Facility: CLINIC | Age: 31
End: 2021-07-20
Payer: COMMERCIAL

## 2021-07-21 ENCOUNTER — HOSPITAL ENCOUNTER (OUTPATIENT)
Dept: NON INVASIVE DIAGNOSTICS | Facility: CLINIC | Age: 31
Discharge: HOME | End: 2021-07-21
Payer: COMMERCIAL

## 2021-07-21 DIAGNOSIS — Z20.822 SUSPECTED COVID-19 VIRUS INFECTION: ICD-10-CM

## 2021-07-21 DIAGNOSIS — I49.8 POTS (POSTURAL ORTHOSTATIC TACHYCARDIA SYNDROME): ICD-10-CM

## 2021-07-21 DIAGNOSIS — R53.82 CHRONIC FATIGUE SYNDROME: ICD-10-CM

## 2021-07-21 PROCEDURE — 93306 TTE W/DOPPLER COMPLETE: CPT | Performed by: INTERNAL MEDICINE

## 2021-07-21 PROCEDURE — 93306 TTE W/DOPPLER COMPLETE: CPT

## 2021-07-22 ENCOUNTER — OFFICE VISIT (OUTPATIENT)
Dept: PHYSICAL THERAPY | Facility: CLINIC | Age: 31
End: 2021-07-22
Payer: COMMERCIAL

## 2021-07-22 ENCOUNTER — OFFICE VISIT (OUTPATIENT)
Dept: OCCUPATIONAL THERAPY | Facility: CLINIC | Age: 31
End: 2021-07-22
Payer: COMMERCIAL

## 2021-07-22 DIAGNOSIS — R53.82 CHRONIC FATIGUE SYNDROME: Primary | ICD-10-CM

## 2021-07-22 DIAGNOSIS — R53.81 PHYSICAL DECONDITIONING: ICD-10-CM

## 2021-07-22 DIAGNOSIS — R53.82 CHRONIC FATIGUE SYNDROME: ICD-10-CM

## 2021-07-22 DIAGNOSIS — Z20.822 SUSPECTED COVID-19 VIRUS INFECTION: ICD-10-CM

## 2021-07-22 DIAGNOSIS — Z20.822 SUSPECTED COVID-19 VIRUS INFECTION: Primary | ICD-10-CM

## 2021-07-22 DIAGNOSIS — I49.8 POTS (POSTURAL ORTHOSTATIC TACHYCARDIA SYNDROME): ICD-10-CM

## 2021-07-22 PROCEDURE — 97530 THERAPEUTIC ACTIVITIES: CPT | Performed by: PHYSICAL THERAPIST

## 2021-07-22 PROCEDURE — 97530 THERAPEUTIC ACTIVITIES: CPT

## 2021-07-22 PROCEDURE — 97110 THERAPEUTIC EXERCISES: CPT | Performed by: PHYSICAL THERAPIST

## 2021-07-22 NOTE — PROGRESS NOTES
Daily Note  IE: 2021 (POC: 2021)    Today's date: 2021  Patient name: Ray Nayak  : 1990  MRN: [id number]  Referring provider: Joseph Ferrell DO  Dx:   Encounter Diagnosis     ICD-10-CM    1  Chronic fatigue syndrome  R53 82    2  Physical deconditioning  R53 81    3  POTS (postural orthostatic tachycardia syndrome)  I49 8    4  Suspected COVID-19 virus infection  Z20 822                   Subjective: Patient reports feeling increased fatigue from having 2 sessions last week, requests 1x/week        Objective: See treatment diary below    Vitals pre: HR 97 BPM, O2 sat 98%      POTS Cardio Guidelines: Recumbent Bike  - 10 minute Warm up (-122)  - 3 minutes Base pace (-145)  - 2 minute Recovery (-125)  - 3 minutes Base Pace (-148)  - 10 minute Cool Down ()    TA  - patient ed of POTS Cardio Guidelines      Assessment: Patient tolerated treatment well today  Recumbent bike followed POTS Global training guidelines for cardio  Patient displayed safe response to exercise, except slight increase in HR to 148 (guidelines 125-145 HR) during base pace  Discussed with patient POTS Global Training Guidelines for Cardio, exercises that she should be performing at home, and discussed HR monitoring, safety, and guidelines  Patient appeared apprehensive about HEP, discussed with patient hopeful future start date  She will benefit from skilled PT services to improve her endurance and QOL  Plan: Continue per plan of care        Precautions:   Past Medical History:   Diagnosis Date    Congenital dislocation of hip     Depression     LAST ASSESSED: 12    Disease of thyroid gland     Pectus excavatum     Scoliosis

## 2021-07-22 NOTE — PROGRESS NOTES
Daily Note     Today's date: 2021  Patient name: Yeimi Llanos  : 1990  MRN: [id number]  Referring provider: Miguel Sol DO  Dx:   Encounter Diagnosis     ICD-10-CM    1  Suspected COVID-19 virus infection  Z20 822    2  Chronic fatigue syndrome  R53 82        Start Time: 1451  Stop Time: 1830  Total time in clinic (min): 45 minutes    Subjective:       Objective:    Performed IQ car focusing on EF skills and  skills in multimodal environment focusing on sustained attention- required min VCs for problem solving - performing intermediate puzzles  Performed themed park map focusing on  skills, EF skills, and organizing with independence    Performed Q bitz task focusing on  skills and EF skills, required use of note card to break down card for complex card  Assessment: Tolerated treatment well  Patient would benefit from continued OT required occasional cues for problem solving       Plan: Continue per plan of care        Precautions:   Past Medical History:   Diagnosis Date    Congenital dislocation of hip     Depression     LAST ASSESSED: 12    Disease of thyroid gland     Pectus excavatum     Scoliosis

## 2021-07-23 ENCOUNTER — TELEPHONE (OUTPATIENT)
Dept: CARDIOLOGY CLINIC | Facility: CLINIC | Age: 31
End: 2021-07-23

## 2021-07-23 ENCOUNTER — TELEPHONE (OUTPATIENT)
Dept: FAMILY MEDICINE CLINIC | Facility: OTHER | Age: 31
End: 2021-07-23

## 2021-07-23 NOTE — TELEPHONE ENCOUNTER
----- Message from Sara Zhong DO sent at 7/23/2021  2:05 PM EDT -----  Please inform pt that echo appears to be normal     Sara Zhong DO

## 2021-07-23 NOTE — TELEPHONE ENCOUNTER
DALIA  Pt wanted to let you know BP was very faint and unable to obtain at 7/22/21 physical therapy visit  HR 97 O2 98%     nebivilol 10 mg daily  Midodrine 5 mg three times a day  Per pt "feels fine" no lightheadedness, no SOB  Does not take BP at home  Advise patient to let us know if she would feel any further symptoms

## 2021-07-26 ENCOUNTER — OFFICE VISIT (OUTPATIENT)
Dept: PSYCHIATRY | Facility: CLINIC | Age: 31
End: 2021-07-26
Payer: COMMERCIAL

## 2021-07-26 DIAGNOSIS — I49.8 POTS (POSTURAL ORTHOSTATIC TACHYCARDIA SYNDROME): ICD-10-CM

## 2021-07-26 DIAGNOSIS — R53.82 CHRONIC FATIGUE SYNDROME: ICD-10-CM

## 2021-07-26 DIAGNOSIS — F33.41 RECURRENT MAJOR DEPRESSIVE DISORDER, IN PARTIAL REMISSION (HCC): ICD-10-CM

## 2021-07-26 DIAGNOSIS — F33.2 SEVERE EPISODE OF RECURRENT MAJOR DEPRESSIVE DISORDER, WITHOUT PSYCHOTIC FEATURES (HCC): ICD-10-CM

## 2021-07-26 DIAGNOSIS — Z20.822 SUSPECTED COVID-19 VIRUS INFECTION: ICD-10-CM

## 2021-07-26 PROCEDURE — 99214 OFFICE O/P EST MOD 30 MIN: CPT | Performed by: NURSE PRACTITIONER

## 2021-07-26 RX ORDER — VENLAFAXINE HYDROCHLORIDE 150 MG/1
150 CAPSULE, EXTENDED RELEASE ORAL DAILY
Qty: 30 CAPSULE | Refills: 2 | Status: SHIPPED | OUTPATIENT
Start: 2021-07-26 | End: 2021-10-29 | Stop reason: SDUPTHER

## 2021-07-26 RX ORDER — VENLAFAXINE HYDROCHLORIDE 75 MG/1
75 CAPSULE, EXTENDED RELEASE ORAL DAILY
Qty: 30 CAPSULE | Refills: 2 | Status: SHIPPED | OUTPATIENT
Start: 2021-07-26 | End: 2021-09-22 | Stop reason: SDUPTHER

## 2021-07-26 NOTE — BH TREATMENT PLAN
TREATMENT PLAN (Medication Management Only)        [facility]    Name and Date of Birth:  Chanda Larry 31 y o  1990  Date of Treatment Plan: July 26, 2021  Diagnosis/Diagnoses:    1  Chronic fatigue syndrome    2  POTS (postural orthostatic tachycardia syndrome)    3  Recurrent major depressive disorder, in partial remission ([address] )    4  Suspected COVID-19 virus infection    5  Severe episode of recurrent major depressive disorder, without psychotic features [facility])      Strengths/Personal Resources for Self-Care: motivation for treatment, taking medications as prescribed, ability to communicate well, ability to listen, ability to reason  Area/Areas of need (in own words): depressive symptoms  1  Long Term Goal: improve depression  Target Date: 6 months - 1/26/2022  Person/Persons responsible for completion of goal: Chanda Larry  2  Short Term Objective (s) - How will we reach this goal?:   A  Provider new recommended medication/dosage changes and/or continue medication(s): continue current medications as prescribed  B   Attend medication management appointments regularly  C   Attend psychotherapy regularly  Target Date: 6 months - 1/26/2022  Person/Persons Responsible for Completion of Goal: Courtney Parker  Progress Towards Goals: continuing treatment  Treatment Modality: medication management with psychotherapy every 3 months  Review due 90 to 120 days from date of this plan: 6 months - 1/26/2022  Expected length of service: maintenance unless revised  My Physician/PA/NP and I have developed this plan together and I agree to work on the goals and objectives  I understand the treatment goals that were developed for my treatment    Treatment Plan Verbal Consent - Due to COVID

## 2021-07-26 NOTE — PSYCH
MEDICATION MANAGEMENT NOTE        [facility]    Name and Date of Birth:  Ashley Keyes 31 y o  1990 MRN: [id number]    Date of Visit: July 26, 2021    Allergies   Allergen Reactions    Bupropion      Other reaction(s): Flushed    Epinephrine Dizziness     Causes dysautonomia    Fluoxetine      Other reaction(s): Flushed     SUBJECTIVE:    Deyanira Villalobos is seen today for a follow up for Major Depressive Disorder and Generalized Anxiety Disorder  She reports that she has done fairly well since the last visit  Depressive symptoms have remained relatively stable  Patient experiences 2-4 depressive episodes per week  Rates depression as a 4/10 when episodes occur  States depression and low mood is generally triggered by thoughts relating to Nia-Danlos syndrome and worry about physical health  Anxiety is well controlled  Patient has not noticed a improvement with increase in Effexor  Patient would like to trial an increase in Effexor  Patient states that blood pressure has been mildly elevated from her baseline 116/80 since starting Effexor  Unable to obtain patient's blood pressure using electronic blood pressure device  Will order manual blood pressure cuff for future visits  She denies any side effects from medications      PLAN:    Increase Effexor to 225 mg p o  daily   consider adjunctive medications at next visit   follow-up in 3 months   continue with individual therapy  Aware of 24 hour and weekend coverage for urgent situations accessed by calling [facility] main practice number    Diagnoses and all orders for this visit:    Chronic fatigue syndrome  -     Ambulatory referral to Psychiatry    POTS (postural orthostatic tachycardia syndrome)  -     Ambulatory referral to Psychiatry    Recurrent major depressive disorder, in partial remission ([facility] [address] )  -     Ambulatory referral to Psychiatry    Suspected COVID-19 virus infection  -     Ambulatory referral to Psychiatry    Severe episode of recurrent major depressive disorder, without psychotic features (HCC)  -     venlafaxine (EFFEXOR-XR) 150 mg 24 hr capsule; Take 1 capsule (150 mg total) by mouth daily  -     venlafaxine (EFFEXOR-XR) 75 mg 24 hr capsule;  Take 1 capsule (75 mg total) by mouth daily        Current Outpatient Medications on File Prior to Visit   Medication Sig Dispense Refill    clindamycin (CLEOCIN T) 1 % APPLY TO ACNE TWICE DAILY  5    desmopressin (DDAVP) 0 2 mg tablet Take 1 tablet (0 2 mg total) by mouth 2 (two) times a day 60 tablet 5    fludrocortisone (FLORINEF) 0 1 mg tablet TAKE 1 TABLET BY MOUTH TWICE A DAY 60 tablet 5    levothyroxine 100 mcg tablet Take 1 tablet (100 mcg total) by mouth daily 90 tablet 1    meclizine (ANTIVERT) 25 mg tablet Take 1 tablet (25 mg total) by mouth every 8 (eight) hours as needed for dizziness or nausea 30 tablet 0    midodrine (PROAMATINE) 5 mg tablet Take 1 tablet (5 mg total) by mouth 3 (three) times a day 270 tablet 3    naproxen (NAPROSYN) 500 mg tablet Take 1 tablet (500 mg total) by mouth daily as needed for moderate pain 30 tablet 1    nebivolol (BYSTOLIC) 10 mg tablet Take 0 5 tablets (5 mg total) by mouth 2 (two) times a day 90 tablet 3    norgestrel-ethinyl estradiol (Cryselle-28) 0 3 mg-30 mcg per tablet Take 1 tablet by mouth daily 90 tablet 3    potassium chloride (MICRO-K) 10 MEQ CR capsule Take 1 capsule (10 mEq total) by mouth daily 90 capsule 1    traMADol-acetaminophen (ULTRACET) 37 5-325 mg per tablet Take 1 tablet by mouth every 6 (six) hours as needed for moderate pain 30 tablet 0    Vitamin D, Cholecalciferol, 25 MCG (1000 UT) CAPS Take 3,000 Units by mouth       [DISCONTINUED] venlafaxine (EFFEXOR-XR) 150 mg 24 hr capsule Take 1 capsule (150 mg total) by mouth daily 30 capsule 2    [DISCONTINUED] venlafaxine (EFFEXOR-XR) 37 5 mg 24 hr capsule Take 1 capsule (37 5 mg total) by mouth daily 30 capsule 2     No current facility-administered medications on file prior to visit  Psychotherapy Provided:     Individual psychotherapy provided: Yes  Counseling was provided during the session today for 16 minutes  Supportive counseling provided  HPI ROS Appetite Changes and Sleep:     She reports normal sleep, normal appetite, adequate energy level   Patient denies suicidal or homicidal ideation    Review Of Systems:      General emotional problems and decreased functioning   Personality no change in personality   Constitutional negative   ENT negative   Cardiovascular negative   Respiratory negative   Gastrointestinal negative   Genitourinary negative   Musculoskeletal negative   Integumentary negative   Neurological negative   Endocrine negative   Other Symptoms none, all other systems are negative     Mental Status Evaluation:    Appearance Adequate hygiene and grooming   Behavior calm and cooperative   Mood depressed  Depression Scale -  of 10 (0 = No depression)  Anxiety Scale - 0 of 10 (0 = No anxiety)   Speech Normal rate and volume   Affect mood-congruent and constricted   Thought Processes Goal directed and coherent   Thought Content Does not verbalize delusional material   Associations Tightly connected   Perceptual Disturbances Denies hallucinations and does not appear to be responding to internal stimuli   Risk Potential Suicidal/Homicidal Ideation - No evidence of suicidal or homicidal ideation and patient does not verbalize suicidal or homicidal ideation  Risk of Violence - No evidence of risk for violence found on assessment  Risk of Self Mutilation - No evidence of risk for self mutilation found on assessment   Orientation oriented to person, place, time/date and situation   Memory recent and remote memory grossly intact   Consciousness alert and awake   Attention/Concentration attention span and concentration are age appropriate   Insight intact   Judgement intact   Muscle Strength and Gait normal muscle strength and normal muscle tone, normal gait/station and normal balance   Motor Activity no abnormal movements   Language no difficulty naming common objects, no difficulty repeating a phrase, no difficulty writing a sentence   Fund of Knowledge adequate knowledge of current events  adequate fund of knowledge regarding past history  adequate fund of knowledge regarding vocabulary      Past Psychiatric History Update:     Inpatient Psychiatric Admission Since Last Encounter:   no  Changes to Outpatient Psychiatric Treatment Team:    no  Suicide Attempt Or Self Mutilation Since Last Encounter:   no  Incidence of Violent Behavior Since Last Encounter:   no    Traumatic History Update:     New Onset of Abuse Since Last Encounter:   no  Traumatic Events Since Last Encounter:   no    Past Medical History:    Past Medical History:   Diagnosis Date    Congenital dislocation of hip     Depression     LAST ASSESSED: 8/4/12    Disease of thyroid gland     Pectus excavatum     Scoliosis      No past medical history pertinent negatives    Past Surgical History:   Procedure Laterality Date    DEVEN ACETABULAR OSTEOTOMY Left     ACETABULAR OSTEOTOMY     Allergies   Allergen Reactions    Bupropion      Other reaction(s): Flushed    Epinephrine Dizziness     Causes dysautonomia    Fluoxetine      Other reaction(s): Flushed     Substance Abuse History:    Social History     Substance and Sexual Activity   Alcohol Use Yes    Comment: SOCIAL     Social History     Substance and Sexual Activity   Drug Use No     Social History:    Social History     Socioeconomic History    Marital status: Single     Spouse name: Not on file    Number of children: Not on file    Years of education: Not on file    Highest education level: Not on file   Occupational History    Occupation: UNEMPLOYED   Tobacco Use    Smoking status: Never Smoker    Smokeless tobacco: Never Used   Vaping Use    Vaping Use: Never used   Substance and Sexual Activity    Alcohol use: Yes     Comment: SOCIAL    Drug use: No    Sexual activity: Never     Birth control/protection: OCP   Other Topics Concern    Not on file   Social History Narrative    CURRENTLY IN SCHOOL: WILL BE STARTING  [address] 1/2014    LIVING WITH PARENTS     Social Determinants of Health     Financial Resource Strain:     Difficulty of Paying Living Expenses:    Food Insecurity:     Worried About Running Out of Food in the Last Year:     Pura of Food in the Last Year:    Transportation Needs:     Lack of Transportation (Medical):  Lack of Transportation (Non-Medical):    Physical Activity:     Days of Exercise per Week:     Minutes of Exercise per Session:    Stress:     Feeling of Stress :    Social Connections:     Frequency of Communication with Friends and Family:     Frequency of Social Gatherings with Friends and Family:     Attends Baptism Services:     Active Member of Clubs or Organizations:     Attends Club or Organization Meetings:     Marital Status:    Intimate Partner Violence:     Fear of Current or Ex-Partner:     Emotionally Abused:     Physically Abused:     Sexually Abused:      Family Psychiatric History:     Family History   Problem Relation Age of Onset    Asthma Mother     Hyperlipidemia Mother     Hypertension Mother     Diabetes type II Mother     Hyperlipidemia Father     Hypertension Father     Melanoma Family         AMELANOTIC MELANOMA OF THE SKIN    Hyperlipidemia Family     Hypertension Family     Prostate cancer Family         MALIGNANT PROSTATIC NEOPLASM G1    Varicose Veins Family         OF LOWER EXTREMITIES     History Review: The following portions of the patient's history were reviewed and updated as appropriate: allergies, current medications, past family history, past medical history, past social history, past surgical history and problem list     OBJECTIVE:     Vital signs in last 24 hours: There were no vitals filed for this visit  Laboratory Results: I have personally reviewed all pertinent laboratory/tests results  Suicide/Homicide Risk Assessment:    Risk of Harm to Self:   The following ratings are based on assessment at the time of the interview   Recent Specific Risk Factors include: current depressive symptoms    Risk of Harm to Others:   The following ratings are based on assessment at the time of the interview   Recent Specific Risk Factors include: none  The following interventions are recommended: no intervention changes needed    Medications Risks/Benefits:      Risks, Benefits And Possible Side Effects Of Medications:    Discussed risks and benefits of treatment with patient including risk of suicidality, serotonin syndrome and SIADH related to treatment with antidepressants; Risk of induction of manic symptoms in certain patient populations     Controlled Medication Discussion:     Not applicable    Treatment Plan:    Due for update/Updated:   yes    LEYDI Herrera 07/26/21    This note was shared with patient

## 2021-07-27 ENCOUNTER — OFFICE VISIT (OUTPATIENT)
Dept: PHYSICAL THERAPY | Facility: CLINIC | Age: 31
End: 2021-07-27
Payer: COMMERCIAL

## 2021-07-27 ENCOUNTER — OFFICE VISIT (OUTPATIENT)
Dept: OCCUPATIONAL THERAPY | Facility: CLINIC | Age: 31
End: 2021-07-27
Payer: COMMERCIAL

## 2021-07-27 DIAGNOSIS — R53.82 CHRONIC FATIGUE SYNDROME: Primary | ICD-10-CM

## 2021-07-27 DIAGNOSIS — Z20.822 SUSPECTED COVID-19 VIRUS INFECTION: Primary | ICD-10-CM

## 2021-07-27 DIAGNOSIS — R53.81 PHYSICAL DECONDITIONING: ICD-10-CM

## 2021-07-27 DIAGNOSIS — I49.8 POTS (POSTURAL ORTHOSTATIC TACHYCARDIA SYNDROME): ICD-10-CM

## 2021-07-27 DIAGNOSIS — R53.82 CHRONIC FATIGUE SYNDROME: ICD-10-CM

## 2021-07-27 DIAGNOSIS — Z20.822 SUSPECTED COVID-19 VIRUS INFECTION: ICD-10-CM

## 2021-07-27 PROCEDURE — 97530 THERAPEUTIC ACTIVITIES: CPT

## 2021-07-27 PROCEDURE — 97110 THERAPEUTIC EXERCISES: CPT

## 2021-07-27 NOTE — PROGRESS NOTES
Daily Note  IE: 2021 (POC: 2021)    Today's date: 2021  Patient name: Chanda Larry  : 1990  MRN: [id number]  Referring provider: Shahnaz Rico DO  Dx:   Encounter Diagnosis     ICD-10-CM    1  Chronic fatigue syndrome  R53 82    2  Physical deconditioning  R53 81    3  POTS (postural orthostatic tachycardia syndrome)  I49 8    4  Suspected COVID-19 virus infection  Z20 822                   Subjective: Patient reports that she feels pretty good today  Objective: See treatment diary below    Vitals pre:  bpm, SPO2 99%  Vitals post: HR 96 bpm, SPO2 97%      POTS Cardio Guidelines: Recumbent Bike  - 10 minute Warm up (HR <125)  - 3 minutes Base pace (-145), RPE 5/10  - 2 minute Recovery (HR <125)  - 3 minutes Base Pace (-145), RPE 6/10  - 10 minute Cool Down (HR <125), RPE 3/10    TA  - Patient education of POTS Cardio Guidelines, see assessment       Assessment: Patient tolerated today's treatment session well  Continued to follow recumbent bike POTS Global training guidelines for cardio this session  Patient displayed safe and appropriate response to exercise  Patient further educated today on POTS Global Training Guidelines for Cardio, due to patient reports of not following protocol at this time, patient verbalized understanding  Per subjective reporting, she does have workout equipment at home, including a bike and light weights, and has a way to monitor HR as well  Patient also educated on RPE scale and its use in monitoring exercise intensity, as she is taking a Beta-blocker and RPE should be continuously assessed  She will benefit from continued skilled PT services to improve her endurance and QOL  Plan: Continue per plan of care        Precautions:   Past Medical History:   Diagnosis Date    Congenital dislocation of hip     Depression     LAST ASSESSED: 12    Disease of thyroid gland     Pectus excavatum     Scoliosis

## 2021-07-27 NOTE — PROGRESS NOTES
Daily Note     Today's date: 2021  Patient name: Lalita Brito  : 1990  MRN: [id number]  Referring provider: Nathaniel Schaeffer DO  Dx:   Encounter Diagnosis     ICD-10-CM    1  Suspected COVID-19 virus infection  Z20 822    2  Chronic fatigue syndrome  R53 82        Start Time: 1400  Stop Time: 1666  Total time in clinic (min): 45 minutes    Subjective:   Pt reports getting IQ fit and has been progressing with OT skills  Objective:    Performed dog pile in multimodal environment focusing on EF skills, organizing skills and  skills with pt able to complete 2 beginner and 3-4 intermediate of layers required min VCs for problem solving for 2 layer items    Performed pixy cubes of complex focusing on  skills and EF skills   ]    Provided with logical solutions foucisngon EF skills and to perform for HEP       Assessment: Tolerated treatment well  Patient would benefit from continued OT  Required min VCs for problem solving overall;     Plan: Continue per plan of care        Precautions:   Past Medical History:   Diagnosis Date    Congenital dislocation of hip     Depression     LAST ASSESSED: 12    Disease of thyroid gland     Pectus excavatum     Scoliosis

## 2021-07-29 ENCOUNTER — APPOINTMENT (OUTPATIENT)
Dept: OCCUPATIONAL THERAPY | Facility: CLINIC | Age: 31
End: 2021-07-29
Payer: COMMERCIAL

## 2021-07-29 ENCOUNTER — APPOINTMENT (OUTPATIENT)
Dept: PHYSICAL THERAPY | Facility: CLINIC | Age: 31
End: 2021-07-29
Payer: COMMERCIAL

## 2021-08-03 ENCOUNTER — EVALUATION (OUTPATIENT)
Dept: OCCUPATIONAL THERAPY | Facility: CLINIC | Age: 31
End: 2021-08-03
Payer: COMMERCIAL

## 2021-08-03 ENCOUNTER — OFFICE VISIT (OUTPATIENT)
Dept: PHYSICAL THERAPY | Facility: CLINIC | Age: 31
End: 2021-08-03
Payer: COMMERCIAL

## 2021-08-03 DIAGNOSIS — R53.81 PHYSICAL DECONDITIONING: Primary | ICD-10-CM

## 2021-08-03 DIAGNOSIS — R53.82 CHRONIC FATIGUE SYNDROME: ICD-10-CM

## 2021-08-03 DIAGNOSIS — Z20.822 SUSPECTED COVID-19 VIRUS INFECTION: ICD-10-CM

## 2021-08-03 DIAGNOSIS — Z20.822 SUSPECTED COVID-19 VIRUS INFECTION: Primary | ICD-10-CM

## 2021-08-03 DIAGNOSIS — I49.8 POTS (POSTURAL ORTHOSTATIC TACHYCARDIA SYNDROME): ICD-10-CM

## 2021-08-03 PROCEDURE — 97530 THERAPEUTIC ACTIVITIES: CPT

## 2021-08-03 PROCEDURE — 97110 THERAPEUTIC EXERCISES: CPT

## 2021-08-03 PROCEDURE — 96125 COGNITIVE TEST BY HC PRO: CPT

## 2021-08-03 NOTE — PROGRESS NOTES
Daily Note     Today's date: 8/3/2021  Patient name: Lawrence Webb  : 1990  MRN: [id number]  Referring provider: Sheridan Polanco DO  Dx:   Encounter Diagnosis     ICD-10-CM    1  Suspected COVID-19 virus infection  Z20 822    2  Chronic fatigue syndrome  R53 82                   Subjective: "Its getting there"       Objective:           Assessments  The Repeatable Battery for the Assessment of Neuropsychological Status (RBANS) is a brief, individually-administered assessment which measures attention, language, visuospatial/constructional abilities, and immediate & delayed memory  The RBANS is intended for use with adolescents to adults, ages 15 to 80 years  The following results were obtained during the administration of the assessment      Form: C     Cognitive Domain/Subtest: Index Score: Percentile Rank: Classification: IE: Status:   IMMEDIATE MEMORY 117 87%ile High average           1  List Learning (70)            2  Story Memory ()               VISUOSPATIAL/  CONSTRUCTIONAL 109 73%ile average           3  Figure Copy ()            4  Line Orientation ()               LANGUAGE 96 32%ile average           5  Picture Naming (10/10)            6  Semantic Fluency ()               ATTENTION 128 97%ile superior           7  Digit Span (16)            8  Coding (/)               DELAYED MEMORY 97 42%ile average           9  List Recall (7/10)            10  List Recognition ()            11  Story Recall (10/12)            12  Figure Recall ()                Sum of Index Scores:  547        Total Score:  113         Percentile: 81%ile         Classification: High Average                IE indicates the scores from the initial evaluation (21)   Form: C     PLEASE COMPLETE ADEXI         LONG TERM GOALS: 6-8 weeks     Memory     EF skills  Pt will complete organizational skills with 90% accuracy in multimodal environment to return to working roles  ACHIEVED      Pt will increase Visual spatial skills to average on RBANs for working tasks  ACHIEVED         · Attention     ?  Pt will demo ability to participate in dual tasking/divided attention task with 90% accuracy in multimodal environment to simulate return to work rolesACHIEVED   ? Pt will demo ability to alternate attention between 2 tasks with cog loading and 90% accuracy with G retention of task directions in multimodal environment to simulate return to work  rolesACHIEVED   [address] [facility] 4 weeks        Memory     EF skills  Pt will complete organizational skills with 75% accuracy in multimodal environment to return to working roles  ACHIEVED      Pt will increase Visual spatial skills to average on RBANs for working tasks  ACHIEVED         · Attention     ?  Pt will demo ability to participate in dual tasking/divided attention task with 75% accuracy in multimodal environment to simulate return to work roles ACHIEVED   ? Pt will demo ability to alternate attention between 2 tasks with cog loading and 75% accuracy with G retention of task directions in multimodal environment to simulate return to work  roles ACHIEVED       TREATMENT  Performed EF skills and  skills task of organizing foodcourt     TIME SPENT  75 min for administration, documentation, interpretation, scoring adn POC development RBANS      Assessment: Tolerated treatment well  Patient would benefit from continued OT  Pt reports improvements in attention and memory  Pt reports impairments in sustained attention in longer periods of time and still difficult performing working tasks  Pt demonstrating improvements in immediate memory and  skills  Discussed discharge with pt via phone post interpretation with pt in understanding and agreement  Discussed translating Kiswahili vs chinese strategies breaking down task into slower dialogue  Recommending to follow up with OT if pt demonstrating further decline       Plan: Continue per plan of care        Precautions:   Past Medical History:   Diagnosis Date    Congenital dislocation of hip     Depression     LAST ASSESSED: 8/4/12    Disease of thyroid gland     Pectus excavatum     Scoliosis            Manuals                                                                 Neuro Re-Ed                                                                                                        Ther Ex                                                                                                                     Ther Activity                                       Gait Training                                       Modalities

## 2021-08-03 NOTE — PROGRESS NOTES
Daily Note  IE: 2021 (POC: 2021)    Today's date: 8/3/2021  Patient name: Eva Tiwari  : 1990  MRN: [id number]  Referring provider: Madai Hampton DO  Dx:   Encounter Diagnosis     ICD-10-CM    1  Physical deconditioning  R53 81    2  Chronic fatigue syndrome  R53 82    3  Suspected COVID-19 virus infection  Z20 822    4  POTS (postural orthostatic tachycardia syndrome)  I49 8                   Subjective: Patient reports that she is feeling good today and her legs feel ready for exercise  Objective: See treatment diary below    Vitals pre: HR 78 bpm, SPO2 99%  Vitals post:  bpm, SPO2 98%      POTS Cardio Guidelines: Recumbent Bike  - 10 minute Warm up (HR <125), RPE 2/10  - 3 minutes Base pace (-145), RPE 5/10  - 2 minute Recovery (HR <125), RPE 3/10  - 3 minutes Base Pace (-145), RPE 4/10  - 10 minute Cool Down (HR <125), RPE 2/10    TA:  - Standing hamstring stretch: 3 x 30 sec  - Standing quad stretch: 2 x 30 sec each       Assessment: Patient tolerated today's treatment session well  Continued to follow recumbent bike POTS Global training guidelines for cardio this session  Throughout session, she displayed safe and appropriate response to exercise  Patient further educated on RPE scale and its use in monitoring appropriate exercise intensity, as she is taking a Beta-blocker and RPE should be continuously assessed  Patient also educated on appropriate parameters of stretches performed today  She will benefit from continued skilled PT services to improve her endurance and QOL  Plan: Continue per plan of care        Precautions:   Past Medical History:   Diagnosis Date    Congenital dislocation of hip     Depression     LAST ASSESSED: 12    Disease of thyroid gland     Pectus excavatum     Scoliosis

## 2021-08-05 ENCOUNTER — APPOINTMENT (OUTPATIENT)
Dept: OCCUPATIONAL THERAPY | Facility: CLINIC | Age: 31
End: 2021-08-05
Payer: COMMERCIAL

## 2021-08-05 ENCOUNTER — APPOINTMENT (OUTPATIENT)
Dept: PHYSICAL THERAPY | Facility: CLINIC | Age: 31
End: 2021-08-05
Payer: COMMERCIAL

## 2021-08-10 ENCOUNTER — APPOINTMENT (OUTPATIENT)
Dept: PHYSICAL THERAPY | Facility: CLINIC | Age: 31
End: 2021-08-10
Payer: COMMERCIAL

## 2021-08-10 ENCOUNTER — APPOINTMENT (OUTPATIENT)
Dept: OCCUPATIONAL THERAPY | Facility: CLINIC | Age: 31
End: 2021-08-10
Payer: COMMERCIAL

## 2021-08-12 ENCOUNTER — APPOINTMENT (OUTPATIENT)
Dept: OCCUPATIONAL THERAPY | Facility: CLINIC | Age: 31
End: 2021-08-12
Payer: COMMERCIAL

## 2021-08-12 ENCOUNTER — APPOINTMENT (OUTPATIENT)
Dept: PHYSICAL THERAPY | Facility: CLINIC | Age: 31
End: 2021-08-12
Payer: COMMERCIAL

## 2021-08-17 ENCOUNTER — APPOINTMENT (OUTPATIENT)
Dept: PHYSICAL THERAPY | Facility: CLINIC | Age: 31
End: 2021-08-17
Payer: COMMERCIAL

## 2021-08-17 ENCOUNTER — APPOINTMENT (OUTPATIENT)
Dept: OCCUPATIONAL THERAPY | Facility: CLINIC | Age: 31
End: 2021-08-17
Payer: COMMERCIAL

## 2021-08-19 ENCOUNTER — OFFICE VISIT (OUTPATIENT)
Dept: PHYSICAL THERAPY | Facility: CLINIC | Age: 31
End: 2021-08-19
Payer: COMMERCIAL

## 2021-08-19 ENCOUNTER — APPOINTMENT (OUTPATIENT)
Dept: OCCUPATIONAL THERAPY | Facility: CLINIC | Age: 31
End: 2021-08-19
Payer: COMMERCIAL

## 2021-08-19 DIAGNOSIS — Z20.822 SUSPECTED COVID-19 VIRUS INFECTION: ICD-10-CM

## 2021-08-19 DIAGNOSIS — R53.81 PHYSICAL DECONDITIONING: Primary | ICD-10-CM

## 2021-08-19 DIAGNOSIS — R53.82 CHRONIC FATIGUE SYNDROME: ICD-10-CM

## 2021-08-19 PROCEDURE — 97110 THERAPEUTIC EXERCISES: CPT

## 2021-08-19 NOTE — PROGRESS NOTES
Daily Note  IE: 2021 (POC: 2021)  PN scheduled for 21 in [facility]  Last PN 21    Today's date: 2021  Patient name: Chanda Larry  : 1990  MRN: [id number]  Referring provider: Shahnaz Rico DO  Dx:   Encounter Diagnosis     ICD-10-CM    1  Physical deconditioning  R53 81    2  Chronic fatigue syndrome  R53 82    3  Suspected COVID-19 virus infection  Z20 822        Start Time:   Stop Time: 153  Total time in clinic (min): 40 minutes    Subjective: Patient reports that she is feeling good today and her legs feel ready for exercise  "I tire out quickly "         Objective: See treatment diary below    Vitals pre: HR 89 bpm, SPO2 95% BP hard to hear systolic in 59'T  Vitals post:  bpm, SPO2 97%      POTS Cardio Guidelines: Recumbent Bike 28 minutes  - 10 minute Warm up (HR <123), RPE 3/10  - 3 minutes Base pace (-138), RPE 5/10  - 2 minute Recovery (HR <125), RPE 3/10  - 3 minutes Base Pace (-139), RPE 4/10  - 10 minute Cool Down (HR <125), RPE 2/10    TA:  - Standing hamstring stretch at step 3 x 20 sec  -seated HS stretch 20s x 3 bilat   - NP Standing quad stretch: 2 x 30 sec each       Assessment: Patient tolerated today's treatment session well  Continued to follow recumbent bike POTS Global training guidelines for cardio this session  Throughout session, she displayed safe and appropriate response to exercise  Patient further educated on RPE scale and its use in monitoring appropriate exercise intensity, as she is taking a Beta-blocker and RPE should be continuously assessed  Patient also educated on appropriate parameters of stretches performed today  She will benefit from continued skilled PT services to improve her endurance and QOL  Plan: Continue per plan of care  reassess NV        Precautions:   Past Medical History:   Diagnosis Date    Congenital dislocation of hip     Depression     LAST ASSESSED: 12    Disease of thyroid gland     Pectus excavatum     Scoliosis

## 2021-08-24 ENCOUNTER — APPOINTMENT (OUTPATIENT)
Dept: OCCUPATIONAL THERAPY | Facility: CLINIC | Age: 31
End: 2021-08-24
Payer: COMMERCIAL

## 2021-08-24 ENCOUNTER — APPOINTMENT (OUTPATIENT)
Dept: PHYSICAL THERAPY | Facility: CLINIC | Age: 31
End: 2021-08-24
Payer: COMMERCIAL

## 2021-08-26 ENCOUNTER — APPOINTMENT (OUTPATIENT)
Dept: PHYSICAL THERAPY | Facility: CLINIC | Age: 31
End: 2021-08-26
Payer: COMMERCIAL

## 2021-08-26 ENCOUNTER — APPOINTMENT (OUTPATIENT)
Dept: OCCUPATIONAL THERAPY | Facility: CLINIC | Age: 31
End: 2021-08-26
Payer: COMMERCIAL

## 2021-08-31 ENCOUNTER — APPOINTMENT (OUTPATIENT)
Dept: OCCUPATIONAL THERAPY | Facility: CLINIC | Age: 31
End: 2021-08-31
Payer: COMMERCIAL

## 2021-08-31 ENCOUNTER — APPOINTMENT (OUTPATIENT)
Dept: PHYSICAL THERAPY | Facility: CLINIC | Age: 31
End: 2021-08-31
Payer: COMMERCIAL

## 2021-09-01 DIAGNOSIS — I10 ESSENTIAL HYPERTENSION: ICD-10-CM

## 2021-09-01 RX ORDER — NEBIVOLOL HYDROCHLORIDE 5 MG/1
TABLET ORAL
Qty: 60 TABLET | Refills: 5 | Status: SHIPPED | OUTPATIENT
Start: 2021-09-01 | End: 2021-11-17 | Stop reason: SDUPTHER

## 2021-09-02 ENCOUNTER — EVALUATION (OUTPATIENT)
Dept: PHYSICAL THERAPY | Facility: CLINIC | Age: 31
End: 2021-09-02
Payer: COMMERCIAL

## 2021-09-02 DIAGNOSIS — Z20.822 SUSPECTED COVID-19 VIRUS INFECTION: ICD-10-CM

## 2021-09-02 DIAGNOSIS — R53.81 PHYSICAL DECONDITIONING: Primary | ICD-10-CM

## 2021-09-02 DIAGNOSIS — R53.82 CHRONIC FATIGUE SYNDROME: ICD-10-CM

## 2021-09-02 PROCEDURE — 97112 NEUROMUSCULAR REEDUCATION: CPT

## 2021-09-02 NOTE — PROGRESS NOTES
PT Re-Evaluation     Today's date: 2021  Patient name: Eva Tiwari  : 1990  MRN: [id number]  Referring provider: Madai Hampton DO  Dx:   Encounter Diagnosis     ICD-10-CM    1  Physical deconditioning  R53 81    2  Chronic fatigue syndrome  R53 82    3  Suspected COVID-19 virus infection  Z20 822      Start Time: 1627  Stop Time: 8813  Total time in clinic (min): 40 minutes    Assessment  Assessment details: Updated 21: Miguel Medina has been attending PT for the past month with focus on recumbent bike progression with HR monitoring  She has missed the past two weeks due to her reports of exacerbation of POTS symptoms  She demonstrates improved (by 1 point) FGA score today to 28/30 with deficits in walking with EC and walking backwards  6MWT is decreased slightly by 34 feet vs last month  Her gait speed is 1 34  m/sec (reduced from 1 9 m/sec last month)  Her gait speed remains safe enough to cross the street  At this time the patient requests to vary her program more, to build more functional strength, and to work on her hip strength  She would like to see improvement in gait with her EC, as she reports bumping into walls (at home, with eyes open) and she would like to reduce the occurrence  She admits to sitting and lying, even though she knows that for POTS she would benefit from more frequent standing  She has been resistant to adhere to a 5 day weekly POTS protocol, stating she could not exercise 5 days weekly when she was at her healthiest   Advise continued PT with addition of TM, to alternate with cycling  Plan to progress/modify pt's POTS exercises to standing to encourage further standing and functional endurance, and to encourage standing when she is exercising at home  Pt is in agreement with this plan  21 Patient is a 32 y/o female who has been presenting to PT after COVID diagnosis   She reports her positive COVID test "at the end of December" and has been having problems with fatigue, SOB, headaches, and dizziness  Upon reassessment, patient demonstrated improvements in all outcome measures performed including 5xSTS, 6 minute walk, 10 meter walk, FGA, modified Balk C Protocol illustrating improvements in LE strength and power, functional cardiovascular endurance, gait speed, dynamic balance, respectively  At this time, she has met 1/3 of her short term goals and is progressing well to the remaining goals  Per subjective reporting, she feels that she has made improvements since starting skilled PT  She will continue to benefit from skilled PT services to further improve her endurance, reduce her symptoms, and help her return to PLOF  Impairments: abnormal gait, abnormal movement, activity intolerance, lacks appropriate home exercise program and safety issue    Goals  STG (4 weeks)  1  Patient will be independent with HEP - NOT MET  2  Patient will report 50% reduction in subjective SOB during ADLs for increased independence at home - progressing  3  Patient will increase her gait speed to over 1 2 m/s to demonstrate community ambulation speed - MET    LTG (12 weeks)  1  Patient will be independent with comprehensive HEP   2  Patient will report 80% reduction in subjective SOB during ADLs for increased independence at home  3  Patient will increase 6 MWT from 1350 ft to 1700 ft to show improvement with her functional endurance  4  Patient will return to walking outside without LOB or SOB to facilitate return to PLOF  5  Patient will be able to bake again with minimal difficulty     Plan  Plan details: Will review exercise guidelines for POTs with focus on exercise miriam  UPDATED PROTOCOL 8/31/21 TO 11/23/21     Planned therapy interventions: abdominal trunk stabilization, ADL retraining, ADL training, balance, balance/weight bearing training, coordination, flexibility, functional ROM exercises, gait training, graded activity, graded exercise, home exercise program, manual therapy, neuromuscular re-education, patient education, postural training, strengthening, stretching, therapeutic activities, therapeutic exercise and therapeutic training  Frequency: 2x week  Duration in weeks: 12  Plan of Care beginning date: 6/8/2021  Plan of Care expiration date: 8/31/2021        Subjective Evaluation    History of Present Illness  Mechanism of injury: Patient reports having COVID in late December  Patient reports that currently her biggest symptoms are fatigue, SOB, headaches, and balance  She states that she is independent with ADLs but requires rest breaks  Update: 7-  Patient reports that she noticed some improvements since starting PT  She states that walking and going upstairs does not feel as exhausting  She reports that she would like to start working towards being able to bake again with less difficulty  Updated 9/2/21:  Feels the POTS protocol is hard for her  She feels even at her healthiest she probably could not do what she has been doing in PT  She has not  been to PT in 2 weeks due to exacerbated POTS symptoms (she does not attribute this to PT )     Wants to do more than biking in PT  She has not returned to work yet, even part time  Before COVID she worked 30 hours a week at the most     Pain  No pain reported    Social Support  Steps to enter house: no  Stairs in house: yes   Lives in: multiple-level home  Lives with: parents    Employment status: not working  Hand dominance: right    Treatments  Previous treatment: physical therapy  Patient Goals  Patient goals for therapy: return to work, independence with ADLs/IADLs and return to sport/leisure activities  Patient goal: improved endurnace 9/2/221 Pt feels slight progress  She could dust around the house for 30 minutes before feeling tired  Objective     Yellow flag question:   Were you in the ICU? No  Were you on a ventilator?  No               (yes continue with  PTSD, PHQ -9 screenings)     Do you feel that your voice has changed? Are you having difficulty with swallowing? No    Difficulty with participation in ADL and IADL - No    Breathing Difficulty? - with exertion  Has pulmonary function test been completed? No     Patient Specific Functional Scale score: Bowel/Bladder changes: (referral for pelvic therapy)    1  Have you had any new onset of urinary or bowel leakage, EVEN JUST A LITTLE BIT, since onset of Covid?- No  2  Have you had any new difficulty with starting a urine stream or a bowel movement since onset of Covid?- No       Objective:      Vitals:   - HR: 68 bpm  - SP02: 98%    MMT: /    Vestibulocular:   Oculomotor ROM : WNL  Resting nystagmus: No  Gaze holding nystagmus No   Smooth pursuit Normal    Vertical Saccades:Normal  Horizontal Saccades:Normal  Convergence: Normal      Functional Outcome Measures:    6 minute walk test: 1350 ft, subjective SOB, , SpO2 99%  Gait Speed : 1 16 m/s  Balance :  5x Sit to stand:  8 91 sec  FGA  score:      Update: 2021  6 minute walk test: 1420 ft, subjective SOB,  bpm, SPO2 99%  Gait Speed : 1 90 m/s  Balance :  5x Sit to stand:  6 37 sec  FGA  score:       Updated: 21:   5XSTS: 6 28 sec  FGA:   6FT'  mildly winded during the test but "not bad " 120 bpm, 98%  10MWT/gait speed = 7 44 sec = 1 34 m/sec   Pt wants to focus more on her left hip strength  She also reports pain in the shoulder blade area  Pt is tender in her upper traps bilaterally  Tender t/o thoracic paraspinals bilaterally  No subocciptial soreness, no mid-trap soreness  Pain is intermittent  Instead of trying to spend as much time as she can standing, "It feels better to sit or lie down " She spends more time sitting or lying down than standing     Forward fold: full ROM seated, no pain  Gentle twist: full ROM seated, no pain   Standing horizontal chest pulls blue TB x 10 reps 2 sets     Additional Screening Based on Yellow flag or if determined appropriate:     SLUMS:  21/30- not retested 9/2/21    Impact of events scale revised:    PHQ-9:       Lorrie VILLEDA Protocol: on Recumbent bike from Initial Eval  Recommended Intensity Dyspnea Scale: 3-5 (moderate to heavy)     Rate of perceived exertion:3-4/10 (moderate to heavy)      Performed on Recumbent Bike: 60-70 rpm  Minutes Incline/LVL RPE Dyspnea Heart Rate PO2 BP   1 min 0% 0/10 0/10 94 99%    2 min 1% 1/10 0/10 128 96%    3 min  2% 1/10 1/10 133 98%    4 min  3% 3/10 3/10 139 96%    5 min  4% 3/10 4/10 144 97%    6 min 5% 6/10 5/10 146 98%    7 min 6% 7/10 6/10 159 97%           Precautions:   Past Medical History:   Diagnosis Date    Congenital dislocation of hip     Depression     LAST ASSESSED: 8/4/12    Disease of thyroid gland     Pectus excavatum     Scoliosis            Manuals                                                                 Neuro Re-Ed                                                                                                        Ther Ex                                                                                                                     Ther Activity                                       Gait Training                                       Modalities

## 2021-09-08 DIAGNOSIS — E03.9 PRIMARY HYPOTHYROIDISM: ICD-10-CM

## 2021-09-08 RX ORDER — LEVOTHYROXINE SODIUM 0.1 MG/1
100 TABLET ORAL DAILY
Qty: 90 TABLET | Refills: 1 | Status: SHIPPED | OUTPATIENT
Start: 2021-09-08 | End: 2021-11-04 | Stop reason: SDUPTHER

## 2021-09-09 ENCOUNTER — OFFICE VISIT (OUTPATIENT)
Dept: PHYSICAL THERAPY | Facility: CLINIC | Age: 31
End: 2021-09-09
Payer: COMMERCIAL

## 2021-09-09 DIAGNOSIS — R53.82 CHRONIC FATIGUE SYNDROME: ICD-10-CM

## 2021-09-09 DIAGNOSIS — Z20.822 SUSPECTED COVID-19 VIRUS INFECTION: ICD-10-CM

## 2021-09-09 DIAGNOSIS — R53.81 PHYSICAL DECONDITIONING: Primary | ICD-10-CM

## 2021-09-09 PROCEDURE — 97110 THERAPEUTIC EXERCISES: CPT

## 2021-09-09 PROCEDURE — 97112 NEUROMUSCULAR REEDUCATION: CPT

## 2021-09-09 NOTE — PROGRESS NOTES
PT Daily Note and PT Discharge Note   IE: 2021 (POC: to 21)  Last PN 21      Today's date: 2021  Patient name: Maribell Geller  : 1990  MRN: [id number]  Referring provider: Anthony Colvin DO  Dx:   Encounter Diagnosis     ICD-10-CM    1  Physical deconditioning  R53 81    2  Chronic fatigue syndrome  R53 82    3  Suspected COVID-19 virus infection  Z20 822        Start Time: 1445  Stop Time: 8618  Total time in clinic (min): 48 minutes    Assessment:  Pt states today  she may seek guidance from a PT she saw previously for POTS, for progression  Ranjith redding states she is not at a level where she can exercise five days weekly, as advised by the POTS protocol  What she has been doing in PT lately causes her to be tired the next day, unable to perform chores around the house  She does not feel she can participate in exercise 5 days weekly  Discussed the possibility of exercising at a lower intensity until she can tolerate 5 times weekly, but  pt cancelled remaining appointments on the way out today  At her last reassessment on 21, [place] requested increasing the  focus of PT to functional strength  Today she guided through some of the exercises known to be part of the POTS protocol, and some more challenging exercises (stationary lunges )  She tolerated these exercises well with significant increase in HR only during lunges, without lightheadedness  Pt has not progressed cardiovascular activity past week 1 of month one of the POTS protocol, because she has not tolerated 5 days/week exercise  DC from PT at this time at patient's request         See progress report/ last visit 21 for goal completion status  Plan: DC from PT at pt's request               Subjective: Pt reports with nausea  Objective: See treatment diary below  PT start date       Vitals pre: HR 98 bpm, SPO2 99% BP hard to hear systolic in 85'W  Vitals post:  bpm, SPO2 97%    NMR:  -Squats x 10, no increase in nausea  -gait with HT- no veer, only asymmetrical gait due to hip weakness  -hip ABD isometric/contralateral into wall    -L side plank 30 second  -R side plank 20 second   -stationary lunges L LE lead 1 UE supprt 10 reps  R LE lead 8 reps    Pt rested HR 4104 bpm  - pilates hold crunch 10 seconds 5 times    bpm       POTS Cardio Guidelines: Recumbent Bike 28 minutes   - 10 minute Warm up (HR <116), RPE 2/10  - 3 minutes Base pace (), RPE 5/10  - 2 minute Recovery (HR <115), RPE 3/10  - 3 minutes Base Pace (), RPE 4/10  - 10 minute Cool Down (HR <125), RPE 2/10    TA: none today 9/9/21  - Standing hamstring stretch at step 3 x 20 sec  -seated HS stretch 20s x 3 bilat   - NP Standing quad stretch: 2 x 30 sec each            Precautions:   Past Medical History:   Diagnosis Date    Congenital dislocation of hip     Depression     LAST ASSESSED: 8/4/12    Disease of thyroid gland     Pectus excavatum     Scoliosis

## 2021-09-13 ENCOUNTER — TELEPHONE (OUTPATIENT)
Dept: FAMILY MEDICINE CLINIC | Facility: OTHER | Age: 31
End: 2021-09-13

## 2021-09-13 DIAGNOSIS — U09.9 POST-ACUTE SEQUELAE OF COVID-19 (PASC): ICD-10-CM

## 2021-09-13 DIAGNOSIS — R53.82 CHRONIC FATIGUE SYNDROME: ICD-10-CM

## 2021-09-13 DIAGNOSIS — I49.8 POTS (POSTURAL ORTHOSTATIC TACHYCARDIA SYNDROME): Primary | ICD-10-CM

## 2021-09-13 NOTE — TELEPHONE ENCOUNTER
Patient would like to get an order for PT but for [address]   [phone]   Jos Moe (who she would like to have on script)   Long covid symptoms/POTS    Please advise   Thank you

## 2021-09-14 ENCOUNTER — APPOINTMENT (OUTPATIENT)
Dept: PHYSICAL THERAPY | Facility: CLINIC | Age: 31
End: 2021-09-14
Payer: COMMERCIAL

## 2021-09-15 ENCOUNTER — OFFICE VISIT (OUTPATIENT)
Dept: CARDIOLOGY CLINIC | Facility: MEDICAL CENTER | Age: 31
End: 2021-09-15
Payer: COMMERCIAL

## 2021-09-15 VITALS
HEART RATE: 64 BPM | HEIGHT: 66 IN | WEIGHT: 150.2 LBS | DIASTOLIC BLOOD PRESSURE: 60 MMHG | BODY MASS INDEX: 24.14 KG/M2 | OXYGEN SATURATION: 99 % | SYSTOLIC BLOOD PRESSURE: 96 MMHG

## 2021-09-15 DIAGNOSIS — I49.8 POTS (POSTURAL ORTHOSTATIC TACHYCARDIA SYNDROME): Primary | ICD-10-CM

## 2021-09-15 DIAGNOSIS — Q79.62 EHLERS-DANLOS SYNDROME, BENIGN HYPERMOBILE FORM: ICD-10-CM

## 2021-09-15 DIAGNOSIS — I49.8 POTS (POSTURAL ORTHOSTATIC TACHYCARDIA SYNDROME): ICD-10-CM

## 2021-09-15 PROCEDURE — 99214 OFFICE O/P EST MOD 30 MIN: CPT | Performed by: INTERNAL MEDICINE

## 2021-09-15 RX ORDER — MIDODRINE HYDROCHLORIDE 10 MG/1
10 TABLET ORAL 3 TIMES DAILY
Qty: 270 TABLET | Refills: 3 | Status: SHIPPED | OUTPATIENT
Start: 2021-09-15 | End: 2021-09-15

## 2021-09-15 RX ORDER — MIDODRINE HYDROCHLORIDE 10 MG/1
TABLET ORAL
Qty: 270 TABLET | Refills: 3 | Status: SHIPPED | OUTPATIENT
Start: 2021-09-15

## 2021-09-15 NOTE — PROGRESS NOTES
Cardiology   Yeimi Llanos 36 y o  female MRN: [id number]        Impression:  1  POTS - worse symptoms since COVID  May benefit from increasing midodrine  2  Nia-Danlos Type III  3  Dyslipidemia - borderline  Recheck lipids in 5 years      Recommendations:  1  Increase midodrine to 10mg 3x/day  2  Continue remainder of medications  3  Continue exercise program - would switch back to prior therapist   4  Follow up in 6 months  HPI: Yeimi Llanos is a 36y o  year old female with Nia-Danlos Type III and POTS presents for follow up  Developed COVID-19 in 12/20, and has had worsening in fatigue, dyspnea, and concentration issues since  Has issues with fluctuating heart rate  Echo 7/21 - structurally normal heart  Does have more orthostatic symptoms  Review of Systems   Constitutional: Negative  HENT: Negative  Eyes: Negative  Respiratory: Negative for chest tightness and shortness of breath  Cardiovascular: Positive for palpitations  Negative for chest pain and leg swelling  Gastrointestinal: Negative  Endocrine: Negative  Genitourinary: Negative  Musculoskeletal: Negative  Skin: Negative  Allergic/Immunologic: Negative  Neurological: Positive for dizziness  Hematological: Negative  Psychiatric/Behavioral: Negative  All other systems reviewed and are negative          Past Medical History:   Diagnosis Date    Congenital dislocation of hip     Depression     LAST ASSESSED: 8/4/12    Disease of thyroid gland     Pectus excavatum     Scoliosis      Past Surgical History:   Procedure Laterality Date    DEVEN ACETABULAR OSTEOTOMY Left     ACETABULAR OSTEOTOMY     Social History     Substance and Sexual Activity   Alcohol Use Yes    Comment: SOCIAL     Social History     Substance and Sexual Activity   Drug Use No     Social History     Tobacco Use   Smoking Status Never Smoker   Smokeless Tobacco Never Used     Family History   Problem Relation Age of Onset   [facility] Asthma Mother     Hyperlipidemia Mother     Hypertension Mother     Diabetes type II Mother     Hyperlipidemia Father     Hypertension Father     Melanoma Family         AMELANOTIC MELANOMA OF THE SKIN    Hyperlipidemia Family     Hypertension Family     Prostate cancer Family         MALIGNANT PROSTATIC NEOPLASM G1    Varicose Veins Family         OF LOWER EXTREMITIES       Allergies:   Allergies   Allergen Reactions    Bupropion      Other reaction(s): Flushed    Epinephrine Dizziness     Causes dysautonomia    Fluoxetine      Other reaction(s): Flushed       Medications:     Current Outpatient Medications:     Bystolic 5 MG tablet, TAKE 1 TABLET BY MOUTH TWICE A DAY, Disp: 60 tablet, Rfl: 5    clindamycin (CLEOCIN T) 1 %, APPLY TO ACNE TWICE DAILY, Disp: , Rfl: 5    desmopressin (DDAVP) 0 2 mg tablet, Take 1 tablet (0 2 mg total) by mouth 2 (two) times a day, Disp: 60 tablet, Rfl: 5    fludrocortisone (FLORINEF) 0 1 mg tablet, TAKE 1 TABLET BY MOUTH TWICE A DAY, Disp: 60 tablet, Rfl: 5    levothyroxine 100 mcg tablet, Take 1 tablet (100 mcg total) by mouth daily, Disp: 90 tablet, Rfl: 1    Low-Ogestrel 0 3-30 MG-MCG per tablet, TAKE 1 TABLET BY MOUTH EVERY DAY, Disp: 84 tablet, Rfl: 0    midodrine (PROAMATINE) 5 mg tablet, Take 1 tablet (5 mg total) by mouth 3 (three) times a day, Disp: 270 tablet, Rfl: 3    naproxen (NAPROSYN) 500 mg tablet, Take 1 tablet (500 mg total) by mouth daily as needed for moderate pain, Disp: 30 tablet, Rfl: 1    potassium chloride (MICRO-K) 10 MEQ CR capsule, Take 1 capsule (10 mEq total) by mouth daily, Disp: 90 capsule, Rfl: 1    traMADol-acetaminophen (ULTRACET) 37 5-325 mg per tablet, Take 1 tablet by mouth every 6 (six) hours as needed for moderate pain, Disp: 30 tablet, Rfl: 0    venlafaxine (EFFEXOR-XR) 150 mg 24 hr capsule, Take 1 capsule (150 mg total) by mouth daily, Disp: 30 capsule, Rfl: 2    venlafaxine (EFFEXOR-XR) 75 mg 24 hr capsule, Take 1 capsule (75 mg total) by mouth daily, Disp: 30 capsule, Rfl: 2    Vitamin D, Cholecalciferol, 25 MCG (1000 UT) CAPS, Take 3,000 Units by mouth , Disp: , Rfl:     meclizine (ANTIVERT) 25 mg tablet, Take 1 tablet (25 mg total) by mouth every 8 (eight) hours as needed for dizziness or nausea (Patient not taking: Reported on 9/15/2021), Disp: 30 tablet, Rfl: 0    nebivolol (BYSTOLIC) 10 mg tablet, Take 0 5 tablets (5 mg total) by mouth 2 (two) times a day (Patient not taking: Reported on 9/15/2021), Disp: 90 tablet, Rfl: 3      Wt Readings from Last 3 Encounters:   09/15/21 68 1 kg (150 lb 3 2 oz)   05/03/21 69 7 kg (153 lb 9 6 oz)   02/17/21 72 1 kg (159 lb)     Temp Readings from Last 3 Encounters:   05/03/21 97 6 °F (36 4 °C)   02/03/21 97 9 °F (36 6 °C) (Tympanic)   10/26/20 97 7 °F (36 5 °C) (Tympanic)     BP Readings from Last 3 Encounters:   09/15/21 96/60   06/08/21 122/84   05/03/21 108/78     Pulse Readings from Last 3 Encounters:   09/15/21 64   05/03/21 99   02/17/21 82         Physical Exam  HENT:      Head: Atraumatic  Mouth/Throat:      Mouth: Mucous membranes are moist    Eyes:      Extraocular Movements: Extraocular movements intact  Cardiovascular:      Rate and Rhythm: Normal rate and regular rhythm  Heart sounds: Normal heart sounds  Pulmonary:      Effort: Pulmonary effort is normal       Breath sounds: Normal breath sounds  Abdominal:      General: Abdomen is flat  Musculoskeletal:         General: Normal range of motion  Cervical back: Normal range of motion  Skin:     General: Skin is warm  Neurological:      General: No focal deficit present  Mental Status: She is alert and oriented to person, place, and time     Psychiatric:         Mood and Affect: Mood normal          Behavior: Behavior normal            Laboratory Studies:  CMP:  Lab Results   Component Value Date     01/20/2016    K 3 6 05/13/2021     05/13/2021    CO2 24 2021    BUN 9 2021    CREATININE 0 76 2021    AST 18 2021    ALT 16 2021    BILITOT 0 4 2016       Lipid Profile:   No results found for: CHOL  Lab Results   Component Value Date    HDL 36 (L) 2019     Lab Results   Component Value Date    LDLCALC 145 (H) 2019     Lab Results   Component Value Date    TRIG 76 2019       Cardiac testing:   EKG reviewed personally:   Results for orders placed during the hospital encounter of 21    Echo complete with contrast if indicated    Narrative  [facility], [address] [facility]  [phone]    Transthoracic Echocardiogram  2D, M-mode, Doppler, and Color Doppler    Study date:  2021    [facility]  MR number: [id number]  Account number: [de-identified]  : 1990  Age: 31 years  Gender: Female  Status: Outpatient  Location: [facility] [place]  Height: 66 in  Weight: 152 7 lb  BP: 122/ 84 mmHg    Indications: Assess left ventricular function  Diagnoses: R53 82 - Chronic fatigue, unspecified    Sonographer:  LEONILA Richardson  Referring Physician:  Teresa Cole DO  Group:  [facility]  Interpreting Physician:  Delmy Ibrahim MD    SUMMARY    PROCEDURE INFORMATION:  This was a technically difficult study  No subcostal views noted  LEFT VENTRICLE:  Systolic function was normal  Ejection fraction was estimated to be 55 %  There were no regional wall motion abnormalities  RIGHT VENTRICLE:  Systolic function was normal     MITRAL VALVE:  There was trace regurgitation  AORTIC VALVE:  There was no evidence for stenosis  TRICUSPID VALVE:  There was trace regurgitation  PERICARDIUM:  There was no pericardial effusion  HISTORY: PRIOR HISTORY: Chronic fatigue s/p COVID-19, POTS, Ehler's Danlos, Pectus excavatum    PROCEDURE: The study was performed in the [facility]  This was a routine study  The transthoracic approach was used  The study included complete 2D imaging, M-mode, complete spectral Doppler, and color Doppler  The  heart rate was 69 bpm, at the start of the study  Images were obtained from the parasternal, apical, subcostal, and suprasternal notch acoustic windows  This was a technically difficult study  No subcostal views noted  LEFT VENTRICLE: Size was normal  Systolic function was normal  Ejection fraction was estimated to be 55 %  There were no regional wall motion abnormalities  Wall thickness was normal  DOPPLER: Left ventricular diastolic function parameters  were normal     RIGHT VENTRICLE: The size was normal  Systolic function was normal     LEFT ATRIUM: Size was normal     RIGHT ATRIUM: Size was normal     MITRAL VALVE: Valve structure was normal  There was normal leaflet separation  DOPPLER: The transmitral velocity was within the normal range  There was no evidence for stenosis  There was trace regurgitation  AORTIC VALVE: The valve was trileaflet  Leaflets exhibited normal thickness and normal cuspal separation  DOPPLER: Transaortic velocity was within the normal range  There was no evidence for stenosis  There was no significant  regurgitation  TRICUSPID VALVE: The valve structure was normal  There was normal leaflet separation  DOPPLER: The transtricuspid velocity was within the normal range  There was no evidence for stenosis  There was trace regurgitation  PULMONIC VALVE: Leaflets exhibited normal thickness, no calcification, and normal cuspal separation  DOPPLER: The transpulmonic velocity was within the normal range  There was no significant regurgitation  PERICARDIUM: There was no pericardial effusion  AORTA: The root exhibited normal size  SYSTEMIC VEINS: IVC: The inferior vena cava was normal in size      SYSTEM MEASUREMENT TABLES    2D  %FS: 28 05 %  Ao Diam: 2 53 cm  EDV(Teich): 58 13 ml  EF(Teich): 55 12 %  ESV(Teich): 26 09 ml  IVSd: 0 87 cm  LA Area: 8 93 cm2  LA Diam: 2 68 cm  LVEDV MOD A4C: 44 94 ml  LVEF MOD A4C: 53 93 %  LVESV MOD A4C: 20 7 ml  LVIDd: 3 7 cm  LVIDs: 2 66 cm  LVLd A4C: 6 62 cm  LVLs A4C: 5 6 cm  LVPWd: 0 86 cm  RA Area: 4 31 cm2  RVIDd: 2 2 cm  SV MOD A4C: 24 24 ml  SV(Teich): 32 04 ml    CW  TR MaxPG: 15 36 mmHg  TR Vmax: 1 96 m/s    MM  TAPSE: 1 66 cm    PW  E' Sept: 0 12 m/s  E/E' Sept: 4 76  MV A Juan Jose: 0 44 m/s  MV Dec Garden: 2 46 m/s2  MV DecT: 234 86 ms  MV E Juan Jose: 0 58 m/s  MV E/A Ratio: 1 31  MV PHT: 68 11 ms  MVA By PHT: 3 23 cm2    [facility] Accredited [facility]ch[facility]    Prepared and electronically signed by    Maury Callahan MD  Signed 21-Jul-2021 16:55:18    No results found for this or any previous visit  No results found for this or any previous visit  No results found for this or any previous visit

## 2021-09-21 ENCOUNTER — APPOINTMENT (OUTPATIENT)
Dept: PHYSICAL THERAPY | Facility: CLINIC | Age: 31
End: 2021-09-21
Payer: COMMERCIAL

## 2021-09-22 ENCOUNTER — TELEPHONE (OUTPATIENT)
Dept: PSYCHIATRY | Facility: CLINIC | Age: 31
End: 2021-09-22

## 2021-09-22 DIAGNOSIS — F33.2 SEVERE EPISODE OF RECURRENT MAJOR DEPRESSIVE DISORDER, WITHOUT PSYCHOTIC FEATURES (HCC): ICD-10-CM

## 2021-09-22 RX ORDER — VENLAFAXINE HYDROCHLORIDE 37.5 MG/1
37.5 CAPSULE, EXTENDED RELEASE ORAL DAILY
Qty: 30 CAPSULE | Refills: 1 | Status: SHIPPED | OUTPATIENT
Start: 2021-09-22 | End: 2021-10-29

## 2021-09-22 NOTE — TELEPHONE ENCOUNTER
She should be taking medication in the morning and with food  We can also decrease the dose back to 187 5  Would recommend a considering individual therapy if she is not currently seeing a therapist   Can revisit other options depending on risks and benefits  at next visit  If depression is worsening recommend she schedule sooner appointment

## 2021-09-22 NOTE — TELEPHONE ENCOUNTER
Call from [place] stating that since her Effexor was increased she has been having increased insomnia  States that she has trouble sleeping about 4-5 days a week  Also states that although it does not bother her, she has had a decrease in her appetite as well  She would like Travis's recommendation  Will refer to Aquiles Ortega for review

## 2021-09-22 NOTE — TELEPHONE ENCOUNTER
LM on Courtney's VM that Travis submitted another script and she could decrease her Effexor back down to 187 5 if she wants  Also instructed her to take her medication in the morning with food and to consider finding a therapist if she doesn't have one already  Instructed her to call the office with any concerns or to schedule sooner appointment if depression worsens  Will refer to Luther Barreto for his information

## 2021-09-28 ENCOUNTER — APPOINTMENT (OUTPATIENT)
Dept: PHYSICAL THERAPY | Facility: CLINIC | Age: 31
End: 2021-09-28
Payer: COMMERCIAL

## 2021-10-22 DIAGNOSIS — E87.6 LOW BLOOD POTASSIUM: ICD-10-CM

## 2021-10-22 RX ORDER — POTASSIUM CHLORIDE 750 MG/1
10 CAPSULE, EXTENDED RELEASE ORAL DAILY
Qty: 90 CAPSULE | Refills: 1 | Status: SHIPPED | OUTPATIENT
Start: 2021-10-22 | End: 2022-04-18 | Stop reason: SDUPTHER

## 2021-10-29 ENCOUNTER — OFFICE VISIT (OUTPATIENT)
Dept: PSYCHIATRY | Facility: CLINIC | Age: 31
End: 2021-10-29
Payer: COMMERCIAL

## 2021-10-29 DIAGNOSIS — F33.2 SEVERE EPISODE OF RECURRENT MAJOR DEPRESSIVE DISORDER, WITHOUT PSYCHOTIC FEATURES (HCC): ICD-10-CM

## 2021-10-29 PROCEDURE — 99214 OFFICE O/P EST MOD 30 MIN: CPT | Performed by: NURSE PRACTITIONER

## 2021-10-29 RX ORDER — VENLAFAXINE HYDROCHLORIDE 37.5 MG/1
37.5 CAPSULE, EXTENDED RELEASE ORAL DAILY
Qty: 30 CAPSULE | Refills: 5 | Status: SHIPPED | OUTPATIENT
Start: 2021-10-29 | End: 2022-05-11

## 2021-10-29 RX ORDER — VENLAFAXINE HYDROCHLORIDE 150 MG/1
150 CAPSULE, EXTENDED RELEASE ORAL DAILY
Qty: 30 CAPSULE | Refills: 5 | Status: SHIPPED | OUTPATIENT
Start: 2021-10-29 | End: 2022-07-06

## 2021-11-01 ENCOUNTER — ANNUAL EXAM (OUTPATIENT)
Dept: OBGYN CLINIC | Facility: MEDICAL CENTER | Age: 31
End: 2021-11-01
Payer: COMMERCIAL

## 2021-11-01 VITALS
SYSTOLIC BLOOD PRESSURE: 120 MMHG | HEIGHT: 67 IN | DIASTOLIC BLOOD PRESSURE: 72 MMHG | BODY MASS INDEX: 23.67 KG/M2 | WEIGHT: 150.8 LBS

## 2021-11-01 DIAGNOSIS — Z01.419 ENCNTR FOR GYN EXAM (GENERAL) (ROUTINE) W/O ABN FINDINGS: Primary | ICD-10-CM

## 2021-11-01 PROBLEM — J01.11 ACUTE RECURRENT FRONTAL SINUSITIS: Status: RESOLVED | Noted: 2018-02-14 | Resolved: 2021-11-01

## 2021-11-01 PROCEDURE — 0503F POSTPARTUM CARE VISIT: CPT | Performed by: PHYSICIAN ASSISTANT

## 2021-11-01 PROCEDURE — 99395 PREV VISIT EST AGE 18-39: CPT | Performed by: PHYSICIAN ASSISTANT

## 2021-11-01 RX ORDER — NORGESTREL AND ETHINYL ESTRADIOL 0.3-0.03MG
1 KIT ORAL DAILY
Qty: 84 TABLET | Refills: 4 | Status: SHIPPED | OUTPATIENT
Start: 2021-11-01

## 2021-11-04 ENCOUNTER — OFFICE VISIT (OUTPATIENT)
Dept: FAMILY MEDICINE CLINIC | Facility: OTHER | Age: 31
End: 2021-11-04
Payer: COMMERCIAL

## 2021-11-04 VITALS
RESPIRATION RATE: 18 BRPM | HEART RATE: 90 BPM | OXYGEN SATURATION: 98 % | DIASTOLIC BLOOD PRESSURE: 74 MMHG | BODY MASS INDEX: 23.86 KG/M2 | SYSTOLIC BLOOD PRESSURE: 112 MMHG | HEIGHT: 67 IN | WEIGHT: 152 LBS | TEMPERATURE: 97.8 F

## 2021-11-04 DIAGNOSIS — Z86.39 HISTORY OF HYPERLIPIDEMIA: ICD-10-CM

## 2021-11-04 DIAGNOSIS — E03.9 PRIMARY HYPOTHYROIDISM: ICD-10-CM

## 2021-11-04 DIAGNOSIS — Z23 ENCOUNTER FOR IMMUNIZATION: ICD-10-CM

## 2021-11-04 DIAGNOSIS — Z11.59 ENCOUNTER FOR HEPATITIS C SCREENING TEST FOR LOW RISK PATIENT: ICD-10-CM

## 2021-11-04 DIAGNOSIS — Z00.00 ANNUAL PHYSICAL EXAM: Primary | ICD-10-CM

## 2021-11-04 PROCEDURE — 90686 IIV4 VACC NO PRSV 0.5 ML IM: CPT

## 2021-11-04 PROCEDURE — 99395 PREV VISIT EST AGE 18-39: CPT | Performed by: FAMILY MEDICINE

## 2021-11-04 PROCEDURE — 90471 IMMUNIZATION ADMIN: CPT

## 2021-11-04 RX ORDER — LEVOTHYROXINE SODIUM 0.1 MG/1
100 TABLET ORAL DAILY
Qty: 90 TABLET | Refills: 1 | Status: SHIPPED | OUTPATIENT
Start: 2021-11-04

## 2021-11-17 ENCOUNTER — TELEPHONE (OUTPATIENT)
Dept: CARDIOLOGY CLINIC | Facility: CLINIC | Age: 31
End: 2021-11-17

## 2021-11-17 DIAGNOSIS — I10 ESSENTIAL HYPERTENSION: ICD-10-CM

## 2021-11-17 RX ORDER — NEBIVOLOL 5 MG/1
5 TABLET ORAL 2 TIMES DAILY
Qty: 60 TABLET | Refills: 5 | Status: SHIPPED | OUTPATIENT
Start: 2021-11-17 | End: 2022-02-08

## 2021-11-20 LAB
CHOLEST SERPL-MCNC: 204 MG/DL
CHOLEST/HDLC SERPL: 4.1 (CALC)
HCV AB S/CO SERPL IA: 0.03
HCV AB SERPL QL IA: NORMAL
HDLC SERPL-MCNC: 50 MG/DL
LDLC SERPL CALC-MCNC: 138 MG/DL (CALC)
NONHDLC SERPL-MCNC: 154 MG/DL (CALC)
T4 FREE SERPL-MCNC: 1.5 NG/DL (ref 0.8–1.8)
TRIGL SERPL-MCNC: 69 MG/DL
TSH SERPL-ACNC: 0.05 MIU/L

## 2021-12-13 ENCOUNTER — CONSULT (OUTPATIENT)
Dept: PAIN MEDICINE | Facility: MEDICAL CENTER | Age: 31
End: 2021-12-13
Payer: COMMERCIAL

## 2021-12-13 VITALS
WEIGHT: 148 LBS | HEART RATE: 80 BPM | HEIGHT: 67 IN | BODY MASS INDEX: 23.23 KG/M2 | DIASTOLIC BLOOD PRESSURE: 66 MMHG | TEMPERATURE: 98.1 F | SYSTOLIC BLOOD PRESSURE: 112 MMHG

## 2021-12-13 DIAGNOSIS — Q79.62 EHLERS-DANLOS SYNDROME, BENIGN HYPERMOBILE FORM: Primary | ICD-10-CM

## 2021-12-13 DIAGNOSIS — M25.50 POLYARTHRALGIA: ICD-10-CM

## 2021-12-13 PROCEDURE — 99243 OFF/OP CNSLTJ NEW/EST LOW 30: CPT | Performed by: PHYSICAL MEDICINE & REHABILITATION

## 2021-12-27 ENCOUNTER — TELEPHONE (OUTPATIENT)
Dept: FAMILY MEDICINE CLINIC | Facility: OTHER | Age: 31
End: 2021-12-27

## 2021-12-27 DIAGNOSIS — Z20.822 ENCOUNTER FOR SCREENING LABORATORY TESTING FOR COVID-19 VIRUS: Primary | ICD-10-CM

## 2021-12-27 PROCEDURE — U0005 INFEC AGEN DETEC AMPLI PROBE: HCPCS | Performed by: FAMILY MEDICINE

## 2021-12-27 PROCEDURE — U0003 INFECTIOUS AGENT DETECTION BY NUCLEIC ACID (DNA OR RNA); SEVERE ACUTE RESPIRATORY SYNDROME CORONAVIRUS 2 (SARS-COV-2) (CORONAVIRUS DISEASE [COVID-19]), AMPLIFIED PROBE TECHNIQUE, MAKING USE OF HIGH THROUGHPUT TECHNOLOGIES AS DESCRIBED BY CMS-2020-01-R: HCPCS | Performed by: FAMILY MEDICINE

## 2022-01-12 DIAGNOSIS — I49.8 POTS (POSTURAL ORTHOSTATIC TACHYCARDIA SYNDROME): ICD-10-CM

## 2022-01-12 RX ORDER — DESMOPRESSIN ACETATE 0.2 MG/1
0.2 TABLET ORAL 2 TIMES DAILY
Qty: 60 TABLET | Refills: 5 | Status: SHIPPED | OUTPATIENT
Start: 2022-01-12 | End: 2022-03-16 | Stop reason: SDUPTHER

## 2022-01-12 NOTE — TELEPHONE ENCOUNTER
Requested medication(s) are due for refill today: Yes  Patient has already received a courtesy refill: No  Other reason request has been forwarded to provider: Failed protocol     Vandana Romeo MA

## 2022-02-08 DIAGNOSIS — I10 ESSENTIAL HYPERTENSION: ICD-10-CM

## 2022-02-08 RX ORDER — NEBIVOLOL 5 MG/1
TABLET ORAL
Qty: 60 TABLET | Refills: 5 | Status: SHIPPED | OUTPATIENT
Start: 2022-02-08

## 2022-03-08 ENCOUNTER — OFFICE VISIT (OUTPATIENT)
Dept: PSYCHIATRY | Facility: CLINIC | Age: 32
End: 2022-03-08
Payer: COMMERCIAL

## 2022-03-08 DIAGNOSIS — F33.41 RECURRENT MAJOR DEPRESSIVE DISORDER, IN PARTIAL REMISSION (HCC): Primary | ICD-10-CM

## 2022-03-08 PROCEDURE — 99214 OFFICE O/P EST MOD 30 MIN: CPT | Performed by: NURSE PRACTITIONER

## 2022-03-08 RX ORDER — BUPROPION HYDROCHLORIDE 150 MG/1
150 TABLET ORAL DAILY
Qty: 30 TABLET | Refills: 2 | Status: SHIPPED | OUTPATIENT
Start: 2022-03-08 | End: 2022-07-26

## 2022-03-08 RX ORDER — BUPROPION HYDROCHLORIDE 150 MG/1
150 TABLET ORAL DAILY
Qty: 30 TABLET | Refills: 2 | Status: SHIPPED | OUTPATIENT
Start: 2022-03-08 | End: 2022-03-08

## 2022-03-08 NOTE — BH TREATMENT PLAN
TREATMENT PLAN (Medication Management Only)        [facility]    Name and Date of Birth:  Sushma Uriostegui 36 y o  1990  Date of Treatment Plan: March 8, 2022  Diagnosis/Diagnoses:    1  Recurrent major depressive disorder, in partial remission [facility])      Strengths/Personal Resources for Self-Care: motivation for treatment, taking medications as prescribed, ability to communicate well, ability to listen, ability to reason  Area/Areas of need (in own words): depressive symptoms  1  Long Term Goal: improve depression  Target Date: 6 months - 9/8/2022  Person/Persons responsible for completion of goal: Sushma Uriostegui  2  Short Term Objective (s) - How will we reach this goal?:   A  Provider new recommended medication/dosage changes and/or continue medication(s): continue current medications as prescribed  B   Attend medication management appointments regularly  C   Attend psychotherapy regularly  Target Date: 6 months - 9/8/2022  Person/Persons Responsible for Completion of Goal: Courtney Parker  Progress Towards Goals: continuing treatment  Treatment Modality: medication management with psychotherapy every 6 weeks  Review due 90 to 120 days from date of this plan: 6 months - 9/8/2022  Expected length of service: maintenance unless revised  My Physician/PA/NP and I have developed this plan together and I agree to work on the goals and objectives  I understand the treatment goals that were developed for my treatment    Treatment Plan Verbal Consent - Due to COVID

## 2022-03-08 NOTE — PSYCH
MEDICATION MANAGEMENT NOTE        [address]    Name and Date of Birth:  Zaid Perrin 36 y o  1990 MRN: [id number]    Date of Visit: March 8, 2022    Allergies   Allergen Reactions    Bupropion      Other reaction(s): Flushed    Epinephrine Dizziness     Causes dysautonomia    Fluoxetine      Other reaction(s): Flushed     SUBJECTIVE:    Vaishnavi Martinez is seen today for a follow up for Major Depressive Disorder  She reports that she has done fairly well since the last visit  Patient reports she continues to experience occasional symptoms of depression  States that every 2-3 weeks she experiences 2-3 days of depressed mood, feelings of hopelessness, feeling like giving up and chronically struggles with some low motivation, low energy  Patient has been able to maintain functioning caring for herself and for her mother who recently underwent surgery  Patient does plan to rejoin the workforce once mother has recovered  Would like to improved control of her current symptoms but did not tolerate higher dose of Effexor  Discuss central risks and benefits of Wellbutrin with patient including tachycardia or changes in blood pressure  No contraindications with patient's current medication regimine  No history of seizures  Will initiate Wellbutrin  mg p o  Daily  Patient experiences changes in blood pressure, tachycardia, dizziness or changes in vision patient should discontinue Wellbutrin  Call with any other questions or concerns  She denies any side effects from medications  PLAN:  Initiate Wellbutrin  mg p o  Daily  Continue Effexor  5 mg p o   Daily  Follow-up in 6-8 weeks  Aware of 24 hour and weekend coverage for urgent situations accessed by calling [facility] main practice number    Diagnoses and all orders for this visit:    Rec[facility] major depressive disorder[facility]  - Discontinue: buPROPion (Wellbutrin XL) 150 mg 24 hr tablet; Take 1 tablet (150 mg total) by mouth daily  -     buPROPion (Wellbutrin XL) 150 mg 24 hr tablet; Take 1 tablet (150 mg total) by mouth daily        Current Outpatient Medications on File Prior to Visit   Medication Sig Dispense Refill    clindamycin (CLEOCIN T) 1 % APPLY TO ACNE TWICE DAILY  5    desmopressin (DDAVP) 0 2 mg tablet Take 1 tablet (0 2 mg total) by mouth 2 (two) times a day 60 tablet 5    fludrocortisone (FLORINEF) 0 1 mg tablet TAKE 1 TABLET BY MOUTH TWICE A DAY 60 tablet 5    levothyroxine 100 mcg tablet Take 1 tablet (100 mcg total) by mouth daily 90 tablet 1    midodrine (PROAMATINE) 10 MG tablet TAKE 1 TABLET BY MOUTH THREE TIMES A  tablet 3    naproxen (NAPROSYN) 500 mg tablet Take 1 tablet (500 mg total) by mouth daily as needed for moderate pain 30 tablet 1    nebivolol (BYSTOLIC) 5 mg tablet TAKE 1 TABLET BY MOUTH TWICE A DAY 60 tablet 5    norgestrel-ethinyl estradiol (Low-Ogestrel) 0 3 mg-30 mcg per tablet Take 1 tablet by mouth daily 84 tablet 4    potassium chloride (MICRO-K) 10 MEQ CR capsule Take 1 capsule (10 mEq total) by mouth daily 90 capsule 1    traMADol-acetaminophen (ULTRACET) 37 5-325 mg per tablet Take 1 tablet by mouth every 6 (six) hours as needed for moderate pain 30 tablet 0    venlafaxine (EFFEXOR-XR) 150 mg 24 hr capsule Take 1 capsule (150 mg total) by mouth daily 30 capsule 5    venlafaxine (EFFEXOR-XR) 37 5 mg 24 hr capsule Take 1 capsule (37 5 mg total) by mouth daily 30 capsule 5    Vitamin D, Cholecalciferol, 25 MCG (1000 UT) CAPS Take 3,000 Units by mouth        No current facility-administered medications on file prior to visit  Psychotherapy Provided:     Individual psychotherapy provided: Yes  Counseling was provided during the session today for 16 minutes  Supportive counseling provided       HPI ROS Appetite Changes and Sleep:     She reports normal sleep, normal appetite, low energy   Denies homicidal ideation, denies suicidal ideation    Review Of Systems:      General emotional problems and decreased functioning   Personality no change in personality   Constitutional negative   ENT negative   Cardiovascular negative   Respiratory negative   Gastrointestinal negative   Genitourinary negative   Musculoskeletal negative   Integumentary negative   Neurological negative   Endocrine negative   Other Symptoms none, all other systems are negative     Mental Status Evaluation:    Appearance Adequate hygiene and grooming   Behavior cooperative, calm and Superficial   Mood depressed  Depression Scale -  of 10 (0 = No depression)  Anxiety Scale -  of 10 (0 = No anxiety)   Speech Normal rate and volume   Affect mood-congruent and constricted   Thought Processes Goal directed and coherent   Thought Content Does not verbalize delusional material   Associations Tightly connected   Perceptual Disturbances Denies hallucinations and does not appear to be responding to internal stimuli   Risk Potential Suicidal/Homicidal Ideation - No evidence of suicidal or homicidal ideation and patient does not verbalize suicidal or homicidal ideation  Risk of Violence - No evidence of risk for violence found on assessment  Risk of Self Mutilation - No evidence of risk for self mutilation found on assessment   Orientation oriented to person, place, time/date and situation   Memory recent and remote memory grossly intact   Consciousness alert and awake   Attention/Concentration attention span and concentration are age appropriate   Insight intact   Judgement intact   Muscle Strength and Gait normal muscle strength and normal muscle tone, normal gait/station and normal balance   Motor Activity no abnormal movements   Language no difficulty naming common objects, no difficulty repeating a phrase, no difficulty writing a sentence   Fund of Knowledge adequate knowledge of current events  adequate fund of knowledge regarding past history  adequate fund of knowledge regarding vocabulary      Past Psychiatric History Update:     Inpatient Psychiatric Admission Since Last Encounter:   no  Changes to Outpatient Psychiatric Treatment Team:    no  Suicide Attempt Or Self Mutilation Since Last Encounter:   no  Incidence of Violent Behavior Since Last Encounter:   no    Traumatic History Update:     New Onset of Abuse Since Last Encounter:   no  Traumatic Events Since Last Encounter:   no    Past Medical History:    Past Medical History:   Diagnosis Date    Congenital dislocation of hip     Depression     LAST ASSESSED: 8/4/12    Disease of thyroid gland     Pectus excavatum     Scoliosis      No past medical history pertinent negatives    Past Surgical History:   Procedure Laterality Date    DEVEN ACETABULAR OSTEOTOMY Left     ACETABULAR OSTEOTOMY     Allergies   Allergen Reactions    Bupropion      Other reaction(s): Flushed    Epinephrine Dizziness     Causes dysautonomia    Fluoxetine      Other reaction(s): Flushed     Substance Abuse History:    Social History     Substance and Sexual Activity   Alcohol Use Yes    Comment: SOCIAL     Social History     Substance and Sexual Activity   Drug Use No     Social History:    Social History     Socioeconomic History    Marital status: Single     Spouse name: Not on file    Number of children: Not on file    Years of education: Not on file    Highest education level: Not on file   Occupational History    Occupation: UNEMPLOYED   Tobacco Use    Smoking status: Never Smoker    Smokeless tobacco: Never Used   Vaping Use    Vaping Use: Never used   Substance and Sexual Activity    Alcohol use: Yes     Comment: SOCIAL    Drug use: No    Sexual activity: Never     Birth control/protection: OCP   Other Topics Concern    Not on file   Social History Narrative    CURRENTLY IN SCHOOL: WILL BE STARTING  [address] 1/2014    LIVING WITH PARENTS     Social Determinants of Health     Financial Resource Strain: Not on file   Food Insecurity: Not on file   Transportation Needs: Not on file   Physical Activity: Not on file   Stress: Not on file   Social Connections: Not on file   Intimate Partner Violence: Not on file   Housing Stability: Not on file     Family Psychiatric History:     Family History   Problem Relation Age of Onset    Asthma Mother     Hyperlipidemia Mother     Hypertension Mother     Diabetes type II Mother     Diabetes Mother     Hyperlipidemia Father     Hypertension Father     Melanoma Family         AMELANOTIC MELANOMA OF THE SKIN    Hyperlipidemia Family     Hypertension Family     Prostate cancer Family         MALIGNANT PROSTATIC NEOPLASM G1    Varicose Veins Family         OF LOWER EXTREMITIES     History Review: The following portions of the patient's history were reviewed and updated as appropriate: allergies, current medications, past family history, past medical history, past social history, past surgical history and problem list     OBJECTIVE:     Vital signs in last 24 hours: There were no vitals filed for this visit  Laboratory Results: I have personally reviewed all pertinent laboratory/tests results  Suicide/Homicide Risk Assessment:    Risk of Harm to Self:   The following ratings are based on assessment at the time of the interview   Recent Specific Risk Factors include: current depressive symptoms    Risk of Harm to Others:   The following ratings are based on assessment at the time of the interview   Recent Specific Risk Factors include: none  The following interventions are recommended: no intervention changes needed    Medications Risks/Benefits:      Risks, Benefits And Possible Side Effects Of Medications:    Discussed risks and benefits of treatment with patient including risk of suicidality, serotonin syndrome, increased QTc interval and SIADH related to treatment with antidepressants;  Risk of induction of manic symptoms in certain patient populations and Reduction in seizure threshold related to treatment with bupropion      Controlled Medication Discussion:     Not applicable    Treatment Plan:    Due for update/Updated:   yes    LEYDI Yo 03/08/22    This note was shared with patient

## 2022-03-15 NOTE — TELEPHONE ENCOUNTER
nebivolol 5 mg prior authorization approved from 3/14/2022 through 3/14/2023  [address] [place] [address]  ID [id number]

## 2022-03-16 DIAGNOSIS — I49.8 POTS (POSTURAL ORTHOSTATIC TACHYCARDIA SYNDROME): ICD-10-CM

## 2022-03-16 RX ORDER — DESMOPRESSIN ACETATE 0.2 MG/1
0.2 TABLET ORAL 2 TIMES DAILY
Qty: 60 TABLET | Refills: 5 | Status: SHIPPED | OUTPATIENT
Start: 2022-03-16

## 2022-04-18 DIAGNOSIS — E87.6 LOW BLOOD POTASSIUM: ICD-10-CM

## 2022-04-18 RX ORDER — POTASSIUM CHLORIDE 750 MG/1
10 CAPSULE, EXTENDED RELEASE ORAL DAILY
Qty: 90 CAPSULE | Refills: 1 | Status: SHIPPED | OUTPATIENT
Start: 2022-04-18

## 2022-04-27 ENCOUNTER — TELEPHONE (OUTPATIENT)
Dept: PSYCHIATRY | Facility: CLINIC | Age: 32
End: 2022-04-27

## 2022-04-27 NOTE — TELEPHONE ENCOUNTER
Nuha Ny left a message requesting to reschedule her appointment for Monday (5/2) with Sylvia Hart  Writer requested a call back

## 2022-05-11 DIAGNOSIS — F33.2 SEVERE EPISODE OF RECURRENT MAJOR DEPRESSIVE DISORDER, WITHOUT PSYCHOTIC FEATURES (HCC): ICD-10-CM

## 2022-05-11 RX ORDER — VENLAFAXINE HYDROCHLORIDE 37.5 MG/1
CAPSULE, EXTENDED RELEASE ORAL
Qty: 90 CAPSULE | Refills: 1 | Status: SHIPPED | OUTPATIENT
Start: 2022-05-11

## 2022-05-23 ENCOUNTER — TELEPHONE (OUTPATIENT)
Dept: CARDIOLOGY CLINIC | Facility: CLINIC | Age: 32
End: 2022-05-23

## 2022-05-23 NOTE — TELEPHONE ENCOUNTER
Angel Klein called the nurse line, c/o feeling her heart racing with normal activity  She notices her HR goes up to 130-140  Ok at rest      She is overdue for a 6 m follow up  I scheduled her to see you in the office next week on 6/2  She has no other symptoms, not lightheaded, dizzy or sob  She said her cuff at home does not  her BP  Takes Bystolic 5 mg BID    Please advise if any changes or recommendations prior to ov

## 2022-06-02 ENCOUNTER — OFFICE VISIT (OUTPATIENT)
Dept: CARDIOLOGY CLINIC | Facility: CLINIC | Age: 32
End: 2022-06-02
Payer: COMMERCIAL

## 2022-06-02 VITALS
DIASTOLIC BLOOD PRESSURE: 72 MMHG | SYSTOLIC BLOOD PRESSURE: 102 MMHG | HEART RATE: 86 BPM | WEIGHT: 149 LBS | HEIGHT: 67 IN | BODY MASS INDEX: 23.39 KG/M2 | OXYGEN SATURATION: 97 %

## 2022-06-02 DIAGNOSIS — I49.8 POTS (POSTURAL ORTHOSTATIC TACHYCARDIA SYNDROME): Primary | ICD-10-CM

## 2022-06-02 PROCEDURE — 99214 OFFICE O/P EST MOD 30 MIN: CPT | Performed by: INTERNAL MEDICINE

## 2022-06-02 PROCEDURE — 93000 ELECTROCARDIOGRAM COMPLETE: CPT | Performed by: INTERNAL MEDICINE

## 2022-06-02 RX ORDER — IVABRADINE 5 MG/1
5 TABLET, FILM COATED ORAL 2 TIMES DAILY
Qty: 60 TABLET | Refills: 5 | Status: SHIPPED | OUTPATIENT
Start: 2022-06-02

## 2022-06-02 NOTE — PROGRESS NOTES
Cardiology   Kendra Alexander 36 y o  female MRN: [id number]          Impression:  1  POTS - worse symptoms since COVID  May benefit from increasing midodrine  2  Nia-Danlos Type III  3  Dyslipidemia - borderline  Recheck lipids in 5 years  4  Sinus tachycardia - may benefit from rate controlling agent without lowering BP      Recommendations:  1  Start Corlanor 5mg 2x/day  2  Continue remainder of medications  3  Check CBC, CMP, and TSH  4  Follow up in 3 months        HPI: Kendra Alexander is a 36y o  year old female with Nia-Danlos Type III and POTS presents for follow up  Developed COVID-19 in 12/20, and has had worsening in fatigue, dyspnea, and concentration issues since   Has issues with fluctuating heart rate  Echo 7/21 - structurally normal heart  Recently has been having tachycardia with standing and activity  Review of Systems   Constitutional: Negative  HENT: Negative  Eyes: Negative  Respiratory: Negative for chest tightness and shortness of breath  Cardiovascular: Positive for palpitations  Negative for chest pain and leg swelling  Gastrointestinal: Negative  Endocrine: Negative  Genitourinary: Negative  Musculoskeletal: Negative  Skin: Negative  Allergic/Immunologic: Negative  Neurological: Negative  Hematological: Negative  Psychiatric/Behavioral: Negative  All other systems reviewed and are negative          Past Medical History:   Diagnosis Date    Congenital dislocation of hip     Depression     LAST ASSESSED: 8/4/12    Disease of thyroid gland     Pectus excavatum     Scoliosis      Past Surgical History:   Procedure Laterality Date    DEVEN ACETABULAR OSTEOTOMY Left     ACETABULAR OSTEOTOMY     Social History     Substance and Sexual Activity   Alcohol Use Yes    Comment: SOCIAL     Social History     Substance and Sexual Activity   Drug Use No     Social History     Tobacco Use   Smoking Status Never Smoker   Smokeless Tobacco Never Used     Family History   Problem Relation Age of Onset   Benji Flower Asthma Mother     Hyperlipidemia Mother     Hypertension Mother     Diabetes type II Mother     Diabetes Mother     Hyperlipidemia Father     Hypertension Father     Melanoma Family         AMELANOTIC MELANOMA OF THE SKIN    Hyperlipidemia Family     Hypertension Family     Prostate cancer Family         MALIGNANT PROSTATIC NEOPLASM G1    Varicose Veins Family         OF LOWER EXTREMITIES       Allergies:   Allergies   Allergen Reactions    Bupropion      Other reaction(s): Flushed    Epinephrine Dizziness     Causes dysautonomia    Fluoxetine      Other reaction(s): Flushed       Medications:     Current Outpatient Medications:     buPROPion (Wellbutrin XL) 150 mg 24 hr tablet, Take 1 tablet (150 mg total) by mouth daily, Disp: 30 tablet, Rfl: 2    clindamycin (CLEOCIN T) 1 %, APPLY TO ACNE TWICE DAILY, Disp: , Rfl: 5    desmopressin (DDAVP) 0 2 mg tablet, Take 1 tablet (0 2 mg total) by mouth 2 (two) times a day, Disp: 60 tablet, Rfl: 5    fludrocortisone (FLORINEF) 0 1 mg tablet, TAKE 1 TABLET BY MOUTH TWICE A DAY, Disp: 60 tablet, Rfl: 5    levothyroxine 100 mcg tablet, Take 1 tablet (100 mcg total) by mouth daily, Disp: 90 tablet, Rfl: 1    midodrine (PROAMATINE) 10 MG tablet, TAKE 1 TABLET BY MOUTH THREE TIMES A DAY, Disp: 270 tablet, Rfl: 3    naproxen (NAPROSYN) 500 mg tablet, Take 1 tablet (500 mg total) by mouth daily as needed for moderate pain, Disp: 30 tablet, Rfl: 1    nebivolol (BYSTOLIC) 5 mg tablet, TAKE 1 TABLET BY MOUTH TWICE A DAY, Disp: 60 tablet, Rfl: 5    norgestrel-ethinyl estradiol (Low-Ogestrel) 0 3 mg-30 mcg per tablet, Take 1 tablet by mouth daily, Disp: 84 tablet, Rfl: 4    potassium chloride (MICRO-K) 10 MEQ CR capsule, Take 1 capsule (10 mEq total) by mouth daily, Disp: 90 capsule, Rfl: 1    traMADol-acetaminophen (ULTRACET) 37 5-325 mg per tablet, Take 1 tablet by mouth every 6 (six) hours as needed for moderate pain, Disp: 30 tablet, Rfl: 0    venlafaxine (EFFEXOR-XR) 150 mg 24 hr capsule, Take 1 capsule (150 mg total) by mouth daily, Disp: 30 capsule, Rfl: 5    venlafaxine (EFFEXOR-XR) 37 5 mg 24 hr capsule, TAKE 1 CAPSULE BY MOUTH EVERY DAY, Disp: 90 capsule, Rfl: 1    Vitamin D, Cholecalciferol, 25 MCG (1000 UT) CAPS, Take 3,000 Units by mouth , Disp: , Rfl:       Wt Readings from Last 3 Encounters:   06/02/22 67 6 kg (149 lb)   12/13/21 67 1 kg (148 lb)   11/04/21 68 9 kg (152 lb)     Temp Readings from Last 3 Encounters:   12/13/21 98 1 °F (36 7 °C)   11/04/21 97 8 °F (36 6 °C)   05/03/21 97 6 °F (36 4 °C)     BP Readings from Last 3 Encounters:   06/02/22 102/72   12/13/21 112/66   11/04/21 112/74     Pulse Readings from Last 3 Encounters:   06/02/22 86   12/13/21 80   11/04/21 90         Physical Exam  HENT:      Head: Atraumatic  Mouth/Throat:      Mouth: Mucous membranes are moist    Eyes:      Extraocular Movements: Extraocular movements intact  Cardiovascular:      Rate and Rhythm: Normal rate and regular rhythm  Heart sounds: Normal heart sounds  Pulmonary:      Effort: Pulmonary effort is normal       Breath sounds: Normal breath sounds  Abdominal:      General: Abdomen is flat  Musculoskeletal:         General: Normal range of motion  Cervical back: Normal range of motion  Skin:     General: Skin is warm  Neurological:      General: No focal deficit present  Mental Status: She is alert and oriented to person, place, and time     Psychiatric:         Mood and Affect: Mood normal          Behavior: Behavior normal            Laboratory Studies:  CMP:  Lab Results   Component Value Date     01/20/2016    K 3 6 05/13/2021     05/13/2021    CO2 24 05/13/2021    BUN 9 05/13/2021    CREATININE 0 76 05/13/2021    AST 18 05/13/2021    ALT 16 05/13/2021    BILITOT 0 4 01/20/2016       Lipid Profile:   No results found for: CHOL  Lab Results Component Value Date    HDL 50 2021     Lab Results   Component Value Date    LDLCALC 138 (H) 2021     Lab Results   Component Value Date    TRIG 69 2021       Cardiac testing:   EKG reviewed personally: NSR Vidya NSSTTWA  Results for orders placed during the hospital encounter of 21    Echo complete with contrast if indicated    Narrative  Kevin [address], [facility]  [phone]    Transthoracic Echocardiogram  2D, M-mode, Doppler, and Color Doppler    Study date:  2021    [facility]  MR number: [id number]  Account number: [de-identified]  : 1990  Age: 31 years  Gender: Female  Status: Outpatient  Location: [address] [place] [address]  Height: 66 in  Weight: 152 7 lb  BP: 122/ 84 mmHg    Indications: Assess left ventricular function  Diagnoses: R53 82 - Chronic fatigue, unspecified    Sonographer:  LEONILA Meadows  Referring Physician:  Everett Paredes DO  Group:  [facility]  Interpreting Physician:  Karime Yung MD    SUMMARY    PROCEDURE INFORMATION:  This was a technically difficult study  No subcostal views noted  LEFT VENTRICLE:  Systolic function was normal  Ejection fraction was estimated to be 55 %  There were no regional wall motion abnormalities  RIGHT VENTRICLE:  Systolic function was normal     MITRAL VALVE:  There was trace regurgitation  AORTIC VALVE:  There was no evidence for stenosis  TRICUSPID VALVE:  There was trace regurgitation  PERICARDIUM:  There was no pericardial effusion  HISTORY: PRIOR HISTORY: Chronic fatigue s/p COVID-19, POTS, Ehler's Danlos, Pectus excavatum    PROCEDURE: The study was performed in the [address] [place] [address]  This was a routine study  The transthoracic approach was used  The study included complete 2D imaging, M-mode, complete spectral Doppler, and color Doppler   The  heart rate was 69 bpm, at the start of the study  Images were obtained from the parasternal, apical, subcostal, and suprasternal notch acoustic windows  This was a technically difficult study  No subcostal views noted  LEFT VENTRICLE: Size was normal  Systolic function was normal  Ejection fraction was estimated to be 55 %  There were no regional wall motion abnormalities  Wall thickness was normal  DOPPLER: Left ventricular diastolic function parameters  were normal     RIGHT VENTRICLE: The size was normal  Systolic function was normal     LEFT ATRIUM: Size was normal     RIGHT ATRIUM: Size was normal     MITRAL VALVE: Valve structure was normal  There was normal leaflet separation  DOPPLER: The transmitral velocity was within the normal range  There was no evidence for stenosis  There was trace regurgitation  AORTIC VALVE: The valve was trileaflet  Leaflets exhibited normal thickness and normal cuspal separation  DOPPLER: Transaortic velocity was within the normal range  There was no evidence for stenosis  There was no significant  regurgitation  TRICUSPID VALVE: The valve structure was normal  There was normal leaflet separation  DOPPLER: The transtricuspid velocity was within the normal range  There was no evidence for stenosis  There was trace regurgitation  PULMONIC VALVE: Leaflets exhibited normal thickness, no calcification, and normal cuspal separation  DOPPLER: The transpulmonic velocity was within the normal range  There was no significant regurgitation  PERICARDIUM: There was no pericardial effusion  AORTA: The root exhibited normal size  SYSTEMIC VEINS: IVC: The inferior vena cava was normal in size      SYSTEM MEASUREMENT TABLES    2D  %FS: 28 05 %  Ao Diam: 2 53 cm  EDV(Teich): 58 13 ml  EF(Teich): 55 12 %  ESV(Teich): 26 09 ml  IVSd: 0 87 cm  LA Area: 8 93 cm2  LA Diam: 2 68 cm  LVEDV MOD A4C: 44 94 ml  LVEF MOD A4C: 53 93 %  LVESV MOD A4C: 20 7 ml  LVIDd: 3 7 cm  LVIDs: 2 66 cm  LVLd A4C: 6 62 cm  LVLs A4C: 5 6 cm  LVPWd: 0 86 cm  RA Area: 4 31 cm2  RVIDd: 2 2 cm  SV MOD A4C: 24 24 ml  SV(Teich): 32 04 ml    CW  TR MaxPG: 15 36 mmHg  TR Vmax: 1 96 m/s    MM  TAPSE: 1 66 cm    PW  E' Sept: 0 12 m/s  E/E' Sept: 4 76  MV A Juan Jose: 0 44 m/s  MV Dec [place]: 2 46 m/s2  MV DecT: 234 86 ms  MV E Juan Jose: 0 58 m/s  MV E/A Ratio: 1 31  MV PHT: 68 11 ms  MVA By PHT: 3 23 cm2    [address] Accredited Echocardiography [facility]    Prepared and electronically signed by    Rakel Darnell MD  Signed 21-Jul-2021 16:55:18    No results found for this or any previous visit  No results found for this or any previous visit  No results found for this or any previous visit

## 2022-06-02 NOTE — PATIENT INSTRUCTIONS
Recommendations:  1  Start Corlanor 5mg 2x/day  2  Continue remainder of medications  3  Check CBC, CMP, and TSH  4  Follow up in 3 months

## 2022-07-05 DIAGNOSIS — G90.A POTS (POSTURAL ORTHOSTATIC TACHYCARDIA SYNDROME): ICD-10-CM

## 2022-07-05 DIAGNOSIS — Q79.62 EHLERS-DANLOS SYNDROME, BENIGN HYPERMOBILE FORM: Primary | ICD-10-CM

## 2022-07-05 NOTE — TELEPHONE ENCOUNTER
The patient called requesting a new referral for PT  She stated that she has previously gone to [facility] and when she called a [facility] PT they told her she needed another referral from her PCP  Please advise    Thank you!

## 2022-07-06 DIAGNOSIS — F33.2 SEVERE EPISODE OF RECURRENT MAJOR DEPRESSIVE DISORDER, WITHOUT PSYCHOTIC FEATURES (HCC): ICD-10-CM

## 2022-07-06 RX ORDER — VENLAFAXINE HYDROCHLORIDE 150 MG/1
CAPSULE, EXTENDED RELEASE ORAL
Qty: 90 CAPSULE | Refills: 1 | Status: SHIPPED | OUTPATIENT
Start: 2022-07-06

## 2022-07-06 RX ORDER — FLUDROCORTISONE ACETATE 0.1 MG/1
0.1 TABLET ORAL 2 TIMES DAILY
Qty: 60 TABLET | Refills: 5 | Status: SHIPPED | OUTPATIENT
Start: 2022-07-06

## 2022-07-22 LAB — HCV AB SER-ACNC: NEGATIVE

## 2022-07-26 ENCOUNTER — TELEMEDICINE (OUTPATIENT)
Dept: PSYCHIATRY | Facility: CLINIC | Age: 32
End: 2022-07-26
Payer: COMMERCIAL

## 2022-07-26 DIAGNOSIS — F33.41 RECURRENT MAJOR DEPRESSIVE DISORDER, IN PARTIAL REMISSION (HCC): Primary | ICD-10-CM

## 2022-07-26 PROCEDURE — 99214 OFFICE O/P EST MOD 30 MIN: CPT | Performed by: NURSE PRACTITIONER

## 2022-07-26 RX ORDER — BUPROPION HYDROCHLORIDE 300 MG/1
300 TABLET ORAL DAILY
Qty: 30 TABLET | Refills: 2 | Status: SHIPPED | OUTPATIENT
Start: 2022-07-26 | End: 2022-09-29

## 2022-07-26 NOTE — BH TREATMENT PLAN
TREATMENT PLAN (Medication Management Only)        [facility]    Name and Date of Birth:  Des Harman 36 y o  1990  Date of Treatment Plan: July 26, 2022  Diagnosis/Diagnoses:    1  Recurrent major depressive disorder, in partial remission [facility])      Strengths/Personal Resources for Self-Care: motivation for treatment, taking medications as prescribed, ability to communicate well, ability to listen, ability to reason  Area/Areas of need (in own words): depressive symptoms  1  Long Term Goal: improve depression  Target Date: 6 months - 1/26/2023  Person/Persons responsible for completion of goal: Des Harman  2  Short Term Objective (s) - How will we reach this goal?:   A  Provider new recommended medication/dosage changes and/or continue medication(s): continue current medications as prescribed  B   Attend medication management appointments regularly  C   Attend psychotherapy regularly  Target Date:  6 months - 1/26/2023  Person/Persons Responsible for Completion of Goal: Courtney Parker  Progress Towards Goals: continuing treatment  Treatment Modality: medication management with psychotherapy every 3 months  Review due 90 to 120 days from date of this plan: 6 months - 1/26/2023  Expected length of service: maintenance unless revised  My Physician/PA/NP and I have developed this plan together and I agree to work on the goals and objectives  I understand the treatment goals that were developed for my treatment    Treatment Plan Verbal Consent - Virtual Visit

## 2022-07-26 NOTE — PSYCH
Virtual Regular Visit    Problem List Items Addressed This Visit        Other    Recurrent major depressive disorder, in partial remission ([facility] [address] ) - Primary    Relevant Medications    buPROPion (Wellbutrin XL) 300 mg 24 hr tablet             Encounter provider LEYDI Starkey    Provider located at   [address]  [facility] [address] [phone]    Patient is located in the following state in which I hold an active license [place]    Recent Visits  No visits were found meeting these conditions  Showing recent visits within past 7 days and meeting all other requirements  Today's Visits  Date Type Provider Dept   07/26/22 Telemedicine LEYDI [facility]   Showing today's visits and meeting all other requirements  Future Appointments  No visits were found meeting these conditions  Showing future appointments within next 150 days and meeting all other requirements     The patient was identified by name and date of birth  Hayder Bernardorebekah was informed that this is a telemedicine visit and that the visit is being conducted through [address] and patient was informed that this is a secure, HIPAA-compliant platform  Hayder Brownlee agrees to proceed  My office door was closed  No one else was in the room  She acknowledged consent and understanding of privacy and security of the video platform  The patient has agreed to participate and understands they can discontinue the visit at any time  Patient is aware this is a billable service       HPI     Current Outpatient Medications   Medication Sig Dispense Refill    buPROPion (Wellbutrin XL) 300 mg 24 hr tablet Take 1 tablet (300 mg total) by mouth daily 30 tablet 2    clindamycin (CLEOCIN T) 1 % APPLY TO ACNE TWICE DAILY  5    desmopressin (DDAVP) 0 2 mg tablet Take 1 tablet (0 2 mg total) by mouth 2 (two) times a day 60 tablet 5    fludrocortisone (FLORINEF) 0 1 mg tablet Take 1 tablet (0 1 mg total) by mouth 2 (two) times a day 60 tablet 5    ivabradine HCl (Corlanor) 5 MG tablet Take 1 tablet (5 mg total) by mouth 2 (two) times a day 60 tablet 5    levothyroxine 100 mcg tablet Take 1 tablet (100 mcg total) by mouth daily 90 tablet 1    midodrine (PROAMATINE) 10 MG tablet TAKE 1 TABLET BY MOUTH THREE TIMES A  tablet 3    naproxen (NAPROSYN) 500 mg tablet Take 1 tablet (500 mg total) by mouth daily as needed for moderate pain 30 tablet 1    nebivolol (BYSTOLIC) 5 mg tablet TAKE 1 TABLET BY MOUTH TWICE A DAY 60 tablet 5    norgestrel-ethinyl estradiol (Low-Ogestrel) 0 3 mg-30 mcg per tablet Take 1 tablet by mouth daily 84 tablet 4    potassium chloride (MICRO-K) 10 MEQ CR capsule Take 1 capsule (10 mEq total) by mouth daily 90 capsule 1    traMADol-acetaminophen (ULTRACET) 37 5-325 mg per tablet Take 1 tablet by mouth every 6 (six) hours as needed for moderate pain 30 tablet 0    venlafaxine (EFFEXOR-XR) 150 mg 24 hr capsule TAKE 1 CAPSULE BY MOUTH EVERY DAY 90 capsule 1    venlafaxine (EFFEXOR-XR) 37 5 mg 24 hr capsule TAKE 1 CAPSULE BY MOUTH EVERY DAY 90 capsule 1    Vitamin D, Cholecalciferol, 25 MCG (1000 UT) CAPS Take 3,000 Units by mouth        No current facility-administered medications for this visit  Review of Systems  Video Exam    There were no vitals filed for this visit  Physical Exam   As a result of this visit, I have referred the patient for further respiratory evaluation  No    I spent 15 minutes directly with the patient during this visit  VIRTUAL VISIT DISCLAIMER    Billie Horton acknowledges that she has consented to an online visit or consultation   She understands that the online visit is based solely on information provided by her, and that, in the absence of a face-to-face physical evaluation by the physician, the diagnosis she receives is both limited and provisional in terms of accuracy and completeness  This is not intended to replace a full medical face-to-face evaluation by the physician  Parish Mercer understands and accepts these terms  MEDICATION MANAGEMENT NOTE        [address]    Name and Date of Birth:  Parish Mercer 36 y o  1990 MRN: [id number]    Date of Visit: July 26, 2022    Allergies   Allergen Reactions    Bupropion      Other reaction(s): Flushed    Epinephrine Dizziness     Causes dysautonomia    Fluoxetine      Other reaction(s): Flushed     SUBJECTIVE:    Leticia Doan is seen today for a follow up for Major Depressive Disorder  She reports that she has done fairly well since the last visit  Patient reports that following initiation of Wellbutrin she did experience some transient side effects such as increased difficulty with sleep and increased irritability  States those side effects have since resolved  Has seen some improvements in mood and energy  Would like to trial a higher dose of Wellbutrin  Currently still struggles with energy and experiences significant feelings of depression 2-3 days per week  Has maintained venlafaxine 187 5 mg without issue  Otherwise no new questions or concerns  She denies any side effects from medications  PLAN:  Increase Wellbutrin XL to 300 mg p o  Daily  Continue Effexor XR to 187 5 mg p o  Daily  Follow-up in 8 weeks  Aware of 24 hour and weekend coverage for urgent situations accessed by calling [facility] main practice number    Diagnoses and all orders for this visit:    Recurrent major depressive disorder, in partial remission ([facility] [address] )  -     buPROPion (Wellbutrin XL) 300 mg 24 hr tablet;  Take 1 tablet (300 mg total) by mouth daily        Current Outpatient Medications on File Prior to Visit   Medication Sig Dispense Refill    clindamycin (CLEOCIN T) 1 % APPLY TO ACNE TWICE DAILY  5    desmopressin (DDAVP) 0 2 mg tablet Take 1 tablet (0 2 mg total) by mouth 2 (two) times a day 60 tablet 5    fludrocortisone (FLORINEF) 0 1 mg tablet Take 1 tablet (0 1 mg total) by mouth 2 (two) times a day 60 tablet 5    ivabradine HCl (Corlanor) 5 MG tablet Take 1 tablet (5 mg total) by mouth 2 (two) times a day 60 tablet 5    levothyroxine 100 mcg tablet Take 1 tablet (100 mcg total) by mouth daily 90 tablet 1    midodrine (PROAMATINE) 10 MG tablet TAKE 1 TABLET BY MOUTH THREE TIMES A  tablet 3    naproxen (NAPROSYN) 500 mg tablet Take 1 tablet (500 mg total) by mouth daily as needed for moderate pain 30 tablet 1    nebivolol (BYSTOLIC) 5 mg tablet TAKE 1 TABLET BY MOUTH TWICE A DAY 60 tablet 5    norgestrel-ethinyl estradiol (Low-Ogestrel) 0 3 mg-30 mcg per tablet Take 1 tablet by mouth daily 84 tablet 4    potassium chloride (MICRO-K) 10 MEQ CR capsule Take 1 capsule (10 mEq total) by mouth daily 90 capsule 1    traMADol-acetaminophen (ULTRACET) 37 5-325 mg per tablet Take 1 tablet by mouth every 6 (six) hours as needed for moderate pain 30 tablet 0    venlafaxine (EFFEXOR-XR) 150 mg 24 hr capsule TAKE 1 CAPSULE BY MOUTH EVERY DAY 90 capsule 1    venlafaxine (EFFEXOR-XR) 37 5 mg 24 hr capsule TAKE 1 CAPSULE BY MOUTH EVERY DAY 90 capsule 1    Vitamin D, Cholecalciferol, 25 MCG (1000 UT) CAPS Take 3,000 Units by mouth       [DISCONTINUED] buPROPion (Wellbutrin XL) 150 mg 24 hr tablet Take 1 tablet (150 mg total) by mouth daily 30 tablet 2     No current facility-administered medications on file prior to visit  Psychotherapy Provided:     Individual psychotherapy provided: Yes  Counseling was provided during the session today for 16 minutes  Supportive counseling provided  HPI ROS Appetite Changes and Sleep:     She reports adequate number of sleep hours (8 hours), normal appetite, low energy   Denies homicidal ideation, denies suicidal ideation    Review Of Systems:      General emotional problems and decreased functioning   Personality no change in personality   Constitutional negative   ENT negative   Cardiovascular negative   Respiratory negative   Gastrointestinal negative   Genitourinary negative   Musculoskeletal negative   Integumentary negative   Neurological negative   Endocrine negative   Other Symptoms none, all other systems are negative     Mental Status Evaluation:    Appearance Adequate hygiene and grooming   Behavior calm and cooperative   Mood depressed  Depression Scale -  of 10 (0 = No depression)  Anxiety Scale -  of 10 (0 = No anxiety)   Speech Normal rate and volume   Affect mood-congruent and constricted   Thought Processes Goal directed and coherent   Thought Content Does not verbalize delusional material   Associations Tightly connected   Perceptual Disturbances Denies hallucinations and does not appear to be responding to internal stimuli   Risk Potential Suicidal/Homicidal Ideation - No evidence of suicidal or homicidal ideation and patient does not verbalize suicidal or homicidal ideation  Risk of Violence - No evidence of risk for violence found on assessment  Risk of Self Mutilation - No evidence of risk for self mutilation found on assessment   Orientation oriented to person, place, time/date and situation   Memory recent and remote memory grossly intact   Consciousness alert and awake   Attention/Concentration attention span and concentration are age appropriate   Insight intact   Judgement intact   Muscle Strength and Gait normal muscle strength and normal muscle tone, normal gait/station and normal balance   Motor Activity no abnormal movements   Language no difficulty naming common objects, no difficulty repeating a phrase, no difficulty writing a sentence   Fund of Knowledge adequate knowledge of current events  adequate fund of knowledge regarding past history  adequate fund of knowledge regarding vocabulary      Past Psychiatric History Update:     Inpatient Psychiatric Admission Since Last Encounter: no  Suicide Attempt Or Self Mutilation Since Last Encounter:   no  Incidence of Violent Behavior Since Last Encounter:   no    Traumatic History Update:     New Onset of Abuse Since Last Encounter:   no  Traumatic Events Since Last Encounter:   no    Past Medical History:    Past Medical History:   Diagnosis Date    Congenital dislocation of hip     Depression     LAST ASSESSED: 8/4/12    Disease of thyroid gland     Pectus excavatum     Scoliosis         Past Surgical History:   Procedure Laterality Date    DEVEN ACETABULAR OSTEOTOMY Left     ACETABULAR OSTEOTOMY     Allergies   Allergen Reactions    Bupropion      Other reaction(s): Flushed    Epinephrine Dizziness     Causes dysautonomia    Fluoxetine      Other reaction(s): Flushed     Substance Abuse History:    Social History     Substance and Sexual Activity   Alcohol Use Yes    Comment: SOCIAL     Social History     Substance and Sexual Activity   Drug Use No     Social History:    Social History     Socioeconomic History    Marital status: Single     Spouse name: Not on file    Number of children: Not on file    Years of education: Not on file    Highest education level: Not on file   Occupational History    Occupation: UNEMPLOYED   Tobacco Use    Smoking status: Never Smoker    Smokeless tobacco: Never Used   Vaping Use    Vaping Use: Never used   Substance and Sexual Activity    Alcohol use: Yes     Comment: SOCIAL    Drug use: No    Sexual activity: Never     Birth control/protection: OCP   Other Topics Concern    Not on file   Social History Narrative    CURRENTLY IN SCHOOL: WILL BE STARTING  [address] 1/2014    LIVING WITH PARENTS     Social Determinants of Health     Financial Resource Strain: Not on file   Food Insecurity: Not on file   Transportation Needs: Not on file   Physical Activity: Not on file   Stress: Not on file   Social Connections: Not on file   Intimate Partner Violence: Not on file   Housing Stability: Not on file     Family Psychiatric History:     Family History   Problem Relation Age of Onset   [facility] Asthma Mother     Hyperlipidemia Mother     Hypertension Mother     Diabetes type II Mother     Diabetes Mother     Hyperlipidemia Father     Hypertension Father     Melanoma Family         AMELANOTIC MELANOMA OF THE SKIN    Hyperlipidemia Family     Hypertension Family     Prostate cancer Family         MALIGNANT PROSTATIC NEOPLASM G1    Varicose Veins Family         OF LOWER EXTREMITIES     History Review: The following portions of the patient's history were reviewed and updated as appropriate: allergies, current medications, past family history, past medical history, past social history, past surgical history and problem list     OBJECTIVE:     Vital signs in last 24 hours: There were no vitals filed for this visit  Laboratory Results: I have personally reviewed all pertinent laboratory/tests results  Suicide/Homicide Risk Assessment:    Risk of Harm to Self:   The following ratings are based on assessment at the time of the interview   Recent Specific Risk Factors include: current depressive symptoms    Risk of Harm to Others:   The following ratings are based on assessment at the time of the interview   Recent Specific Risk Factors include: none  The following interventions are recommended: no intervention changes needed    Medications Risks/Benefits:      Risks, Benefits And Possible Side Effects Of Medications:    Discussed risks and benefits of treatment with patient including risk of suicidality, serotonin syndrome, increased QTc interval and SIADH related to treatment with antidepressants;  Risk of induction of manic symptoms in certain patient populations and Reduction in seizure threshold related to treatment with bupropion      Controlled Medication Discussion:     Not applicable    Treatment Plan:    Due for update/Updated:   yes    LEYDI Mahoney 07/26/22    This note was shared with patient

## 2022-09-20 ENCOUNTER — OFFICE VISIT (OUTPATIENT)
Dept: CARDIOLOGY CLINIC | Facility: MEDICAL CENTER | Age: 32
End: 2022-09-20
Payer: COMMERCIAL

## 2022-09-20 VITALS
HEART RATE: 100 BPM | OXYGEN SATURATION: 99 % | HEIGHT: 67 IN | SYSTOLIC BLOOD PRESSURE: 110 MMHG | DIASTOLIC BLOOD PRESSURE: 74 MMHG | WEIGHT: 149 LBS | BODY MASS INDEX: 23.39 KG/M2

## 2022-09-20 DIAGNOSIS — G90.A POTS (POSTURAL ORTHOSTATIC TACHYCARDIA SYNDROME): Primary | ICD-10-CM

## 2022-09-20 DIAGNOSIS — R00.0 TACHYCARDIA: ICD-10-CM

## 2022-09-20 PROCEDURE — 99214 OFFICE O/P EST MOD 30 MIN: CPT | Performed by: INTERNAL MEDICINE

## 2022-09-20 NOTE — PROGRESS NOTES
Cardiology   Jeff Carrion 29 y o  female MRN: [id number]        Impression:  1  POTS - improved after stop using weighted blanket  2  Nia-Danlos Type III  3  Dyslipidemia - borderline  Recheck lipids in 5 years  4  Sinus tachycardia - did not start Corlanor       Recommendations:  1  Continue current medications  2  Follow up in 4 months  HPI: Jeff Carrion is a 29y o  year old female with Nia-Danlos Type III and POTS presents for follow up  Developed COVID-19 in 12/20, and has had worsening in fatigue, dyspnea, and concentration issues since   Has issues with fluctuating heart rate  Echo 7/21 - structurally normal heart   Recently has been having tachycardia with standing and activity  Review of Systems   Constitutional: Negative  HENT: Negative  Eyes: Negative  Respiratory: Negative for chest tightness and shortness of breath  Cardiovascular: Negative for chest pain, palpitations and leg swelling  Gastrointestinal: Negative  Endocrine: Negative  Genitourinary: Negative  Musculoskeletal: Negative  Skin: Negative  Allergic/Immunologic: Negative  Neurological: Negative  Hematological: Negative  Psychiatric/Behavioral: Negative  All other systems reviewed and are negative          Past Medical History:   Diagnosis Date    Congenital dislocation of hip     Depression     LAST ASSESSED: 8/4/12    Disease of thyroid gland     Pectus excavatum     Scoliosis      Past Surgical History:   Procedure Laterality Date    DEVEN ACETABULAR OSTEOTOMY Left     ACETABULAR OSTEOTOMY     Social History     Substance and Sexual Activity   Alcohol Use Yes    Comment: SOCIAL     Social History     Substance and Sexual Activity   Drug Use No     Social History     Tobacco Use   Smoking Status Never Smoker   Smokeless Tobacco Never Used     Family History   Problem Relation Age of Onset    Asthma Mother     Hyperlipidemia Mother     Hypertension Mother    Selvin Jesus Diabetes type II Mother     Diabetes Mother     Hyperlipidemia Father     Hypertension Father     Melanoma Family         AMELANOTIC MELANOMA OF THE SKIN    Hyperlipidemia Family     Hypertension Family     Prostate cancer Family         MALIGNANT PROSTATIC NEOPLASM G1    Varicose Veins Family         OF LOWER EXTREMITIES       Allergies:   Allergies   Allergen Reactions    Bupropion      Other reaction(s): Flushed    Epinephrine Dizziness     Causes dysautonomia    Fluoxetine      Other reaction(s): Flushed       Medications:     Current Outpatient Medications:     buPROPion (Wellbutrin XL) 300 mg 24 hr tablet, Take 1 tablet (300 mg total) by mouth daily, Disp: 30 tablet, Rfl: 2    clindamycin (CLEOCIN T) 1 %, APPLY TO ACNE TWICE DAILY, Disp: , Rfl: 5    desmopressin (DDAVP) 0 2 mg tablet, Take 1 tablet (0 2 mg total) by mouth 2 (two) times a day, Disp: 60 tablet, Rfl: 5    fludrocortisone (FLORINEF) 0 1 mg tablet, Take 1 tablet (0 1 mg total) by mouth 2 (two) times a day, Disp: 60 tablet, Rfl: 5    levothyroxine 100 mcg tablet, Take 1 tablet (100 mcg total) by mouth daily, Disp: 90 tablet, Rfl: 1    midodrine (PROAMATINE) 10 MG tablet, TAKE 1 TABLET BY MOUTH THREE TIMES A DAY, Disp: 270 tablet, Rfl: 3    naproxen (NAPROSYN) 500 mg tablet, Take 1 tablet (500 mg total) by mouth daily as needed for moderate pain, Disp: 30 tablet, Rfl: 1    nebivolol (BYSTOLIC) 5 mg tablet, TAKE 1 TABLET BY MOUTH TWICE A DAY, Disp: 60 tablet, Rfl: 5    norgestrel-ethinyl estradiol (Low-Ogestrel) 0 3 mg-30 mcg per tablet, Take 1 tablet by mouth daily, Disp: 84 tablet, Rfl: 4    potassium chloride (MICRO-K) 10 MEQ CR capsule, Take 1 capsule (10 mEq total) by mouth daily, Disp: 90 capsule, Rfl: 1    traMADol-acetaminophen (ULTRACET) 37 5-325 mg per tablet, Take 1 tablet by mouth every 6 (six) hours as needed for moderate pain, Disp: 30 tablet, Rfl: 0    venlafaxine (EFFEXOR-XR) 150 mg 24 hr capsule, TAKE 1 CAPSULE BY MOUTH EVERY DAY, Disp: 90 capsule, Rfl: 1    venlafaxine (EFFEXOR-XR) 37 5 mg 24 hr capsule, TAKE 1 CAPSULE BY MOUTH EVERY DAY, Disp: 90 capsule, Rfl: 1    Vitamin D, Cholecalciferol, 25 MCG (1000 UT) CAPS, Take 3,000 Units by mouth , Disp: , Rfl:       Wt Readings from Last 3 Encounters:   09/20/22 67 6 kg (149 lb)   06/02/22 67 6 kg (149 lb)   12/13/21 67 1 kg (148 lb)     Temp Readings from Last 3 Encounters:   12/13/21 98 1 °F (36 7 °C)   11/04/21 97 8 °F (36 6 °C)   05/03/21 97 6 °F (36 4 °C)     BP Readings from Last 3 Encounters:   09/20/22 110/74   06/02/22 102/72   12/13/21 112/66     Pulse Readings from Last 3 Encounters:   09/20/22 100   06/02/22 86   12/13/21 80         Physical Exam  HENT:      Head: Atraumatic  Mouth/Throat:      Mouth: Mucous membranes are moist    Eyes:      Extraocular Movements: Extraocular movements intact  Cardiovascular:      Rate and Rhythm: Normal rate and regular rhythm  Heart sounds: Normal heart sounds  Pulmonary:      Effort: Pulmonary effort is normal       Breath sounds: Normal breath sounds  Abdominal:      General: Abdomen is flat  Musculoskeletal:         General: Normal range of motion  Cervical back: Normal range of motion  Skin:     General: Skin is warm  Neurological:      General: No focal deficit present  Mental Status: She is alert and oriented to person, place, and time     Psychiatric:         Mood and Affect: Mood normal          Behavior: Behavior normal            Laboratory Studies:  CMP:  Lab Results   Component Value Date     01/20/2016    K 3 6 05/13/2021     05/13/2021    CO2 24 05/13/2021    BUN 9 05/13/2021    CREATININE 0 76 05/13/2021    AST 18 05/13/2021    ALT 16 05/13/2021    BILITOT 0 4 01/20/2016       Lipid Profile:   No results found for: CHOL  Lab Results   Component Value Date    HDL 50 11/19/2021     Lab Results   Component Value Date    LDLCALC 138 (H) 11/19/2021     Lab Results Component Value Date    TRIG 69 2021       Cardiac testing:     Results for orders placed during the hospital encounter of 21    Echo complete with contrast if indicated    Narrative  [facility], [address] [facility]  [phone]    Transthoracic Echocardiogram  2D, M-mode, Doppler, and Color Doppler    Study date:  2021    [facility]  MR number: [id number]  Account number: [de-identified]  : 1990  Age: 31 years  Gender: Female  Status: Outpatient  Location: [address] [place] [address]  Height: 66 in  Weight: 152 7 lb  BP: 122/ 84 mmHg    Indications: Assess left ventricular function  Diagnoses: R53 82 - Chronic fatigue, unspecified    Sonographer:  LEONILA Og  Referring Physician:  Brian Cramer DO  Group:  [facility]  Interpreting Physician:  Leonel Reese MD    SUMMARY    PROCEDURE INFORMATION:  This was a technically difficult study  No subcostal views noted  LEFT VENTRICLE:  Systolic function was normal  Ejection fraction was estimated to be 55 %  There were no regional wall motion abnormalities  RIGHT VENTRICLE:  Systolic function was normal     MITRAL VALVE:  There was trace regurgitation  AORTIC VALVE:  There was no evidence for stenosis  TRICUSPID VALVE:  There was trace regurgitation  PERICARDIUM:  There was no pericardial effusion  HISTORY: PRIOR HISTORY: Chronic fatigue s/p COVID-19, POTS, Ehler's Danlos, Pectus excavatum    PROCEDURE: The study was performed in the [address] [place] [address]  This was a routine study  The transthoracic approach was used  The study included complete 2D imaging, M-mode, complete spectral Doppler, and color Doppler  The  heart rate was 69 bpm, at the start of the study  Images were obtained from the parasternal, apical, subcostal, and suprasternal notch acoustic windows  This was a technically difficult study   No subcostal views noted     LEFT VENTRICLE: Size was normal  Systolic function was normal  Ejection fraction was estimated to be 55 %  There were no regional wall motion abnormalities  Wall thickness was normal  DOPPLER: Left ventricular diastolic function parameters  were normal     RIGHT VENTRICLE: The size was normal  Systolic function was normal     LEFT ATRIUM: Size was normal     RIGHT ATRIUM: Size was normal     MITRAL VALVE: Valve structure was normal  There was normal leaflet separation  DOPPLER: The transmitral velocity was within the normal range  There was no evidence for stenosis  There was trace regurgitation  AORTIC VALVE: The valve was trileaflet  Leaflets exhibited normal thickness and normal cuspal separation  DOPPLER: Transaortic velocity was within the normal range  There was no evidence for stenosis  There was no significant  regurgitation  TRICUSPID VALVE: The valve structure was normal  There was normal leaflet separation  DOPPLER: The transtricuspid velocity was within the normal range  There was no evidence for stenosis  There was trace regurgitation  PULMONIC VALVE: Leaflets exhibited normal thickness, no calcification, and normal cuspal separation  DOPPLER: The transpulmonic velocity was within the normal range  There was no significant regurgitation  PERICARDIUM: There was no pericardial effusion  AORTA: The root exhibited normal size  SYSTEMIC VEINS: IVC: The inferior vena cava was normal in size      SYSTEM MEASUREMENT TABLES    2D  %FS: 28 05 %  Ao Diam: 2 53 cm  EDV(Teich): 58 13 ml  EF(Teich): 55 12 %  ESV(Teich): 26 09 ml  IVSd: 0 87 cm  LA Area: 8 93 cm2  LA Diam: 2 68 cm  LVEDV MOD A4C: 44 94 ml  LVEF MOD A4C: 53 93 %  LVESV MOD A4C: 20 7 ml  LVIDd: 3 7 cm  LVIDs: 2 66 cm  LVLd A4C: 6 62 cm  LVLs A4C: 5 6 cm  LVPWd: 0 86 cm  RA Area: 4 31 cm2  RVIDd: 2 2 cm  SV MOD A4C: 24 24 ml  SV(Teich): 32 04 ml    CW  TR MaxPG: 15 36 mmHg  TR Vmax: 1 96 m/s    MM  TAPSE: 1 66 cm    PW  E' Sept: 0 12 m/s  E/E' Sept: 4 76  MV A Juan Jose: 0 44 m/s  MV Dec [place]: 2 46 m/s2  MV DecT: 234 86 ms  MV E Juan Jose: 0 58 m/s  MV E/A Ratio: 1 31  MV PHT: 68 11 ms  MVA By PHT: 3 23 cm2    [address] Accredited E[facility]    Prepared and electronically signed by    Martín Arthur MD  Signed 21-Jul-2021 16:55:18    No results found for this or any previous visit  No results found for this or any previous visit  No results found for this or any previous visit

## 2022-09-26 DIAGNOSIS — E03.9 PRIMARY HYPOTHYROIDISM: ICD-10-CM

## 2022-09-26 DIAGNOSIS — G90.A POTS (POSTURAL ORTHOSTATIC TACHYCARDIA SYNDROME): ICD-10-CM

## 2022-09-26 DIAGNOSIS — E87.6 LOW BLOOD POTASSIUM: ICD-10-CM

## 2022-09-26 NOTE — TELEPHONE ENCOUNTER
Pt called in asking for a refill of the following prescriptions, desmopressin (DDAVP) 0 2 mg tablet, potassium chloride (MICRO-K) 10 MEQ CR capsule, and levothyroxine 100 mcg tablet

## 2022-09-28 RX ORDER — LEVOTHYROXINE SODIUM 0.1 MG/1
100 TABLET ORAL DAILY
Qty: 90 TABLET | Refills: 0 | Status: SHIPPED | OUTPATIENT
Start: 2022-09-28

## 2022-09-28 RX ORDER — DESMOPRESSIN ACETATE 0.2 MG/1
0.2 TABLET ORAL 2 TIMES DAILY
Qty: 60 TABLET | Refills: 5 | Status: SHIPPED | OUTPATIENT
Start: 2022-09-28

## 2022-09-28 RX ORDER — POTASSIUM CHLORIDE 750 MG/1
10 CAPSULE, EXTENDED RELEASE ORAL DAILY
Qty: 90 CAPSULE | Refills: 0 | Status: SHIPPED | OUTPATIENT
Start: 2022-09-28

## 2022-09-29 ENCOUNTER — TELEMEDICINE (OUTPATIENT)
Dept: PSYCHIATRY | Facility: CLINIC | Age: 32
End: 2022-09-29
Payer: COMMERCIAL

## 2022-09-29 DIAGNOSIS — F33.41 RECURRENT MAJOR DEPRESSIVE DISORDER, IN PARTIAL REMISSION (HCC): ICD-10-CM

## 2022-09-29 PROCEDURE — 99214 OFFICE O/P EST MOD 30 MIN: CPT | Performed by: NURSE PRACTITIONER

## 2022-09-29 RX ORDER — BUPROPION HYDROCHLORIDE 450 MG/1
450 TABLET, FILM COATED, EXTENDED RELEASE ORAL DAILY
Qty: 30 TABLET | Refills: 5 | Status: SHIPPED | OUTPATIENT
Start: 2022-09-29

## 2022-09-29 NOTE — BH TREATMENT PLAN
TREATMENT PLAN (Medication Management Only)        [facility]    Name and Date of Birth:  Daniela Armijo 29 y o  1990  Date of Treatment Plan: September 29, 2022  Diagnosis/Diagnoses:    1  Recurrent major depressive disorder, in partial remission [facility])      Strengths/Personal Resources for Self-Care: motivation for treatment, taking medications as prescribed, ability to communicate well, ability to listen, ability to reason  Area/Areas of need (in own words): depressive symptoms, anxiety symptoms  1  Long Term Goal: improve anxiety and improve depression  Target Date: 6 months - 3/29/2023  Person/Persons responsible for completion of goal: Daniela Armijo  2  Short Term Objective (s) - How will we reach this goal?:   A  Provider new recommended medication/dosage changes and/or continue medication(s): continue current medications as prescribed  B   Attend medication management appointments regularly  C   N/a  Target Date: 6 months - 3/29/2023  Person/Persons Responsible for Completion of Goal: Courtney Parker  Progress Towards Goals: continuing treatment  Treatment Modality: medication management with psychotherapy every 3 months  Review due 90 to 120 days from date of this plan: 6 months - 3/29/2023  Expected length of service: maintenance unless revised  My Physician/PA/NP and I have developed this plan together and I agree to work on the goals and objectives  I understand the treatment goals that were developed for my treatment    Treatment Plan Verbal Consent - Virtual Visit

## 2022-09-29 NOTE — PSYCH
Virtual Regular Visit    Problem List Items Addressed This Visit        Other    Recurrent major depressive disorder, in partial remission ([facility] [address] )    Relevant Medications    buPROPion (FORFIVO XL) 450 MG 24 hr tablet             Encounter provider LEYDI Salas    Provider located at   [address]  [facility]    [address] [facility] [address]  [facility] [address] [phone]    Patient is located in the following state in which I hold an active license [place]    Recent Visits  No visits were found meeting these conditions  Showing recent visits within past 7 days and meeting all other requirements  Today's Visits  Date Type Provider Dept   09/29/22 Telemedicine LEYDI [facility]   Showing today's visits and meeting all other requirements  Future Appointments  No visits were found meeting these conditions  Showing future appointments within next 150 days and meeting all other requirements     The patient was identified by name and date of birth  Raina Chinchilla was informed that this is a telemedicine visit and that the visit is being conducted through [facility] and patient was informed that this is a secure, HIPAA-compliant platform  Raina Chinchilla agrees to proceed  My office door was closed  No one else was in the room  She acknowledged consent and understanding of privacy and security of the video platform  The patient has agreed to participate and understands they can discontinue the visit at any time  Patient is aware this is a billable service       HPI     Current Outpatient Medications   Medication Sig Dispense Refill    buPROPion (FORFIVO XL) 450 MG 24 hr tablet Take 1 tablet (450 mg total) by mouth daily 30 tablet 5    clindamycin (CLEOCIN T) 1 % APPLY TO ACNE TWICE DAILY  5    desmopressin (DDAVP) 0 2 mg tablet Take 1 tablet (0 2 mg total) by mouth 2 (two) times a day 60 tablet 5    fludrocortisone (FLORINEF) 0 1 mg tablet Take 1 tablet (0 1 mg total) by mouth 2 (two) times a day 60 tablet 5    levothyroxine 100 mcg tablet Take 1 tablet (100 mcg total) by mouth daily 90 tablet 0    midodrine (PROAMATINE) 10 MG tablet TAKE 1 TABLET BY MOUTH THREE TIMES A  tablet 3    naproxen (NAPROSYN) 500 mg tablet Take 1 tablet (500 mg total) by mouth daily as needed for moderate pain 30 tablet 1    nebivolol (BYSTOLIC) 5 mg tablet TAKE 1 TABLET BY MOUTH TWICE A DAY 60 tablet 5    norgestrel-ethinyl estradiol (Low-Ogestrel) 0 3 mg-30 mcg per tablet Take 1 tablet by mouth daily 84 tablet 4    potassium chloride (MICRO-K) 10 MEQ CR capsule Take 1 capsule (10 mEq total) by mouth daily 90 capsule 0    traMADol-acetaminophen (ULTRACET) 37 5-325 mg per tablet Take 1 tablet by mouth every 6 (six) hours as needed for moderate pain 30 tablet 0    venlafaxine (EFFEXOR-XR) 150 mg 24 hr capsule TAKE 1 CAPSULE BY MOUTH EVERY DAY 90 capsule 1    venlafaxine (EFFEXOR-XR) 37 5 mg 24 hr capsule TAKE 1 CAPSULE BY MOUTH EVERY DAY 90 capsule 1    Vitamin D, Cholecalciferol, 25 MCG (1000 UT) CAPS Take 3,000 Units by mouth        No current facility-administered medications for this visit  Review of Systems  Video Exam    There were no vitals filed for this visit  Physical Exam   As a result of this visit, I have referred the patient for further respiratory evaluation  No    I spent 15 minutes directly with the patient during this visit  VIRTUAL VISIT DISCLAIMER    Des Harman acknowledges that she has consented to an online visit or consultation  She understands that the online visit is based solely on information provided by her, and that, in the absence of a face-to-face physical evaluation by the physician, the diagnosis she receives is both limited and provisional in terms of accuracy and completeness  This is not intended to replace a full medical face-to-face evaluation by the physician   Des Harman understands and accepts these terms  This note was not shared with the patient due to reasonable likelihood of causing patient harm   MEDICATION MANAGEMENT NOTE        Anson Lundberg    Name and Date of Birth:  Hayder Brownlee 29 y o  1990 MRN: [id number]    Date of Visit: September 29, 2022    Allergies   Allergen Reactions    Bupropion      Other reaction(s): Flushed    Epinephrine Dizziness     Causes dysautonomia    Fluoxetine      Other reaction(s): Flushed     SUBJECTIVE:    Jovani Hernandez is seen today for a follow up for Major Depressive Disorder and Generalized Anxiety Disorder  She reports that she has improved slightly since the last visit  Patient reports that since increase in Wellbutrin has noticed some improvement in energy  Continues to experience 2-3 episodes depressed mood per week  Since last visit experienced 1 episode persistently depressed mood for about 2 weeks  During that time experienced anhedonia, hopelessness, low energy, low motivation, persistent sadness  Patient states she would like to try a higher dose of Wellbutrin  Discussed potential risks and benefits of increasing dose Wellbutrin  In general, patient states that low energy is significant concern for her  Struggles to engage in normal day-to-day activities due to low energy  She reports occasional involuntary jerking movements especially at bedtime when trying to fall sleep  Has noticed some occurrences during the day  States this only trouble someone trying to sleep  Denies other potential medication side effects or problems  PLAN:  Continue Wellbutrin  mg p o  Daily; consider potential risks and benefits of transitioning from Wellbutrin to low-dose stimulant  Continue Effexor 187 5 mg p o   Daily  Follow-up in 3 months    Aware of 24 hour and weekend coverage for urgent situations accessed by calling [facility] main practice number    Diagnoses and all orders for this visit:    Recurrent major depressive disorder, in partial remission (HCC)  -     buPROPion (FORFIVO XL) 450 MG 24 hr tablet; Take 1 tablet (450 mg total) by mouth daily        Current Outpatient Medications on File Prior to Visit   Medication Sig Dispense Refill    clindamycin (CLEOCIN T) 1 % APPLY TO ACNE TWICE DAILY  5    desmopressin (DDAVP) 0 2 mg tablet Take 1 tablet (0 2 mg total) by mouth 2 (two) times a day 60 tablet 5    fludrocortisone (FLORINEF) 0 1 mg tablet Take 1 tablet (0 1 mg total) by mouth 2 (two) times a day 60 tablet 5    levothyroxine 100 mcg tablet Take 1 tablet (100 mcg total) by mouth daily 90 tablet 0    midodrine (PROAMATINE) 10 MG tablet TAKE 1 TABLET BY MOUTH THREE TIMES A  tablet 3    naproxen (NAPROSYN) 500 mg tablet Take 1 tablet (500 mg total) by mouth daily as needed for moderate pain 30 tablet 1    nebivolol (BYSTOLIC) 5 mg tablet TAKE 1 TABLET BY MOUTH TWICE A DAY 60 tablet 5    norgestrel-ethinyl estradiol (Low-Ogestrel) 0 3 mg-30 mcg per tablet Take 1 tablet by mouth daily 84 tablet 4    potassium chloride (MICRO-K) 10 MEQ CR capsule Take 1 capsule (10 mEq total) by mouth daily 90 capsule 0    traMADol-acetaminophen (ULTRACET) 37 5-325 mg per tablet Take 1 tablet by mouth every 6 (six) hours as needed for moderate pain 30 tablet 0    venlafaxine (EFFEXOR-XR) 150 mg 24 hr capsule TAKE 1 CAPSULE BY MOUTH EVERY DAY 90 capsule 1    venlafaxine (EFFEXOR-XR) 37 5 mg 24 hr capsule TAKE 1 CAPSULE BY MOUTH EVERY DAY 90 capsule 1    Vitamin D, Cholecalciferol, 25 MCG (1000 UT) CAPS Take 3,000 Units by mouth       [DISCONTINUED] buPROPion (Wellbutrin XL) 300 mg 24 hr tablet Take 1 tablet (300 mg total) by mouth daily 30 tablet 2     No current facility-administered medications on file prior to visit         Psychotherapy Provided:     Individual psychotherapy provided: Yes  Counseling was provided during the session today for 16 minutes  Supportive counseling provided  HPI ROS Appetite Changes and Sleep:     She reports adequate number of sleep hours (8 hours), adequate appetite, low energy   Denies homicidal ideation, denies suicidal ideation    Review Of Systems:      General emotional problems and decreased functioning   Personality no change in personality   Constitutional negative   ENT negative   Cardiovascular negative   Respiratory negative   Gastrointestinal negative   Genitourinary negative   Musculoskeletal negative   Integumentary negative   Neurological negative   Endocrine negative   Other Symptoms none, all other systems are negative     Mental Status Evaluation:    Appearance Adequate hygiene and grooming   Behavior calm and cooperative   Mood depressed  Depression Scale -  of 10 (0 = No depression)  Anxiety Scale -  of 10 (0 = No anxiety)   Speech Normal rate and volume   Affect constricted   Thought Processes Goal directed and coherent   Thought Content Does not verbalize delusional material   Associations Tightly connected   Perceptual Disturbances Denies hallucinations and does not appear to be responding to internal stimuli   Risk Potential Suicidal/Homicidal Ideation - No evidence of suicidal or homicidal ideation and patient does not verbalize suicidal or homicidal ideation  Risk of Violence - No evidence of risk for violence found on assessment  Risk of Self Mutilation - No evidence of risk for self mutilation found on assessment   Orientation oriented to person, place, time/date and situation   Memory recent and remote memory grossly intact   Consciousness alert and awake   Attention/Concentration attention span and concentration are age appropriate   Insight fair   Judgement fair   Muscle Strength and Gait normal muscle strength and normal muscle tone, normal gait/station and normal balance   Motor Activity no abnormal movements   Language no difficulty naming common objects, no difficulty repeating a phrase, no difficulty writing a sentence   Fund of Knowledge adequate knowledge of current events  adequate fund of knowledge regarding past history  adequate fund of knowledge regarding vocabulary      Past Psychiatric History Update:     Inpatient Psychiatric Admission Since Last Encounter:   no  Suicide Attempt Or Self Mutilation Since Last Encounter:   no  Incidence of Violent Behavior Since Last Encounter:   no    Traumatic History Update:     New Onset of Abuse Since Last Encounter:   no  Traumatic Events Since Last Encounter:   no    Past Medical History:    Past Medical History:   Diagnosis Date    Congenital dislocation of hip     Depression     LAST ASSESSED: 8/4/12    Disease of thyroid gland     Pectus excavatum     Scoliosis         Past Surgical History:   Procedure Laterality Date    DEVEN ACETABULAR OSTEOTOMY Left     ACETABULAR OSTEOTOMY     Allergies   Allergen Reactions    Bupropion      Other reaction(s): Flushed    Epinephrine Dizziness     Causes dysautonomia    Fluoxetine      Other reaction(s): Flushed     Substance Abuse History:    Social History     Substance and Sexual Activity   Alcohol Use Yes    Comment: SOCIAL     Social History     Substance and Sexual Activity   Drug Use No     Social History:    Social History     Socioeconomic History    Marital status: Single     Spouse name: Not on file    Number of children: Not on file    Years of education: Not on file    Highest education level: Not on file   Occupational History    Occupation: UNEMPLOYED   Tobacco Use    Smoking status: Never Smoker    Smokeless tobacco: Never Used   Vaping Use    Vaping Use: Never used   Substance and Sexual Activity    Alcohol use: Yes     Comment: SOCIAL    Drug use: No    Sexual activity: Never     Birth control/protection: OCP   Other Topics Concern    Not on file   Social History Narrative    CURRENTLY IN SCHOOL: [address] [place] [address] 1/2014    LIVING WITH PARENTS Social Determinants of Health     Financial Resource Strain: Not on file   Food Insecurity: Not on file   Transportation Needs: Not on file   Physical Activity: Not on file   Stress: Not on file   Social Connections: Not on file   Intimate Partner Violence: Not on file   Housing Stability: Not on file     Family Psychiatric History:     Family History   Problem Relation Age of Onset    Asthma Mother     Hyperlipidemia Mother     Hypertension Mother     Diabetes type II Mother     Diabetes Mother     Hyperlipidemia Father     Hypertension Father     Melanoma Family         AMELANOTIC MELANOMA OF THE SKIN    Hyperlipidemia Family     Hypertension Family     Prostate cancer Family         MALIGNANT PROSTATIC NEOPLASM G1    Varicose Veins Family         OF LOWER EXTREMITIES     History Review: The following portions of the patient's history were reviewed and updated as appropriate: allergies, current medications, past family history, past medical history, past social history, past surgical history and problem list     OBJECTIVE:     Vital signs in last 24 hours: There were no vitals filed for this visit  Laboratory Results: I have personally reviewed all pertinent laboratory/tests results  Suicide/Homicide Risk Assessment:    Risk of Harm to Self:   The following ratings are based on assessment at the time of the interview   Recent Specific Risk Factors include: current depressive symptoms, current anxiety symptoms    Risk of Harm to Others:   The following ratings are based on assessment at the time of the interview   Recent Specific Risk Factors include: none      The following interventions are recommended: no intervention changes needed    Medications Risks/Benefits:      Risks, Benefits And Possible Side Effects Of Medications:    Discussed risks and benefits of treatment with patient including risk of suicidality, serotonin syndrome, increased QTc interval and SIADH related to treatment with antidepressants;  Risk of induction of manic symptoms in certain patient populations and Reduction in seizure threshold related to treatment with bupropion      Controlled Medication Discussion:     Not applicable    Treatment Plan:    Due for update/Updated:   yes    LEYDI Goldstein 09/29/22

## 2022-10-24 DIAGNOSIS — I10 ESSENTIAL HYPERTENSION: ICD-10-CM

## 2022-10-25 ENCOUNTER — TELEPHONE (OUTPATIENT)
Dept: CARDIOLOGY CLINIC | Facility: CLINIC | Age: 32
End: 2022-10-25

## 2022-10-25 RX ORDER — NEBIVOLOL HYDROCHLORIDE 5 MG/1
TABLET ORAL
Qty: 60 TABLET | Refills: 5 | Status: SHIPPED | OUTPATIENT
Start: 2022-10-25

## 2022-10-27 ENCOUNTER — TELEPHONE (OUTPATIENT)
Dept: FAMILY MEDICINE CLINIC | Facility: OTHER | Age: 32
End: 2022-10-27

## 2022-10-27 DIAGNOSIS — R42 DIZZINESS: ICD-10-CM

## 2022-10-27 DIAGNOSIS — G90.A POTS (POSTURAL ORTHOSTATIC TACHYCARDIA SYNDROME): Primary | ICD-10-CM

## 2022-10-27 NOTE — TELEPHONE ENCOUNTER
The patient called stating she needs about 5 more visits for physical therapy  Can you please place a new referral?    Please advise    Thank you!

## 2022-10-31 NOTE — TELEPHONE ENCOUNTER
Called PT and spoke with them patient has a referral in her chart and she is good she does not need an [facility] just insurance referral

## 2022-11-04 ENCOUNTER — TELEPHONE (OUTPATIENT)
Dept: OTHER | Facility: OTHER | Age: 32
End: 2022-11-04

## 2022-11-04 ENCOUNTER — TELEPHONE (OUTPATIENT)
Dept: PSYCHIATRY | Facility: CLINIC | Age: 32
End: 2022-11-04

## 2022-11-04 ENCOUNTER — HOSPITAL ENCOUNTER (EMERGENCY)
Facility: HOSPITAL | Age: 32
Discharge: HOME | End: 2022-11-05
Attending: EMERGENCY MEDICINE

## 2022-11-04 DIAGNOSIS — R25.1 SHAKING: Primary | ICD-10-CM

## 2022-11-05 ENCOUNTER — TELEPHONE (OUTPATIENT)
Dept: OTHER | Facility: OTHER | Age: 32
End: 2022-11-05

## 2022-11-05 ENCOUNTER — TELEPHONE (OUTPATIENT)
Dept: PSYCHIATRY | Facility: CLINIC | Age: 32
End: 2022-11-05

## 2022-11-05 VITALS
HEART RATE: 91 BPM | SYSTOLIC BLOOD PRESSURE: 123 MMHG | RESPIRATION RATE: 20 BRPM | DIASTOLIC BLOOD PRESSURE: 79 MMHG | HEIGHT: 67 IN | BODY MASS INDEX: 23.54 KG/M2 | TEMPERATURE: 98.5 F | OXYGEN SATURATION: 100 % | WEIGHT: 150 LBS

## 2022-11-05 DIAGNOSIS — F33.9 EPISODE OF RECURRENT MAJOR DEPRESSIVE DISORDER, UNSPECIFIED DEPRESSION EPISODE SEVERITY (HCC): Primary | ICD-10-CM

## 2022-11-05 LAB
ALBUMIN SERPL BCP-MCNC: 4.4 G/DL (ref 3.5–5)
ALP SERPL-CCNC: 119 U/L (ref 34–104)
ALT SERPL W P-5'-P-CCNC: 19 U/L (ref 7–52)
ANION GAP SERPL CALCULATED.3IONS-SCNC: 5 MMOL/L (ref 4–13)
AST SERPL W P-5'-P-CCNC: 18 U/L (ref 13–39)
BASOPHILS # BLD AUTO: 0.02 THOUSANDS/ÂΜL (ref 0–0.1)
BASOPHILS NFR BLD AUTO: 0 % (ref 0–1)
BILIRUB SERPL-MCNC: 0.37 MG/DL (ref 0.2–1)
BUN SERPL-MCNC: 12 MG/DL (ref 5–25)
CALCIUM SERPL-MCNC: 9.2 MG/DL (ref 8.4–10.2)
CARDIAC TROPONIN I PNL SERPL HS: <2 NG/L
CHLORIDE SERPL-SCNC: 106 MMOL/L (ref 96–108)
CO2 SERPL-SCNC: 28 MMOL/L (ref 21–32)
CREAT SERPL-MCNC: 0.75 MG/DL (ref 0.6–1.3)
EOSINOPHIL # BLD AUTO: 0.17 THOUSAND/ÂΜL (ref 0–0.61)
EOSINOPHIL NFR BLD AUTO: 2 % (ref 0–6)
ERYTHROCYTE [DISTWIDTH] IN BLOOD BY AUTOMATED COUNT: 12.2 % (ref 11.6–15.1)
GFR SERPL CREATININE-BSD FRML MDRD: 107 ML/MIN/1.73SQ M
GLUCOSE SERPL-MCNC: 115 MG/DL (ref 65–140)
GLUCOSE SERPL-MCNC: 88 MG/DL (ref 65–140)
HCT VFR BLD AUTO: 44.2 % (ref 34.8–46.1)
HGB BLD-MCNC: 14.3 G/DL (ref 11.5–15.4)
IMM GRANULOCYTES # BLD AUTO: 0.02 THOUSAND/UL (ref 0–0.2)
IMM GRANULOCYTES NFR BLD AUTO: 0 % (ref 0–2)
LYMPHOCYTES # BLD AUTO: 1.76 THOUSANDS/ÂΜL (ref 0.6–4.47)
LYMPHOCYTES NFR BLD AUTO: 25 % (ref 14–44)
MAGNESIUM SERPL-MCNC: 2 MG/DL (ref 1.9–2.7)
MCH RBC QN AUTO: 30.3 PG (ref 26.8–34.3)
MCHC RBC AUTO-ENTMCNC: 32.4 G/DL (ref 31.4–37.4)
MCV RBC AUTO: 94 FL (ref 82–98)
MONOCYTES # BLD AUTO: 0.42 THOUSAND/ÂΜL (ref 0.17–1.22)
MONOCYTES NFR BLD AUTO: 6 % (ref 4–12)
NEUTROPHILS # BLD AUTO: 4.73 THOUSANDS/ÂΜL (ref 1.85–7.62)
NEUTS SEG NFR BLD AUTO: 67 % (ref 43–75)
NRBC BLD AUTO-RTO: 0 /100 WBCS
PLATELET # BLD AUTO: 268 THOUSANDS/UL (ref 149–390)
PMV BLD AUTO: 10.3 FL (ref 8.9–12.7)
POTASSIUM SERPL-SCNC: 4.2 MMOL/L (ref 3.5–5.3)
PROT SERPL-MCNC: 7 G/DL (ref 6.4–8.4)
RBC # BLD AUTO: 4.72 MILLION/UL (ref 3.81–5.12)
SODIUM SERPL-SCNC: 139 MMOL/L (ref 135–147)
TSH SERPL DL<=0.05 MIU/L-ACNC: 1.45 UIU/ML (ref 0.45–4.5)
WBC # BLD AUTO: 7.12 THOUSAND/UL (ref 4.31–10.16)

## 2022-11-05 RX ORDER — BUPROPION HYDROCHLORIDE 300 MG/1
300 TABLET ORAL DAILY
Qty: 30 TABLET | Refills: 0 | Status: SHIPPED | OUTPATIENT
Start: 2022-11-05

## 2022-11-05 RX ORDER — DIPHENHYDRAMINE HYDROCHLORIDE 50 MG/ML
25 INJECTION INTRAMUSCULAR; INTRAVENOUS ONCE
Status: COMPLETED | OUTPATIENT
Start: 2022-11-05 | End: 2022-11-05

## 2022-11-05 RX ADMIN — DIPHENHYDRAMINE HYDROCHLORIDE 25 MG: 50 INJECTION, SOLUTION INTRAMUSCULAR; INTRAVENOUS at 01:01

## 2022-11-05 RX ADMIN — SODIUM CHLORIDE 1000 ML: 0.9 INJECTION, SOLUTION INTRAVENOUS at 01:01

## 2022-11-05 NOTE — TELEPHONE ENCOUNTER
Received call from Aleyda Beal, [facility], provider evaluating Jim Yap this evening  Reports that labs and cardiac work-up unremarkable  Timing of symptoms likely indicating side effect from increased dose of Wellbutrin XL  Discussed discharge plan: Discontinue Wellbutrin  mg  Continue other medications as ordered  Initiate Wellbutrin  mg daily on Sunday morning and continue until next appointment with psychiatric provider (next appointment scheduled for 1/3/23  Will message provider to reschedule to earlier date)  Return to ED if symptoms progress  Requested psychiatry to change Wellbutrin order and send Rx to pharmacy  No further concerns at this time

## 2022-11-05 NOTE — PROGRESS NOTES
Wellbutrin  mg daily changed to Wellbutrin  mg daily per ED recommendations  See telephone encounter  Patient instructed to hold dose Saturday, November 5, 2022 and start decreased dose Sunday by ED provider  Return to ED if symptoms progress or worsen  Travis Laureano notified of events  30 day refill sent to pharmacy on file with no refills

## 2022-11-05 NOTE — TELEPHONE ENCOUNTER
Received call from after hours call regarding medication side effect  Wellbutrin XL recently optimized to 450 mg daily  PMH POTS syndrome, on beta blocker, and followed by cardiology  Reports dry mouth, headache, and decreased appetite  More concerning symptoms include shaking and tremors while sitting  Also, having difficulty ambulating up and down stairs as result  States "my legs almost gave out and I felt very nauseous after going up steps"  Nikki Aguirre has BP cuff at home, but unable to determine reading  -140  Advised to seek medical attention from ED to r/o cardiac etiology to symptoms  Advised to follow ED directions regarding Wellbutrin XL dose in addition to Effexor XR as both could effect BP and HR  Reports that father is home and able to take to ED at this time  Will inform  Provider, Rosa Alonzo, of this encounter

## 2022-11-05 NOTE — ED PROVIDER NOTES
History  Chief Complaint   Patient presents with   • Shaking     Patient states she increased buproprion and started noticing she was shaking and having difficulty ambulating down the stairs  Patient is a 74-year-old female with PMH of POTS, hypothyroidism, and depression presenting for evaluation of 3 days of progressive bilateral leg shaking and 1 day of knee buckling when going up stairs  Patient notes that she 10 days ago had an increase in her Wellbutrin from 300 mg to 450 mg  She also notes taking Effexor 150 mg for depression  She is uncertain if this new symptom of shaking and leg buckling is related to the increased dosage  She called on-call psychiatry who instructed her to come to the ER for further evaluation  She denies associated fevers, chills, recent illnesses, sick contacts  She denies lightheadedness/dizziness, chest pain, shortness of breath, abdominal pain, N/V/D, urinary complaints, skin changes, leg swelling  She also notes 1 day of difficulty articulating her speech with a feeling of abnormal tongue sensation, however reports that she is able to speak without slurred speech  History provided by:  Patient   used: No        Prior to Admission Medications   Prescriptions Last Dose Informant Patient Reported? Taking?    Bystolic 5 MG tablet   No No   Sig: TAKE 1 TABLET BY MOUTH TWICE A DAY   Vitamin D, Cholecalciferol, 25 MCG (1000 UT) CAPS  Self Yes No   Sig: Take 3,000 Units by mouth    buPROPion (Wellbutrin XL) 300 mg 24 hr tablet   No No   Sig: Take 1 tablet (300 mg total) by mouth daily   clindamycin (CLEOCIN T) 1 %  Self Yes No   Sig: APPLY TO ACNE TWICE DAILY   desmopressin (DDAVP) 0 2 mg tablet   No No   Sig: Take 1 tablet (0 2 mg total) by mouth 2 (two) times a day   fludrocortisone (FLORINEF) 0 1 mg tablet  Self No No   Sig: Take 1 tablet (0 1 mg total) by mouth 2 (two) times a day   levothyroxine 100 mcg tablet   No No   Sig: Take 1 tablet (100 mcg total) by mouth daily   midodrine (PROAMATINE) 10 MG tablet  Self No No   Sig: TAKE 1 TABLET BY MOUTH THREE TIMES A DAY   naproxen (NAPROSYN) 500 mg tablet  Self No No   Sig: Take 1 tablet (500 mg total) by mouth daily as needed for moderate pain   norgestrel-ethinyl estradiol (Low-Ogestrel) 0 3 mg-30 mcg per tablet  Self No No   Sig: Take 1 tablet by mouth daily   potassium chloride (MICRO-K) 10 MEQ CR capsule   No No   Sig: Take 1 capsule (10 mEq total) by mouth daily   traMADol-acetaminophen (ULTRACET) 37 5-325 mg per tablet  Self No No   Sig: Take 1 tablet by mouth every 6 (six) hours as needed for moderate pain   venlafaxine (EFFEXOR-XR) 150 mg 24 hr capsule  Self No No   Sig: TAKE 1 CAPSULE BY MOUTH EVERY DAY   venlafaxine (EFFEXOR-XR) 37 5 mg 24 hr capsule  Self No No   Sig: TAKE 1 CAPSULE BY MOUTH EVERY DAY      Facility-Administered Medications: None       Past Medical History:   Diagnosis Date   • Congenital dislocation of hip    • Depression     LAST ASSESSED: 8/4/12   • Disease of thyroid gland    • Pectus excavatum    • Scoliosis        Past Surgical History:   Procedure Laterality Date   • DEVEN ACETABULAR OSTEOTOMY Left     ACETABULAR OSTEOTOMY       Family History   Problem Relation Age of Onset   • Asthma Mother    • Hyperlipidemia Mother    • Hypertension Mother    • Diabetes type II Mother    • Diabetes Mother    • Hyperlipidemia Father    • Hypertension Father    • Melanoma Family         AMELANOTIC MELANOMA OF THE SKIN   • Hyperlipidemia Family    • Hypertension Family    • Prostate cancer Family         MALIGNANT PROSTATIC NEOPLASM G1   • Varicose Veins Family         OF LOWER EXTREMITIES     I have reviewed and agree with the history as documented      E-Cigarette/Vaping   • E-Cigarette Use Never User      E-Cigarette/Vaping Substances   • Nicotine No    • THC No    • CBD No    • Flavoring No    • Other No    • Unknown No      Social History     Tobacco Use   • Smoking status: Never Smoker   • Smokeless tobacco: Never Used   Vaping Use   • Vaping Use: Never used   Substance Use Topics   • Alcohol use: Yes     Comment: SOCIAL   • Drug use: No       Review of Systems   Constitutional: Negative for chills and fever  HENT: Negative for sore throat  Eyes: Negative for pain and visual disturbance  Respiratory: Negative for cough and shortness of breath  Cardiovascular: Negative for chest pain, palpitations and leg swelling  Gastrointestinal: Negative for abdominal pain, diarrhea, nausea and vomiting  Genitourinary: Negative for decreased urine volume, difficulty urinating, flank pain, frequency and hematuria  Musculoskeletal: Negative for back pain, gait problem and myalgias (Denies joint pain and body aches, but notes generalized discomfort with the leg shaking)  Skin: Negative for color change, rash and wound  Neurological: Positive for tremors and weakness (Bilateral leg/knee buckling when walking, worse when downstairs)  Negative for dizziness, seizures, syncope, light-headedness, numbness and headaches  All other systems reviewed and are negative  Physical Exam  Physical Exam  Vitals and nursing note reviewed  Constitutional:       General: She is not in acute distress  Appearance: Normal appearance  She is well-developed and normal weight  She is not ill-appearing, toxic-appearing or diaphoretic  HENT:      Head: Normocephalic and atraumatic  Nose: Nose normal       Mouth/Throat:      Lips: Pink  No lesions  Mouth: Mucous membranes are moist       Pharynx: Oropharynx is clear  No oropharyngeal exudate or posterior oropharyngeal erythema  Eyes:      General: Lids are normal  Vision grossly intact  Gaze aligned appropriately  No visual field deficit  Extraocular Movements: Extraocular movements intact  Right eye: Normal extraocular motion and no nystagmus  Left eye: Normal extraocular motion and no nystagmus        Conjunctiva/sclera: Conjunctivae normal    Neck:      Trachea: Trachea and phonation normal  No abnormal tracheal secretions  Cardiovascular:      Rate and Rhythm: Normal rate and regular rhythm  Pulses: Normal pulses  Radial pulses are 2+ on the right side and 2+ on the left side  Dorsalis pedis pulses are 2+ on the right side and 2+ on the left side  Heart sounds: Normal heart sounds, S1 normal and S2 normal  No murmur heard  Pulmonary:      Effort: Pulmonary effort is normal  No tachypnea or respiratory distress  Breath sounds: Normal breath sounds and air entry  No stridor, decreased air movement or transmitted upper airway sounds  No decreased breath sounds  Abdominal:      Palpations: Abdomen is soft  Tenderness: There is no abdominal tenderness  Musculoskeletal:         General: Normal range of motion  Cervical back: Normal, normal range of motion and neck supple  No bony tenderness  Thoracic back: Normal  No bony tenderness  Lumbar back: Normal  No bony tenderness  Right hip: No bony tenderness  Normal range of motion  Normal strength  Left hip: No bony tenderness  Normal range of motion  Normal strength  Right knee: No swelling or bony tenderness  Normal range of motion  Left knee: No swelling or bony tenderness  Normal range of motion  Right lower leg: No swelling  No edema  Left lower leg: No swelling  No edema  Right ankle: No swelling  Normal range of motion  Left ankle: No swelling  Normal range of motion  Right foot: Normal range of motion and normal capillary refill  No swelling  Left foot: Normal range of motion and normal capillary refill  No swelling  Comments: 5/5 strength throughout, moves all extremities spontaneously, evidence of shaking to bilateral lower extremities with right greater than left when raised off of bed against gravity  No evidence of tremor to bilateral hands     Skin:     General: Skin is warm and dry  Capillary Refill: Capillary refill takes less than 2 seconds  Neurological:      General: No focal deficit present  Mental Status: She is alert and oriented to person, place, and time  Mental status is at baseline  GCS: GCS eye subscore is 4  GCS verbal subscore is 5  GCS motor subscore is 6  Cranial Nerves: Cranial nerves are intact  No cranial nerve deficit, dysarthria or facial asymmetry  Sensory: Sensation is intact  No sensory deficit  Motor: Tremor present  No weakness, atrophy, abnormal muscle tone or pronator drift  Coordination: Coordination is intact  Coordination normal       Gait: Gait abnormal          Vital Signs  ED Triage Vitals [11/04/22 2233]   Temperature Pulse Respirations Blood Pressure SpO2   98 5 °F (36 9 °C) 98 18 115/56 99 %      Temp Source Heart Rate Source Patient Position - Orthostatic VS BP Location FiO2 (%)   Oral Monitor Sitting Right arm --      Pain Score       No Pain           Vitals:    11/04/22 2233 11/05/22 0028 11/05/22 0130   BP: 115/56 123/79    Pulse: 98 95 91   Patient Position - Orthostatic VS: Sitting           Visual Acuity      ED Medications  Medications   sodium chloride 0 9 % bolus 1,000 mL (0 mL Intravenous Stopped 11/5/22 0415)   diphenhydrAMINE (BENADRYL) injection 25 mg (25 mg Intravenous Given 11/5/22 0101)       Diagnostic Studies  Results Reviewed     Procedure Component Value Units Date/Time    TSH, 3rd generation with Free T4 reflex [[id number]]  (Normal) Collected: 11/04/22 2340    Lab Status: Final result Specimen: Blood from Arm, Right Updated: 11/05/22 0109     TSH 3RD GENERATON 1 446 uIU/mL     Narrative:      Patients undergoing fluorescein dye angiography may retain small amounts of fluorescein in the body for 48-72 hours post procedure  Samples containing fluorescein can produce falsely depressed TSH values  If the patient had this procedure,a specimen should be resubmitted post fluorescein clearance  Fingerstick Glucose (POCT) [[id number]]  (Normal) Collected: 11/05/22 0100    Lab Status: Final result Updated: 11/05/22 0105     POC Glucose 88 mg/dl     Wingo draw [[id number]] Collected: 11/04/22 2340    Lab Status: Final result Specimen: Blood from Arm, Right Updated: 11/05/22 0103    Narrative: The following orders were created for panel order [place]ingo draw  Procedure                               Abnormality         Status                     ---------                               -----------         ------                     Lamona Mike Top on ZYHF[[id number]]                           Final result               Gold top on HAFT[[id number]]                                 Final result               Green / Yellow tube on NWOW[[id number]]                      Final result               Green / Black tube on hold[[id number]]                       Final result               Lavender Top 3 ml on hold[[id number]]                        Final result                 Please view results for these tests on the individual orders      HS Troponin 0hr (reflex protocol) [[id number]]  (Normal) Collected: 11/04/22 2340    Lab Status: Final result Specimen: Blood from Arm, Right Updated: 11/05/22 0100     hs TnI 0hr <2 ng/L     Comprehensive metabolic panel [[id number]]  (Abnormal) Collected: 11/04/22 2340    Lab Status: Final result Specimen: Blood from Arm, Right Updated: 11/05/22 0053     Sodium 139 mmol/L      Potassium 4 2 mmol/L      Chloride 106 mmol/L      CO2 28 mmol/L      ANION GAP 5 mmol/L      BUN 12 mg/dL      Creatinine 0 75 mg/dL      Glucose 115 mg/dL      Calcium 9 2 mg/dL      AST 18 U/L      ALT 19 U/L      Alkaline Phosphatase 119 U/L      Total Protein 7 0 g/dL      Albumin 4 4 g/dL      Total Bilirubin 0 37 mg/dL      eGFR 107 ml/min/1 73sq m     Narrative:      [facility] guidelines for Chronic Kidney Disease (CKD):   •  Stage 1 with normal or high GFR (GFR > 90 mL/min/1 73 square meters)  •  Stage 2 Mild CKD (GFR = 60-89 mL/min/1 73 square meters)  •  Stage 3A Moderate CKD (GFR = 45-59 mL/min/1 73 square meters)  •  Stage 3B Moderate CKD (GFR = 30-44 mL/min/1 73 square meters)  •  Stage 4 Severe CKD (GFR = 15-29 mL/min/1 73 square meters)  •  Stage 5 End Stage CKD (GFR <15 mL/min/1 73 square meters)  Note: GFR calculation is accurate only with a steady state creatinine    Magnesium [[id number]]  (Normal) Collected: 11/04/22 2340    Lab Status: Final result Specimen: Blood from Arm, Right Updated: 11/05/22 0053     Magnesium 2 0 mg/dL     CBC and differential [[id number]] Collected: 11/04/22 2340    Lab Status: Final result Specimen: Blood from Arm, Right Updated: 11/05/22 0044     WBC 7 12 Thousand/uL      RBC 4 72 Million/uL      Hemoglobin 14 3 g/dL      Hematocrit 44 2 %      MCV 94 fL      MCH 30 3 pg      MCHC 32 4 g/dL      RDW 12 2 %      MPV 10 3 fL      Platelets 780 Thousands/uL      nRBC 0 /100 WBCs      Neutrophils Relative 67 %      Immat GRANS % 0 %      Lymphocytes Relative 25 %      Monocytes Relative 6 %      Eosinophils Relative 2 %      Basophils Relative 0 %      Neutrophils Absolute 4 73 Thousands/µL      Immature Grans Absolute 0 02 Thousand/uL      Lymphocytes Absolute 1 76 Thousands/µL      Monocytes Absolute 0 42 Thousand/µL      Eosinophils Absolute 0 17 Thousand/µL      Basophils Absolute 0 02 Thousands/µL                  No orders to display              Procedures  ECG 12 Lead Documentation Only    Date/Time: 11/5/2022 3:02 AM  Performed by: Princess Zhou  Authorized by:  LEYDI Zhou     Indications / Diagnosis:  Shaking  ECG reviewed by me, the ED Provider: yes    Patient location:  ED  Previous ECG:     Previous ECG:  Unavailable    Comparison to cardiac monitor: Yes    Interpretation:     Interpretation: non-specific    Rate:     ECG rate:  95    ECG rate assessment: normal    Rhythm:     Rhythm: sinus rhythm    Ectopy:     Ectopy: none QRS:     QRS axis:  Normal    QRS intervals:  Normal  Conduction:     Conduction: normal    Comments:      Sinus rhythm, rightward axis, normal intervals, no ST elevation, nonspecific T-wave abnormalities             ED Course  ED Course as of 11/05/22 0824   Sat Nov 05, 2022   0053 Comprehensive metabolic panel(!)  No metabolic abnormality, no AFSANEH, no transaminitis   0053 CBC and differential  No leukocytosis no anemia   0053 Magnesium  Within defined limits   0105 Patient updated on CMP, CBC results  Plan to obtain EKG, troponin, and TSH given history of hypothyroidism  Plan for IV Benadryl and fluids and reassessment of symptoms  0122 TSH, 3rd generation with Free T4 reflex  Within normal limits, levothyroxine dosage therapeutic   0122 Fingerstick Glucose (POCT)  No hypoglycemia source of shaking   0122 HS Troponin 0hr (reflex protocol)  Negative, symptoms greater than 3 hours, no indication for repeat troponin   0205 On reassessment, patient ambulatory with improvement in stability of gait  She continues to note slight "weakness" in her knees on making turns  Decrease in tremors and shaking on exam    0210 Plan made with patient for reach out to on-call psychiatry did discuss increasing Wellbutrin and negative ER workup  Call made to on-call psychiatry awaiting call back   [phone] with on-call psychiatry [facility] with spoken to the patient earlier in the night and advised her to come to the ER after phone triage  After discussing patient case including physical exam, lab results, and clinical improvement after fluids and IV Benadryl, plan for discharge home with holding of Wellbutrin dose for 2 day with resuming dosing tomorrow at lower dose of 300 mg of Wellbutrin  Less likely a cardiac or pulmonary cause to the patient's symptoms given stable vital signs, benign exam, and lab work  0400 IV fluids completed  I returned to the bedside to discuss follow-up care with psychiatry    Patient mother agreeable to plan and patient continues to note that her shaking has improved and not returned  Vital signs remained stable  Patient ambulatory with slow, steady gait independently  DC paperwork reviewed with emphasis on strict return precautions, follow-up with Psychiatry, and new medication dosing  MDM  Number of Diagnoses or Management Options  Shaking: new and requires workup  Diagnosis management comments: DDx including but not limited to: tremors, dystonic reaction, akathisia, metabolic abnormality, adverse medication reaction; doubt cardiac etiology, infection       Amount and/or Complexity of Data Reviewed  Clinical lab tests: ordered and reviewed  Decide to obtain previous medical records or to obtain history from someone other than the patient: yes  Review and summarize past medical records: yes  Discuss the patient with other providers: yes (Idris Rodriguez)    Risk of Complications, Morbidity, and/or Mortality  General comments: See ED course for MDM and disposition discussion    Patient Progress  Patient progress: improved      Disposition  Final diagnoses:   Shaking     Time reflects when diagnosis was documented in both MDM as applicable and the Disposition within this note     Time User Action Codes Description Comment    11/5/2022  3:00 AM Diya Cardona Add [R25 1] Shaking       ED Disposition     ED Disposition   Discharge    Condition   Stable    Date/Time   Sat Nov 5, 2022  3:00 AM    Comment   Courtney Parker discharge to home/self care                 Follow-up Information     Follow up With Specialties Details Why Contact Info Additional Information    [facility] Emergency Medicine Go to  If symptoms worsen [address] [facility] [address] [facility] Emergency Department, [address], [place], [place], [address] [facility] Psychiatry Call on 11/7/2022  [address] [place] [address]  [phone]             Discharge Medication List as of 11/5/2022  3:07 AM      CONTINUE these medications which have NOT CHANGED    Details   Bystolic 5 MG tablet TAKE 1 TABLET BY MOUTH TWICE A DAY, Normal      clindamycin (CLEOCIN T) 1 % APPLY TO ACNE TWICE DAILY, Historical Med      desmopressin (DDAVP) 0 2 mg tablet Take 1 tablet (0 2 mg total) by mouth 2 (two) times a day, Starting Wed 9/28/2022, Normal      fludrocortisone (FLORINEF) 0 1 mg tablet Take 1 tablet (0 1 mg total) by mouth 2 (two) times a day, Starting Wed 7/6/2022, Normal      levothyroxine 100 mcg tablet Take 1 tablet (100 mcg total) by mouth daily, Starting Wed 9/28/2022, Normal      midodrine (PROAMATINE) 10 MG tablet TAKE 1 TABLET BY MOUTH THREE TIMES A DAY, Normal      naproxen (NAPROSYN) 500 mg tablet Take 1 tablet (500 mg total) by mouth daily as needed for moderate pain, Starting Tue 8/6/2019, Normal      norgestrel-ethinyl estradiol (Low-Ogestrel) 0 3 mg-30 mcg per tablet Take 1 tablet by mouth daily, Starting Mon 11/1/2021, Normal      potassium chloride (MICRO-K) 10 MEQ CR capsule Take 1 capsule (10 mEq total) by mouth daily, Starting Wed 9/28/2022, Normal      traMADol-acetaminophen (ULTRACET) 37 5-325 mg per tablet Take 1 tablet by mouth every 6 (six) hours as needed for moderate pain, Starting Tue 12/14/2021, Normal      !! venlafaxine (EFFEXOR-XR) 150 mg 24 hr capsule TAKE 1 CAPSULE BY MOUTH EVERY DAY, Normal      !! venlafaxine (EFFEXOR-XR) 37 5 mg 24 hr capsule TAKE 1 CAPSULE BY MOUTH EVERY DAY, Normal      Vitamin D, Cholecalciferol, 25 MCG (1000 UT) CAPS Take 3,000 Units by mouth , Historical Med      buPROPion (FORFIVO XL) 450 MG 24 hr tablet Take 1 tablet (450 mg total) by mouth daily, Starting u 9/29/2022, Normal       !! - Potential duplicate medications found  Please discuss with provider  No discharge procedures on file      PDMP Review Value Time User    PDMP Reviewed  Yes 12/14/2021  1:13 PM Yolanda Erazo DO          ED Provider  Electronically Signed by           Janessa Mabry  11/05/22 0147

## 2022-11-05 NOTE — TELEPHONE ENCOUNTER
Requesting a call back from the on call regarding possible side effect patient is having to Wellbutrin     On call paged

## 2022-11-05 NOTE — TELEPHONE ENCOUNTER
Patient called saying that she is having side effect from the increase of her medication buPROPion (FORFIVO XL) 450 MG

## 2022-11-05 NOTE — DISCHARGE INSTRUCTIONS
Please hold your Wellbutrin dose for today, 11/05/2022  Resume taking your Wellbutrin tomorrow, 11/06/2022 at 300 mg  A new prescription has been sent to the pharmacy  Continue to monitor symptoms at home and increase her fluids to maintain her hydration  Change positions slowly and take her time when walking or exerting herself going up and down stairs  Call your psychiatry team and notify them of your visit today  I have spoken with the on-call provider and they are aware of your visit as well as the plan for holding a dose of Wellbutrin in resuming the lower dosing  Return to the emergency department if you develop lightheadedness/dizziness, chest pain, shortness of breath, weakness, worsening tremors/shaking, inability tolerate fluids, and muscle rigidity

## 2022-11-06 LAB
ATRIAL RATE: 95 BPM
P AXIS: 81 DEGREES
PR INTERVAL: 180 MS
QRS AXIS: 103 DEGREES
QRSD INTERVAL: 66 MS
QT INTERVAL: 332 MS
QTC INTERVAL: 417 MS
T WAVE AXIS: 56 DEGREES
VENTRICULAR RATE: 95 BPM

## 2022-11-07 ENCOUNTER — TELEPHONE (OUTPATIENT)
Dept: PSYCHIATRY | Facility: CLINIC | Age: 32
End: 2022-11-07

## 2022-11-07 ENCOUNTER — TELEMEDICINE (OUTPATIENT)
Dept: FAMILY MEDICINE CLINIC | Facility: OTHER | Age: 32
End: 2022-11-07

## 2022-11-07 DIAGNOSIS — U07.1 COVID-19: Primary | ICD-10-CM

## 2022-11-07 NOTE — TELEPHONE ENCOUNTER
LM on Courtney's VM that I was calling to follow up on her call over the weekend regarding her side effects to the Wellbutrin  Requested she call me back  Nursing number provided

## 2022-11-07 NOTE — PATIENT INSTRUCTIONS
Molnupiravir (By mouth)   Molnupiravir (lic-blu-BCJ-a-vir)  Treats coronavirus disease 2019 (COVID-19)  Brand Name(s):   There may be other brand names for this medicine  When This Medicine Should Not Be Used: This medicine is not right for everyone  Do not use it if you had an allergic reaction to molnupiravir  How to Use This Medicine:   Capsule  Your doctor will tell you how much medicine to use  Do not use more than directed  Take this medicine within 5 days of the onset of COVID-19 symptoms  Swallow the capsule whole with a glass of water  Do not crush, open, or chew it  This medicine comes with a Fact Sheet for Patients and Caregivers  Read and follow the information in the Fact Sheet carefully  Ask your doctor if you have any questions  Missed dose: If it is within 10 hours of the time it is usually taken, take it as soon as possible, and then go back to your regular schedule  If it is more than 10 hours, skip the missed dose and take your next dose at the regular time  Do not use extra medicine to make up for a missed dose  Store the medicine in a closed container at room temperature, away from heat, moisture, and direct light  Drugs and Foods to Avoid:      Ask your doctor or pharmacist before using any other medicine, including over-the-counter medicines, vitamins, and herbal products  Warnings While Using This Medicine:   Tell your doctor if you are pregnant or planning to become pregnant  If you are a woman who can get pregnant, your doctor may do tests to make sure you are not pregnant before starting this medicine  Use an effective form of birth control during treatment and for at least 4 days after the last dose  Male patients with female partners should use an effective form of birth control during treatment and for at least 3 months after the last dose  Tell your doctor right away if you become pregnant    Do not breastfeed during treatment and for at least 4 days after the last dose   Your doctor will check your progress and the effects of this medicine at regular visits  Keep all appointments  Keep all medicine out of the reach of children  Never share your medicine with anyone  Possible Side Effects While Using This Medicine: If you notice these less serious side effects, talk with your doctor: Allergic reaction: Itching or hives, swelling in your face or hands, swelling or tingling in your mouth or throat, chest tightness, trouble breathing  If you notice these less serious side effects, talk with your doctor:  Diarrhea, nausea, vomiting  Dizziness, headache  If you notice other side effects that you think are caused by this medicine, tell your doctor  Call your doctor for medical advice about side effects  You may report side effects to FDA at 4-583-FDA-8648    © Copyright [employer] 2022 Information is for End User's use only and may not be sold, redistributed or otherwise used for commercial purposes  The above information is an  only  It is not intended as medical advice for individual conditions or treatments  Talk to your doctor, nurse or pharmacist before following any medical regimen to see if it is safe and effective for you  Fact Sheet for Patients And Caregivers   Emergency Use Authorization (EUA) Of LAGEVRIO™ (molnupiravir) capsules For Coronavirus Disease 2019 (COVID-19)   What is the most important information I should know about LAGEVRIO? Garcia Ban may cause serious side effects, including:    Garcia Ban may cause harm to your unborn baby  It is not known if LAGEVRIO will harm your baby if you take LAGEVRIO during pregnancy  o Garcia Ban is not recommended for use in pregnancy  o Garcia Ban has not been studied in pregnancy  Garcia Ban was studied in pregnant animals only   When Garcia Ban was given to pregnant animals, Garcia Ban caused harm to their unborn babies    o You and your healthcare provider may decide that you should take Garcia Ban during pregnancy if there are no other COVID-19 treatment options approved or authorized by the FDA that are accessible or clinically appropriate for you    o If you and your healthcare provider decide that you should take Roselinda Moll during pregnancy, you and your healthcare provider should discuss the known and potential benefits and the potential risks of taking LAGEVRIO during pregnancy  For individuals who are able to become pregnant:    You should use a reliable method of birth control (contraception) consistently and correctly during treatment with LAGEVRIO and for 4 days after the last dose of LAGEVRIO  Talk to your healthcare provider about reliable birth control methods  Before starting treatment with Roselinda Moll your healthcare provider may do a pregnancy test to see if you are pregnant before starting treatment with LAGEVRIO  Tell your healthcare provider right away if you become pregnant or think you may be pregnant during treatment with LAGEVRIO  Pregnancy Surveillance Program:    There is a pregnancy surveillance program for individuals who take Roselinda Moll during pregnancy  The purpose of this program is to collect information about the health of you and your baby  Talk to your healthcare provider about how to take part in this program     If you take Roselinda Moll during pregnancy and you agree to participate in the pregnancy surveillance program and allow your healthcare provider to share your information with [facility], then your healthcare provider will report your use of Roselinda Moll during pregnancy to [address] [place] [address]  by calling [phone] or pregnancyreporting msd com  For individuals who are sexually active with partners who are able to become pregnant: It is not known if LAGEVRIO can affect sperm  While the risk is regarded as low, animal studies to fully assess the potential for LAGEVRIO to affect the babies of males treated with LAGEVRIO have not been completed   A reliable method of birth control (contraception) should be used consistently and correctly during treatment with LAGEVRIO and for at least 3 months after the last dose  The risk to sperm beyond 3 months is not known  Studies to understand the risk to sperm beyond 3 months are ongoing  Talk to your healthcare provider       about reliable birth control methods  Talk to your healthcare provider if you have questions or concerns about how Maxine Mylene may affect sperm  You are being given this fact sheet because your healthcare provider believes it is necessary to provide you with Maxine Mylene for the treatment of adults with mild-to-moderate coronavirus disease 2019 (COVID-19) with positive results of direct SARS-CoV-2 viral testing, and who are at high risk for progression to severe COVID-19 including hospitalization or death, and for whom other COVID-19 treatment options approved or authorized by the FDA are not accessible or clinically appropriate  The [place]  Food and Drug Administration (FDA) has issued an Emergency Use Authorization (EUA) to make Maxine Mylene available during the COVID-19 pandemic (for more details about an EUA please see "What is an Emergency Use Authorization?" at the end of this document)  Maxine Mylene is not an FDA-approved medicine in the [place]  Read this Fact Sheet for information about LAGEVRIO  Talk to your healthcare provider about your options if you have any questions  It is your choice to take LAGEVRIO  What is COVID-19? COVID-19 is caused by a virus called a coronavirus  You can get COVID-19 through close contact with another person who has the virus  COVID-19 illnesses have ranged from very mild-to-severe, including illness resulting in death  While information so far suggests that most COVID-19 illness is mild, serious illness can happen and may cause some of your other medical conditions to become worse   Older people and people of all ages with severe, long lasting (chronic) medical conditions like heart disease, lung disease and diabetes, for example seem to be at higher risk of being hospitalized for COVID-19  What is LAGEVRIO? Belinda Idler is an investigational medicine used to treat mild-to-moderate COVID-19 in adults:    with positive results of direct SARS-CoV-2 viral testing, and    who are at high risk for progression to severe COVID-19 including hospitalization or death, and for whom other COVID-19 treatment options approved or authorized by the FDA are not accessible or clinically appropriate  The FDA has authorized the emergency use of LAGEVRIO for the treatment of mild-to-moderate COVID-19 in adults under an EUA  For more information on EUA, see the "What is an Emergency Use Authorization (EUA)?" section at the end of this Fact Sheet  Belinda Idler is not authorized:    for use in people less than 25years of age  for prevention of COVID-19     for people needing hospitalization for COVID-19     for use for longer than 5 consecutive days  What should I tell my healthcare provider before I take Belinda Idler? Tell your healthcare provider if you:    Have any allergies    Are breastfeeding or plan to breastfeed    Have any serious illnesses    Are taking any medicines (prescription, over-the-counter, vitamins, or herbal products)  How do I take LAGEVRIO? Take Belinda Idler exactly as your healthcare provider tells you to take it  Take 4 capsules of LAGEVRIO every 12 hours (for example, at 8 am and at 8 pm)    Take LAGEVRIO for 5 days  It is important that you complete the full 5 days of treatment with LAGEVRIO  Do not stop taking LAGEVRIO before you complete the full 5 days of treatment, even if you feel better  Take LAGEVRIO with or without food  You should stay in isolation for as long as your healthcare provider tells you to  Talk to your healthcare provider if you are not sure about how to properly isolate while you have COVID-19      Walt Galeas capsules whole  Do not open, break, or crush the capsules  If you cannot swallow capsules whole, tell your healthcare provider  What to do if you miss a dose: o If it has been less than 10 hours since the missed dose, take it as soon as you remember   o If it has been more than 10 hours since the missed dose, skip the missed dose and take your dose at the next scheduled time  Do not double the dose of LAGEVRIO to make up for a missed dose  What are the important possible side effects of LAGEVRIO? See, "What is the most important information I should know about LAGEVRIO?"      Allergic Reactions  Allergic reactions can happen in people taking LAGEVRIO, even after only 1 dose  Stop taking LAGEVRIO and call your healthcare provider right away if you get any of the following symptoms of an allergic reaction:   o hives   o rapid heartbeat   o trouble swallowing or breathing   o swelling of the mouth, lips, or face   o throat tightness   o hoarseness   o skin rash     The most common side effects of LAGEVRIO are:    diarrhea    nausea    dizziness     These are not all the possible side effects of LAGEVRIO  Not many people have taken LAGEVRIO  Serious and unexpected side effects may happen  This medicine is still being studied, so it is possible that all of the risks are not known at this time  What other treatment choices are there? Veklury (remdesivir) is FDA-approved as an intravenous (IV) infusion for the treatment of mild-to-moderate [id number]-42 in certain adults and children  Talk with your doctor to see if Reggy Edward is appropriate for you  Like Amalia Fraire may also allow for the emergency use of other medicines to treat people with COVID-19  Go to Keely singh for more information  It is your choice to be treated or not to be treated with LAGEVRIO   Should you decide not to take it, it will not change your standard medical care  What if I am breastfeeding? Breastfeeding is not recommended during treatment with LAGEVRIO and for 4 days after the last dose of LAGEVRIO  If you are breastfeeding or plan to breastfeed, talk to your healthcare provider about your options and specific situation before taking LAGEVRIO  How do I report side effects with LAGEVRIO? Contact your healthcare provider if you have any side effects that bother you or do not go away  Report side effects to FDA MedWatch at www fda gov/medwatch or call 2-473-FME-3833 (1-373.651.5656)  How should I store 5 [facility]? Store LAGEVRIO capsules at room temperature between 68°F to 77°F (20°C to 25°C)  Keep LAGEVRIO and all medicines out of the reach of children and pets  How can I learn more about COVID-19? Ask your healthcare provider  Visit www Hiri [email or web address]    Contact your local or state public health department  Call [address]  at [phone] (toll free in the [place] )    Visit www [email or web address]  com     What Is an Emergency Use Authorization (EUA)? The [place] FDA has made 5 [facility] available under an emergency access mechanism called an Emergency Use Authorization (EUA) The EUA is supported by a  of Health and Human Service (HHS) declaration that circumstances exist to justify emergency use of drugs and biological products during the COVID-19 pandemic  5 [facility] for the treatment of mild-to-moderate COVID-19 in adults with positive results of direct SARS-CoV-2 viral testing, who are at high risk for progression to severe COVID-19, including hospitalization or death, and for whom alternative COVID-19 treatment options approved or authorized by FDA are not accessible or clinically appropriate, has not undergone the same type of review as an FDA-approved product   In issuing an EUA under the YKMBY-52 public health emergency, the FDA has determined, among other things, that based on the total amount of scientific evidence available including data from adequate and well-controlled clinical trials, if available, it is reasonable to believe that the product may be effective for diagnosing, treating, or preventing COVID-19, or a serious or life-threatening disease or condition caused by COVID-19; that the known and potential benefits of the product, when used to diagnose, treat, or prevent such disease or condition, outweigh the known and potential risks of such product; and that there are no adequate, approved, and available alternatives  All of these criteria must be met to allow for the product to be used in the treatment of patients during the COVID-19 pandemic  The EUA for Arlander Halo is in effect for the duration of the COVID-19 declaration justifying emergency use of LAGEVRIO, unless terminated or revoked (after which Arlander Halo may no longer be used under the EUA)  Manuf  for: [employer], [address] [facility], [place]   For patent information: www msd com/research/patent   Copyright © 9850[employer] [address] and its affiliates  All rights reserved     [id number]   Revised: June 2022

## 2022-11-07 NOTE — TELEPHONE ENCOUNTER
Nichelle Oneal returned my call  States she is currently taking 300 MG of Wellbutrin and is doing well  She is not experiencing any side effects at this dose  Asked if she wants a sooner appointment and she said she is ok to wait till next appointment in January

## 2022-11-07 NOTE — PROGRESS NOTES
COVID-19 Outpatient Progress Note    Assessment/Plan:    Problem List Items Addressed This Visit    None     Visit Diagnoses     COVID-19    -  Primary    Relevant Medications    molnupiravir 200 mg capsule         Disposition:     Patient has asymptomatic or mild COVID-19 infection  Based off CDC guidelines, they were recommended to isolate for 5 days  If they are asymptomatic or symptoms are improving with no fevers in the past 24 hours, isolation may be ended followed by 5 days of wearing a mask when around othes to minimize risk of infecting others  If still have a fever or other symptoms have not improved, continue to isolate until they improve  Regardless of when they end isolation, avoid being around people who are more likely to get very sick from COVID-19 until at least day 11  Discussed over-the-counter management, ensure adequate hydration  Discussed antiviral medication options and will prescribe Molnupiravir - medication information also attached to after visit summary  Will follow-up with patient via virtual visit on Friday 11/11  Discussed reasons to call office and or proceed to emergency department such as severe shortness of breath/inability to catch breath, difficulty speaking full sentences, high fever, altered mental status, chest pain, syncope, etc  Anticipatory guidance given  Pt instructed to call back should any new acute or worsening s/sx occur  Pt expressed understanding, all questions answered       Patient meets criteria for Molnupiravir and they have been counseled according to EUA documentation provided by the FDA  After discussion, patient agrees to treatment      Sigmund Androscoggin is an investigational medicine used to treat mild-to-moderate COVID-19 in adults with positive results of direct SARS-CoV-2 viral testing, and who are at high risk for progressing to severe COVID-19 including hospitalization or death, and for whom other COVID-19 treatment options authorized by the FDA are not accessible or clinically appropriate  The FDA has authorized the emergency use of molnupiravir for the treatment of mild-tomoderate COVID-19 in adults under an EUA  Loreda Daft is not authorized:  - for use in people less than 25years of age   - for prevention of COVID-19   - for people needing hospitalization for COVID-19   - for use for longer than 5 consecutive days    What is the most important information I should know about molnupiravir? Loreda Daft may cause serious side effects, including:    Loreda Daft may cause harm to your unborn baby  It is not known if molnupiravir will harm your baby if you take molnupiravir during pregnancy  - Loreda Daft is not recommended for use in pregnancy  - Loreda Daft has not been studied in pregnancy  Loreda Daft was studied in pregnant animals only  When molnupiravir was given to pregnant animals, molnupiravir caused harm to their unborn babies   - You and your healthcare provider may decide that you should take molnupiravir duringpregnancy if there are no other COVID-19 treatment options authorized by the FDA that are accessible or clinically appropriate for you  - If you and your healthcare provider decide that you should take molnupiravir during pregnancy, you and your healthcare provider should discuss the known and potential benefits and the potential risks of taking molnupiravir during pregnancy  For individuals who are able to become pregnant:  - You should use a reliable method of birth control (contraception) consistently and correctly during treatment with molnupiravir and for 4 days after the last dose of molnupiravir  Talk to your healthcare provider about reliable birth control methods  - Before starting treatment with molnupiravir your healthcare provider may do a pregnancy test to see if you are pregnant before starting treatment with molnupiravir    - Tell your healthcare provider right away if you become pregnant or think you may be pregnant during treatment with molnupiravir  Pregnancy Surveillance Program:  - There is a pregnancy surveillance program for individuals who take molnupiravir during pregnancy  The purpose of this program is to collect information about the health of you and your baby  Talk to your healthcare provider about how to take part in this program   - If you take molnupiravir during pregnancy and you agree to participate in the pregnancy surveillance program and allow your healthcare provider to share your information with Angelina Torre , then your healthcare provider will report your use of molnupiravir during pregnancy to [address] [place] [address],[address]  by calling [phone] or pregnancyreporting msd com  For individuals who are sexually active with partners who are able to become pregnant:  - It is not known if molnupiravir can affect sperm  While the risk is regarded as low, animal studies to fully assess the potential for molnupiravir to affect the babies of males treated with molnupiravir have not been completed  A reliable method of birth control (contraception) should be used consistently and correctly during treatment with molnupiravir and for at least 3 months after the last dose  The risk to sperm beyond 3 months is not known  Studies to understand the risk to sperm beyond 3 months are ongoing  Talk to your healthcare provider about reliable birth control methods  Talk to your healthcare provider if you have questions or concerns about how molnupiravir may affect sperm  How do I take molnupiravir? - Take molnupiravir exactly as your healthcare provider tells you to take it  - Take 4 capsules of molnupiravir every 12 hours (for example, at 8 am and at 8 pm)  - Take molnupiravir for 5 days  It is important that you complete the full 5 days of treatment with molnupiravir  Do not stop taking molnupiravir before you complete the full 5 days of treatment, even if you feel better    - Take molnupiravir with or without food  - You should stay in isolation for as long as your healthcare provider tells you to  Talk to your healthcare provider if you are not sure about how to properly isolate while you have COVID-19   - Swallow molnupiravir capsules whole  Do not open, break, or crush the capsules  If you cannot swallow capsules whole, tell your healthcare provider  What to do if you miss a dose:  - If it has been less than 10 hours since the missed dose, take it as soon as you remember  - If it has been more than 10 hours since the missed dose, skip the missed dose and take your dose at the next scheduled time  - Do not double the dose of molnupiravir to make up for a missed dose  What are the important possible side effects of molnupiravir? Possible side effects of molnupiravir are:  - See, Meg Morgan is the most important information I should know about molnupiravir?”  - Diarrhea  - Nausea  - Dizziness    These are not all the possible side effects of molnupiravir  Not many people have taken molnupiravir  Serious and unexpected side effects may happen  This medicine is still being studied, so it is possible that all of the risks are not known at this time  What other treatment choices are there? Like Troy Anger may allow for the emergency use of other medicines to treat people with COVID-19  Go to [facility] for more information  It is your choice to be treated or not to be treated with molnupiravir  Should you decide not to take it, it will not change your standard medical care  What if I am breastfeeding? Breastfeeding is not recommended during treatment with molnupiravir and for 4 days after the last dose of molnupiravir   If you are breastfeeding or plan to breastfeed, talk to your healthcare provider about your options and specific situation before taking molnupiravir  How do I report side effects with molnupiravir? Contact your healthcare provider if you have any side effects that bother you or do not go away  Report side effects to FDA MedWatch at www fda gov/medwatch or call 1-800-VXY-1397 (3-347-603-0740)  How should I store [address]? - Store molnupiravir capsules at room temperature between 68°F to 77°F (20°C to 25°C)  - Keep molnupiravir and all medicines out of the reach of children and pets  Full fact sheet for patients, parents, and caregivers can be found at: [employer]    I have spent 9 minutes directly with the patient  Greater than 50% of this time was spent in counseling/coordination of care regarding: risks and benefits of treatment options, instructions for management, patient and family education and impressions  Encounter provider: Radha Garg MD     Provider located at: [address] [address]     Recent Visits  No visits were found meeting these conditions  Showing recent visits within past 7 days and meeting all other requirements  Today's Visits  Date Type Provider Dept   11/07/22 Telemedicine Radha Garg MD [facility]   Showing today's visits and meeting all other requirements  Future Appointments  No visits were found meeting these conditions  Showing future appointments within next 150 days and meeting all other requirements     This virtual check-in was done via [employer] [address] and patient was informed that this is a secure, HIPAA-compliant platform  She agrees to proceed  Patient agrees to participate in a virtual check in via telephone or video visit instead of presenting to the office to address urgent/immediate medical needs  Patient is aware this is a billable service  She acknowledged consent and understanding of privacy and security of the video platform   The patient has agreed to participate and understands they can discontinue the visit at any time  After connecting through [facility], the patient was identified by name and date of birth  Sally Oakley was informed that this was a telemedicine visit and that the exam was being conducted confidentially over secure lines  My office door was closed  No one else was in the room  Sally Oakley acknowledged consent and understanding of privacy and security of the telemedicine visit  I informed the patient that I have reviewed her record in Epic and presented the opportunity for her to ask any questions regarding the visit today  The patient agreed to participate  Verification of patient location:  Patient is located in the following state in which I hold an active license: [place]    Subjective:   Sally Oakley is a 29 y o  female who has been screened for COVID-19  Symptom change since last report: unchanged  Patient's symptoms include fatigue, sore throat, cough and headache  Patient denies fever and chills  - Date of symptom onset: 11/6/2022  - Date of positive COVID-19 test: 11/6/2022  Type of test: Home antigen  Patient with typical symptoms of COVID-19 and they attest that they were positive on home rapid antigen testing  Image of positive result is not able to be uploaded into their chart  COVID-19 vaccination status: Fully vaccinated with booster    Shaunna Knowles has been staying home and has isolated themselves in her home  She is taking care to not share personal items and is cleaning all surfaces that are touched often, like counters, tabletops, and doorknobs using household cleaning sprays or wipes  She is wearing a mask when she leaves her room  Patient began with COVID like symptoms yesterday  Patient's family members in home all positive for COVID, patient took home COVID test which was also positive  Cough sore throat headache fatigue our current complaints      Lab Results   Component Value Date    SARSCOV2 NOT DETECTED 04/06/2022 Review of Systems   Constitutional: Positive for fatigue  Negative for chills and fever  HENT: Positive for sore throat  Respiratory: Positive for cough  Neurological: Positive for headaches  All other systems reviewed and are negative  Current Outpatient Medications on File Prior to Visit   Medication Sig   • buPROPion (Wellbutrin XL) 300 mg 24 hr tablet Take 1 tablet (300 mg total) by mouth daily   • Bystolic 5 MG tablet TAKE 1 TABLET BY MOUTH TWICE A DAY   • clindamycin (CLEOCIN T) 1 % APPLY TO ACNE TWICE DAILY   • desmopressin (DDAVP) 0 2 mg tablet Take 1 tablet (0 2 mg total) by mouth 2 (two) times a day   • fludrocortisone (FLORINEF) 0 1 mg tablet Take 1 tablet (0 1 mg total) by mouth 2 (two) times a day   • levothyroxine 100 mcg tablet Take 1 tablet (100 mcg total) by mouth daily   • midodrine (PROAMATINE) 10 MG tablet TAKE 1 TABLET BY MOUTH THREE TIMES A DAY   • naproxen (NAPROSYN) 500 mg tablet Take 1 tablet (500 mg total) by mouth daily as needed for moderate pain   • norgestrel-ethinyl estradiol (Low-Ogestrel) 0 3 mg-30 mcg per tablet Take 1 tablet by mouth daily   • potassium chloride (MICRO-K) 10 MEQ CR capsule Take 1 capsule (10 mEq total) by mouth daily   • traMADol-acetaminophen (ULTRACET) 37 5-325 mg per tablet Take 1 tablet by mouth every 6 (six) hours as needed for moderate pain   • venlafaxine (EFFEXOR-XR) 150 mg 24 hr capsule TAKE 1 CAPSULE BY MOUTH EVERY DAY   • venlafaxine (EFFEXOR-XR) 37 5 mg 24 hr capsule TAKE 1 CAPSULE BY MOUTH EVERY DAY   • Vitamin D, Cholecalciferol, 25 MCG (1000 UT) CAPS Take 3,000 Units by mouth        Objective:    LMP 10/21/2022 (Approximate)      Physical Exam  Constitutional:       General: She is not in acute distress  Appearance: She is not ill-appearing  HENT:      Head: Normocephalic and atraumatic  Nose: Congestion present  Eyes:      General: No scleral icterus  Pulmonary:      Effort: No respiratory distress        Comments: Patient did not appear to be in respiratory distress or with increased work of breathing on video exam Patient is also speaking in full sentences without difficulty  No coughing observed    Skin:     Coloration: Skin is not jaundiced  Neurological:      Mental Status: She is alert and oriented to person, place, and time     Psychiatric:         Mood and Affect: Mood normal          Behavior: Behavior normal        Olinda Daniels MD

## 2022-11-08 LAB — EXT SARS-COV-2: POSITIVE

## 2022-11-25 PROBLEM — L50.9 HIVES: Status: RESOLVED | Noted: 2017-09-07 | Resolved: 2022-11-25

## 2022-11-25 PROBLEM — J31.0 RHINITIS, CHRONIC: Status: RESOLVED | Noted: 2017-03-22 | Resolved: 2022-11-25

## 2022-11-25 PROBLEM — M54.9 BACK PAIN: Status: RESOLVED | Noted: 2018-07-19 | Resolved: 2022-11-25

## 2022-11-25 NOTE — PROGRESS NOTES
Diagnoses and all orders for this visit:    Encntr for gyn exam (general) (routine) w/o abn findings  -     norgestrel-ethinyl estradiol (Low-Ogestrel) 0 3 mg-30 mcg per tablet; Take 1 tablet by mouth daily    Mass of upper outer quadrant of left breast  -     US breast left limited (diagnostic); Future      Perineal hygiene reviewed   Weight bearing exercises minium of 150 mins/weekly advised  Kegel exercises recommended  SBE encouraged, ASCCP guidelines reviewed  Condoms encouraged with all sexual activity to prevent STI's  Gardisil vaccines recommended up to age 39  Calcium/ Vit D dietary requirements discussed,   Advised to call with any issues,  all concerns & questions addressed  See provided information in your after visit summary     F/U Annually and PRN      Health Maintenance:    Last PAP: 10/26/2020 Neg  Next PAP [id number]    Last Mammogram: Not on file  advised age 38     Last Colonoscopy: Not on file    advised age 41    Gardisil: Completed      Subjective    CC: Yearly Exam      Unknown Sheri is a 29 y o  female here for an annual exam  Delia Barthel  GYN hx includes:  OCPs for menstrual management,  No personal Hx of breast, cervical, ovarian or colon CA  Family hx of: P cousin with BC in her 42's    Medically stable, reports no changes in medical Hx, follows with PMD    Patient's last menstrual period was 11/10/2022  Her menstrual cycles are regular every 28-30 days  She denies issues with bleeding or her menses  Denies history of abnormal pap smear  She denies breast concerns, abnormal vaginal discharge, vaginal itching, odor, irritation, bowel/bladder dysfunction, urinary symptoms, pelvic pain, or dyspareunia today  She is not sexually active  She does not want STD testing today        Past Medical History:   Diagnosis Date   • Congenital dislocation of hip    • Depression     LAST ASSESSED: 8/4/12   • Disease of thyroid gland    • Pectus excavatum    • Scoliosis      Past Surgical History:   Procedure Laterality Date   • DEVEN ACETABULAR OSTEOTOMY Left     ACETABULAR OSTEOTOMY       Immunization History   Administered Date(s) Administered   • COVID-19 MODERNA VACC 0 5 ML IM 12/13/2021   • COVID-19 PFIZER VACCINE 0 3 ML IM 04/11/2021, 05/03/2021   • DTaP 5 1992, 1992, 02/05/1993, 02/21/1994, 07/21/1997   • HPV Quadrivalent 04/16/2007, 07/06/2007, 10/23/2007   • Hep A, adult 02/19/2013   • Hep B, Adolescent or Pediatric 03/04/1993   • Hep B, adult 1992, 1992, 02/05/1993, 05/14/2003   • HiB 03/05/1993   • Hib (PRP-OMP) 1992, 1992, 02/05/1993, 11/30/1993   • INFLUENZA 12/12/2005, 11/06/2006, 10/16/2007, 11/18/2008, 11/10/2009, 10/25/2010, 09/20/2018   • IPV 1992, 1992, 02/21/1994, 07/21/1997   • Influenza Quadrivalent Preservative Free 3 years and older IM 10/13/2017   • Influenza Quadrivalent, 6-35 Months IM 10/18/2016   • Influenza, injectable, quadrivalent, preservative free 0 5 mL 09/20/2018, 09/09/2019, 09/24/2020, 11/04/2021   • Influenza, seasonal, injectable 1992, 10/12/2011, 11/07/2013   • MMR 11/02/1993, 02/05/1998   • Meningococcal Conjugate (MCV4O) 03/26/2013   • Meningococcal MCV4P 05/08/2013   • Meningococcal, Unknown Serogroups 08/01/2005   • Pneumococcal Polysaccharide PPV23 06/08/2016   • Td (adult), adsorbed 07/23/2003   • Tdap 08/04/2008, 08/06/2019   • Tuberculin Skin Test-PPD Intradermal 01/07/2014   • Varicella 08/02/1995, 08/04/2008       Family History   Problem Relation Age of Onset   • Asthma Mother    • Hyperlipidemia Mother    • Hypertension Mother    • Diabetes type II Mother    • Diabetes Mother    • Hyperlipidemia Father    • Hypertension Father    • No Known Problems Brother    • Melanoma Family         AMELANOTIC MELANOMA OF THE SKIN   • Hyperlipidemia Family    • Hypertension Family    • Prostate cancer Family         MALIGNANT PROSTATIC NEOPLASM G1   • Varicose Veins Family         OF LOWER EXTREMITIES Social History     Tobacco Use   • Smoking status: Never   • Smokeless tobacco: Never   Vaping Use   • Vaping Use: Never used   Substance Use Topics   • Alcohol use: Yes     Comment: SOCIAL   • Drug use: No       Current Outpatient Medications:   •  buPROPion (Wellbutrin XL) 300 mg 24 hr tablet, Take 1 tablet (300 mg total) by mouth daily, Disp: 30 tablet, Rfl: 0  •  Bystolic 5 MG tablet, TAKE 1 TABLET BY MOUTH TWICE A DAY, Disp: 60 tablet, Rfl: 5  •  clindamycin (CLEOCIN T) 1 %, APPLY TO ACNE TWICE DAILY, Disp: , Rfl: 5  •  desmopressin (DDAVP) 0 2 mg tablet, Take 1 tablet (0 2 mg total) by mouth 2 (two) times a day, Disp: 60 tablet, Rfl: 5  •  fludrocortisone (FLORINEF) 0 1 mg tablet, Take 1 tablet (0 1 mg total) by mouth 2 (two) times a day, Disp: 60 tablet, Rfl: 5  •  levothyroxine 100 mcg tablet, Take 1 tablet (100 mcg total) by mouth daily, Disp: 90 tablet, Rfl: 0  •  midodrine (PROAMATINE) 10 MG tablet, TAKE 1 TABLET BY MOUTH THREE TIMES A DAY, Disp: 270 tablet, Rfl: 3  •  naproxen (NAPROSYN) 500 mg tablet, Take 1 tablet (500 mg total) by mouth daily as needed for moderate pain, Disp: 30 tablet, Rfl: 1  •  norgestrel-ethinyl estradiol (Low-Ogestrel) 0 3 mg-30 mcg per tablet, Take 1 tablet by mouth daily, Disp: 84 tablet, Rfl: 4  •  potassium chloride (MICRO-K) 10 MEQ CR capsule, Take 1 capsule (10 mEq total) by mouth daily, Disp: 90 capsule, Rfl: 0  •  traMADol-acetaminophen (ULTRACET) 37 5-325 mg per tablet, Take 1 tablet by mouth every 6 (six) hours as needed for moderate pain, Disp: 30 tablet, Rfl: 0  •  venlafaxine (EFFEXOR-XR) 150 mg 24 hr capsule, TAKE 1 CAPSULE BY MOUTH EVERY DAY, Disp: 90 capsule, Rfl: 1  •  venlafaxine (EFFEXOR-XR) 37 5 mg 24 hr capsule, TAKE 1 CAPSULE BY MOUTH EVERY DAY, Disp: 90 capsule, Rfl: 1  •  Vitamin D, Cholecalciferol, 25 MCG (1000 UT) CAPS, Take 3,000 Units by mouth , Disp: , Rfl:   Patient Active Problem List    Diagnosis Date Noted   • Benign paroxysmal positional vertigo 2020   • Scoliosis (and kyphoscoliosis), idiopathic 2019   • Unilateral congenital dislocation of hip 2019   • Severe episode of recurrent major depressive disorder, without psychotic features ([facility] ) 2019   • Dyslipidemia 2018   • Hypokalemia 2018   • Nia-Danlos syndrome, benign hypermobile form 2018   • POTS (postural orthostatic tachycardia syndrome) 2018   • Chronic fatigue syndrome 2018   • Mast cell activation syndrome ([facility] ) 2018   • Adolescent idiopathic scoliosis of thoracolumbar region 2018   • Pectus excavatum 2018   • Primary hypothyroidism 2018   • Recurrent major depressive disorder, in partial remission ([place] [id number] ) 2018   • Vitamin D deficiency 2018   • Hallucinations 2017   • Dermatographism 2017   • Palpitations 2015   • Allergic rhinitis 2012   • Diffuse connective tissue disease ([place] [address] ) 2006       Allergies   Allergen Reactions   • Bupropion      Other reaction(s): Flushed   • Epinephrine Dizziness     Causes dysautonomia   • Fluoxetine      Other reaction(s): Flushed       OB History    Para Term  AB Living   0 0 0 0 0 0   SAB IAB Ectopic Multiple Live Births   0 0 0 0 0       Vitals:    22 1420   BP: 110/68   BP Location: Right arm   Patient Position: Sitting   Cuff Size: Large   Weight: 65 3 kg (144 lb)   Height: 5' 6 5" (1 689 m)     Body mass index is 22 89 kg/m²  Review of Systems     Constitutional: Negative for chills, fatigue, fever, headaches, visual disturbances, and unexpected weight change  Respiratory: Negative for cough, & shortness of breath  Cardiovascular: Negative for chest pain       Gastrointestinal: Negative for Abd pain, nausea & vomiting, constipation and diarrhea     Genitourinary: Negative for difficulty urinating, dysuria, hematuria, dyspareunia, unusual vaginal bleeding or discharge  Skin: Negative skin changes    Physical Exam Constitutional: Alert & Oriented x3, well-developed and well-nourished  No distress  HENT: Atraumatic, Normocephalic, Conjunctivae clear  Neck: Normal range of motion  Neck supple  No thyromegaly, mass, nodules or tenderness  Pulmonary: Effort normal    Abdominal: Soft  No tenderness or masses  Musculoskeletal: Normal ROM  Skin: Warm & Dry  Psychological: Normal mood, thought content, behavior & judgement     Breasts:   Right: tissue soft without masses, tenderness, skin changes or nipple discharge  No areas of erythema or pain  No subclavicular, axillary, pectoral adenopathy  Left:  tissue soft without tenderness, skin changes or nipple discharge  Grouping of thickened, tissues that may be calcifications noted at approx 2 pm 4-5 cm from the nipple  Pt has not noticed this  No areas of erythema or pain  No subclavicular, axillary, pectoral adenopathy    Pelvic exam was performed with patient supine, lithotomy position  Difficulty tolerating speculum  Limited visualization due to intolerance and onset of menses  Labia: Negative rash, tenderness, lesion or injury on the right labia  Negative rash, tenderness, lesion or injury on the left labia  Urethral meatus:  Negative for  tenderness, inflammation or discharge  Uterus: not deviated, enlarged, fixed or tender  Cervix: No CMT, no discharge or friability  Right adnexa: no mass, no tenderness and no fullness  Left adnexa: no mass, no tenderness and no fullness  Vagina: No erythema, tenderness, masses, or foreign body in the vagina  No signs of injury around the vagina  No unusual vaginal discharge   Perineum without lesions, signs of injury, erythema or swelling  Inguinal Canal:        Right: No inguinal adenopathy or hernia present  Left: No inguinal adenopathy or hernia present

## 2022-11-25 NOTE — PATIENT INSTRUCTIONS
Breast Self Exam for Women   AMBULATORY CARE:   A breast self-exam (BSE)  is a way to check your breasts for lumps and other changes  Regular BSEs can help you know how your breasts normally look and feel  Most breast lumps or changes are not cancer, but you should always have them checked by a healthcare provider  Why you should do a BSE:  Breast cancer is the most common type of cancer in women  Even if you have mammograms, you may still want to do a BSE regularly  If you know how your breasts normally feel and look, it may help you know when to contact your healthcare provider  Mammograms can miss some cancers  You may find a lump during a BSE that did not show up on a mammogram   When you should do a BSE:  If you have periods, you may want to do your BSE 1 week after your period ends  This is the time when your breasts may be the least swollen, lumpy, or tender  You can do regular BSEs even if you are breastfeeding or have breast implants  Call your doctor if:   You find any lumps or changes in your breasts  You have breast pain or fluid coming from your nipples  You have questions or concerns about your condition or care  How to do a BSE:       Look at your breasts in a mirror  Look at the size and shape of each breast and nipple  Check for swelling, lumps, dimpling, scaly skin, or other skin changes  Look for nipple changes, such as a nipple that is painful or beginning to pull inward  Gently squeeze both nipples and check to see if fluid (that is not breast milk) comes out of them  If you find any of these or other breast changes, contact your healthcare provider  Check your breasts while you sit or  the following 3 positions:    [place] your arms down at your sides  Raise your hands and join them behind your head  Put firm pressure with your hands on your hips  Bend slightly forward while you look at your breasts in the mirror  Lie down and feel your breasts    When you lie down, your breast tissue spreads out evenly over your chest  This makes it easier for you to feel for lumps and anything that may not be normal for your breasts  Do a BSE on one breast at a time  Place a small pillow or towel under your left shoulder  Put your left arm behind your head  Use the 3 middle fingers of your right hand  Use your fingertip pads, on the top of your fingers  Your fingertip pad is the most sensitive part of your finger  Use small circles to feel your breast tissue  Use your fingertip pads to make dime-sized, overlapping circles on your breast and armpits  Use light, medium, and firm pressure  First, press lightly  Second, press with medium pressure to feel a little deeper into the breast  Last, use firm pressure to feel deep within your breast     Examine your entire breast area  Examine the breast area from above the breast to below the breast where you feel only ribs  Make small circles with your fingertips, starting in the middle of your armpit  Make circles going up and down the breast area  Continue toward your breast and all the way across it  Examine the area from your armpit all the way over to the middle of your chest (breastbone)  Stop at the middle of your chest     Move the pillow or towel to your right shoulder, and put your right arm behind your head  Use the 3 fingertip pads of your left hand, and repeat the above steps to do a BSE on your right breast     What else you can do to check for breast problems or cancer:  Talk to your healthcare provider about mammograms  A mammogram is an x-ray of your breasts to screen for breast cancer or other problems  Your provider can tell you the benefits and risks of mammograms  The first mammogram is usually at age 39 or 48  Your provider may recommend you start at 36 or younger if your risk for breast cancer is high  Mammograms usually continue every 1 to 2 years until age 76         Follow up with your doctor as directed:  Write down your questions so you remember to ask them during your visits  © Copyright [employer] 2022 Information is for End User's use only and may not be sold, redistributed or otherwise used for commercial purposes  All illustrations and images included in CareNotes® are the copyrighted property of [employer] D A M [employer] Inc  or Keely Palmer  The above information is an  only  It is not intended as medical advice for individual conditions or treatments  Talk to your doctor, nurse or pharmacist before following any medical regimen to see if it is safe and effective for you  Wellness Visit for Adults   AMBULATORY CARE:   A wellness visit  is when you see your healthcare provider to get screened for health problems  Your healthcare provider will also give you advice on how to stay healthy  Write down your questions so you remember to ask them  Ask your healthcare provider how often you should have a wellness visit  What happens at a wellness visit:  Your healthcare provider will ask about your health, and your family history of health problems  This includes high blood pressure, heart disease, and cancer  He or she will ask if you have symptoms that concern you, if you smoke, and about your mood  You may also be asked about your intake of medicines, supplements, food, and alcohol  Any of the following may be done: Your weight  will be checked  Your height may also be checked so your body mass index (BMI) can be calculated  Your BMI shows if you are at a healthy weight  Your blood pressure  and heart rate will be checked  Your temperature may also be checked  Blood and urine tests  may be done  Blood tests may be done to check your cholesterol levels  Abnormal cholesterol levels increase your risk for heart disease and stroke  You may also need a blood or urine test to check for diabetes if you are at increased risk  Urine tests may be done to look for signs of an infection or kidney disease      A physical exam  includes checking your heartbeat and lungs with a stethoscope  Your healthcare provider may also check your skin to look for sun damage  Screening tests  may be recommended  A screening test is done to check for diseases that may not cause symptoms  The screening tests you may need depend on your age, gender, family history, and lifestyle habits  For example, colorectal screening may be recommended if you are 48years old or older  Screening tests you need if you are a woman:   A Pap smear  is used to screen for cervical cancer  Pap smears are usually done every 3 to 5 years depending on your age  You may need them more often if you have had abnormal Pap smear test results in the past  Ask your healthcare provider how often you should have a Pap smear  A mammogram  is an x-ray of your breasts to screen for breast cancer  Experts recommend mammograms every 2 years starting at age 48 years  You may need a mammogram at age 52 years or younger if you have an increased risk for breast cancer  Talk to your healthcare provider about when you should start having mammograms and how often you need them  Vaccines you may need:   Get an influenza vaccine  every year  The influenza vaccine protects you from the flu  Several types of viruses cause the flu  The viruses change over time, so new vaccines are made each year  Get a tetanus-diphtheria (Td) booster vaccine  every 10 years  This vaccine protects you against tetanus and diphtheria  Tetanus is a severe infection that may cause painful muscle spasms and lockjaw  Diphtheria is a severe bacterial infection that causes a thick covering in the back of your mouth and throat  Get a human papillomavirus (HPV) vaccine  if you are female and aged 23 to 32 or male 23 to 24 and never received it  This vaccine protects you from HPV infection  HPV is the most common infection spread by sexual contact   HPV may also cause vaginal, penile, and anal cancers  Get a pneumococcal vaccine  if you are aged 72 years or older  The pneumococcal vaccine is an injection given to protect you from pneumococcal disease  Pneumococcal disease is an infection caused by pneumococcal bacteria  The infection may cause pneumonia, meningitis, or an ear infection  Get a shingles vaccine  if you are 60 or older, even if you have had shingles before  The shingles vaccine is an injection to protect you from the varicella-zoster virus  This is the same virus that causes chickenpox  Shingles is a painful rash that develops in people who had chickenpox or have been exposed to the virus  How to eat healthy:  My Plate is a model for planning healthy meals  It shows the types and amounts of foods that should go on your plate  Fruits and vegetables make up about half of your plate, and grains and protein make up the other half  A serving of dairy is included on the side of your plate  The amount of calories and serving sizes you need depends on your age, gender, weight, and height  Examples of healthy foods are listed below:  Eat a variety of vegetables  such as dark green, red, and orange vegetables  You can also include canned vegetables low in sodium (salt) and frozen vegetables without added butter or sauces  Eat a variety of fresh fruits , canned fruit in 100% juice, frozen fruit, and dried fruit  Include whole grains  At least half of the grains you eat should be whole grains  Examples include whole-wheat bread, wheat pasta, brown rice, and whole-grain cereals such as oatmeal     Eat a variety of protein foods such as seafood (fish and shellfish), lean meat, and poultry without skin (turkey and chicken)  Examples of lean meats include pork leg, shoulder, or tenderloin, and beef round, sirloin, tenderloin, and extra lean ground beef  Other protein foods include eggs and egg substitutes, beans, peas, soy products, nuts, and seeds      Choose low-fat dairy products such as skim or 1% milk or low-fat yogurt, cheese, and cottage cheese  Limit unhealthy fats  such as butter, hard margarine, and shortening  Exercise:  Exercise at least 30 minutes per day on most days of the week  Some examples of exercise include walking, biking, dancing, and swimming  You can also fit in more physical activity by taking the stairs instead of the elevator or parking farther away from stores  Include muscle strengthening activities 2 days each week  Regular exercise provides many health benefits  It helps you manage your weight, and decreases your risk for type 2 diabetes, heart disease, stroke, and high blood pressure  Exercise can also help improve your mood  Ask your healthcare provider about the best exercise plan for you  General health and safety guidelines:   Do not smoke  Nicotine and other chemicals in cigarettes and cigars can cause lung damage  Ask your healthcare provider for information if you currently smoke and need help to quit  E-cigarettes or smokeless tobacco still contain nicotine  Talk to your healthcare provider before you use these products  Limit alcohol  A drink of alcohol is 12 ounces of beer, 5 ounces of wine, or 1½ ounces of liquor  Lose weight, if needed  Being overweight increases your risk of certain health conditions  These include heart disease, high blood pressure, type 2 diabetes, and certain types of cancer  Protect your skin  Do not sunbathe or use tanning beds  Use sunscreen with a SPF 15 or higher  Apply sunscreen at least 15 minutes before you go outside  Reapply sunscreen every 2 hours  Wear protective clothing, hats, and sunglasses when you are outside  Drive safely  Always wear your seatbelt  Make sure everyone in your car wears a seatbelt  A seatbelt can save your life if you are in an accident  Do not use your cell phone when you are driving  This could distract you and cause an accident   Pull over if you need to make a call or send a text message  Practice safe sex  Use latex condoms if are sexually active and have more than one partner  Your healthcare provider may recommend screening tests for sexually transmitted infections (STIs)  Wear helmets, lifejackets, and protective gear  Always wear a helmet when you ride a bike or motorcycle, go skiing, or play sports that could cause a head injury  Wear protective equipment when you play sports  Wear a lifejacket when you are on a boat or doing water sports  © Copyright [employer] 2022 Information is for End User's use only and may not be sold, redistributed or otherwise used for commercial purposes  All illustrations and images included in CareNotes® are the copyrighted property of A [employer] A M , Inc  or Keely Ya   The above information is an  only  It is not intended as medical advice for individual conditions or treatments  Talk to your doctor, nurse or pharmacist before following any medical regimen to see if it is safe and effective for you  HPV (Human Papillomavirus) Vaccine for Adults   AMBULATORY CARE:   The human papillomavirus (HPV) vaccine  is an injection given to females and males to protect against human papillomavirus infection  HPV is most commonly spread through sexual activity  It can also be spread from a mother to her baby during delivery  The HPV vaccine is most effective if given before sexual activity begins  This allows your body to build almost complete protection against HPV before you have contact with the virus  The HPV vaccine is still effective after sexual activity has begun  How the vaccine is given:  The HPV vaccine can be given with other vaccines  The vaccine is given in 2 or 3 doses through age 32: The first dose  is given at any time  The second dose  is given 1 to 2 months after the first dose  The third dose, if needed,  is given 6 months after the first dose      Reasons you should not get the HPV vaccine, or should wait to get it: You had a severe allergic reaction to a dose of the vaccine  You are pregnant  Your healthcare provider will tell you when you can get the vaccine  You are sick or have a fever  You may need to wait to get the vaccine until symptoms go away  Risks of the HPV vaccine: You may have pain, redness, or swelling where the shot was given  You may have a fever or headache  You may have an allergic reaction to the vaccine  This can be life-threatening  Call your local emergency number (911 in the [address]) if:   You have signs of a severe allergic reaction, such as trouble breathing, hives, or wheezing  Seek care immediately if:   You have a high fever or behavior changes that concern you  Call your doctor if:   You have questions or concerns about the HPV vaccine  Apply a warm compress  to the area to relieve swelling and pain  Follow up with your doctor as directed:  Write down your questions so you remember to ask them during your visits  © Copyright [employer] 2022 Information is for End User's use only and may not be sold, redistributed or otherwise used for commercial purposes  All illustrations and images included in CareNotes® are the copyrighted property of A D A M , Inc  or [facility] [employer]   The above information is an  only  It is not intended as medical advice for individual conditions or treatments  Talk to your doctor, nurse or pharmacist before following any medical regimen to see if it is safe and effective for you  Kegel Exercises for Women   AMBULATORY CARE:   Kegel exercises  help strengthen your pelvic muscles  Pelvic muscles hold your pelvic organs, such as your bladder and uterus, in place  Kegel exercises help prevent or control problems with urine incontinence (leakage)  Incontinence may be caused by pregnancy, childbirth, or menopause  Contact your healthcare provider if:   You cannot feel your muscles tighten or relax      You continue to leak urine  You have questions or concerns about your condition or care  Use the correct muscles:  Pelvic muscles are the muscles you use to control urine flow  To target these muscles, stop and start the flow of urine several times  This will help you become familiar with how it feels to tighten and relax these muscles  How to do Kegel exercises:   Empty your bladder  You may lie down, stand up, or sit down to do these exercises  When you first try to do these exercises, it may be easier if you lie down  Tighten or squeeze your pelvic muscles slowly  It may feel like you are trying to hold back urine or gas  Hold this position for 3 seconds  Relax for 3 seconds  Repeat this cycle 10 times  Do 10 sets of Kegel exercises, at least 3 times a day  Do not hold your breath when you do Kegel exercises  Keep your stomach, back, and leg muscles relaxed  As your muscles get stronger, you will be able to hold the squeeze longer  Your healthcare provider may ask that you increase your pelvic muscle squeeze to 10 seconds  After you squeeze for 10 seconds, relax for 10 seconds  What else you should know:   Once you know how to do Kegel exercises, use different positions  You can do these exercises while you lie on the floor, sit at your desk or watch TV, and while you stand  You may notice improved bladder control within about 6 weeks  Tighten your pelvic muscles before you sneeze, cough, or lift to prevent urine leakage  Follow up with your doctor as directed:  Write down your questions so you remember to ask them during your visits  © Copyright [employer] 2022 Information is for End User's use only and may not be sold, redistributed or otherwise used for commercial purposes  All illustrations and images included in CareNotes® are the copyrighted property of A [employer] A M , Inc  or [facility] Jacqui Ya   The above information is an  only   It is not intended as medical advice for individual conditions or treatments  Talk to your doctor, nurse or pharmacist before following any medical regimen to see if it is safe and effective for you  Perineal Hygiene      Your vaginal naturally takes care of its self, it is a self washing system, the less you mess the healthier it will be     No soaps or feminine wash to the vulva, these products can cause dermitis, bacterial infections and other vulvar problems  Use only water to cleanse, or water with Dove or [employer] if necessary  No scented lotions or products are advised in or near your vulva  Use only coconut oil for moisture if needed  No douching this may cause imbalance in your vaginal PH and further issues  If you wear panty liners, you may apply a thin coating of Vaseline, A&D ointment or coconut oil to the vulvar tissues as a skin barrier     [place]otton underware, loose fitting clothing  Only perfume-free, dye-free laundry detergent, use a second rinse cycle   Avoid fabric softeners/dryer sheets  Partner should avoid the same products as well  Over the counter probiotic to restore vaginal nader may be helpful as well, take daily  You may also look into Boric Acid vaginal suppositories to restore vaginal PH balance for up to 2 weeks as directed on the box  You may not use these if you are pregnant      For vaginal dryness: You may use:     Coconut oil (organic, pure, unscented) as needed for moisture or lubrication  ( Do not use if allergic)       Replens moisture restore external comfort gel daily ( use as directed on the box)        Replens long lasting vaginal moisturizer  ( use as directed on the box)         For Vaginal Lubrication:          You may use:     Coconut oil (organic, pure, unscented) as a lubricant or another scent-free lubricant (Astroglide, Uberlube) if needed    Do not use coconut oil or silicone if using a condom as this may break down the integrity of the condom and cause an unplanned pregnancy              Do not use coconut oil if allergic               Replens silky smooth lubricant, premium silicone based lubricant for intercourse  ( use as directed, a small amount will provide an enhanced natural feeling)     Any premium over the counter vaginal lubricant water or silicone based  Silicone based will have more staying power  Birth Control Pills     Birth control pills  are also called oral contraceptives, or the pill  It is medicine that helps prevent pregnancy by stopping ovulation  Ovulation is when the ovaries make and release an egg cell each month  If this egg gets fertilized by sperm, pregnancy occurs  You will need to take the pill at the same time every day  Your healthcare provider will tell you when to start taking the pill  You will also be told what to do if you miss a dose  Instructions will depend on the kind of birth control pills you are taking  Different kinds of birth control pills:  Some kinds are taken for 21 days in a row, followed by 7 days of placebo (no hormones) pills  Other kinds are taken for 24 days followed by 4 days of placebos  Each kind has a certain amount of female hormones  Your provider will decide on the kind that is best for you based on your age and other health conditions  Call your local emergency number (911 in the [address] [place] [address],[address]) for any of the following: You have any of the following signs of a stroke:      Numbness or drooping on one side of your face     Weakness in an arm or leg    Confusion or difficulty speaking    Dizziness, a severe headache, or vision loss    You feel lightheaded, short of breath, and have chest pain  You cough up blood  Seek care immediately if:   Your arm or leg feels warm, tender, and painful  It may look swollen and red  You have severe pain, numbness, or swelling in your arms or legs  Contact your healthcare provider if:   You have forgotten to take a birth control pill      You have mood changes, such as depression, since starting birth control pills  You have nausea or are vomiting  You have severe abdominal pain  You missed a period and have questions or concerns about being pregnant  You still have bleeding 4 months after taking birth control pills correctly  You have questions or concerns about your condition or care  What may be done before you can start taking birth control pills: You need to see your healthcare provider to get a prescription  Any of the following may be done before your healthcare provider gives you a prescription:  Your healthcare provider will ask about diseases and illnesses you have had in the past  Your provider will check your risk for blood clots, heart conditions, or stroke  Tell your provider if you had gastric bypass surgery  This surgery can affect the way your body absorbs medicines such as birth control pills  Your provider will also check your blood pressure, and may do a breast and pelvic exam  A Pap smear may also be done during the pelvic exam  This is a test to make sure you do not have abnormal changes on your cervix  You may need other tests, such as a urine test to make sure you are not pregnant  Your provider will ask if you take any medicines and if you smoke  Smoking increases your risk for stroke, heart attack, or a blood clot in your lungs  If you smoke, you should not take certain kinds of birth control pills  Advantages of birth control pills:  When birth control pills are used correctly, the chances of getting pregnant are very low  Birth control pills may help decrease bleeding and pain during your monthly period  They may also help prevent cancer of the uterus and ovaries  Disadvantages of birth control pills: You may have sudden changes in your mood or feelings while you take birth control pills  You may have nausea and a decreased sex drive  You may have an increased appetite and rapid weight gain   You may also have bleeding in between periods, less frequent periods, vaginal dryness, and breast pain  Birth control pills will not protect you from sexually transmitted infections  Rarely, some birth control pills can increase your risk for a blood clot  This may become life-threatening  If you decide you want to get pregnant: If you are planning to have a baby, ask your healthcare provider when you may stop taking your birth control pills  It may take some time for you to start ovulating again  Ask your healthcare provider for more information about pregnancy after birth control pills  When to start taking birth control pills after you have a baby: If you are not breastfeeding, you may start taking birth control pills 3 weeks after you give birth  You may be able to take certain types of birth control pills if you are breastfeeding  These pills can be started from 6 weeks to 6 months after you give birth  Ask your healthcare provider for more information about when to start taking birth control pills after you give birth  What you need to know about birth control pills and menopause:   Talk with your healthcare provider if you want to take birth control pills around menopause  Around age 39, you will enter into perimenopause  This means your hormone levels are dropping and you are ovulating less often  You can still become pregnant during this time  The risk for problems, such as miscarriage, are higher if you become pregnant after age 39  Birth control pills will prevent pregnancy, and may also help prevent or relieve some signs and symptoms of menopause  Examples are hot flashes and mood swings  Your provider will do tests when you are around age 48  The tests may show that you are in menopause  If the tests do not show menopause for sure, you may be able to continue taking the pill up to age 54  The decision will depend on your health and if you have any medical conditions, such as a blood clot      Do not smoke: Nicotine and other chemicals in cigarettes and cigars increase your risk for a blood clot while you use birth control pills  The risk is higher if you are also 35 or older  Ask your healthcare provider for information if you currently smoke and need help to quit  E-cigarettes or smokeless tobacco still contain nicotine  Talk to your healthcare provider before you use these products  Follow up with your healthcare provider as directed:  Write down your questions so you remember to ask them during your visits  © Copyright [address] Information is for End User's use only and may not be sold, redistributed or otherwise used for commercial purposes  All illustrations and images included in CareNotes® are the copyrighted property of A D A M , Inc  or [address] [place] [address]  The above information is an  only  It is not intended as medical advice for individual conditions or treatments  Talk to your doctor, nurse or pharmacist before following any medical regimen to see if it is safe and effective for you

## 2022-12-01 ENCOUNTER — ANNUAL EXAM (OUTPATIENT)
Dept: OBGYN CLINIC | Facility: MEDICAL CENTER | Age: 32
End: 2022-12-01

## 2022-12-01 VITALS
BODY MASS INDEX: 22.6 KG/M2 | WEIGHT: 144 LBS | HEIGHT: 67 IN | SYSTOLIC BLOOD PRESSURE: 110 MMHG | DIASTOLIC BLOOD PRESSURE: 68 MMHG

## 2022-12-01 DIAGNOSIS — N63.21 MASS OF UPPER OUTER QUADRANT OF LEFT BREAST: ICD-10-CM

## 2022-12-01 DIAGNOSIS — Z01.419 ENCNTR FOR GYN EXAM (GENERAL) (ROUTINE) W/O ABN FINDINGS: Primary | ICD-10-CM

## 2022-12-01 RX ORDER — NORGESTREL AND ETHINYL ESTRADIOL 0.3-0.03MG
1 KIT ORAL DAILY
Qty: 84 TABLET | Refills: 4 | Status: SHIPPED | OUTPATIENT
Start: 2022-12-01

## 2022-12-01 NOTE — PROGRESS NOTES
LMP: 11/10/2022  PMB:  SA: NO   HPV: YES 3 doses   Birth control: Pills   Last pap: 10/26/2020 Negative   Last mammo: Not on file:  Last colonoscopy: Not on file  Last Dexa: Not on file  Family History:    Paternal cousin - Breast cancer

## 2022-12-13 ENCOUNTER — OFFICE VISIT (OUTPATIENT)
Dept: FAMILY MEDICINE CLINIC | Facility: OTHER | Age: 32
End: 2022-12-13

## 2022-12-13 VITALS
BODY MASS INDEX: 22.88 KG/M2 | DIASTOLIC BLOOD PRESSURE: 80 MMHG | RESPIRATION RATE: 16 BRPM | SYSTOLIC BLOOD PRESSURE: 124 MMHG | HEIGHT: 67 IN | OXYGEN SATURATION: 99 % | TEMPERATURE: 97.6 F | HEART RATE: 97 BPM | WEIGHT: 145.8 LBS

## 2022-12-13 DIAGNOSIS — U09.9 POST-ACUTE SEQUELAE OF COVID-19 (PASC): Primary | ICD-10-CM

## 2022-12-13 DIAGNOSIS — R06.02 SHORTNESS OF BREATH: ICD-10-CM

## 2022-12-13 DIAGNOSIS — U07.1 COVID-19 VIRUS INFECTION: ICD-10-CM

## 2022-12-13 NOTE — PROGRESS NOTES
Patient Name: Anatoliy Goldsmith     : 1990     MRN: [id number]    Assessment/Plan:    Problem List Items Addressed This Visit    None    COVID-19 [facility] Plan       {Covid Time Spent:72465}     Subjective:    COVID-19 PAS HPI  Review of Systems     Patient Active Problem List   Diagnosis   • POTS (postural orthostatic tachycardia syndrome)   • Nia-Danlos syndrome, benign hypermobile form   • Adolescent idiopathic scoliosis of thoracolumbar region   • Allergic rhinitis   • Chronic fatigue syndrome   • Dermatographism   • Hallucinations   • Mast cell activation syndrome (HCC)   • Palpitations   • Pectus excavatum   • Primary hypothyroidism   • Recurrent major depressive disorder, in partial remission (HCC)   • Vitamin D deficiency   • Hypokalemia   • Dyslipidemia   • Diffuse connective tissue disease (HCC)   • Scoliosis (and kyphoscoliosis), idiopathic   • Unilateral congenital dislocation of hip   • Severe episode of recurrent major depressive disorder, without psychotic features (HCC)   • Benign paroxysmal positional vertigo     Social History     Tobacco Use   • Smoking status: Never   • Smokeless tobacco: Never   Vaping Use   • Vaping Use: Never used   Substance Use Topics   • Alcohol use: Yes     Comment: SOCIAL   • Drug use: No      Objective: There were no vitals taken for this visit       Physical Exam    Chris Masters DO

## 2022-12-13 NOTE — PROGRESS NOTES
Patient Name: Ana Spence     : 1990     MRN: [id number]    Assessment/Plan:    Problem List Items Addressed This Visit    None  Visit Diagnoses     Post-acute sequelae of COVID-19 (PASC)    -  Primary    Relevant Orders    Ambulatory Referral to Pulmonary Rehabilitation    COVID-19 virus infection        Shortness of breath            Discussion:     Office procedures recommended:  EKG    Other management recommendations:     Dyspnea & cough   Discussed the need for deep breathing and breathing exercises      Chest discomfort/tightness/pain:   Recommend short-term course of NSAIDs (ie, ibuprofen)      Neurologic & neurocognitive sequalae:  Patient was reassured; no neuroimaging is indicated at this time  Fatigue, poor endurance, and functional status:   Encouraged adequate rest, proper hydration/nutrition, and good sleep hygiene  Referral to pulmonary rehab  EKG in office showed sinus rhythm at 78bpm and no ST elevations or depressions  If pt does not experience improvement in SOB with pulmonary rehab, consider pulmonary or cardiology referral      The patient indicates understanding of these issues and agrees with the plan  I spent 25 minutes directly with the patient during this visit    Return in about 2 months (around 2023) for Recheck long covid sx  Subjective:    COVID-19 Infection:  Date of symptom onset: 2022  Date of positive test: 2022    Initial symptoms with acute illness:  Initial symptoms included: fever, chills, cough, rhinorrhea, nasal congestion, sore throat, muscle aches, fatigue and malaise  Patient denied: loss of smell    New or persistent symptoms:  Patient complains of: fatigue, weakness, poor endurance, shortness of breath, chest discomfort, poor concentration, difficulty swallowing, appetite change and muscle aches   Patient denies: cough, altered taste, altered smell, memory problems, headache, dry eyes, rhinitis, sweating, diarrhea and nausea  Patient rates severity of current symptoms as moderate  Outpatient treatment:      Did patient receive monoclonal antibody therapy?: No      Pt completed course of molnupiravir that was started within 5 days of symptom onset  Review of Systems   Constitutional: Positive for appetite change and fatigue  HENT: Positive for trouble swallowing  Negative for rhinorrhea  Respiratory: Positive for shortness of breath  Negative for cough  Gastrointestinal: Negative for diarrhea and nausea  Musculoskeletal: Positive for myalgias  Neurological: Positive for weakness  Negative for headaches  Psychiatric/Behavioral: Positive for decreased concentration  Patient Active Problem List   Diagnosis   • POTS (postural orthostatic tachycardia syndrome)   • Nia-Danlos syndrome, benign hypermobile form   • Adolescent idiopathic scoliosis of thoracolumbar region   • Allergic rhinitis   • Chronic fatigue syndrome   • Dermatographism   • Hallucinations   • Mast cell activation syndrome (HCC)   • Palpitations   • Pectus excavatum   • Primary hypothyroidism   • Recurrent major depressive disorder, in partial remission (HCC)   • Vitamin D deficiency   • Hypokalemia   • Dyslipidemia   • Diffuse connective tissue disease (HCC)   • Scoliosis (and kyphoscoliosis), idiopathic   • Unilateral congenital dislocation of hip   • Severe episode of recurrent major depressive disorder, without psychotic features (HCC)   • Benign paroxysmal positional vertigo     Social History     Tobacco Use   • Smoking status: Never   • Smokeless tobacco: Never   Vaping Use   • Vaping Use: Never used   Substance Use Topics   • Alcohol use: Yes     Comment: SOCIAL   • Drug use: No      Objective:  /80   Pulse 97   Temp 97 6 °F (36 4 °C)   Resp 16   Ht 5' 6 5" (1 689 m)   Wt 66 1 kg (145 lb 12 8 oz)   SpO2 99%   BMI 23 18 kg/m²      Physical Exam  Vitals and nursing note reviewed     Constitutional:       General: She is not in acute distress  Appearance: She is well-developed  She is not diaphoretic  HENT:      Head: Normocephalic and atraumatic  Right Ear: External ear normal       Left Ear: External ear normal       Nose: No congestion  Mouth/Throat:      Mouth: Mucous membranes are moist       Pharynx: Oropharynx is clear  No posterior oropharyngeal erythema  Eyes:      Extraocular Movements: Extraocular movements intact  Conjunctiva/sclera: Conjunctivae normal       Pupils: Pupils are equal, round, and reactive to light  Cardiovascular:      Rate and Rhythm: Normal rate and regular rhythm  Pulses: Normal pulses  Heart sounds: Normal heart sounds  No murmur heard  Pulmonary:      Effort: Pulmonary effort is normal  No respiratory distress  Breath sounds: Normal breath sounds  No wheezing, rhonchi or rales  Chest:      Chest wall: No tenderness  Abdominal:      Palpations: Abdomen is soft  Tenderness: There is no abdominal tenderness  Musculoskeletal:         General: No swelling  Cervical back: Neck supple  Right lower leg: No edema  Left lower leg: No edema  Skin:     General: Skin is warm and dry  Capillary Refill: Capillary refill takes less than 2 seconds  Neurological:      General: No focal deficit present  Mental Status: She is alert and oriented to person, place, and time     Psychiatric:         Mood and Affect: Mood normal          Tracee Vanegas, DO

## 2022-12-22 ENCOUNTER — TELEMEDICINE (OUTPATIENT)
Dept: PSYCHIATRY | Facility: CLINIC | Age: 32
End: 2022-12-22

## 2022-12-22 DIAGNOSIS — F33.2 SEVERE EPISODE OF RECURRENT MAJOR DEPRESSIVE DISORDER, WITHOUT PSYCHOTIC FEATURES (HCC): Primary | ICD-10-CM

## 2022-12-22 DIAGNOSIS — F33.9 EPISODE OF RECURRENT MAJOR DEPRESSIVE DISORDER, UNSPECIFIED DEPRESSION EPISODE SEVERITY (HCC): ICD-10-CM

## 2022-12-22 RX ORDER — VENLAFAXINE HYDROCHLORIDE 150 MG/1
150 CAPSULE, EXTENDED RELEASE ORAL DAILY
Qty: 90 CAPSULE | Refills: 1 | Status: SHIPPED | OUTPATIENT
Start: 2022-12-22

## 2022-12-22 RX ORDER — BUPROPION HYDROCHLORIDE 300 MG/1
300 TABLET ORAL DAILY
Qty: 90 TABLET | Refills: 1 | Status: SHIPPED | OUTPATIENT
Start: 2022-12-22

## 2022-12-22 RX ORDER — VENLAFAXINE HYDROCHLORIDE 37.5 MG/1
37.5 CAPSULE, EXTENDED RELEASE ORAL DAILY
Qty: 90 CAPSULE | Refills: 1 | Status: SHIPPED | OUTPATIENT
Start: 2022-12-22

## 2022-12-22 NOTE — PSYCH
Visit Time    Visit Start Time: 3293  Visit Stop Time: 0458  Total Visit Duration: 11 minutes   Virtual Regular Visit    Problem List Items Addressed This Visit        Other    Severe episode of recurrent major depressive disorder, without psychotic features ([facility] [address] ) - Primary    Relevant Medications    buPROPion (Wellbutrin XL) 300 mg 24 hr tablet    venlafaxine (EFFEXOR-XR) 150 mg 24 hr capsule    venlafaxine (EFFEXOR-XR) 37 5 mg 24 hr capsule   Other Visit Diagnoses     Episode of recurrent major depressive disorder, unspecified depression episode severity (HCC)        Relevant Medications    buPROPion (Wellbutrin XL) 300 mg 24 hr tablet    venlafaxine (EFFEXOR-XR) 150 mg 24 hr capsule    venlafaxine (EFFEXOR-XR) 37 5 mg 24 hr capsule             Encounter provider LEYDI Elizabeth    Provider located at   [address] [place] [address] Dr Easley [address] [place] [id number] [phone]    Patient is located in the following state in which I hold an active license [place]    Recent Visits  No visits were found meeting these conditions  Showing recent visits within past 7 days and meeting all other requirements  Today's Visits  Date Type Provider Dept   12/22/22 Telemedicine LEYDI [facility]   Showing today's visits and meeting all other requirements  Future Appointments  No visits were found meeting these conditions  Showing future appointments within next 150 days and meeting all other requirements     The patient was identified by name and date of birth  Joe Marie was informed that this is a telemedicine visit and that the visit is being conducted through [facility] and patient was informed that this is a secure, HIPAA-compliant platform  Joe Marie agrees to proceed  My office door was closed  No one else was in the room  She acknowledged consent and understanding of privacy and security of the video platform  The patient has agreed to participate and understands they can discontinue the visit at any time  Patient is aware this is a billable service  HPI     Current Outpatient Medications   Medication Sig Dispense Refill   • buPROPion (Wellbutrin XL) 300 mg 24 hr tablet Take 1 tablet (300 mg total) by mouth daily 90 tablet 1   • venlafaxine (EFFEXOR-XR) 150 mg 24 hr capsule Take 1 capsule (150 mg total) by mouth daily 90 capsule 1   • venlafaxine (EFFEXOR-XR) 37 5 mg 24 hr capsule Take 1 capsule (37 5 mg total) by mouth daily 90 capsule 1   • Bystolic 5 MG tablet TAKE 1 TABLET BY MOUTH TWICE A DAY 60 tablet 5   • clindamycin (CLEOCIN T) 1 % APPLY TO ACNE TWICE DAILY  5   • desmopressin (DDAVP) 0 2 mg tablet Take 1 tablet (0 2 mg total) by mouth 2 (two) times a day 60 tablet 5   • fludrocortisone (FLORINEF) 0 1 mg tablet Take 1 tablet (0 1 mg total) by mouth 2 (two) times a day 60 tablet 5   • levothyroxine 100 mcg tablet Take 1 tablet (100 mcg total) by mouth daily 90 tablet 0   • midodrine (PROAMATINE) 10 MG tablet TAKE 1 TABLET BY MOUTH THREE TIMES A  tablet 3   • naproxen (NAPROSYN) 500 mg tablet Take 1 tablet (500 mg total) by mouth daily as needed for moderate pain 30 tablet 1   • norgestrel-ethinyl estradiol (Low-Ogestrel) 0 3 mg-30 mcg per tablet Take 1 tablet by mouth daily 84 tablet 4   • potassium chloride (MICRO-K) 10 MEQ CR capsule Take 1 capsule (10 mEq total) by mouth daily 90 capsule 0   • traMADol-acetaminophen (ULTRACET) 37 5-325 mg per tablet Take 1 tablet by mouth every 6 (six) hours as needed for moderate pain 30 tablet 0   • Vitamin D, Cholecalciferol, 25 MCG (1000 UT) CAPS Take 3,000 Units by mouth        No current facility-administered medications for this visit  Review of Systems  Video Exam    There were no vitals filed for this visit  Physical Exam   As a result of this visit, I have referred the patient for further respiratory evaluation   No    VIRTUAL VISIT DISCLAIMER    Frankey Alter acknowledges that she has consented to an online visit or consultation  She understands that the online visit is based solely on information provided by her, and that, in the absence of a face-to-face physical evaluation by the physician, the diagnosis she receives is both limited and provisional in terms of accuracy and completeness  This is not intended to replace a full medical face-to-face evaluation by the physician  Frankey Alter understands and accepts these terms  This note was not shared with the patient due to reasonable likelihood of causing patient harm   MEDICATION MANAGEMENT NOTE        [facility]    Name and Date of Birth:  Frankey Alter 29 y o  1990 MRN: [id number]    Date of Visit: December 22, 2022    Allergies   Allergen Reactions   • Bupropion      Other reaction(s): Flushed   • Epinephrine Dizziness     Causes dysautonomia   • Fluoxetine      Other reaction(s): Flushed     SUBJECTIVE:    Stephane Reyes is seen today for a follow up for Major Depressive Disorder  She reports that she has done fairly well since the last visit  Patient reports that following the increase in bupropion and extended release to 450 mg began to experience lightheadedness and lower extremity tremors  Bupropion was decreased back to bupropion extended release 300 mg and patient has been doing well since  He is to experience some dry mouth but denies other side effects or issues  Patient reports feeling depressed and frustrated due to long COVID symptoms and catching COVID for second time  Experiences shortness of breath and exercise intolerance  Overall happy with current medication regimen and feels that these medicines do help her maintain her mood  Reports occasional fleeting passive suicidal ideation without intent or planning  She Denied any other side effects from psychiatric medications      PLAN:  Continue Wellbutrin  mg p o  daily  Continue Effexor  5 mg p o  daily  Continue individual therapy  Aware of 24 hour and weekend coverage for urgent situations accessed by calling [facility] main practice number    Diagnoses and all orders for this visit:    Severe episode of recurrent major depressive disorder, without psychotic features ([address] )  -     venlafaxine (EFFEXOR-XR) 150 mg 24 hr capsule; Take 1 capsule (150 mg total) by mouth daily  -     venlafaxine (EFFEXOR-XR) 37 5 mg 24 hr capsule; Take 1 capsule (37 5 mg total) by mouth daily    Episode of recurrent major depressive disorder, unspecified depression episode severity (HCC)  -     buPROPion (Wellbutrin XL) 300 mg 24 hr tablet;  Take 1 tablet (300 mg total) by mouth daily      Current Outpatient Medications on File Prior to Visit   Medication Sig Dispense Refill   • Bystolic 5 MG tablet TAKE 1 TABLET BY MOUTH TWICE A DAY 60 tablet 5   • clindamycin (CLEOCIN T) 1 % APPLY TO ACNE TWICE DAILY  5   • desmopressin (DDAVP) 0 2 mg tablet Take 1 tablet (0 2 mg total) by mouth 2 (two) times a day 60 tablet 5   • fludrocortisone (FLORINEF) 0 1 mg tablet Take 1 tablet (0 1 mg total) by mouth 2 (two) times a day 60 tablet 5   • levothyroxine 100 mcg tablet Take 1 tablet (100 mcg total) by mouth daily 90 tablet 0   • midodrine (PROAMATINE) 10 MG tablet TAKE 1 TABLET BY MOUTH THREE TIMES A  tablet 3   • naproxen (NAPROSYN) 500 mg tablet Take 1 tablet (500 mg total) by mouth daily as needed for moderate pain 30 tablet 1   • norgestrel-ethinyl estradiol (Low-Ogestrel) 0 3 mg-30 mcg per tablet Take 1 tablet by mouth daily 84 tablet 4   • potassium chloride (MICRO-K) 10 MEQ CR capsule Take 1 capsule (10 mEq total) by mouth daily 90 capsule 0   • traMADol-acetaminophen (ULTRACET) 37 5-325 mg per tablet Take 1 tablet by mouth every 6 (six) hours as needed for moderate pain 30 tablet 0   • Vitamin D, Cholecalciferol, 25 MCG (1000 UT) CAPS Take 3,000 Units by mouth      • [DISCONTINUED] buPROPion (Wellbutrin XL) 300 mg 24 hr tablet Take 1 tablet (300 mg total) by mouth daily 30 tablet 0   • [DISCONTINUED] venlafaxine (EFFEXOR-XR) 150 mg 24 hr capsule TAKE 1 CAPSULE BY MOUTH EVERY DAY 90 capsule 1   • [DISCONTINUED] venlafaxine (EFFEXOR-XR) 37 5 mg 24 hr capsule TAKE 1 CAPSULE BY MOUTH EVERY DAY 90 capsule 1     No current facility-administered medications on file prior to visit  Psychotherapy Provided:     Individual psychotherapy provided: Yes  Counseling was provided during the session today for 16 minutes  Supportive counseling provided  HPI ROS Appetite Changes and Sleep:     She reports normal sleep, fluctuating appetite, adequate energy level   Denies homicidal ideation, denies suicidal ideation    Review Of Systems:      General emotional problems, decreased functioning and appetite disturbances   Personality no change in personality   Constitutional negative   ENT negative   Cardiovascular negative   Respiratory negative   Gastrointestinal negative   Genitourinary negative   Musculoskeletal negative   Integumentary negative   Neurological negative   Endocrine negative   Other Symptoms none, all other systems are negative     Mental Status Evaluation:    Appearance Adequate hygiene and grooming   Behavior calm and cooperative   Mood depressed  Depression Scale -  of 10 (0 = No depression)  Anxiety Scale -  of 10 (0 = No anxiety)   Speech Normal rate and volume   Affect constricted   Thought Processes Goal directed and coherent   Thought Content Does not verbalize delusional material   Associations Tightly connected   Perceptual Disturbances Denies hallucinations and does not appear to be responding to internal stimuli   Risk Potential Suicidal/Homicidal Ideation - No evidence of suicidal or homicidal ideation and patient does not verbalize suicidal or homicidal ideation  Risk of Violence - No evidence of risk for violence found on assessment  Risk of Self Mutilation - No evidence of risk for self mutilation found on assessment   Orientation oriented to person, place, time/date and situation   Memory recent and remote memory grossly intact   Consciousness alert and awake   Attention/Concentration attention span and concentration are age appropriate   Insight intact   Judgement intact   Muscle Strength and Gait normal muscle strength and normal muscle tone, normal gait/station and normal balance   Motor Activity no abnormal movements   Language no difficulty naming common objects, no difficulty repeating a phrase, no difficulty writing a sentence   Fund of Knowledge adequate knowledge of current events  adequate fund of knowledge regarding past history  adequate fund of knowledge regarding vocabulary      Past Psychiatric History Update:     Inpatient Psychiatric Admission Since Last Encounter:   no  Suicide Attempt Or Self Mutilation Since Last Encounter:   no  Incidence of Violent Behavior Since Last Encounter:   no    Traumatic History Update:     New Onset of Abuse Since Last Encounter:   no  Traumatic Events Since Last Encounter:   no    Past Medical History:    Past Medical History:   Diagnosis Date   • Congenital dislocation of hip    • Depression     LAST ASSESSED: 8/4/12   • Disease of thyroid gland    • Pectus excavatum    • Scoliosis         Past Surgical History:   Procedure Laterality Date   • DEVEN ACETABULAR OSTEOTOMY Left     ACETABULAR OSTEOTOMY     Allergies   Allergen Reactions   • Bupropion      Other reaction(s): Flushed   • Epinephrine Dizziness     Causes dysautonomia   • Fluoxetine      Other reaction(s): Flushed     Substance Abuse History:    Social History     Substance and Sexual Activity   Alcohol Use Yes    Comment: SOCIAL     Social History     Substance and Sexual Activity   Drug Use No     Social History:    Social History     Socioeconomic History   • Marital status: Single     Spouse name: Not on file   • Number of children: Not on file   • Years of education: Not on file   • Highest education level: Not on file   Occupational History   • Occupation: UNEMPLOYED   Tobacco Use   • Smoking status: Never   • Smokeless tobacco: Never   Vaping Use   • Vaping Use: Never used   Substance and Sexual Activity   • Alcohol use: Yes     Comment: SOCIAL   • Drug use: No   • Sexual activity: Not Currently     Birth control/protection: OCP   Other Topics Concern   • Not on file   Social History Narrative    CURRENTLY IN SCHOOL: WILL BE STARTING  [address] 1/2014    LIVING WITH PARENTS     Social Determinants of Health     Financial Resource Strain: Not on file   Food Insecurity: Not on file   Transportation Needs: Not on file   Physical Activity: Not on file   Stress: Not on file   Social Connections: Not on file   Intimate Partner Violence: Not on file   Housing Stability: Not on file     Family Psychiatric History:     Family History   Problem Relation Age of Onset   • Asthma Mother    • Hyperlipidemia Mother    • Hypertension Mother    • Diabetes type II Mother    • Diabetes Mother    • Hyperlipidemia Father    • Hypertension Father    • No Known Problems Brother    • Melanoma Family         AMELANOTIC MELANOMA OF THE SKIN   • Hyperlipidemia Family    • Hypertension Family    • Prostate cancer Family         MALIGNANT PROSTATIC NEOPLASM G1   • Varicose Veins Family         OF LOWER EXTREMITIES     History Review: The following portions of the patient's history were reviewed and updated as appropriate: allergies, current medications, past family history, past medical history, past social history, past surgical history and problem list     OBJECTIVE:     Vital signs in last 24 hours: There were no vitals filed for this visit  Laboratory Results: I have personally reviewed all pertinent laboratory/tests results      Suicide/Homicide Risk Assessment:    Risk of Harm to Self:  The following ratings are based on assessment at the time of the interview  Recent Specific Risk Factors include: current depressive symptoms    Risk of Harm to Others: The following ratings are based on assessment at the time of the interview  Recent Specific Risk Factors include: none  The following interventions are recommended: no intervention changes needed    Medications Risks/Benefits:      Risks, Benefits And Possible Side Effects Of Medications:    Discussed risks and benefits of treatment with patient including risk of suicidality, serotonin syndrome, increased QTc interval and SIADH related to treatment with antidepressants;  Risk of induction of manic symptoms in certain patient populations and Reduction in seizure threshold related to treatment with bupropion      Controlled Medication Discussion:     Not applicable    Treatment Plan:    Due for update/Updated:   LEYDI Gallegos 12/22/22

## 2022-12-27 ENCOUNTER — TRANSCRIBE ORDERS (OUTPATIENT)
Dept: CARDIAC REHAB | Facility: CLINIC | Age: 32
End: 2022-12-27

## 2022-12-27 DIAGNOSIS — R06.09 POST-COVID CHRONIC DYSPNEA: Primary | ICD-10-CM

## 2022-12-27 DIAGNOSIS — U09.9 POST-COVID CHRONIC DYSPNEA: Primary | ICD-10-CM

## 2022-12-28 ENCOUNTER — CLINICAL SUPPORT (OUTPATIENT)
Dept: PULMONOLOGY | Facility: CLINIC | Age: 32
End: 2022-12-28

## 2022-12-28 DIAGNOSIS — U09.9 POST-COVID CHRONIC DYSPNEA: Primary | ICD-10-CM

## 2022-12-28 DIAGNOSIS — R06.09 POST-COVID CHRONIC DYSPNEA: Primary | ICD-10-CM

## 2022-12-28 NOTE — PROGRESS NOTES
PULMONARY REHAB ASSESSMENT    Today's date: 2022  Patient name: Jessica Camp     : 1990       MRN: [id number]  PCP: Sukhdeep Ronquillo DO  Referring Physician: Rose Patton DO  Pulmonologist: n/a    Dx: post-covid dyspnea      Date of onset: 2022  Cultural needs: none    Weight:    Wt Readings from Last 1 Encounters:   22 66 1 kg (145 lb 12 8 oz)      Height:   Ht Readings from Last 1 Encounters:   22 5' 6 5" (1 689 m)     Medical History:   Past Medical History:   Diagnosis Date   • Congenital dislocation of hip    • Depression     LAST ASSESSED: 12   • Disease of thyroid gland    • Pectus excavatum    • Scoliosis          Physical Limitations: EDS, left hip surgery- weakness    Oxygen needs: n/a    History of Toxic Exposure:   None    Risk Factors   Cholesterol: Yes  Smoking: Never used  HTN: No  DM: No  Obesity: No   Inactivity: Yes  Stress:  perceived  stress: /10   Stressors: life health, mental health   Goals for Stress Management: practice relaxation techniques     Family History:  Family History   Problem Relation Age of Onset   • Asthma Mother    • Hyperlipidemia Mother    • Hypertension Mother    • Diabetes type II Mother    • Diabetes Mother    • Hyperlipidemia Father    • Hypertension Father    • No Known Problems Brother    • Melanoma Family         AMELANOTIC MELANOMA OF THE SKIN   • Hyperlipidemia Family    • Hypertension Family    • Prostate cancer Family         MALIGNANT PROSTATIC NEOPLASM G1   • Varicose Veins Family         OF LOWER EXTREMITIES       Allergies: Bupropion, Epinephrine, and Fluoxetine  ETOH:   Social History     Substance and Sexual Activity   Alcohol Use Yes    Comment: SOCIAL         Current Medications:   Current Outpatient Medications   Medication Sig Dispense Refill   • buPROPion (Wellbutrin XL) 300 mg 24 hr tablet Take 1 tablet (300 mg total) by mouth daily 90 tablet 1   • Bystolic 5 MG tablet TAKE 1 TABLET BY MOUTH TWICE A DAY 60 tablet 5   • clindamycin (CLEOCIN T) 1 % APPLY TO ACNE TWICE DAILY  5   • desmopressin (DDAVP) 0 2 mg tablet Take 1 tablet (0 2 mg total) by mouth 2 (two) times a day 60 tablet 5   • fludrocortisone (FLORINEF) 0 1 mg tablet Take 1 tablet (0 1 mg total) by mouth 2 (two) times a day 60 tablet 5   • levothyroxine 100 mcg tablet Take 1 tablet (100 mcg total) by mouth daily 90 tablet 0   • midodrine (PROAMATINE) 10 MG tablet TAKE 1 TABLET BY MOUTH THREE TIMES A  tablet 3   • naproxen (NAPROSYN) 500 mg tablet Take 1 tablet (500 mg total) by mouth daily as needed for moderate pain 30 tablet 1   • norgestrel-ethinyl estradiol (Low-Ogestrel) 0 3 mg-30 mcg per tablet Take 1 tablet by mouth daily 84 tablet 4   • potassium chloride (MICRO-K) 10 MEQ CR capsule Take 1 capsule (10 mEq total) by mouth daily 90 capsule 0   • traMADol-acetaminophen (ULTRACET) 37 5-325 mg per tablet Take 1 tablet by mouth every 6 (six) hours as needed for moderate pain 30 tablet 0   • venlafaxine (EFFEXOR-XR) 150 mg 24 hr capsule Take 1 capsule (150 mg total) by mouth daily 90 capsule 1   • venlafaxine (EFFEXOR-XR) 37 5 mg 24 hr capsule Take 1 capsule (37 5 mg total) by mouth daily 90 capsule 1   • Vitamin D, Cholecalciferol, 25 MCG (1000 UT) CAPS Take 3,000 Units by mouth        No current facility-administered medications for this visit             Functional Status Prior to Diagnosis for Treatment   Occupation: unemployed  Recreation: none  ADL’s: Capable of performing light ADLs only  Breaks: No limitations but fatigue from POTA and EDS  Exercise: PT previously for EDS and POTS      Current Functional Status  Occupation: unemployed  Recreation: none  ADL’s:Capable of performing light ADLs only  Breaks: lives with parents, able to perform self-care, fatigued easily  Exercise: none      Patient Specific Goals:  Decrease SOB, make ADLs easier,     Short Term Program Goals: dietary modifications increased strength improved energy/stamina with ADLs exercise 120-150 mins/wk    Long Term Goals: intial claudication time extended  increased maximal walking duration  increased intial training workload  Improved [facility] Activity Status score  Improved functional capacity  Improved Quality of Life - [facility] score reduced  Reduced stress  improved Rate Your Plate Score    Oxygen Goals: Maintain SpO2>90% titrating supplemental oxygen as needed     Ability to reach goals/rehabilitation potential:  Very Good     Projected return to function: 12 weeks  Objective tests: 6 MWT      Nutritional   Reviewed details of Rate your Plate  Discussed key elements of heart healthy eating  Reviewed patient goals for dietary modifications and their clinical implications  Reviewed most recent lipid profile  Goals for dietary modification: choose lean cuts of meat  poultry without the skin  increase fish intake  more meatless meals  reduced fat cheese  increase whole grains  increase fruits and vegetables  low sodium  improved snack choices  reduce sweets/frozen desserts      Emotional/Social  Has hx of depression, COVID has made it a lot worse, compliant with meds and therapy    SOCIAL SUPPORT NETWORK    Marital status: single      Domestic Violence Screening: No    Comments: patient tested positive for COVID on 11/6/2022   Since then she has been experiencing ongoing SOB, stairs and walking, ADLs are difficult- dressing  POTS stable at the moment  Has chronic fatigue

## 2022-12-28 NOTE — PROGRESS NOTES
Pulmonary Rehabilitation Plan of Care   Initial Care Plan      Today's date: 2022   # of Exercise Sessions Completed: 1- initial care plan  Patient name: Scar Monet      : 1990  Age: 29 y o  MRN: [id number]  Referring Physician: Ubaldo Tamez DO  Pulmonologist: n/a   Cardiologist: Jennie Caballero MD  Provider: [facility]  Clinician: Nathaniel Merino, MS, [facility], [facility]    Dx:   Encounter Diagnosis   Name Primary? • Post-COVID chronic dyspnea      Date of onset: 2022      SUMMARY OF PROGRESS:  Today is Courtney's initial evaluation to begin Pulmonary Rehab for the diagnosis of chronic dyspnea post-COVID  She also has chronic fatigue, Nia-Danlos syndrome, and POTS  All of these are contributing to her weakness and fatigue  Which seems to have been exacerbated with COVID along with SOB  Patient does not have a PFT on file  Since their diagnosis, the patient has been experiencing increased dyspnea and increased fatigue  They report dyspnea, weakness and fatigue when completing ADLs  Jaspreetbeth Zaldivar is very limited by her SOB with daily activities such as getting dressed and walking  The patient currently does not follow a formal exercise program at home  She was previously doing PT for EDS and POTS  Pt reports the following physical limitations: chronic fatigue, EDS, hx of left hip surgery  Depression screening using the PHQ-9 interprets the patient's score of 18 15-19 = Moderately Severe Depression  Anxiety screening using the ANNEL-7 interprets the patients score of 7 5-9 = Mild anxiety  When addressed, the patient reports having depression  Patient reports excellent social/emotional support and being compliant with medical therapy  She does feel that her depression has been worse since having COVID  Information to begin using Ericks[facility] Pereira was provided as well as contact information for counseling through [facility]  PHQ-9 score will be reassessed in 30 days  The patient is a non-smoker  Patient admits to 100% medication compliance  At rest, the patient rated dyspnea 0/10 with SpO2 97% on room air  They completed an initial 6MWT, walking 900 ft on room air  The patient’s rating of perceived dyspnea during the 6MWT was 7/10 with SpO2 94%  Patient took 0 rest breaks  Telemetry revealed NSR  Resting  /64 with appropriate hemodynamic response to exercise reaching 124/62  Patient will exercise on room air  Education on smoking cessation, oxygen use, breathing techniques, pulmonary anatomy, exercise for the pulmonary patient, healthy eating, stress, and relaxation will be provided  Patient goals include: Decrease SOB, make ADLs easier  Gita Cowart will attend 24 exercise sessions, 2x/wk for 12-18 weeks beginning   Will progress patient as tolerated over the next 30 days            Medication compliance: Yes   Comments: Pt reports to be compliant with medications  Fall Risk: Low   Comments: Ambulates with a steady gait with no assist device, does have EDS    Smoking: Never used    RPD at Rest: 0/10  RPD with Exercise:  7/10    Assessment of progression of lung disease and functional status:  Shortness of breath questionnaire: 89/120      EXERCISE ASSESSMENT and PLAN    Current Exercise Program in Rehab:       Frequency: 2 days/week   Supplement with home exercise 2+ days/wk as tolerated       Minutes: 30         METS: 1 5-3 0              SpO2: >90%              RPD: 4-6                      HR: RHR +30bpm   RPE: 4-5         Modalities: Treadmill, UBE, NuStep and Recumbent bike      Exercise Progression 30 Day Goals :    Frequency: 2 days/week    Supplement with home exercise 2+ days/wk as tolerated       Minutes: 35         METS: 2 0-3 5              SpO2: >90%              RPD: 1-3                      HR: RHR +30bpm   RPE: 4-5        Modalities: Treadmill, NuStep and Recumbent bike     Strength trainin-3 days / week  12-15 repetitions  1-2 sets per modality   Will be added following 2-3 weeks of monitored exercise sessions   Modalities: Arm Curl and Sit to Stands    Oxygen Needs: on room air at rest and on room air with exercise  Oxygen Goal: Maintain SpO2>90% during exercise    Home Exercise: none  Education: pursed lipped breathing, diaphragmatic breathing, benefits of exercise for pulmonary disease, RPE scale, RPD scale and energy conservation    Goals: reduced score on  USCD Shortness of Breath Questionnaire, Improved 6MW distance by 10%, reduced dyspnea during exercise (0-3/10), improved exercise tolerance (max METs tolerated in pulmonary rehab), SpO2 >90% during exercise, reduced RPD at rest, attend pulmonary rehab regularly, decrease sitting time at home and start a walking program  Progressing:  Reviewed Pt goals and determined plan of care, Will continue to educate and progress as tolerated  Plan: learn to conserve energy with ADLs , practice breathing techniques 3x/day and reduce time sitting at home    Readiness to change: Preparation:  (Getting ready to change)       NUTRITION ASSESSMENT AND PLAN    Weight control:    Starting weight: 143 6 lbs   Current weight:       Diabetes: N/A    Goals:Improved Rate Your Plate score  >83, choose lean meat (93-95%), increase intake of fish, shellfish, increase intake of meatless meals, reduce cheese intake or use reduced-fat, eat 3 or more servings of whole grains a day, Eat 4-5 cups of fruits and vegetables daily, choose healthy snacks: light popcorn, plain pretzels, Increase intake of nuts and seeds, seldom eat or choose low fat ice-cream, fruit juice bars or frozen yogurt  and eliminate or choose low-fat sweets  Education: heart healthy eating  hydration  Progressing:Reviewed Pt goals and determined plan of care, Will continue to educate and progress as tolerated    Plan: switch to low fat cheeses, replace refined grain bread with whole grain bread, replace unhealthy snacks with fruits & vegs, eat fewer desserts and sweets, will try new grains like brown rice, quinoa, farro, drink more water and learn how to read food labels  Readiness to change: Preparation:  (Getting ready to change)       PSYCHOSOCIAL ASSESSMENT AND PLAN    Emotional:  Depression assessment:  PHQ-9 = 18 15-19 = Moderately Severe Depression            Anxiety measure:  ANNEL-7 = 7 5-9 = Mild anxiety  Self-reported stress level: 5   Social support: Patient reports excellent emotional/social support from family    Goals:  Reduce perceived stress to 1-3/10, improved [facility] QOL < 27, PHQ-9 - reduced severity by one level, continue medical therapy, follow up with therapist, Feelings in [place] Score < 3, Physical Fitness in [place] Score < 3, Daily Activity in [place] Score < 3, Social Activities in [place] Score < 3, Pain in [place] Score < 3, Overall Health in [place] Score < 3 and Quality of Life in [place] Score < 3   Education: signs/sxs of depression, benefits of a positive support system, stress management techniques and benefits of mental health counseling    Progressing:Reviewed Pt goals and determined plan of care, Patient will continue medical therapy in the next 30 days, Will continue to educate and progress as tolerated    Plan: Class: Stress and Your Health, Class: Relaxation, Practice relaxation techniques, Exercise, Enjoy a hobby, Return to previous social activity and Repeat PHQ-9 every 30 days if score >5  Readiness to change: Preparation:  (Getting ready to change)       OTHER CORE COMPONENTS     Tobacco:   Social History     Tobacco Use   Smoking Status Never   Smokeless Tobacco Never       Tobacco Use Intervention: Referral to tobacco expert:   N/A:  Patient is a non-smoker     Blood pressure:    Restin/64   Exercise: 124/62    Goals: consistent BP < 130/80, reduced dietary sodium <2300mg, moderate intensity exercise >150 mins/wk and medication compliance  Education:  inspiratory muscle training and environmental triggers  Progressing:Reviewed Pt goals and determined plan of care, Patient will monitor home HR in the next 30 days, Will continue to educate and progress as tolerated    Plan: engage in regular exercise and monitor home BP  Readiness to change: Preparation:  (Getting ready to change)

## 2022-12-29 NOTE — PROGRESS NOTES
Patient is here today to be evaluate for increased heart rate per Dr Ramona Pacheco  Patient states she has some SOB but much better than before and her dizziness come POTS and she is doing much better  Patient denies chest pain, and leg swelling  Oxygen 97  Heart Rate 82  /82  Wt  159lb     The heart rate was discussed with Dr Amber Vines and patient was given instructions  Thank you  [phone]

## 2023-01-04 ENCOUNTER — CLINICAL SUPPORT (OUTPATIENT)
Dept: PULMONOLOGY | Facility: CLINIC | Age: 33
End: 2023-01-04

## 2023-01-04 DIAGNOSIS — U09.9 POST-COVID CHRONIC DYSPNEA: Primary | ICD-10-CM

## 2023-01-04 DIAGNOSIS — R06.09 POST-COVID CHRONIC DYSPNEA: Primary | ICD-10-CM

## 2023-01-09 ENCOUNTER — CLINICAL SUPPORT (OUTPATIENT)
Dept: PULMONOLOGY | Facility: CLINIC | Age: 33
End: 2023-01-09

## 2023-01-09 DIAGNOSIS — R06.09 POST-COVID CHRONIC DYSPNEA: Primary | ICD-10-CM

## 2023-01-09 DIAGNOSIS — U09.9 POST-COVID CHRONIC DYSPNEA: Primary | ICD-10-CM

## 2023-01-10 DIAGNOSIS — G90.A POTS (POSTURAL ORTHOSTATIC TACHYCARDIA SYNDROME): ICD-10-CM

## 2023-01-10 RX ORDER — FLUDROCORTISONE ACETATE 0.1 MG/1
TABLET ORAL
Qty: 60 TABLET | Refills: 5 | Status: SHIPPED | OUTPATIENT
Start: 2023-01-10

## 2023-01-11 ENCOUNTER — CLINICAL SUPPORT (OUTPATIENT)
Dept: PULMONOLOGY | Facility: CLINIC | Age: 33
End: 2023-01-11

## 2023-01-11 DIAGNOSIS — U09.9 POST-COVID CHRONIC DYSPNEA: Primary | ICD-10-CM

## 2023-01-11 DIAGNOSIS — R06.09 POST-COVID CHRONIC DYSPNEA: Primary | ICD-10-CM

## 2023-01-16 ENCOUNTER — APPOINTMENT (OUTPATIENT)
Dept: PULMONOLOGY | Facility: CLINIC | Age: 33
End: 2023-01-16

## 2023-01-18 ENCOUNTER — CLINICAL SUPPORT (OUTPATIENT)
Dept: PULMONOLOGY | Facility: CLINIC | Age: 33
End: 2023-01-18

## 2023-01-18 DIAGNOSIS — R06.09 POST-COVID CHRONIC DYSPNEA: Primary | ICD-10-CM

## 2023-01-18 DIAGNOSIS — U09.9 POST-COVID CHRONIC DYSPNEA: Primary | ICD-10-CM

## 2023-01-23 ENCOUNTER — APPOINTMENT (OUTPATIENT)
Dept: PULMONOLOGY | Facility: CLINIC | Age: 33
End: 2023-01-23

## 2023-01-25 ENCOUNTER — CLINICAL SUPPORT (OUTPATIENT)
Dept: PULMONOLOGY | Facility: CLINIC | Age: 33
End: 2023-01-25

## 2023-01-25 DIAGNOSIS — R06.09 POST-COVID CHRONIC DYSPNEA: Primary | ICD-10-CM

## 2023-01-25 DIAGNOSIS — U09.9 POST-COVID CHRONIC DYSPNEA: Primary | ICD-10-CM

## 2023-01-25 NOTE — PROGRESS NOTES
Pulmonary Rehabilitation Plan of Care   30 Day Reassessment      Today's date: 2023   # of Exercise Sessions Completed: 6  Patient name: Ana Spence      : 1990  Age: 29 y o  MRN: [id number]  Referring Physician: Jhoan Wagoner DO  Pulmonologist: n/a   Cardiologist: Genoveva Hernandez MD  Provider: [facility]  Clinician: Pa Jc MS, CEP     Dx:   Encounter Diagnosis   Name Primary? • Post-COVID chronic dyspnea Yes     Date of onset: 2022      SUMMARY OF PROGRESS:  Merissa Goyal has begun Pulmonary Rehab for the diagnosis of chronic dyspnea post-COVID  She also has chronic fatigue, Nia-Danlos syndrome, and POTS  All of these are contributing to her weakness and fatigue  Which seems to have been exacerbated with COVID along with SOB  She has been attending 1-2x/week in the past 30 days  She is doing 1xweek right now for 4 weeks due to her chronic fatigue and hoping to get back to doing 2x/week  Since their diagnosis, the patient has been experiencing increased dyspnea and increased fatigue  They report dyspnea, weakness and fatigue when completing ADLs  Merissa Goyal reported today that she is seeing improvement with her SOB doing ADLs  She stated today that picking up her cat is now easier and has no SOB with it  The patient currently does not follow a formal exercise program at home  I encouraged her to do light walking on the days now in rehab if she is able  Pt reports the following physical limitations: chronic fatigue, EDS, hx of left hip surgery  30 Day Depression screening using the PHQ-9 interprets the patient's score of 12 10-14 = Moderate Depression  Decreased 1 level in the past 30 days  Anxiety screening using the ANNEL-7 interprets the patients score of 5 5-9 = Mild anxiety  No change in the past 30 days  When addressed, the patient reports having depression  Patient reports excellent social/emotional support and being compliant with medical therapy   She does feel that her depression has been worse since having COVID and is slightly improving since coming to rehab  PHQ-9 score will be reassessed in 30 days  Patient admits to 100% medication compliance  At rest, the patient rated dyspnea 0-2/10 with SpO2 % on room air  She is completing 20-30 minutes of aerobic exercise reaching 3 7 METs on different modalities such as the TRM, UBE, and recumbent bike  The patient's rating of percieved dyspnea during exertion is 2-5/10 with SpO2 92-98% on room air  Will start to add in light strength training program next session  She is slowly increasing her workloads and durations well with no concerns  Her telemetry has no changes and will discharge from the telemetry next session  Resting BP 94//74 with normal response to exercise 98/6-114/74  Education on smoking cessation, oxygen use, breathing techniques, pulmonary anatomy, exercise for the pulmonary patient, healthy eating, stress, and relaxation will be provided  Patient goals include: Decrease SOB, make ADLs easier  She is on track with her personal goals at 30 days  Will continue to educate, monitor, and progress as tolerated in the next 30 days         Medication compliance: Yes   Comments: Pt reports to be compliant with medications  Fall Risk: Low   Comments: Ambulates with a steady gait with no assist device, does have EDS    Smoking: Never used    RPD at Rest: 0-2/10  RPD with Exercise:  2-5/10    Assessment of progression of lung disease and functional status:  Shortness of breath questionnaire: 89/120      EXERCISE ASSESSMENT and PLAN    Current Exercise Program in Rehab:       Frequency: 2 days/week   Supplement with home exercise 2+ days/wk as tolerated       Minutes: 20-30         METS: 3 7              SpO2: 92-98% on RA              RPD: 2-5                      HR: RHR +30bpm   RPE: 4-5         Modalities: Treadmill, UBE and Recumbent bike      Exercise Progression 30 Day Goals :    Frequency: 2 days/week    Supplement with home exercise 2+ days/wk as tolerated       Minutes: 35-40         METS: 3 8-4 0              SpO2: >90%              RPD: 1-3                      HR: RHR +30bpm   RPE: 4-5        Modalities: Treadmill, UBE, [employer], NuStep and Recumbent bike     Strength trainin-3 days / week  12-15 repetitions  1-2 sets per modality   Will be added following 2-3 weeks of monitored exercise sessions   Modalities: Arm Curl and Sit to Stands    Oxygen Needs: on room air at rest and on room air with exercise  Oxygen Goal: Maintain SpO2>90% during exercise    Home Exercise: none     Education: pursed lipped breathing, diaphragmatic breathing, benefits of exercise for pulmonary disease, RPE scale, RPD scale and energy conservation    Goals: reduced score on  US Shortness of Breath Questionnaire, Improved 6MW distance by 10%, reduced dyspnea during exercise (0-3/10), improved exercise tolerance (max METs tolerated in pulmonary rehab), SpO2 >90% during exercise, reduced RPD at rest, attend pulmonary rehab regularly, decrease sitting time at home and start a walking program    Progressing:  Pt is progressing and showing improvement  toward the following goals:  attending rehab regularly 1-2x/week, reduced dyspnea with exertion in the past 30 days with exercise and ADLs, slow increase in strength and endurance, SpO2 >90% on room air at rest and exercise, and tolerating small increments of durations and workloads well with no concerns with her chronic fatigue    , Pt has not made progress toward the following goals: no home exercise   , Patient will start home walking and start strength training  in the next 30 days, Will continue to educate and progress as tolerated      Plan: learn to conserve energy with ADLs , practice breathing techniques 3x/day and reduce time sitting at home    Readiness to change: Action:  (Changing behavior)      NUTRITION ASSESSMENT AND PLAN    Weight control:    Starting weight: 143 6 lbs   Current weight:   143 lbs     Diabetes: N/A    Goals:Improved Rate Your Plate score  >42, choose lean meat (93-95%), increase intake of fish, shellfish, increase intake of meatless meals, reduce cheese intake or use reduced-fat, eat 3 or more servings of whole grains a day, Eat 4-5 cups of fruits and vegetables daily, choose healthy snacks: light popcorn, plain pretzels, Increase intake of nuts and seeds, seldom eat or choose low fat ice-cream, fruit juice bars or frozen yogurt  and eliminate or choose low-fat sweets  Education: heart healthy eating  hydration     Progressing:Pt is progressing and showing improvement  toward the following goals:  no changes to dietary choices and maintaining weight    , Patient will lose 1-2 lbs a week for weight loss per patient goal  in the next 30 days, Will continue to educate and progress as tolerated       Plan: switch to low fat cheeses, replace refined grain bread with whole grain bread, replace unhealthy snacks with fruits & vegs, eat fewer desserts and sweets, will try new grains like brown rice, quinoa, farro, drink more water and learn how to read food labels  Readiness to change: Preparation:  (Getting ready to change)       PSYCHOSOCIAL ASSESSMENT AND PLAN    Emotional:  Depression assessment:  PHQ-9 = 12 10-14 = Moderate Depression            Anxiety measure:  ANNEL-7 = 5  5-9 = Mild anxiety  Self-reported stress level: 5   Social support: Patient reports excellent emotional/social support from family    Goals:  Reduce perceived stress to 1-3/10, improved [facility] QOL < 27, PHQ-9 - reduced severity by one level, continue medical therapy, follow up with therapist, Feelings in [place] Score < 3, Physical Fitness in [place] Score < 3, Daily Activity in [place] Score < 3, Social Activities in [place] Score < 3, Pain in [place] Score < 3, Overall Health in [place] Score < 3 and Quality of Life in [facility] Score < 3   Education: signs/sxs of depression, benefits of a positive support system, stress management techniques and benefits of mental health counseling    Progressing:Pt is progressing and showing improvement  toward the following goals:  decreased 1 level on PHQ-9 and ANNEL-7 stayed the same, improved QOL with improved SOB with doing ADLs, compliant with medical therapy, and great support system at home    , Patient will continue medical therapy in the next 30 days, Will continue to educate and progress as tolerated  Plan: Class: Stress and Your Health, Class: Relaxation, Practice relaxation techniques, Exercise, Enjoy a hobby, Return to previous social activity and Repeat PHQ-9 every 30 days if score >5  Readiness to change: Preparation:  (Getting ready to change)       OTHER CORE COMPONENTS     Tobacco:   Social History     Tobacco Use   Smoking Status Never   Smokeless Tobacco Never       Tobacco Use Intervention: Referral to tobacco expert:   N/A:  Patient is a non-smoker     Blood pressure:    Restin//74   Exercise: 98//74    Goals: consistent BP < 130/80, reduced dietary sodium <2300mg, moderate intensity exercise >150 mins/wk and medication compliance  Education:  inspiratory muscle training and environmental triggers     Progressing:Pt is progressing and showing improvement  toward the following goals:  medication compliant and blood pressures normal at rest and normal response to exercise   , Pt has not made progress toward the following goals: no home exercise with rehab   , Patient will monitor home HR and add in home walking  in the next 30 days, Will continue to educate and progress as tolerated       Plan: engage in regular exercise and monitor home BP  Readiness to change: Action:  (Changing behavior)

## 2023-01-26 ENCOUNTER — OFFICE VISIT (OUTPATIENT)
Dept: FAMILY MEDICINE CLINIC | Facility: OTHER | Age: 33
End: 2023-01-26

## 2023-01-26 VITALS
TEMPERATURE: 98 F | RESPIRATION RATE: 14 BRPM | HEART RATE: 118 BPM | BODY MASS INDEX: 22.66 KG/M2 | HEIGHT: 67 IN | OXYGEN SATURATION: 98 % | WEIGHT: 144.4 LBS | DIASTOLIC BLOOD PRESSURE: 59 MMHG | SYSTOLIC BLOOD PRESSURE: 119 MMHG

## 2023-01-26 DIAGNOSIS — Z23 ENCOUNTER FOR IMMUNIZATION: ICD-10-CM

## 2023-01-26 DIAGNOSIS — E55.9 VITAMIN D DEFICIENCY: ICD-10-CM

## 2023-01-26 DIAGNOSIS — Z13.6 ENCOUNTER FOR LIPID SCREENING FOR CARDIOVASCULAR DISEASE: ICD-10-CM

## 2023-01-26 DIAGNOSIS — E03.9 PRIMARY HYPOTHYROIDISM: ICD-10-CM

## 2023-01-26 DIAGNOSIS — Z00.00 ANNUAL PHYSICAL EXAM: Primary | ICD-10-CM

## 2023-01-26 DIAGNOSIS — Z13.220 ENCOUNTER FOR LIPID SCREENING FOR CARDIOVASCULAR DISEASE: ICD-10-CM

## 2023-01-26 DIAGNOSIS — R53.82 CHRONIC FATIGUE: ICD-10-CM

## 2023-01-26 DIAGNOSIS — G25.81 RESTLESS LEGS: ICD-10-CM

## 2023-01-26 DIAGNOSIS — E87.6 LOW BLOOD POTASSIUM: ICD-10-CM

## 2023-01-26 DIAGNOSIS — M25.541 ARTHRALGIA OF BOTH HANDS: ICD-10-CM

## 2023-01-26 DIAGNOSIS — M25.542 ARTHRALGIA OF BOTH HANDS: ICD-10-CM

## 2023-01-26 RX ORDER — POTASSIUM CHLORIDE 750 MG/1
10 CAPSULE, EXTENDED RELEASE ORAL DAILY
Qty: 30 CAPSULE | Refills: 2 | Status: SHIPPED | OUTPATIENT
Start: 2023-01-26 | End: 2023-04-26

## 2023-01-26 RX ORDER — LEVOTHYROXINE SODIUM 0.1 MG/1
100 TABLET ORAL DAILY
Qty: 90 TABLET | Refills: 0 | Status: SHIPPED | OUTPATIENT
Start: 2023-01-26 | End: 2023-04-26

## 2023-01-26 NOTE — PATIENT INSTRUCTIONS

## 2023-01-26 NOTE — PROGRESS NOTES
[address] [place]    NAME: Frankey Alter  AGE: 29 y o  SEX: female  : 1990     DATE: 2023     Assessment and Plan:     Problem List Items Addressed This Visit        Endocrine    Primary hypothyroidism    Relevant Medications    levothyroxine 100 mcg tablet       Other    Vitamin D deficiency    Relevant Orders    Vitamin D 25 hydroxy   Other Visit Diagnoses     Annual physical exam    -  Primary    Encounter for immunization        Low blood potassium        Relevant Medications    potassium chloride (MICRO-K) 10 MEQ CR capsule    Other Relevant Orders    Comprehensive metabolic panel    Restless legs        Relevant Orders    CBC and differential    Magnesium    Comprehensive metabolic panel    Ambulatory Referral to Sleep Medicine    Chronic fatigue        Relevant Orders    CBC and differential    Magnesium    Comprehensive metabolic panel    Lipid panel    FE Screen w/ Reflex to Titer/Pattern    RF Screen w/ Reflex to Titer    Ambulatory Referral to Sleep Medicine    Encounter for lipid screening for cardiovascular disease        Relevant Orders    Lipid panel    Arthralgia of both hands        Relevant Orders    Magnesium    FE Screen w/ Reflex to Titer/Pattern    RF Screen w/ Reflex to Titer          Patient with ongoing chronic fatigue, suspect this is most likely secondary to depression, EDS, POTS  To rule out other etiology, will refer to sleep medicine for fatigue as well as restless legs  Will check above blood work to rule out any further causes  Will look into any nearby chronic fatigue specialist for the patient  With increasing arthralgias, will check for RA with above labs as well  Follow up pending blood work and sleep medicine eval      Immunizations and preventive care screenings were discussed with patient today   Appropriate education was printed on patient's after visit summary  Counseling:  Alcohol/drug use: discussed moderation in alcohol intake, the recommendations for healthy alcohol use, and avoidance of illicit drug use  Dental Health: discussed importance of regular tooth brushing, flossing, and dental visits  Injury prevention: discussed safety/seat belts, safety helmets, smoke detectors, carbon dioxide detectors, and smoking near bedding or upholstery  Sexual health: discussed sexually transmitted diseases, partner selection, use of condoms, avoidance of unintended pregnancy, and contraceptive alternatives  · Exercise: the importance of regular exercise/physical activity was discussed  Recommend exercise 3-5 times per week for at least 30 minutes  Depression Screening and Follow-up Plan: Continue regular follow-up with their mental health provider who is managing their mental health condition(s)  Nutrition Assessment and Intervention:     Nutrition patient handout reviewed with patient      Other interventions: Current diet regimen reviewed with patient, and recommendations based on current guidelines were discussed  Handout was provided  Physical Activity Assessment and Intervention:    Physical Activity patient handout reviewed with patient      Other interventions: Current exercise regimen reviewed with patient, and recommendations based on current guidelines were discussed  Handout was provided      Emotional and Mental Well-being, Sleep, Connectedness Assessment and Intervention:    Sleep/stress assessment performed    Depression and anxiety screening performed and reviewed      Tobacco and Toxic Substance Assessment and Intervention:     Tobacco use screening performed    Alcohol and drug use screening performed        Return in about 1 year (around 1/26/2024) for Annual physical      Chief Complaint:     Chief Complaint   Patient presents with   • Physical Exam      History of Present Illness:     Adult Annual Physical   Patient here for a comprehensive physical exam  The patient reports below problems  1: chronic fatigue - history of CFS, EDS, POTS, depression, This has been really impacting her lifestyle on a daily basis  Sleep study was normal, saw POTS specialists who did some diet changes without improvement in her fatigue, physcial therapy (still ongoing and sees some imporvement)  Follows with psychiatry for mgmt of depression  2  She is concerned about arthritis  She reports chronic pains in her fingers and knees  No swelling, but seems different than her typical EDS pain  She is asking to be tested for arthritis  Pains are intermittent but occur at varying times of the day  She reports Chronic restlessness in he LE  Cant tell if this is worsening her fatigue but feels her legs are restless a lot during the day  She does not know if this is the case at nighttime, but due to her EDS she does have chronic pain in her legs  Diet and Physical Activity  · Diet/Nutrition: well balanced diet and limited fruits/vegetables  · Exercise: physcial therapy once per week         Depression Screening  PHQ-2/9 Depression Screening    Little interest or pleasure in doing things: 1 - several days  Feeling down, depressed, or hopeless: 2 - more than half the days  Trouble falling or staying asleep, or sleeping too much: 3 - nearly every day  Feeling tired or having little energy: 3 - nearly every day  Poor appetite or overeatin - several days  Feeling bad about yourself - or that you are a failure or have let yourself or your family down: 3 - nearly every day  Trouble concentrating on things, such as reading the newspaper or watching television: 0 - not at all  Moving or speaking so slowly that other people could have noticed   Or the opposite - being so fidgety or restless that you have been moving around a lot more than usual: 0 - not at all  Thoughts that you would be better off dead, or of hurting yourself in some way: 0 - not at all  PHQ-9 Score: 13   PHQ-9 Interpretation: Moderate depression        General Health  · Sleep: gets more than 8 hours of sleep on average and unrefreshing sleep  · Hearing: normal - bilateral   · Vision: goes for regular eye exams, most recent eye exam <1 year ago and wears glasses  · Dental: regular dental visits  /GYN Health  · Last menstrual period: starting today, periods are regular  · Contraceptive method: oral contraceptives  For pain  · History of STDs?: no      Review of Systems:     Review of Systems   Constitutional: Positive for fatigue  Negative for appetite change and unexpected weight change  HENT: Negative for congestion, rhinorrhea and sore throat  Eyes: Negative for visual disturbance  Respiratory: Negative for shortness of breath  Cardiovascular: Negative for chest pain and leg swelling  Gastrointestinal: Negative for constipation and diarrhea  Genitourinary: Negative for dysuria, frequency and urgency  Musculoskeletal: Positive for arthralgias  Negative for joint swelling  Skin: Negative for rash  Neurological: Negative for dizziness and headaches  Psychiatric/Behavioral: Positive for dysphoric mood  Past Medical History:     Past Medical History:   Diagnosis Date   • Congenital dislocation of hip    • Depression     LAST ASSESSED: 8/4/12   • Disease of thyroid gland    • Pectus excavatum    • Scoliosis       Past Surgical History:     Past Surgical History:   Procedure Laterality Date   • DEVEN ACETABULAR OSTEOTOMY Left     ACETABULAR OSTEOTOMY      Social History:     Social History     Socioeconomic History   • Marital status: Single     Spouse name: None   • Number of children: None   • Years of education: None   • Highest education level: None   Occupational History   • Occupation: UNEMPLOYED   Tobacco Use   • Smoking status: Never   • Smokeless tobacco: Never   Vaping Use   • Vaping Use: Never used   Substance and Sexual Activity   • Alcohol use:  Yes Comment: SOCIAL   • Drug use: No   • Sexual activity: Not Currently     Birth control/protection: OCP   Other Topics Concern   • None   Social History Narrative    CURRENTLY IN SCHOOL: WILL BE STARTING AT [place] [address] IN 1/2014    LIVING WITH PARENTS     Social Determinants of Health     Financial Resource Strain: Not on file   Food Insecurity: Not on file   Transportation Needs: Not on file   Physical Activity: Not on file   Stress: Not on file   Social Connections: Not on file   Intimate Partner Violence: Not on file   Housing Stability: Not on file      Family History:     Family History   Problem Relation Age of Onset   • Asthma Mother    • Hyperlipidemia Mother    • Hypertension Mother    • Diabetes type II Mother    • Diabetes Mother    • Hyperlipidemia Father    • Hypertension Father    • No Known Problems Brother    • Melanoma Family         AMELANOTIC MELANOMA OF THE SKIN   • Hyperlipidemia Family    • Hypertension Family    • Prostate cancer Family         MALIGNANT PROSTATIC NEOPLASM G1   • Varicose Veins Family         OF LOWER EXTREMITIES      Current Medications:     Current Outpatient Medications   Medication Sig Dispense Refill   • buPROPion (Wellbutrin XL) 300 mg 24 hr tablet Take 1 tablet (300 mg total) by mouth daily 90 tablet 1   • Bystolic 5 MG tablet TAKE 1 TABLET BY MOUTH TWICE A DAY 60 tablet 5   • clindamycin (CLEOCIN T) 1 % APPLY TO ACNE TWICE DAILY  5   • desmopressin (DDAVP) 0 2 mg tablet Take 1 tablet (0 2 mg total) by mouth 2 (two) times a day 60 tablet 5   • fludrocortisone (FLORINEF) 0 1 mg tablet TAKE 1 TABLET BY MOUTH TWICE A DAY 60 tablet 5   • levothyroxine 100 mcg tablet Take 1 tablet (100 mcg total) by mouth daily 90 tablet 0   • midodrine (PROAMATINE) 10 MG tablet TAKE 1 TABLET BY MOUTH THREE TIMES A  tablet 3   • naproxen (NAPROSYN) 500 mg tablet Take 1 tablet (500 mg total) by mouth daily as needed for moderate pain 30 tablet 1   • norgestrel-ethinyl estradiol (Low-Ogestrel) 0 3 mg-30 mcg per tablet Take 1 tablet by mouth daily 84 tablet 4   • potassium chloride (MICRO-K) 10 MEQ CR capsule Take 1 capsule (10 mEq total) by mouth daily 30 capsule 2   • traMADol-acetaminophen (ULTRACET) 37 5-325 mg per tablet Take 1 tablet by mouth every 6 (six) hours as needed for moderate pain 30 tablet 0   • venlafaxine (EFFEXOR-XR) 150 mg 24 hr capsule Take 1 capsule (150 mg total) by mouth daily 90 capsule 1   • venlafaxine (EFFEXOR-XR) 37 5 mg 24 hr capsule Take 1 capsule (37 5 mg total) by mouth daily 90 capsule 1   • Vitamin D, Cholecalciferol, 25 MCG (1000 UT) CAPS Take 3,000 Units by mouth        No current facility-administered medications for this visit  Allergies: Allergies   Allergen Reactions   • Epinephrine Dizziness     Causes dysautonomia   • Fluoxetine      Other reaction(s): Flushed      Physical Exam:     /59   Pulse (!) 118   Temp 98 °F (36 7 °C)   Resp 14   Ht 5' 6 5" (1 689 m)   Wt 65 5 kg (144 lb 6 4 oz)   SpO2 98%   BMI 22 96 kg/m²     Physical Exam  Constitutional:       General: She is not in acute distress  Appearance: She is well-developed  HENT:      Head: Normocephalic and atraumatic  Right Ear: Tympanic membrane, ear canal and external ear normal       Left Ear: Tympanic membrane, ear canal and external ear normal       Nose: Nose normal       Mouth/Throat:      Mouth: Mucous membranes are moist       Pharynx: Oropharynx is clear  No oropharyngeal exudate  Eyes:      Extraocular Movements: Extraocular movements intact  Conjunctiva/sclera: Conjunctivae normal       Pupils: Pupils are equal, round, and reactive to light  Neck:      Vascular: No JVD  Cardiovascular:      Rate and Rhythm: Regular rhythm  Tachycardia present  Pulses: Normal pulses  Heart sounds: Normal heart sounds  No murmur heard  Pulmonary:      Effort: Pulmonary effort is normal  No respiratory distress        Breath sounds: Normal breath sounds  No wheezing or rales  Abdominal:      General: Abdomen is flat  Bowel sounds are normal  There is no distension  Palpations: Abdomen is soft  Tenderness: There is no abdominal tenderness  Musculoskeletal:      Cervical back: Normal range of motion and neck supple  Right lower leg: No edema  Left lower leg: No edema  Skin:     General: Skin is warm  Findings: No rash  Neurological:      Mental Status: She is alert and oriented to person, place, and time     Psychiatric:         Mood and Affect: Mood normal          Behavior: Behavior normal           Anita Villagranw, DO   [facility] [address] [facility] [address],[place] [address]

## 2023-01-30 ENCOUNTER — APPOINTMENT (OUTPATIENT)
Dept: PULMONOLOGY | Facility: CLINIC | Age: 33
End: 2023-01-30

## 2023-02-01 ENCOUNTER — CLINICAL SUPPORT (OUTPATIENT)
Dept: PULMONOLOGY | Facility: CLINIC | Age: 33
End: 2023-02-01

## 2023-02-01 DIAGNOSIS — U09.9 POST-COVID CHRONIC DYSPNEA: Primary | ICD-10-CM

## 2023-02-01 DIAGNOSIS — R06.09 POST-COVID CHRONIC DYSPNEA: Primary | ICD-10-CM

## 2023-02-06 ENCOUNTER — APPOINTMENT (OUTPATIENT)
Dept: PULMONOLOGY | Facility: CLINIC | Age: 33
End: 2023-02-06

## 2023-02-07 ENCOUNTER — OFFICE VISIT (OUTPATIENT)
Dept: CARDIOLOGY CLINIC | Facility: MEDICAL CENTER | Age: 33
End: 2023-02-07

## 2023-02-07 VITALS
BODY MASS INDEX: 22.29 KG/M2 | DIASTOLIC BLOOD PRESSURE: 70 MMHG | SYSTOLIC BLOOD PRESSURE: 118 MMHG | HEART RATE: 101 BPM | WEIGHT: 142 LBS | HEIGHT: 67 IN | OXYGEN SATURATION: 99 %

## 2023-02-07 DIAGNOSIS — Q79.62 EHLERS-DANLOS SYNDROME, BENIGN HYPERMOBILE FORM: ICD-10-CM

## 2023-02-07 DIAGNOSIS — R00.0 SINUS TACHYCARDIA: ICD-10-CM

## 2023-02-07 DIAGNOSIS — G90.A POTS (POSTURAL ORTHOSTATIC TACHYCARDIA SYNDROME): Primary | ICD-10-CM

## 2023-02-07 NOTE — PROGRESS NOTES
Cardiology   Hemalatha Zarate 29 y o  female MRN: [id number]      Impression:  1  POTS - intermittent symptoms  2  Nia-Danlos Type III  3  Dyslipidemia - borderline  Recheck lipids in 5 years  4  Sinus tachycardia - stable        Recommendations:  1  Continue current medications  2  Check echo to evaluate for cardiomyopathy post-COVID  3  Multivitamin with zinc, B12, iron  4  Increase protein intake  5  Follow up in 6 months             HPI: Hemalatha Zarate is a 29y o  year old female with Nia-Danlos Type III and POTS presents for follow up  Developed COVID-19 in 12/20, and has had worsening in fatigue, dyspnea, and concentration issues since   Has issues with fluctuating heart rate  Echo 7/21 - structurally normal heart   Had COVID again in 11/22  Has been having increased dyspnea  Started cardio-pulm rehab  Review of Systems   Constitutional: Positive for fatigue  HENT: Negative  Eyes: Negative  Respiratory: Positive for shortness of breath  Negative for chest tightness  Cardiovascular: Negative for chest pain, palpitations and leg swelling  Gastrointestinal: Negative  Endocrine: Negative  Genitourinary: Negative  Musculoskeletal: Negative  Skin: Negative  Allergic/Immunologic: Negative  Neurological: Positive for light-headedness  Hematological: Negative  Psychiatric/Behavioral: Negative  All other systems reviewed and are negative          Past Medical History:   Diagnosis Date   • Congenital dislocation of hip    • Depression     LAST ASSESSED: 8/4/12   • Disease of thyroid gland    • Pectus excavatum    • Scoliosis      Past Surgical History:   Procedure Laterality Date   • DEVEN ACETABULAR OSTEOTOMY Left     ACETABULAR OSTEOTOMY     Social History     Substance and Sexual Activity   Alcohol Use Yes    Comment: SOCIAL     Social History     Substance and Sexual Activity   Drug Use No     Social History     Tobacco Use   Smoking Status Never Smokeless Tobacco Never     Family History   Problem Relation Age of Onset   • Asthma Mother    • Hyperlipidemia Mother    • Hypertension Mother    • Diabetes type II Mother    • Diabetes Mother    • Hyperlipidemia Father    • Hypertension Father    • No Known Problems Brother    • Melanoma Family         AMELANOTIC MELANOMA OF THE SKIN   • Hyperlipidemia Family    • Hypertension Family    • Prostate cancer Family         MALIGNANT PROSTATIC NEOPLASM G1   • Varicose Veins Family         OF LOWER EXTREMITIES       Allergies:   Allergies   Allergen Reactions   • Epinephrine Dizziness     Causes dysautonomia   • Fluoxetine      Other reaction(s): Flushed       Medications:     Current Outpatient Medications:   •  buPROPion (Wellbutrin XL) 300 mg 24 hr tablet, Take 1 tablet (300 mg total) by mouth daily, Disp: 90 tablet, Rfl: 1  •  Bystolic 5 MG tablet, TAKE 1 TABLET BY MOUTH TWICE A DAY, Disp: 60 tablet, Rfl: 5  •  clindamycin (CLEOCIN T) 1 %, APPLY TO ACNE TWICE DAILY, Disp: , Rfl: 5  •  desmopressin (DDAVP) 0 2 mg tablet, Take 1 tablet (0 2 mg total) by mouth 2 (two) times a day, Disp: 60 tablet, Rfl: 5  •  fludrocortisone (FLORINEF) 0 1 mg tablet, TAKE 1 TABLET BY MOUTH TWICE A DAY, Disp: 60 tablet, Rfl: 5  •  levothyroxine 100 mcg tablet, Take 1 tablet (100 mcg total) by mouth daily, Disp: 90 tablet, Rfl: 0  •  midodrine (PROAMATINE) 10 MG tablet, TAKE 1 TABLET BY MOUTH THREE TIMES A DAY, Disp: 270 tablet, Rfl: 3  •  naproxen (NAPROSYN) 500 mg tablet, Take 1 tablet (500 mg total) by mouth daily as needed for moderate pain, Disp: 30 tablet, Rfl: 1  •  norgestrel-ethinyl estradiol (Low-Ogestrel) 0 3 mg-30 mcg per tablet, Take 1 tablet by mouth daily, Disp: 84 tablet, Rfl: 4  •  potassium chloride (MICRO-K) 10 MEQ CR capsule, Take 1 capsule (10 mEq total) by mouth daily, Disp: 30 capsule, Rfl: 2  •  traMADol-acetaminophen (ULTRACET) 37 5-325 mg per tablet, Take 1 tablet by mouth every 6 (six) hours as needed for moderate pain, Disp: 30 tablet, Rfl: 0  •  venlafaxine (EFFEXOR-XR) 150 mg 24 hr capsule, Take 1 capsule (150 mg total) by mouth daily, Disp: 90 capsule, Rfl: 1  •  venlafaxine (EFFEXOR-XR) 37 5 mg 24 hr capsule, Take 1 capsule (37 5 mg total) by mouth daily, Disp: 90 capsule, Rfl: 1  •  Vitamin D, Cholecalciferol, 25 MCG (1000 UT) CAPS, Take 3,000 Units by mouth , Disp: , Rfl:       Wt Readings from Last 3 Encounters:   02/07/23 64 4 kg (142 lb)   01/26/23 65 5 kg (144 lb 6 4 oz)   12/13/22 66 1 kg (145 lb 12 8 oz)     Temp Readings from Last 3 Encounters:   01/26/23 98 °F (36 7 °C)   12/13/22 97 6 °F (36 4 °C)   11/04/22 98 5 °F (36 9 °C) (Oral)     BP Readings from Last 3 Encounters:   02/07/23 118/70   01/26/23 119/59   12/13/22 124/80     Pulse Readings from Last 3 Encounters:   02/07/23 101   01/26/23 (!) 118   12/13/22 97         Physical Exam  HENT:      Head: Atraumatic  Mouth/Throat:      Mouth: Mucous membranes are moist    Eyes:      Extraocular Movements: Extraocular movements intact  Cardiovascular:      Rate and Rhythm: Normal rate and regular rhythm  Heart sounds: Normal heart sounds  Pulmonary:      Effort: Pulmonary effort is normal       Breath sounds: Normal breath sounds  Abdominal:      General: Abdomen is flat  Musculoskeletal:         General: Normal range of motion  Cervical back: Normal range of motion  Skin:     General: Skin is warm  Neurological:      General: No focal deficit present  Mental Status: She is alert and oriented to person, place, and time     Psychiatric:         Mood and Affect: Mood normal          Behavior: Behavior normal            Laboratory Studies:  CMP:  Lab Results   Component Value Date     01/20/2016    K 4 2 11/04/2022     11/04/2022    CO2 28 11/04/2022    BUN 12 11/04/2022    CREATININE 0 75 11/04/2022    AST 18 11/04/2022    ALT 19 11/04/2022    BILITOT 0 4 01/20/2016    EGFR 107 11/04/2022       Lipid Profile:   No results found for: CHOL  Lab Results   Component Value Date    HDL 50 2021     Lab Results   Component Value Date    LDLCALC 138 (H) 2021     Lab Results   Component Value Date    TRIG 69 2021       Cardiac testing:   EKG reviewed personally:   Results for orders placed during the hospital encounter of 21    Echo complete with contrast if indicated    Narrative  Kevin [address], [address] [facility]  [phone]    Transthoracic Echocardiogram  2D, M-mode, Doppler, and Color Doppler    Study date:  2021    [facility]  MR number: [id number]  Account number: [de-identified]  : 1990  Age: 31 years  Gender: Female  Status: Outpatient  Location: [address] [place] [address]  Height: 66 in  Weight: 152 7 lb  BP: 122/ 84 mmHg    Indications: Assess left ventricular function  Diagnoses: R53 82 - Chronic fatigue, unspecified    Sonographer:  LEONLIA Hightower  Referring Physician:  Nicoletta Nageotte, DO  Group:  [facility]  Interpreting Physician:  Nallely Martell MD    SUMMARY    PROCEDURE INFORMATION:  This was a technically difficult study  No subcostal views noted  LEFT VENTRICLE:  Systolic function was normal  Ejection fraction was estimated to be 55 %  There were no regional wall motion abnormalities  RIGHT VENTRICLE:  Systolic function was normal     MITRAL VALVE:  There was trace regurgitation  AORTIC VALVE:  There was no evidence for stenosis  TRICUSPID VALVE:  There was trace regurgitation  PERICARDIUM:  There was no pericardial effusion  HISTORY: PRIOR HISTORY: Chronic fatigue s/p COVID-19, POTS, Ehler's Danlos, Pectus excavatum    PROCEDURE: The study was performed in the [address] [place] [address]  This was a routine study  The transthoracic approach was used  The study included complete 2D imaging, M-mode, complete spectral Doppler, and color Doppler   The  heart rate was 69 bpm, at the start of the study  Images were obtained from the parasternal, apical, subcostal, and suprasternal notch acoustic windows  This was a technically difficult study  No subcostal views noted  LEFT VENTRICLE: Size was normal  Systolic function was normal  Ejection fraction was estimated to be 55 %  There were no regional wall motion abnormalities  Wall thickness was normal  DOPPLER: Left ventricular diastolic function parameters  were normal     RIGHT VENTRICLE: The size was normal  Systolic function was normal     LEFT ATRIUM: Size was normal     RIGHT ATRIUM: Size was normal     MITRAL VALVE: Valve structure was normal  There was normal leaflet separation  DOPPLER: The transmitral velocity was within the normal range  There was no evidence for stenosis  There was trace regurgitation  AORTIC VALVE: The valve was trileaflet  Leaflets exhibited normal thickness and normal cuspal separation  DOPPLER: Transaortic velocity was within the normal range  There was no evidence for stenosis  There was no significant  regurgitation  TRICUSPID VALVE: The valve structure was normal  There was normal leaflet separation  DOPPLER: The transtricuspid velocity was within the normal range  There was no evidence for stenosis  There was trace regurgitation  PULMONIC VALVE: Leaflets exhibited normal thickness, no calcification, and normal cuspal separation  DOPPLER: The transpulmonic velocity was within the normal range  There was no significant regurgitation  PERICARDIUM: There was no pericardial effusion  AORTA: The root exhibited normal size  SYSTEMIC VEINS: IVC: The inferior vena cava was normal in size      SYSTEM MEASUREMENT TABLES    2D  %FS: 28 05 %  Ao Diam: 2 53 cm  EDV(Teich): 58 13 ml  EF(Teich): 55 12 %  ESV(Teich): 26 09 ml  IVSd: 0 87 cm  LA Area: 8 93 cm2  LA Diam: 2 68 cm  LVEDV MOD A4C: 44 94 ml  LVEF MOD A4C: 53 93 %  LVESV MOD A4C: 20 7 ml  LVIDd: 3 7 cm  LVIDs: 2 66 cm  LVLd A4C: 6 62 cm  LVLs A4C: 5 6 cm  LVPWd: 0 86 cm  RA Area: 4 31 cm2  RVIDd: 2 2 cm  SV MOD A4C: 24 24 ml  SV(Teich): 32 04 ml    CW  TR MaxPG: 15 36 mmHg  TR Vmax: 1 96 m/s    MM  TAPSE: 1 66 cm    PW  E' Sept: 0 12 m/s  E/E' Sept: 4 76  MV A Juan Jose: 0 44 m/s  MV Dec [place]: 2 46 m/s2  MV DecT: 234 86 ms  MV E Juan Jose: 0 58 m/s  MV E/A Ratio: 1 31  MV PHT: 68 11 ms  MVA By PHT: 3 23 cm2    [facility] Accredited Echocardiography Laboratory    Prepared and electronically signed by    Khadijah Olmedo MD  Signed 21-Jul-2021 16:55:18    No results found for this or any previous visit  No results found for this or any previous visit  No results found for this or any previous visit

## 2023-02-07 NOTE — PATIENT INSTRUCTIONS
Recommendations:  1  Continue current medications  2  Check echo to evaluate for cardiomyopathy post-COVID  3  Multivitamin with zinc, B12, iron  4  Increase protein intake  5  Follow up in 6 months  Recommendations:  1  Continue current medications  2  Check echo to evaluate for cardiomyopathy post-COVID  3  Multivitamin with zinc, B12, iron  4  Increase protein intake  5  Follow up in 6 months

## 2023-02-08 ENCOUNTER — APPOINTMENT (OUTPATIENT)
Dept: PULMONOLOGY | Facility: CLINIC | Age: 33
End: 2023-02-08

## 2023-02-13 ENCOUNTER — APPOINTMENT (OUTPATIENT)
Dept: PULMONOLOGY | Facility: CLINIC | Age: 33
End: 2023-02-13

## 2023-02-15 ENCOUNTER — CLINICAL SUPPORT (OUTPATIENT)
Dept: PULMONOLOGY | Facility: CLINIC | Age: 33
End: 2023-02-15

## 2023-02-15 DIAGNOSIS — U09.9 POST-COVID CHRONIC DYSPNEA: Primary | ICD-10-CM

## 2023-02-15 DIAGNOSIS — R06.09 POST-COVID CHRONIC DYSPNEA: Primary | ICD-10-CM

## 2023-02-20 ENCOUNTER — APPOINTMENT (OUTPATIENT)
Dept: PULMONOLOGY | Facility: CLINIC | Age: 33
End: 2023-02-20

## 2023-02-20 NOTE — PSYCH
[address]    Name and Date of Birth:  Sushma Uriostegui 29 y o  1990 MRN: [id number]    Date of Visit: February 23, 2023    Reason for visit: Full psychiatric intake assessment for medication management        Chief Complaint   Patient presents with   • Establish Care       HPI:     Sushma Uriostegui is a 29 y o  female, domiciled with parents in [place], currently unemployed, w/ PMH of POTS, Ehler's-Danlos syndrome, hypothyroidism and PPH of depression, no prior psychiatric admissions, attended [facility] 5/2020, no prior SA, no h/o self-injurious behavior, who presented to the mental health clinic for the initial intake and psychiatric evaluation on February 23, 2023  Angie Harris was a former patient of [facility] (last visit on 12/22/22) and is currently prescribed Wellbutrin XL and Effexor XR  Tolerating medication well with no medication side effects observed or reported  Actively involved in individual psychotherapy with Brianna Heath biweekly since 2019  Sushma Uriostegui was visited in the clinic; chart reviewed  Met with patient individually  Reports first meeting with psychiatrist around age 17 due to depression and anxiety symptoms  Stressors at that time included bullying and medical issues  On evaluation today, Angie Harris endorses a long history of depression and anxiety symptoms beginning around age 17 with fluctuating course since that time  Angie Harris reports worsening depressive symptoms around 2019, when she stopped working due to her physical symptoms  She attended [facility] in 2020 due to depressive symptoms, stating her mental health had deteriorated around that time without her realizing it for awhile   Patient endorses a history of multiple depressive episodes in the past, with symptoms including depressed mood, sleep disruption, anhedonia, decreased appetite, decreased concentration, low energy, and poor motivation  Denies suicidal ideation, plan, or intent  Depressive episodes have lasted 2 weeks or longer  No history of SAs or inpatient hospitalizations  Currently, she endorses depressed mood some days, anhedonia, low energy and motivation, difficulty initiating and maintaining sleep, and decreased appetite  Depressive symptoms were recently more severe in Dec 2022 around the holidays due to stressors with her brother  She has noticed some improvement over the past several months  She feels she has done well overall on current medications  Experienced some leg tremors when Wellbutrin dose was previously increased but this improved when dose was tapered back down  She has been struggling with long Covid symptoms since 12/2020  Symptoms were beginning to improve and then she tested positive for Covid a 2nd time in Nov 2022  She has been attending [facility] weekly  Current stressors include physical symptoms, social isolation, and finances  Elke Gillis reports a long history of anxiety in social situations, with symptoms worsening during Covid quarantine  She does not have any friends in the area and often isolates at home  She has considered attending a meet up group as recommended by her therapist but has not had the courage to do so yet  She reports a history of social isolation when she was younger due to medical conditions  Feels this was when her social anxiety began  She feels nervous in crowds and large groups  She was experiencing frequent panic episodes in high school due to medical conditions but denies any recent  No suicidal ideation, plan, or intent upon direct inquiry  SIB: denies  HI/hx violence: no HI or concerns for violence    No reported or documented trauma history  No intrusive, avoidance, negative alterations, or hyperarousal symptoms of PTSD noted  No disordered eating patterns, including restricting intake, binging, or purging       No symptoms of OCD including obsessive, ritualistic acts, intrusive thoughts or images  No present or past manic symptoms  No perceptual disturbances  No paranoid ideations or fixed delusions were elicited  Does not appear internally preoccupied at time of encounter  No history of substance use, including vaping, cigarettes, etoh, marijuana, or other illicit drug use  Review Of Systems:    Constitutional low energy   ENT negative   Cardiovascular negative   Respiratory negative   Gastrointestinal negative   Genitourinary negative   Musculoskeletal negative   Integumentary negative   Neurological negative   Endocrine negative   Other Symptoms none, all other systems are negative         PHQ-2/9 Depression Screening    Little interest or pleasure in doing things: 1 - several days  Feeling down, depressed, or hopeless: 2 - more than half the days  Trouble falling or staying asleep, or sleeping too much: 3 - nearly every day  Feeling tired or having little energy: 3 - nearly every day  Poor appetite or overeatin - more than half the days  Feeling bad about yourself - or that you are a failure or have let yourself or your family down: 1 - several days  Trouble concentrating on things, such as reading the newspaper or watching television: 0 - not at all  Moving or speaking so slowly that other people could have noticed  Or the opposite - being so fidgety or restless that you have been moving around a lot more than usual: 1 - several days  Thoughts that you would be better off dead, or of hurting yourself in some way: 0 - not at all  PHQ-9 Score: 13   PHQ-9 Interpretation: Moderate depression          ANNEL-7 Flowsheet Screening    Flowsheet Row Most Recent Value   Over the last 2 weeks, how often have you been bothered by any of the following problems?     Feeling nervous, anxious, or on edge 1   Not being able to stop or control worrying 1   Worrying too much about different things 1   Trouble relaxing 0   Being so restless that it is hard to sit still 2   Becoming easily annoyed or irritable 0   Feeling afraid as if something awful might happen 0   ANNEL-7 Total Score 5        Past Psychiatric History:    Past Inpatient Psychiatric Treatment:   No history of past inpatient psychiatric admissions  Past Outpatient Psychiatric Treatment:    Was in outpatient psychiatric treatment in the past with a psychiatrist  Past Suicide Attempts: no  Past Violent Behavior: no  Past Psychiatric Medication Trials: Prozac, Celexa, Effexor XR and Wellbutrin XL   Prozac-mood swings, Celexa-worsening fatigue     Traumatic History:      Abuse: no history of physical or sexual abuse, no history of emotional abuse  Other Traumatic Events: none     Family Psychiatric History:   Maternal side of family-strong hx of depression and anxiety  Paternal side of family-strong hx of D&A addiction    FH of suicide-denies     Substance Abuse History:   Tobacco/alcohol/caffeine: Denies tobacco use, Denies caffeine use, alcohol intake: social drinker  Illicit drugs: Denies history of illicit drug use    Social History:    Developmental: Denies a history of milestone/developmental delay  Denies a history of in-utero exposure to toxins/illicit substances  There is no documented history of IEP or need for special education  Education: college graduate   Currently unemployed, has not applied for disability  Worked from [id number], unable to continue working due to medical conditions  Living arrangement, social support: parents   Never , no children   Access to firearms: Denies direct access to weapons/firearms       Past Medical History:    Past Medical History:   Diagnosis Date   • Congenital dislocation of hip    • Depression     LAST ASSESSED: 8/4/12   • Disease of thyroid gland    • Pectus excavatum    • Scoliosis         Past Surgical History:   Procedure Laterality Date   • DEVEN ACETABULAR OSTEOTOMY Left     ACETABULAR OSTEOTOMY     Allergies   Allergen Reactions   • Epinephrine Dizziness     Causes dysautonomia   • Fluoxetine      Other reaction(s): Flushed       History Review: The following portions of the patient's history were reviewed and updated as appropriate: allergies, current medications, past family history, past medical history, past social history, past surgical history and problem list     OBJECTIVE:    Vital signs in last 24 hours: There were no vitals filed for this visit      Mental Status Evaluation:  Appearance and attitude: appeared as stated age, cooperative and attentive, casually dressed, with good hygiene  Eye contact: fair  Motor Function: within normal limits, intact gait, No PMA/PMR  Gait/station: normal gait/station and normal balance  Speech: normal for rate, rhythm, volume, latency, amount  Language: No overt abnormality  Mood/affect: depressed / Affect was constricted but reactive, mood congruent  Thought Processes: sequential and goal-directed  Thought content: denies suicidal ideation or homicidal ideation; no delusions or first rank symptoms  Associations: intact associations  Perceptual disturbances: denies Auditory/Visual/Tactile Hallucinations  Orientation: oriented to time, person, place and to the situational context  Cognitive Function: intact  Memory: recent and remote memory grossly intact  Intellect: average  Fund of knowledge: aware of current events, aware of past history and vocabulary average  Impulse control: good  Insight/judgment: good/good    Pain: denied    Lab Results: I have personally reviewed all pertinent laboratory/tests results  Recent Labs (last 2 months):   Hospital Outpatient Visit on 02/22/2023   Component Date Value   • A4C EF 02/22/2023 58    • LVIDd 02/22/2023 3 90    • LVIDS 02/22/2023 2 60    • IVSd 02/22/2023 0 90    • LVPWd 02/22/2023 0 90    • FS 02/22/2023 33    • MV E' Tissue Velocity Se* 02/22/2023 10    • E wave deceleration time 02/22/2023 123    • MV Peak E Juan Jose 02/22/2023 53    • MV Peak A Juan Jose 02/22/2023 0 44    • RVID d 02/22/2023 2 1    • LA size 02/22/2023 2 4    • LA length (A2C) 02/22/2023 3 30    • RA 2D Volume 02/22/2023 6 0    • RAA A4C 02/22/2023 5 4    • MV stenosis pressure 1/2* 02/22/2023 36    • MV valve area p 1/2 meth* 02/22/2023 6  11    • Ao root 02/22/2023 2 50    • Asc Ao 02/22/2023 2    • Left ventricular stroke * 02/22/2023 39 00    • IVS 02/22/2023 0 9    • LEFT VENTRICLE SYSTOLIC * 74/06/8289 26    • LV DIASTOLIC VOLUME (MOD* 73/28/4688 64    • Left Atrium Area-systoli* 02/22/2023 7 6    • Left Atrium Area-systoli* 02/22/2023 7 5    • LVSV, 2D 02/22/2023 39    • LV EF 02/22/2023 55          Suicide/Homicide Risk Assessment:    Risk of Harm to Self:  The following ratings are based on assessment at the time of the interview  Demographic risk factors include: , never   Historical Risk Factors include: chronic psychiatric problems, chronic depressive symptoms  Recent Specific Risk Factors include: diagnosis of depression, mental illness diagnosis  Protective Factors: no current suicidal ideation, access to mental health treatment, compliant with medications, compliant with mental health treatment, medical compliance, no substance use problems, stable living environment, supportive family  Weapons: none  The following steps have been taken to ensure weapons are properly secured: not applicable  Based on today's assessment, Richard Bias presents the following risk of harm to self: minimal    Risk of Harm to Others: The following ratings are based on assessment at the time of the interview  Demographic Risk Factors include: none  Historical Risk Factors include: none  Recent Specific Risk Factors include: none  Protective Factors: no current homicidal ideation  Weapons: none   The following steps have been taken to ensure weapons are properly secured: not applicable  Based on today's assessment, Richard Bias presents the following risk of harm to others: none    The following interventions are recommended: no intervention changes needed  Although patient's acute lethality risk is LOW, long-term/chronic lethality risk is mildly elevated given chronic mental health symptoms  Saul Cabezas is future-oriented, forward-thinking, and demonstrates ability to act in a self-preserving manner as evidenced by volitionally presenting to the clinic today, seeking treatment  At this time, inpatient hospitalization is not currently warranted  To mitigate future risk, patient should adhere to treatment recommendations, avoid alcohol/illicit substance use, utilize community-based resources and familiar support, and prioritize mental health treatment  Based on today's assessment and clinical criteria, Daniela Armijo contracts for safety and is not an imminent risk of harm to self or others  Outpatient level of care is deemed appropriate at this present time  Saul Cabezas understands that if they are no longer able to contract for safety, they need to call/contact the outpatient office including this writer, call/contact crisis and/or attend to the nearest Emergency Department for immediate evaluation  Assessment/Plan:   Diagnosis: 1  MDD 2  Social anxiety disorder    In summary,   Daniela Armijo is a 29 y o  female, domiciled with parents in [facility], currently unemployed, w/ PMH of POTS, Ehler's-Danlos syndrome, hypothyroidism and PPH of depression, no prior psychiatric admissions, attended [facility] 5/2020, no prior SA, no h/o self-injurious behavior, who presented to the mental health clinic for the initial intake and psychiatric evaluation on February 23, 2023  Saul Cabezas was a former patient of [facility] (last visit on 12/22/22) and is currently prescribed Wellbutrin XL and Effexor XR  Tolerating medication well with no medication side effects observed or reported  Actively involved in individual psychotherapy with Edna San biweekly since 2019      On assessment today, Arcelia Camejo endorses depressed mood some days, anhedonia, low energy and motivation, ongoing social anxiety with avoidance of social situations, in the context of chronic mental health symptoms, psychosocial stressors, and family history of mental illness  Her current presentation meets criteria for MDD and social anxiety disorder  Currently she is not at risk for suicide, homicide, self-injury, aggressive behaviors, self-neglect, or neglect of dependents or children  Given this presentation, the patient will benefit from further outpatient follow up for management of her symptoms  At conclusion of evaluation, Kenneth Camp is amenable and gave informed consent to the recommendations of this writer  Psycho-education regarding SSRI medication class, and the importance of compliance with psychiatric treatment reiterated  PARQ completed including serotonin syndrome, SIADH, worsening depression, suicidality, induction of indigo, GI upset, headaches, activation, sexual side effects, sedation, potential drug interactions, and others  Educated on the [facility] and Environmental Approach to mental health  Patient was receptive to education  Plan:  1  Admit to [address] [place] [address] outpatient services for ongoing treatment of MDD and Social anxiety disorder  2  Continue Wellbutrin  mg daily for depression  3  Decrease Effexor XR to 150 mg daily with plan to gradually taper/discontinue  4  Start Zoloft 25 mg daily for depression and anxiety symptoms   5  Continue individual psychotherapy sessions  6  Follow up with primary care provider for ongoing medical care  7  Follow up with this provider in 3 months          Diagnoses and all orders for this visit:    Social anxiety disorder  -     sertraline (Zoloft) 25 mg tablet;  Take 1 tablet (25 mg total) by mouth daily    Mild episode of recurrent major depressive disorder (HCC)          - Psychoeducation provided regarding the importance of exercise and health dietary choices and their impact on mood, energy, and motivation   - Counseled to avoid ETOH, illicit substances, and nicotine secondary to the detrimental effects of these substances on mental and physical health  - Encouraged to engage in non-verbal forms of therapy such as art therapy, music therapy, and mindfulness  - Psychoeducation regarding medication benefits and risks, side effects, indications and alternatives provided to the patient and the importance of compliance with psychiatric medication reiterated  The Abbi Marie verbalized understanding and agreed with the plan  - The patient was educated about 24 hour and weekend coverage for urgent situations accessed by calling [facility] main practice number  - Patient was educated to call [address] [facility] ([phone] [[phone]]) for behavioral crisis at any time, 911 for any safety concerns, or go to nearest ER if her symptoms become overwhelming or unmanageable  Medications Risks/Benefits:      Risks, Benefits And Possible Side Effects Of Medications:    Risks, benefits, and possible side effects of medications explained to [place] and she verbalizes understanding and agreement for treatment      Controlled Medication Discussion:     No recent records found for controlled prescriptions according to [place] Prescription Drug Monitoring Program    Treatment Plan:    Completed and signed during the session: Yes - Treatment Plan done but not signed at time of office visit due to:  Plan reviewed in person and verbal consent given due to [address] distancing    This note was not shared with the patient due to reasonable likelihood of causing patient harm    Visit Time    Visit Start Time: 2:28 PM  Visit Stop Time: 3:03 PM  Total Visit Duration: 35 minutes      [facility], LEYDI 02/23/23

## 2023-02-22 ENCOUNTER — CLINICAL SUPPORT (OUTPATIENT)
Dept: PULMONOLOGY | Facility: CLINIC | Age: 33
End: 2023-02-22

## 2023-02-22 ENCOUNTER — HOSPITAL ENCOUNTER (OUTPATIENT)
Dept: NON INVASIVE DIAGNOSTICS | Facility: CLINIC | Age: 33
Discharge: HOME | End: 2023-02-22

## 2023-02-22 VITALS
SYSTOLIC BLOOD PRESSURE: 118 MMHG | BODY MASS INDEX: 22.82 KG/M2 | HEART RATE: 98 BPM | WEIGHT: 142 LBS | HEIGHT: 66 IN | DIASTOLIC BLOOD PRESSURE: 70 MMHG

## 2023-02-22 DIAGNOSIS — R06.09 POST-COVID CHRONIC DYSPNEA: Primary | ICD-10-CM

## 2023-02-22 DIAGNOSIS — G90.A POTS (POSTURAL ORTHOSTATIC TACHYCARDIA SYNDROME): ICD-10-CM

## 2023-02-22 DIAGNOSIS — U09.9 POST-COVID CHRONIC DYSPNEA: Primary | ICD-10-CM

## 2023-02-22 LAB
AORTIC ROOT: 2.5 CM
APICAL FOUR CHAMBER EJECTION FRACTION: 58 %
ASCENDING AORTA: 2 CM
E WAVE DECELERATION TIME: 123 MS
FRACTIONAL SHORTENING: 33 (ref 28–44)
INTERVENTRICULAR SEPTUM IN DIASTOLE (PARASTERNAL SHORT AXIS VIEW): 0.9 CM
INTERVENTRICULAR SEPTUM: 0.9 CM (ref 0.6–1.1)
LAAS-AP2: 7.5 CM2
LAAS-AP4: 7.6 CM2
LEFT ATRIUM SIZE: 2.4 CM
LEFT INTERNAL DIMENSION IN SYSTOLE: 2.6 CM (ref 2.1–4)
LEFT VENTRICULAR INTERNAL DIMENSION IN DIASTOLE: 3.9 CM (ref 3.5–6)
LEFT VENTRICULAR POSTERIOR WALL IN END DIASTOLE: 0.9 CM
LEFT VENTRICULAR STROKE VOLUME: 39 ML
LVSV (TEICH): 39 ML
MV E'TISSUE VEL-SEP: 10 CM/S
MV PEAK A VEL: 0.44 M/S
MV PEAK E VEL: 53 CM/S
MV STENOSIS PRESSURE HALF TIME: 36 MS
MV VALVE AREA P 1/2 METHOD: 6.11
RIGHT ATRIAL 2D VOLUME: 6 ML
RIGHT ATRIUM AREA SYSTOLE A4C: 5.4 CM2
RIGHT VENTRICLE ID DIMENSION: 2.1 CM
SL CV LEFT ATRIUM LENGTH A2C: 3.3 CM
SL CV LV EF: 55
SL CV PED ECHO LEFT VENTRICLE DIASTOLIC VOLUME (MOD BIPLANE) 2D: 64 ML
SL CV PED ECHO LEFT VENTRICLE SYSTOLIC VOLUME (MOD BIPLANE) 2D: 26 ML

## 2023-02-22 NOTE — PROGRESS NOTES
Pulmonary Rehabilitation Plan of Care   60 Day Reassessment      Today's date: 2023   # of Exercise Sessions Completed: 9  Patient name: Tom Brian      : 1990  Age: 29 y o  MRN: [id number]  Referring Physician: Morro Clemons DO  Pulmonologist: n/a   Cardiologist: Jose Gale MD  Provider: Miyl Nuñez  Clinician: Tara Ingram MS, CEP     Dx:   Encounter Diagnosis   Name Primary? • Post-COVID chronic dyspnea Yes     Date of onset: 2022      SUMMARY OF PROGRESS:  Anamika Silva continues Pulmonary Rehab for the diagnosis of chronic dyspnea post-COVID  She also has chronic fatigue, Nia-Danlos syndrome, and POTS  All of these are contributing to her weakness and fatigue  Which seems to have been exacerbated with COVID along with SOB  She has been attending 1x/week and has attended 9 sessions in the past 60 days  She is doing 1xweek right now due to her chronic fatigue and hoping to eventually get back to 2x/week  Since their diagnosis, the patient has been experiencing increased dyspnea and increased fatigue  They report dyspnea, weakness and fatigue when completing ADLs  Anamika Silva continues to report today that she is seeing improvement with her SOB doing some ADLs  The stairs continue to be the hardest thing for her  She stated today that picking up her cat is now easier and has no SOB with it  She had an echo today following exercise session  The patient currently does not follow a formal exercise program at home  I encouraged her to do light walking on the days now in rehab if she is able  Pt reports the following physical limitations: chronic fatigue, EDS, hx of left hip surgery  61 Day Depression screening using the PHQ-9 interprets the patient's score of 15 15-19 = Moderately Severe Depression  Increased 1 level in the past 30 days  Anxiety screening using the ANNEL-7 interprets the patients score of 5 5-9 = Mild anxiety  No change in the past 30 days   When addressed, the patient reports having depression  Patient reports excellent social/emotional support and being compliant with medical therapy  She does feel that her depression has been worse since having COVID  PHQ-9 score will be reassessed in 30 days but is medically managed and she is compliant  Patient admits to 100% medication compliance  At rest, the patient rated dyspnea 0-2/10 with SpO2 92-99% on room air  She is completing 45-50 minutes of aerobic exercise reaching 3 7 METs on different modalities such as the TRM, UBE, and recumbent bike  She has started a light strength training program as well  The patient's rating of percieved dyspnea during exertion is 2-5/10 with SpO2 95-98% on room air  She is slowly increasing her workloads and durations well with no concerns  Her telemetry has been discharged  Resting BP 84//64 with normal response to exercise 110//88  Education on smoking cessation, oxygen use, breathing techniques, pulmonary anatomy, exercise for the pulmonary patient, healthy eating, stress, and relaxation will be provided  Patient goals include: Decrease SOB, make ADLs easier  She is on track with her personal goals at 60 days and will continue to work towards them  Will continue to educate, monitor, and progress as tolerated in the next 30 days         Medication compliance: Yes   Comments: Pt reports to be compliant with medications  Fall Risk: Low   Comments: Ambulates with a steady gait with no assist device, does have EDS    Smoking: Never used    RPD at Rest: 0-2/10  RPD with Exercise:  2-5/10    Assessment of progression of lung disease and functional status:  Shortness of breath questionnaire: 89/120      EXERCISE ASSESSMENT and PLAN    Current Exercise Program in Rehab:       Frequency: 2 days/week   Supplement with home exercise 2+ days/wk as tolerated       Minutes: 45-50       METS: 3 7              SpO2: 92-98% on RA              RPD: 2-5                      HR: RHR +30bpm   RPE: 4-5         Modalities: Treadmill, UBE and Recumbent bike      Exercise Progression 30 Day Goals :    Frequency: 2 days/week    Supplement with home exercise 2+ days/wk as tolerated       Minutes: 45-55        METS: 3 8-4 0              SpO2: >90%              RPD: 1-3                      HR: RHR +30bpm   RPE: 4-5        Modalities: Treadmill, UBE, Lifecycle, NuStep and Recumbent bike     Strength trainin-3 days / week  12-15 repetitions  1-2 sets per modality    Modalities: Chest Press, Lateral Raise, Arm Curl, Sit to [employer] and Front Raises    Oxygen Needs: on room air at rest and on room air with exercise  Oxygen Goal: Maintain SpO2>90% during exercise    Home Exercise: none     Education: pursed lipped breathing, diaphragmatic breathing, benefits of exercise for pulmonary disease, RPE scale, RPD scale and energy conservation    Goals: reduced score on  USCD Shortness of Breath Questionnaire, Improved 6MW distance by 10%, reduced dyspnea during exercise (0-3/10), improved exercise tolerance (max METs tolerated in pulmonary rehab), SpO2 >90% during exercise, reduced RPD at rest, attend pulmonary rehab regularly, decrease sitting time at home and start a walking program    Progressing:  Pt is progressing and showing improvement  toward the following goals:  attending rehab regularly 1x/week, reduced dyspnea with exertion in the past 30 days with exercise and ADLs, slow increase in strength and endurance with increasing duration and intensities, SpO2 >90% on room air at rest and exercise, strength training program being completed, and tolerating small increments of durations and workloads well with no concerns with her chronic fatigue    , Pt has not made progress toward the following goals: no home exercise   , Patient will start home walking and continue working on personal goals in the next 30 days, Will continue to educate and progress as tolerated      Plan: learn to conserve energy with ADLs , practice breathing techniques 3x/day and reduce time sitting at home    Readiness to change: Action:  (Changing behavior)      NUTRITION ASSESSMENT AND PLAN    Weight control:    Starting weight: 143 6 lbs   Current weight:   142 lbs     Diabetes: N/A    Goals:Improved Rate Your Plate score  >28, choose lean meat (93-95%), increase intake of fish, shellfish, increase intake of meatless meals, reduce cheese intake or use reduced-fat, eat 3 or more servings of whole grains a day, Eat 4-5 cups of fruits and vegetables daily, choose healthy snacks: light popcorn, plain pretzels, Increase intake of nuts and seeds, seldom eat or choose low fat ice-cream, fruit juice bars or frozen yogurt  and eliminate or choose low-fat sweets  Education: heart healthy eating  hydration     Progressing:Pt is progressing and showing improvement  toward the following goals:  no changes to dietary choices and maintaining weight    , Patient will lose 1-2 lbs a week for weight loss per patient goal  in the next 30 days, Will continue to educate and progress as tolerated       Plan: switch to low fat cheeses, replace refined grain bread with whole grain bread, replace unhealthy snacks with fruits & vegs, eat fewer desserts and sweets, will try new grains like brown rice, quinoa, farro, drink more water and learn how to read food labels  Readiness to change: Preparation:  (Getting ready to change)       PSYCHOSOCIAL ASSESSMENT AND PLAN    Emotional:  Depression assessment:  PHQ-9 = 15 15-19 = Moderately Severe Depression            Anxiety measure:  ANNEL-7 = 5  5-9 = Mild anxiety  Self-reported stress level: 5   Social support: Patient reports excellent emotional/social support from family    Goals:  Reduce perceived stress to 1-3/10, improved [facility] QOL < 27, PHQ-9 - reduced severity by one level, continue medical therapy, follow up with therapist, Feelings in [facility] Score < 3, Physical Fitness in [facility] Score < 3, Daily Activity in [facility] Score < 3, Social Activities in [facility] Score < 3, Pain in [facility] Score < 3, Overall Health in [facility] Score < 3 and Quality of Life in [facility] Score < 3   Education: signs/sxs of depression, benefits of a positive support system, stress management techniques and benefits of mental health counseling    Progressing:Pt is progressing and showing improvement  toward the following goals:  ANNEL-7 stayed the same, improved QOL with improved SOB with doing ADLs, compliant with medical therapy, and great support system at home    , Pt has not made progress toward the following goals: Increased 1 level on PHQ-9 in the past 30 days   , Patient will continue medical therapy in the next 30 days, Will continue to educate and progress as tolerated  Plan: Class: Stress and Your Health, Class: Relaxation, Practice relaxation techniques, Exercise, Enjoy a hobby, Return to previous social activity and Repeat PHQ-9 every 30 days if score >5  Readiness to change: Preparation:  (Getting ready to change)       OTHER CORE COMPONENTS     Tobacco:   Social History     Tobacco Use   Smoking Status Never   Smokeless Tobacco Never       Tobacco Use Intervention: Referral to tobacco expert:   N/A:  Patient is a non-smoker     Blood pressure:    Restin//64   Exercise: 110/-88    Goals: consistent BP < 130/80, reduced dietary sodium <2300mg, moderate intensity exercise >150 mins/wk and medication compliance  Education:  inspiratory muscle training and environmental triggers     Progressing:Pt is progressing and showing improvement  toward the following goals:  medication compliant and blood pressures normal at rest and normal response to exercise   , Pt has not made progress toward the following goals: no home exercise with rehab   , Patient will monitor home HR and add in home walking  in the next 30 days, Will continue to educate and progress as tolerated       Plan: engage in regular exercise and monitor home BP  Readiness to change: Action:  (Changing behavior)

## 2023-02-23 ENCOUNTER — OFFICE VISIT (OUTPATIENT)
Dept: PSYCHIATRY | Facility: CLINIC | Age: 33
End: 2023-02-23

## 2023-02-23 DIAGNOSIS — F40.10 SOCIAL ANXIETY DISORDER: Primary | ICD-10-CM

## 2023-02-23 DIAGNOSIS — F33.0 MILD EPISODE OF RECURRENT MAJOR DEPRESSIVE DISORDER (HCC): ICD-10-CM

## 2023-02-23 RX ORDER — SERTRALINE HYDROCHLORIDE 25 MG/1
25 TABLET, FILM COATED ORAL DAILY
Qty: 30 TABLET | Refills: 2 | Status: SHIPPED | OUTPATIENT
Start: 2023-02-23

## 2023-02-23 NOTE — BH TREATMENT PLAN
TREATMENT PLAN (Medication Management Only)        [facility]    Name and Date of Birth:  Richard Howard 29 y o  1990  Date of Treatment Plan: February 23, 2023  Diagnosis/Diagnoses:    1  Social anxiety disorder    2  Mild episode of recurrent major depressive disorder [facility])      Strengths/Personal Resources for Self-Care: supportive family, taking medications as prescribed, ability to communicate needs, ability to understand psychiatric illness, average or above intelligence, willingness to work on problems  Area/Areas of need (in own words): anxiety symptoms, depressive symptoms  1  Long Term Goal: continue improvement in depression  Target Date:3 months - 5/23/2023  Person/Persons responsible for completion of goal: Courtney  2  Short Term Objective (s) - How will we reach this goal?:   A  Provider new recommended medication/dosage changes and/or continue medication(s): continue current medications as prescribed (lower Effexor, start Zoloft)  B  N/A   C  N/A  Target Date:3 months - 5/23/2023  Person/Persons Responsible for Completion of Goal: Courtney  Progress Towards Goals: continuing treatment  Treatment Modality: medication management every 3 months  Review due 180 days from date of this plan: 6 months - 8/23/2023  Expected length of service: ongoing treatment  My Physician/PA/NP and I have developed this plan together and I agree to work on the goals and objectives  I understand the treatment goals that were developed for my treatment

## 2023-02-27 ENCOUNTER — APPOINTMENT (OUTPATIENT)
Dept: PULMONOLOGY | Facility: CLINIC | Age: 33
End: 2023-02-27

## 2023-03-01 ENCOUNTER — CLINICAL SUPPORT (OUTPATIENT)
Dept: PULMONOLOGY | Facility: CLINIC | Age: 33
End: 2023-03-01

## 2023-03-01 DIAGNOSIS — R06.09 POST-COVID CHRONIC DYSPNEA: Primary | ICD-10-CM

## 2023-03-01 DIAGNOSIS — U09.9 POST-COVID CHRONIC DYSPNEA: Primary | ICD-10-CM

## 2023-03-01 NOTE — TELEPHONE ENCOUNTER
Pt aware       Ana Gracia MA Hydroxyzine Counseling: Patient advised that the medication is sedating and not to drive a car after taking this medication.  Patient informed of potential adverse effects including but not limited to dry mouth, urinary retention, and blurry vision.  The patient verbalized understanding of the proper use and possible adverse effects of hydroxyzine.  All of the patient's questions and concerns were addressed.

## 2023-03-06 ENCOUNTER — APPOINTMENT (OUTPATIENT)
Dept: PULMONOLOGY | Facility: CLINIC | Age: 33
End: 2023-03-06

## 2023-03-08 ENCOUNTER — CLINICAL SUPPORT (OUTPATIENT)
Dept: PULMONOLOGY | Facility: CLINIC | Age: 33
End: 2023-03-08

## 2023-03-08 DIAGNOSIS — R06.09 POST-COVID CHRONIC DYSPNEA: Primary | ICD-10-CM

## 2023-03-08 DIAGNOSIS — U09.9 POST-COVID CHRONIC DYSPNEA: Primary | ICD-10-CM

## 2023-03-13 ENCOUNTER — APPOINTMENT (OUTPATIENT)
Dept: PULMONOLOGY | Facility: CLINIC | Age: 33
End: 2023-03-13

## 2023-03-15 ENCOUNTER — CLINICAL SUPPORT (OUTPATIENT)
Dept: PULMONOLOGY | Facility: CLINIC | Age: 33
End: 2023-03-15

## 2023-03-15 DIAGNOSIS — R06.09 POST-COVID CHRONIC DYSPNEA: Primary | ICD-10-CM

## 2023-03-15 DIAGNOSIS — U09.9 POST-COVID CHRONIC DYSPNEA: Primary | ICD-10-CM

## 2023-03-20 ENCOUNTER — APPOINTMENT (OUTPATIENT)
Dept: PULMONOLOGY | Facility: CLINIC | Age: 33
End: 2023-03-20

## 2023-03-22 ENCOUNTER — APPOINTMENT (OUTPATIENT)
Dept: PULMONOLOGY | Facility: CLINIC | Age: 33
End: 2023-03-22

## 2023-03-22 NOTE — PROGRESS NOTES
Pulmonary Rehabilitation Plan of Care   90 Day Reassessment      Today's date: 3/22/2023   # of Exercise Sessions Completed: 12  Patient name: Tiffany White      : 1990  Age: 29 y o  MRN: [id number]  Referring Physician: Ted Vázquez DO  Pulmonologist: n/a   Cardiologist: Loan Jolly MD  Provider: [place]  Clinician: Shivani Ramsey MS, CEP     Dx:   Encounter Diagnosis   Name Primary? • Post-COVID chronic dyspnea Yes     Date of onset: 2022      SUMMARY OF PROGRESS:  Oleg Perez continues Pulmonary Rehab for the diagnosis of chronic dyspnea post-COVID  She also has chronic fatigue, Nia-Danlos syndrome, and POTS  All of these are contributing to her weakness and fatigue  Which seems to have been exacerbated with COVID along with SOB  She has been attending 1x/week and has attended 12 sessions in the past 90 days  She is doing 1x/week right now due to her chronic fatigue and hoping to eventually get back to 2x/week  She is not in attendance today due to her chronic fatigue/POTs and unable to assess progression at 90 days  Since their diagnosis, the patient has been experiencing increased dyspnea and increased fatigue  They report dyspnea, weakness and fatigue when completing ADLs  Oleg Perez continues to report today that she is seeing improvement with her SOB doing some ADLs  The stairs continue to be the hardest thing for her  She stated today that picking up her cat is now easier and has no SOB with it  The patient currently does not follow a formal exercise program at home  I encouraged her to do light walking on the days now in rehab if she is able  Pt reports the following physical limitations: chronic fatigue, EDS, hx of left hip surgery  61 Day Depression screening using the PHQ-9 interprets the patient's score of 15 15-19 = Moderately Severe Depression  Increased 1 level in the past 30 days  Anxiety screening using the ANENL-7 interprets the patients score of 5 5-9 = Mild anxiety  No change in the past 30 days  When addressed, the patient reports having depression  Patient reports excellent social/emotional support and being compliant with medical therapy  She does feel that her depression has been worse since having COVID  PHQ-9 and ANNEL-7 was not reassessed at 90 days due to absence but is medically managed and she is compliant  Patient admits to 100% medication compliance  At rest, the patient rated dyspnea 0-3/10 with SpO2 95-98% on room air  She is completing 45-50 minutes of aerobic exercise reaching 3 7 METs on different modalities such as the TRM, UBE, and recumbent bike  She has started a light strength training program as well  The patient's rating of percieved dyspnea during exertion is 2-7/10 with SpO2 92-98% on room air  She is slowly increasing her workloads and durations well with no concerns  Her telemetry has been discharged  Resting //62 with normal response to exercise 1100//80  Education on smoking cessation, oxygen use, breathing techniques, pulmonary anatomy, exercise for the pulmonary patient, healthy eating, stress, and relaxation will be provided  Patient goals include: Decrease SOB, make ADLs easier  She is on track with her personal goals at 90 days and will continue to work towards them  Will continue to educate, monitor, and progress as tolerated in the next 30 days         Medication compliance: Yes   Comments: Pt reports to be compliant with medications  Fall Risk: Low   Comments: Ambulates with a steady gait with no assist device, does have EDS    Smoking: Never used    RPD at Rest: 0-3/10  RPD with Exercise:  2-7/10    Assessment of progression of lung disease and functional status:  Shortness of breath questionnaire: 89/120      EXERCISE ASSESSMENT and PLAN    Current Exercise Program in Rehab:       Frequency: 2 days/week   Supplement with home exercise 2+ days/wk as tolerated       Minutes: 45-50       METS: 3 7              SpO2: 92-98% on RA              RPD: 2-7                      HR: RHR +30bpm   RPE: 4-5         Modalities: Treadmill, UBE and Recumbent bike      Exercise Progression 30 Day Goals :    Frequency: 2 days/week    Supplement with home exercise 2+ days/wk as tolerated       Minutes: 45-55        METS: 3 8-4 0              SpO2: >90%              RPD: 1-3                      HR: RHR +30bpm   RPE: 4-5        Modalities: Treadmill, UBE, Lifecycle, NuStep and Recumbent bike     Strength trainin-3 days / week  12-15 repetitions  1-2 sets per modality    Modalities: Chest Press, Lateral Raise, Arm Curl, Sit to [employer] and Front Raises    Oxygen Needs: on room air at rest and on room air with exercise  Oxygen Goal: Maintain SpO2>90% during exercise    Home Exercise: none     Education: pursed lipped breathing, diaphragmatic breathing, benefits of exercise for pulmonary disease, RPE scale, RPD scale and energy conservation    Goals: reduced score on  USCD Shortness of Breath Questionnaire, Improved 6MW distance by 10%, reduced dyspnea during exercise (0-3/10), improved exercise tolerance (max METs tolerated in pulmonary rehab), SpO2 >90% during exercise, reduced RPD at rest, attend pulmonary rehab regularly, decrease sitting time at home and start a walking program    Progressing:  Pt is progressing and showing improvement  toward the following goals:  attending rehab regularly 1x/week, reduced dyspnea with exertion in the past 30 days with exercise and ADLs, slow increase in strength and endurance with increasing duration and intensities, SpO2 >90% on room air at rest and exercise, strength training program being completed, and tolerating small increments of durations and workloads well with no concerns with her chronic fatigue    , Pt has not made progress toward the following goals: no home exercise   , Patient will start home walking and continue working on personal goals in the next 30 days, Will continue to educate and progress as tolerated  , Unable to assess progression with goals at 90 days due to absence    Plan: learn to conserve energy with ADLs , practice breathing techniques 3x/day and reduce time sitting at home    Readiness to change: Action:  (Changing behavior)      NUTRITION ASSESSMENT AND PLAN    Weight control:    Starting weight: 143 6 lbs   Current weight:   139 lbs     Diabetes: N/A    Goals:Improved Rate Your Plate score  >93, choose lean meat (93-95%), increase intake of fish, shellfish, increase intake of meatless meals, reduce cheese intake or use reduced-fat, eat 3 or more servings of whole grains a day, Eat 4-5 cups of fruits and vegetables daily, choose healthy snacks: light popcorn, plain pretzels, Increase intake of nuts and seeds, seldom eat or choose low fat ice-cream, fruit juice bars or frozen yogurt  and eliminate or choose low-fat sweets  Education: heart healthy eating  hydration     Progressing:Pt is progressing and showing improvement  toward the following goals:  no changes to dietary choices and weight loss noted with the program   , Patient will lose 1-2 lbs a week for weight loss per patient goal  in the next 30 days, Will continue to educate and progress as tolerated  , Unable to assess progression of personal goals due to absence      Plan: switch to low fat cheeses, replace refined grain bread with whole grain bread, replace unhealthy snacks with fruits & vegs, eat fewer desserts and sweets, will try new grains like brown rice, quinoa, farro, drink more water and learn how to read food labels  Readiness to change: Preparation:  (Getting ready to change)       PSYCHOSOCIAL ASSESSMENT AND PLAN    Emotional:  Depression assessment:  PHQ-9 = 15 15-19 = Moderately Severe Depression            Anxiety measure:  ANNEL-7 = 5  5-9 = Mild anxiety  Self-reported stress level: 5   Social support: Patient reports excellent emotional/social support from family    Goals:  Reduce perceived stress to 1-3/10, improved [facility] QOL < 27, PHQ-9 - reduced severity by one level, continue medical therapy, follow up with therapist, Feelings in [facility] Score < 3, Physical Fitness in [facility] Score < 3, Daily Activity in [facility] Score < 3, Social Activities in [facility] Score < 3, Pain in [facility] Score < 3, Overall Health in [facility] Score < 3 and Quality of Life in  [facility] Score < 3   Education: signs/sxs of depression, benefits of a positive support system, stress management techniques and benefits of mental health counseling    Progressing:Pt is progressing and showing improvement  toward the following goals:  ANNEL-7 stayed the same, improved QOL with improved SOB with doing ADLs, compliant with medical therapy, and great support system at home    , Patient will continue medical therapy in the next 30 days, Will continue to educate and progress as tolerated  , Unable to assess progression of personal goals due to absence     Plan: Class: Stress and Your Health, Class: Relaxation, Practice relaxation techniques, Exercise, Enjoy a hobby, Return to previous social activity and Repeat PHQ-9 every 30 days if score >5  Readiness to change: Preparation:  (Getting ready to change)       OTHER CORE COMPONENTS     Tobacco:   Social History     Tobacco Use   Smoking Status Never   Smokeless Tobacco Never       Tobacco Use Intervention: Referral to tobacco expert:   N/A:  Patient is a non-smoker     Blood pressure:    Restin//62   Exercise: 100//80    Goals: consistent BP < 130/80, reduced dietary sodium <2300mg, moderate intensity exercise >150 mins/wk and medication compliance  Education:  inspiratory muscle training and environmental triggers     Progressing:Pt is progressing and showing improvement  toward the following goals:  medication compliant and blood pressures normal at rest and normal response to exercise   , Pt has not made progress toward the following goals: no home exercise with rehab   , Patient will monitor home HR and add in home walking  in the next 30 days, Will continue to educate and progress as tolerated  , Unable to assess progression of personal goals due to absence     Plan: engage in regular exercise and monitor home BP  Readiness to change: Action:  (Changing behavior)

## 2023-03-24 LAB
25(OH)D3 SERPL-MCNC: 44 NG/ML (ref 30–100)
ALBUMIN SERPL-MCNC: 4.3 G/DL (ref 3.6–5.1)
ALBUMIN/GLOB SERPL: 2.2 (CALC) (ref 1–2.5)
ALP SERPL-CCNC: 106 U/L (ref 31–125)
ALT SERPL-CCNC: 22 U/L (ref 6–29)
AST SERPL-CCNC: 20 U/L (ref 10–30)
BASOPHILS # BLD AUTO: 32 CELLS/UL (ref 0–200)
BASOPHILS NFR BLD AUTO: 0.5 %
BILIRUB SERPL-MCNC: 0.3 MG/DL (ref 0.2–1.2)
BUN SERPL-MCNC: 15 MG/DL (ref 7–25)
BUN/CREAT SERPL: NORMAL (CALC) (ref 6–22)
CALCIUM SERPL-MCNC: 9.6 MG/DL (ref 8.6–10.2)
CHLORIDE SERPL-SCNC: 105 MMOL/L (ref 98–110)
CHOLEST SERPL-MCNC: 198 MG/DL
CHOLEST/HDLC SERPL: 3.7 (CALC)
CO2 SERPL-SCNC: 28 MMOL/L (ref 20–32)
CREAT SERPL-MCNC: 0.72 MG/DL (ref 0.5–0.97)
EOSINOPHIL # BLD AUTO: 90 CELLS/UL (ref 15–500)
EOSINOPHIL NFR BLD AUTO: 1.4 %
ERYTHROCYTE [DISTWIDTH] IN BLOOD BY AUTOMATED COUNT: 11.8 % (ref 11–15)
GFR/BSA.PRED SERPLBLD CYS-BASED-ARV: 115 ML/MIN/1.73M2
GLOBULIN SER CALC-MCNC: 2 G/DL (CALC) (ref 1.9–3.7)
GLUCOSE SERPL-MCNC: 96 MG/DL (ref 65–99)
HCT VFR BLD AUTO: 40.8 % (ref 35–45)
HDLC SERPL-MCNC: 53 MG/DL
HGB BLD-MCNC: 13.9 G/DL (ref 11.7–15.5)
LDLC SERPL CALC-MCNC: 124 MG/DL (CALC)
LYMPHOCYTES # BLD AUTO: 2931 CELLS/UL (ref 850–3900)
LYMPHOCYTES NFR BLD AUTO: 45.8 %
MAGNESIUM SERPL-MCNC: 2 MG/DL (ref 1.5–2.5)
MCH RBC QN AUTO: 30.8 PG (ref 27–33)
MCHC RBC AUTO-ENTMCNC: 34.1 G/DL (ref 32–36)
MCV RBC AUTO: 90.5 FL (ref 80–100)
MONOCYTES # BLD AUTO: 410 CELLS/UL (ref 200–950)
MONOCYTES NFR BLD AUTO: 6.4 %
NEUTROPHILS # BLD AUTO: 2938 CELLS/UL (ref 1500–7800)
NEUTROPHILS NFR BLD AUTO: 45.9 %
NONHDLC SERPL-MCNC: 145 MG/DL (CALC)
PLATELET # BLD AUTO: 281 THOUSAND/UL (ref 140–400)
PMV BLD REES-ECKER: 11.2 FL (ref 7.5–12.5)
POTASSIUM SERPL-SCNC: 4.1 MMOL/L (ref 3.5–5.3)
PROT SERPL-MCNC: 6.3 G/DL (ref 6.1–8.1)
RBC # BLD AUTO: 4.51 MILLION/UL (ref 3.8–5.1)
RHEUMATOID FACT SERPL-ACNC: <14 IU/ML
SODIUM SERPL-SCNC: 141 MMOL/L (ref 135–146)
TRIGL SERPL-MCNC: 100 MG/DL
WBC # BLD AUTO: 6.4 THOUSAND/UL (ref 3.8–10.8)

## 2023-03-27 ENCOUNTER — APPOINTMENT (OUTPATIENT)
Dept: PULMONOLOGY | Facility: CLINIC | Age: 33
End: 2023-03-27

## 2023-03-28 LAB
25(OH)D3 SERPL-MCNC: 44 NG/ML (ref 30–100)
ALBUMIN SERPL-MCNC: 4.3 G/DL (ref 3.6–5.1)
ALBUMIN/GLOB SERPL: 2.2 (CALC) (ref 1–2.5)
ALP SERPL-CCNC: 106 U/L (ref 31–125)
ALT SERPL-CCNC: 22 U/L (ref 6–29)
ANA PAT SER IF-IMP: ABNORMAL
ANA SER QL IF: POSITIVE
ANA TITR SER IF: ABNORMAL TITER
AST SERPL-CCNC: 20 U/L (ref 10–30)
BASOPHILS # BLD AUTO: 32 CELLS/UL (ref 0–200)
BASOPHILS NFR BLD AUTO: 0.5 %
BILIRUB SERPL-MCNC: 0.3 MG/DL (ref 0.2–1.2)
BUN SERPL-MCNC: 15 MG/DL (ref 7–25)
BUN/CREAT SERPL: NORMAL (CALC) (ref 6–22)
CALCIUM SERPL-MCNC: 9.6 MG/DL (ref 8.6–10.2)
CHLORIDE SERPL-SCNC: 105 MMOL/L (ref 98–110)
CHOLEST SERPL-MCNC: 198 MG/DL
CHOLEST/HDLC SERPL: 3.7 (CALC)
CO2 SERPL-SCNC: 28 MMOL/L (ref 20–32)
CREAT SERPL-MCNC: 0.72 MG/DL (ref 0.5–0.97)
EOSINOPHIL # BLD AUTO: 90 CELLS/UL (ref 15–500)
EOSINOPHIL NFR BLD AUTO: 1.4 %
ERYTHROCYTE [DISTWIDTH] IN BLOOD BY AUTOMATED COUNT: 11.8 % (ref 11–15)
GFR/BSA.PRED SERPLBLD CYS-BASED-ARV: 115 ML/MIN/1.73M2
GLOBULIN SER CALC-MCNC: 2 G/DL (CALC) (ref 1.9–3.7)
GLUCOSE SERPL-MCNC: 96 MG/DL (ref 65–99)
HCT VFR BLD AUTO: 40.8 % (ref 35–45)
HDLC SERPL-MCNC: 53 MG/DL
HGB BLD-MCNC: 13.9 G/DL (ref 11.7–15.5)
LDLC SERPL CALC-MCNC: 124 MG/DL (CALC)
LYMPHOCYTES # BLD AUTO: 2931 CELLS/UL (ref 850–3900)
LYMPHOCYTES NFR BLD AUTO: 45.8 %
MAGNESIUM SERPL-MCNC: 2 MG/DL (ref 1.5–2.5)
MCH RBC QN AUTO: 30.8 PG (ref 27–33)
MCHC RBC AUTO-ENTMCNC: 34.1 G/DL (ref 32–36)
MCV RBC AUTO: 90.5 FL (ref 80–100)
MONOCYTES # BLD AUTO: 410 CELLS/UL (ref 200–950)
MONOCYTES NFR BLD AUTO: 6.4 %
NEUTROPHILS # BLD AUTO: 2938 CELLS/UL (ref 1500–7800)
NEUTROPHILS NFR BLD AUTO: 45.9 %
NONHDLC SERPL-MCNC: 145 MG/DL (CALC)
PLATELET # BLD AUTO: 281 THOUSAND/UL (ref 140–400)
PMV BLD REES-ECKER: 11.2 FL (ref 7.5–12.5)
POTASSIUM SERPL-SCNC: 4.1 MMOL/L (ref 3.5–5.3)
PROT SERPL-MCNC: 6.3 G/DL (ref 6.1–8.1)
RBC # BLD AUTO: 4.51 MILLION/UL (ref 3.8–5.1)
RHEUMATOID FACT SERPL-ACNC: <14 IU/ML
SODIUM SERPL-SCNC: 141 MMOL/L (ref 135–146)
TRIGL SERPL-MCNC: 100 MG/DL
WBC # BLD AUTO: 6.4 THOUSAND/UL (ref 3.8–10.8)

## 2023-03-29 ENCOUNTER — CLINICAL SUPPORT (OUTPATIENT)
Dept: PULMONOLOGY | Facility: CLINIC | Age: 33
End: 2023-03-29

## 2023-03-29 DIAGNOSIS — R06.09 POST-COVID CHRONIC DYSPNEA: Primary | ICD-10-CM

## 2023-03-29 DIAGNOSIS — U09.9 POST-COVID CHRONIC DYSPNEA: Primary | ICD-10-CM

## 2023-04-03 ENCOUNTER — APPOINTMENT (OUTPATIENT)
Dept: PULMONOLOGY | Facility: CLINIC | Age: 33
End: 2023-04-03

## 2023-04-03 DIAGNOSIS — G90.A POTS (POSTURAL ORTHOSTATIC TACHYCARDIA SYNDROME): ICD-10-CM

## 2023-04-04 RX ORDER — MIDODRINE HYDROCHLORIDE 10 MG/1
10 TABLET ORAL 3 TIMES DAILY
Qty: 270 TABLET | Refills: 0 | Status: SHIPPED | OUTPATIENT
Start: 2023-04-04

## 2023-04-05 ENCOUNTER — TELEPHONE (OUTPATIENT)
Dept: CARDIOLOGY CLINIC | Facility: CLINIC | Age: 33
End: 2023-04-05

## 2023-04-05 ENCOUNTER — CLINICAL SUPPORT (OUTPATIENT)
Dept: PULMONOLOGY | Facility: CLINIC | Age: 33
End: 2023-04-05

## 2023-04-05 DIAGNOSIS — R06.09 POST-COVID CHRONIC DYSPNEA: Primary | ICD-10-CM

## 2023-04-05 DIAGNOSIS — U09.9 POST-COVID CHRONIC DYSPNEA: Primary | ICD-10-CM

## 2023-04-05 NOTE — TELEPHONE ENCOUNTER
Patient has hx of POTS followed by Dr Eren Bashir, he is off today  She hasn't been feeling well this past week with high heart rate  She denies palpitations but does feel her heart pounding at times and felt lightheaded one day when her bp was down to 88/64  She was a pulmonary rehab and her heart rate went up to 153 with exercise  During recovery her heart rate stayed up to 120 for 10 minutes  She is trying to stay hydrated  bp was 104/70, she is taking midodrine 10mg tid florinef 0 1mg bid and DDAVP 0 2 bid  She did not miss any doses  She also felt nauseous today while exercising  Currently hr is 110 10 minutes post exercise   Please advise

## 2023-04-06 ENCOUNTER — APPOINTMENT (EMERGENCY)
Dept: RADIOLOGY | Facility: HOSPITAL | Age: 33
End: 2023-04-06

## 2023-04-06 ENCOUNTER — HOSPITAL ENCOUNTER (EMERGENCY)
Facility: HOSPITAL | Age: 33
Discharge: HOME | End: 2023-04-06
Attending: EMERGENCY MEDICINE

## 2023-04-06 ENCOUNTER — OFFICE VISIT (OUTPATIENT)
Dept: URGENT CARE | Age: 33
End: 2023-04-06

## 2023-04-06 VITALS
HEART RATE: 87 BPM | SYSTOLIC BLOOD PRESSURE: 112 MMHG | HEIGHT: 66 IN | TEMPERATURE: 98.2 F | DIASTOLIC BLOOD PRESSURE: 53 MMHG | BODY MASS INDEX: 22.11 KG/M2 | RESPIRATION RATE: 18 BRPM | OXYGEN SATURATION: 100 %

## 2023-04-06 VITALS
TEMPERATURE: 97.4 F | HEART RATE: 120 BPM | BODY MASS INDEX: 22.11 KG/M2 | RESPIRATION RATE: 20 BRPM | DIASTOLIC BLOOD PRESSURE: 53 MMHG | WEIGHT: 137 LBS | SYSTOLIC BLOOD PRESSURE: 123 MMHG | OXYGEN SATURATION: 98 %

## 2023-04-06 DIAGNOSIS — G90.A POTS (POSTURAL ORTHOSTATIC TACHYCARDIA SYNDROME): ICD-10-CM

## 2023-04-06 DIAGNOSIS — R00.2 PALPITATIONS: ICD-10-CM

## 2023-04-06 DIAGNOSIS — R06.02 SOB (SHORTNESS OF BREATH) ON EXERTION: Primary | ICD-10-CM

## 2023-04-06 DIAGNOSIS — R00.0 TACHYCARDIA: Primary | ICD-10-CM

## 2023-04-06 LAB
ALBUMIN SERPL BCP-MCNC: 4.4 G/DL (ref 3.5–5)
ALP SERPL-CCNC: 94 U/L (ref 34–104)
ALT SERPL W P-5'-P-CCNC: 20 U/L (ref 7–52)
ANION GAP SERPL CALCULATED.3IONS-SCNC: 9 MMOL/L (ref 4–13)
AST SERPL W P-5'-P-CCNC: 20 U/L (ref 13–39)
BASOPHILS # BLD AUTO: 0.04 THOUSANDS/ÂΜL (ref 0–0.1)
BASOPHILS NFR BLD AUTO: 1 % (ref 0–1)
BILIRUB SERPL-MCNC: 0.42 MG/DL (ref 0.2–1)
BUN SERPL-MCNC: 16 MG/DL (ref 5–25)
CALCIUM SERPL-MCNC: 9.5 MG/DL (ref 8.4–10.2)
CARDIAC TROPONIN I PNL SERPL HS: 2 NG/L
CHLORIDE SERPL-SCNC: 104 MMOL/L (ref 96–108)
CO2 SERPL-SCNC: 25 MMOL/L (ref 21–32)
CREAT SERPL-MCNC: 0.88 MG/DL (ref 0.6–1.3)
D DIMER PPP FEU-MCNC: 0.35 UG/ML FEU
EOSINOPHIL # BLD AUTO: 0.08 THOUSAND/ÂΜL (ref 0–0.61)
EOSINOPHIL NFR BLD AUTO: 1 % (ref 0–6)
ERYTHROCYTE [DISTWIDTH] IN BLOOD BY AUTOMATED COUNT: 12.5 % (ref 11.6–15.1)
GFR SERPL CREATININE-BSD FRML MDRD: 88 ML/MIN/1.73SQ M
GLUCOSE SERPL-MCNC: 85 MG/DL (ref 65–140)
HCT VFR BLD AUTO: 42.5 % (ref 34.8–46.1)
HGB BLD-MCNC: 14.2 G/DL (ref 11.5–15.4)
IMM GRANULOCYTES # BLD AUTO: 0.01 THOUSAND/UL (ref 0–0.2)
IMM GRANULOCYTES NFR BLD AUTO: 0 % (ref 0–2)
LYMPHOCYTES # BLD AUTO: 2.75 THOUSANDS/ÂΜL (ref 0.6–4.47)
LYMPHOCYTES NFR BLD AUTO: 39 % (ref 14–44)
MCH RBC QN AUTO: 30.6 PG (ref 26.8–34.3)
MCHC RBC AUTO-ENTMCNC: 33.4 G/DL (ref 31.4–37.4)
MCV RBC AUTO: 92 FL (ref 82–98)
MONOCYTES # BLD AUTO: 0.48 THOUSAND/ÂΜL (ref 0.17–1.22)
MONOCYTES NFR BLD AUTO: 7 % (ref 4–12)
NEUTROPHILS # BLD AUTO: 3.79 THOUSANDS/ÂΜL (ref 1.85–7.62)
NEUTS SEG NFR BLD AUTO: 52 % (ref 43–75)
NRBC BLD AUTO-RTO: 0 /100 WBCS
PLATELET # BLD AUTO: 283 THOUSANDS/UL (ref 149–390)
PMV BLD AUTO: 10.7 FL (ref 8.9–12.7)
POTASSIUM SERPL-SCNC: 4 MMOL/L (ref 3.5–5.3)
PROT SERPL-MCNC: 6.7 G/DL (ref 6.4–8.4)
RBC # BLD AUTO: 4.64 MILLION/UL (ref 3.81–5.12)
SODIUM SERPL-SCNC: 138 MMOL/L (ref 135–147)
WBC # BLD AUTO: 7.15 THOUSAND/UL (ref 4.31–10.16)

## 2023-04-06 RX ADMIN — SODIUM CHLORIDE 1000 ML: 0.9 INJECTION, SOLUTION INTRAVENOUS at 18:51

## 2023-04-06 NOTE — ED PROVIDER NOTES
History  Chief Complaint   Patient presents with   • Rapid Heart Rate     Pt reports HR 120s for approx 1 week, seen at urgent care and sent here  Denies CP/fevers, states hx POTS, symptoms usually more controlled  82-year-old female with a history of POTS, Nia-Danlos, presents today due to to elevated heart rate and shortness of breath  Patient states that over the last week her shortness of breath has worsened to where it is exacerbated with only very minimal exertion  It is relieved by rest   Patient has been doing pulmonary PT recently with little improvement  Patient also states she got COVID in November 2022 and has had lingering symptoms since  Patient states she called her cardiologist this afternoon, and they stated to go to the ER for IV fluids  Patient endorses nausea, headaches, lightheadedness after exacerbation  Patient denies chest pain, vomiting, diarrhea/constipation, vision changes  Prior to Admission Medications   Prescriptions Last Dose Informant Patient Reported? Taking?    Bystolic 5 MG tablet   No No   Sig: TAKE 1 TABLET BY MOUTH TWICE A DAY   Vitamin D, Cholecalciferol, 25 MCG (1000 UT) CAPS   Yes No   Sig: Take 3,000 Units by mouth    buPROPion (Wellbutrin XL) 300 mg 24 hr tablet   No No   Sig: Take 1 tablet (300 mg total) by mouth daily   clindamycin (CLEOCIN T) 1 %   Yes No   Sig: APPLY TO ACNE TWICE DAILY   desmopressin (DDAVP) 0 2 mg tablet   No No   Sig: Take 1 tablet (0 2 mg total) by mouth 2 (two) times a day   fludrocortisone (FLORINEF) 0 1 mg tablet   No No   Sig: TAKE 1 TABLET BY MOUTH TWICE A DAY   levothyroxine 100 mcg tablet   No No   Sig: Take 1 tablet (100 mcg total) by mouth daily   midodrine (PROAMATINE) 10 MG tablet   No No   Sig: Take 1 tablet (10 mg total) by mouth 3 (three) times a day   naproxen (NAPROSYN) 500 mg tablet   No No   Sig: Take 1 tablet (500 mg total) by mouth daily as needed for moderate pain   norgestrel-ethinyl estradiol (Low-Ogestrel) 0 3 mg-30 mcg per tablet   No No   Sig: Take 1 tablet by mouth daily   potassium chloride (MICRO-K) 10 MEQ CR capsule   No No   Sig: Take 1 capsule (10 mEq total) by mouth daily   sertraline (Zoloft) 25 mg tablet   No No   Sig: Take 1 tablet (25 mg total) by mouth daily   traMADol-acetaminophen (ULTRACET) 37 5-325 mg per tablet   No No   Sig: Take 1 tablet by mouth every 6 (six) hours as needed for moderate pain   venlafaxine (EFFEXOR-XR) 150 mg 24 hr capsule   No No   Sig: Take 1 capsule (150 mg total) by mouth daily      Facility-Administered Medications: None       Past Medical History:   Diagnosis Date   • Congenital dislocation of hip    • Depression     LAST ASSESSED: 8/4/12   • Disease of thyroid gland    • Pectus excavatum    • Scoliosis        Past Surgical History:   Procedure Laterality Date   • DEVEN ACETABULAR OSTEOTOMY Left     ACETABULAR OSTEOTOMY       Family History   Problem Relation Age of Onset   • Asthma Mother    • Hyperlipidemia Mother    • Hypertension Mother    • Diabetes type II Mother    • Diabetes Mother    • Hyperlipidemia Father    • Hypertension Father    • No Known Problems Brother    • Melanoma Family         AMELANOTIC MELANOMA OF THE SKIN   • Hyperlipidemia Family    • Hypertension Family    • Prostate cancer Family         MALIGNANT PROSTATIC NEOPLASM G1   • Varicose Veins Family         OF LOWER EXTREMITIES     I have reviewed and agree with the history as documented  E-Cigarette/Vaping   • E-Cigarette Use Never User      E-Cigarette/Vaping Substances   • Nicotine No    • THC No    • CBD No    • Flavoring No    • Other No    • Unknown No      Social History     Tobacco Use   • Smoking status: Never   • Smokeless tobacco: Never   Vaping Use   • Vaping Use: Never used   Substance Use Topics   • Alcohol use: Yes     Comment: SOCIAL   • Drug use: No        Review of Systems   Constitutional: Negative for chills and fever     HENT: Negative for congestion, rhinorrhea and sneezing  Eyes: Negative for pain and visual disturbance  Respiratory: Positive for shortness of breath  Negative for cough  Cardiovascular: Positive for palpitations  Negative for chest pain  Gastrointestinal: Positive for nausea  Negative for abdominal pain, constipation, diarrhea and vomiting  Genitourinary: Negative for dysuria and hematuria  Musculoskeletal: Negative for arthralgias and back pain  Skin: Negative for color change and rash  Neurological: Positive for light-headedness and headaches  Negative for syncope, weakness and numbness  All other systems reviewed and are negative  Physical Exam  ED Triage Vitals [04/06/23 1647]   Temperature Pulse Respirations Blood Pressure SpO2   98 2 °F (36 8 °C) (!) 115 17 (!) 105/49 96 %      Temp Source Heart Rate Source Patient Position - Orthostatic VS BP Location FiO2 (%)   Oral Monitor Sitting Right arm --      Pain Score       No Pain             Orthostatic Vital Signs  Vitals:    04/06/23 1647 04/06/23 1853 04/06/23 2023   BP: (!) 105/49 107/56 112/53   Pulse: (!) 115 88 87   Patient Position - Orthostatic VS: Sitting Sitting Sitting       Physical Exam  Vitals and nursing note reviewed  Constitutional:       General: She is not in acute distress  Appearance: She is not ill-appearing  HENT:      Head: Normocephalic and atraumatic  Right Ear: External ear normal       Left Ear: External ear normal       Nose: Nose normal  No congestion or rhinorrhea  Mouth/Throat:      Mouth: Mucous membranes are moist       Pharynx: Oropharynx is clear  Eyes:      General: No scleral icterus  Extraocular Movements: Extraocular movements intact  Cardiovascular:      Rate and Rhythm: Normal rate and regular rhythm  Pulses: Normal pulses  Heart sounds: Normal heart sounds  Pulmonary:      Effort: Pulmonary effort is normal       Breath sounds: Normal breath sounds  Abdominal:      Palpations: Abdomen is soft  Tenderness: There is no abdominal tenderness  Musculoskeletal:         General: Normal range of motion  Cervical back: Normal range of motion  Skin:     General: Skin is warm and dry  Neurological:      General: No focal deficit present  Mental Status: She is alert and oriented to person, place, and time     Psychiatric:         Mood and Affect: Mood normal          Behavior: Behavior normal          ED Medications  Medications   sodium chloride 0 9 % bolus 1,000 mL (0 mL Intravenous Stopped 4/6/23 2054)       Diagnostic Studies  Results Reviewed     Procedure Component Value Units Date/Time    D-dimer, quantitative [[id number]]  (Normal) Collected: 04/06/23 2055    Lab Status: Final result Specimen: Blood from Arm, Left Updated: 04/06/23 2116     D-Dimer, Quant 0 35 ug/ml FEU     HS Troponin 0hr (reflex protocol) [[id number]]  (Normal) Collected: 04/06/23 1652    Lab Status: Final result Specimen: Blood from Arm, Left Updated: 04/06/23 1736     hs TnI 0hr 2 ng/L     Comprehensive metabolic panel [[id number]] Collected: 04/06/23 1652    Lab Status: Final result Specimen: Blood from Arm, Left Updated: 04/06/23 1728     Sodium 138 mmol/L      Potassium 4 0 mmol/L      Chloride 104 mmol/L      CO2 25 mmol/L      ANION GAP 9 mmol/L      BUN 16 mg/dL      Creatinine 0 88 mg/dL      Glucose 85 mg/dL      Calcium 9 5 mg/dL      AST 20 U/L      ALT 20 U/L      Alkaline Phosphatase 94 U/L      Total Protein 6 7 g/dL      Albumin 4 4 g/dL      Total Bilirubin 0 42 mg/dL      eGFR 88 ml/min/1 73sq m     Narrative:      Giovani guidelines for Chronic Kidney Disease (CKD):   •  Stage 1 with normal or high GFR (GFR > 90 mL/min/1 73 square meters)  •  Stage 2 Mild CKD (GFR = 60-89 mL/min/1 73 square meters)  •  Stage 3A Moderate CKD (GFR = 45-59 mL/min/1 73 square meters)  •  Stage 3B Moderate CKD (GFR = 30-44 mL/min/1 73 square meters)  •  Stage 4 Severe CKD (GFR = 15-29 mL/min/1 73 square meters)  •  Stage 5 End Stage CKD (GFR <15 mL/min/1 73 square meters)  Note: GFR calculation is accurate only with a steady state creatinine    CBC and differential [[id number]] Collected: 04/06/23 1652    Lab Status: Final result Specimen: Blood from Arm, Left Updated: 04/06/23 1711     WBC 7 15 Thousand/uL      RBC 4 64 Million/uL      Hemoglobin 14 2 g/dL      Hematocrit 42 5 %      MCV 92 fL      MCH 30 6 pg      MCHC 33 4 g/dL      RDW 12 5 %      MPV 10 7 fL      Platelets 461 Thousands/uL      nRBC 0 /100 WBCs      Neutrophils Relative 52 %      Immat GRANS % 0 %      Lymphocytes Relative 39 %      Monocytes Relative 7 %      Eosinophils Relative 1 %      Basophils Relative 1 %      Neutrophils Absolute 3 79 Thousands/µL      Immature Grans Absolute 0 01 Thousand/uL      Lymphocytes Absolute 2 75 Thousands/µL      Monocytes Absolute 0 48 Thousand/µL      Eosinophils Absolute 0 08 Thousand/µL      Basophils Absolute 0 04 Thousands/µL                  XR chest 1 view portable    (Results Pending)         Procedures  ECG 12 Lead Documentation Only    Date/Time: 4/6/2023 9:38 PM  Performed by: Minnie Dubon DO  Authorized by: Minnie Dubon DO     Indications / Diagnosis:  SOB  ECG reviewed by me, the ED Provider: yes    Patient location:  ED  Previous ECG:     Previous ECG:  Compared to current    Similarity:  No change    Comparison to cardiac monitor: Yes    Interpretation:     Interpretation: abnormal    Rate:     ECG rate:  94    ECG rate assessment: normal    Rhythm:     Rhythm: sinus rhythm    Ectopy:     Ectopy: none    QRS:     QRS axis:  Right    QRS intervals:  Normal  Conduction:     Conduction: normal    ST segments:     ST segments:  Normal  T waves:     T waves: normal    Other findings:     Other findings: PAUL            ED Course  ED Course as of 04/06/23 2155   Thu Apr 06, 2023   1935 Comprehensive metabolic panel  wnl   9458 CBC and differential  wnl   1935 hs TnI 0hr: 2 1953 Patient's labs are reassuring, no signs of endorgan damage  Patient given a liter of IV fluids with significant improvement of her heart rate now in the low 80s  Patient states she feels well, but symptoms returned upon exertion  HEART Risk Score    Flowsheet Row Most Recent Value   Heart Score Risk Calculator    History 0 Filed at: 04/06/2023 2150   ECG 0 Filed at: 04/06/2023 2150   Age 0 Filed at: 04/06/2023 2150   Risk Factors 1 Filed at: 04/06/2023 2150   Troponin 0 Filed at: 04/06/2023 2150   HEART Score 1 Filed at: 04/06/2023 2150                      SBIRT 20yo+    Flowsheet Row Most Recent Value   SBIRT (23 yo +)    In order to provide better care to our patients, we are screening all of our patients for alcohol and drug use  Would it be okay to ask you these screening questions? Unable to answer at this time Filed at: 04/06/2023 Tamia Dejesus' Criteria for PE    Flowsheet Row Most Recent Value   Wells' Criteria for PE    Clinical signs and symptoms of DVT 0 Filed at: 04/06/2023 2050   PE is primary diagnosis or equally likely 3 Filed at: 04/06/2023 2050   HR >100 1 5 Filed at: 04/06/2023 2050   Immobilization at least 3 days or Surgery in the previous 4 weeks 0 Filed at: 04/06/2023 2050   Previous, objectively diagnosed PE or DVT 0 Filed at: 04/06/2023 2050   Hemoptysis 0 Filed at: 04/06/2023 2050   Malignancy with treatment within 6 months or palliative 0 Filed at: 04/06/2023 2050   Dianna Way' Criteria Total 4 5 Filed at: 04/06/2023 [place] presented due to increasingly worsening shortness of breath with exertion  She also noted palpitations when she changes positions, and 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  Upon arrival to the ED, and resting comfortably on the stretcher, patient states that symptoms are not present    Patient's labs including CBC, CMP, troponin, D-dimer, were all largely unremarkable and reassuring with no signs of endorgan damage  Upon arrival to the ED, patient was also tachycardic and given a liter bolus of normal saline  Heart rate improved significantly into the low and mid 80s  Patient was walked while connected to the monitor and showed only minimal rise in her heart rate into the 110s  Patient endorsed no shortness of breath during this period of exertion  Due to this patient will be given ambulatory referral to pulmonology for further work-up of worsening shortness of breath with exertion  Patient understands agrees the plan  The patient discharged in stable condition  Strict return precautions given if symptoms are worsening or not resolving  Amount and/or Complexity of Data Reviewed  Labs: ordered  Decision-making details documented in ED Course  Radiology: ordered  Disposition  Final diagnoses:   Palpitations   SOB (shortness of breath) on exertion     Time reflects when diagnosis was documented in both MDM as applicable and the Disposition within this note     Time User Action Codes Description Comment    4/6/2023  9:30 PM [place] Add [R00 2] Palpitations     4/6/2023  9:30 PM [place] Add [R06 02] SOB (shortness of breath) on exertion     4/6/2023  9:30 PM [place] Modify [R00 2] Palpitations     4/6/2023  9:30 PM [place] Modify [R06 02] SOB (shortness of breath) on exertion       ED Disposition     ED Disposition   Discharge    Condition   Stable    Date/Time   u Apr 6, 2023  9:30 PM    Comment   Courtney Parker discharge to home/self care                 Follow-up Information     Follow up With Specialties Details Why Contact Info Additional [address], DO Family Medicine Call in 3 days For ED follow-up [facility] [address] Emergency Department Emergency Medicine Go to  If symptoms worsen or do not resolve Rashawn [address] [facility]  [phone] [place] [address] Emergency Department, [address], [place], [place], [address]          Discharge Medication List as of 4/6/2023  9:32 PM      CONTINUE these medications which have NOT CHANGED    Details   buPROPion (Wellbutrin XL) 300 mg 24 hr tablet Take 1 tablet (300 mg total) by mouth daily, Starting Thu 12/22/2022, Normal      Bystolic 5 MG tablet TAKE 1 TABLET BY MOUTH TWICE A DAY, Normal      clindamycin (CLEOCIN T) 1 % APPLY TO ACNE TWICE DAILY, Historical Med      desmopressin (DDAVP) 0 2 mg tablet Take 1 tablet (0 2 mg total) by mouth 2 (two) times a day, Starting Wed 9/28/2022, Normal      fludrocortisone (FLORINEF) 0 1 mg tablet TAKE 1 TABLET BY MOUTH TWICE A DAY, Normal      levothyroxine 100 mcg tablet Take 1 tablet (100 mcg total) by mouth daily, Starting Thu 1/26/2023, Until Wed 4/26/2023, Normal      midodrine (PROAMATINE) 10 MG tablet Take 1 tablet (10 mg total) by mouth 3 (three) times a day, Starting Tue 4/4/2023, Normal      naproxen (NAPROSYN) 500 mg tablet Take 1 tablet (500 mg total) by mouth daily as needed for moderate pain, Starting Tue 8/6/2019, Normal      norgestrel-ethinyl estradiol (Low-Ogestrel) 0 3 mg-30 mcg per tablet Take 1 tablet by mouth daily, Starting Thu 12/1/2022, Normal      potassium chloride (MICRO-K) 10 MEQ CR capsule Take 1 capsule (10 mEq total) by mouth daily, Starting Thu 1/26/2023, Until Wed 4/26/2023, Normal      sertraline (Zoloft) 25 mg tablet Take 1 tablet (25 mg total) by mouth daily, Starting Thu 2/23/2023, Normal      traMADol-acetaminophen (ULTRACET) 37 5-325 mg per tablet Take 1 tablet by mouth every 6 (six) hours as needed for moderate pain, Starting Tue 12/14/2021, Normal      venlafaxine (EFFEXOR-XR) 150 mg 24 hr capsule Take 1 capsule (150 mg total) by mouth daily, Starting Thu 12/22/2022, Normal      Vitamin D, Cholecalciferol, 25 MCG (1000 UT) CAPS Take 3,000 Units by mouth , Historical Med PDMP Review       Value Time User    PDMP Reviewed  Yes 2/23/2023  9:49 AM [facility] [address]           ED Provider  Attending physically available and evaluated Carlee Arevalo I managed the patient along with the ED Attending      Electronically Signed by         Minnie Dubon DO  04/06/23 7755

## 2023-04-06 NOTE — TELEPHONE ENCOUNTER
Called patient in follow up to see how she is feeling today  Her resting heart rate is 100-108  She feels ok at rest but if she starts to get up and move around her heart races and is fatigued with very little exertion  She does not have a bp cuff at home   Please advise

## 2023-04-06 NOTE — PROGRESS NOTES
[address] [facility]        NAME: Mira Perez is a 29 y o  female  : 1990    MRN: [id number]  DATE: 2023  TIME: 4:08 PM    Assessment and Plan   Tachycardia [R00 0]  1  Tachycardia  Transfer to other facility      2  POTS (postural orthostatic tachycardia syndrome)              Patient Instructions       Advised to go to the ED for further eval    Chief Complaint     Chief Complaint   Patient presents with   • Dizziness   • Shortness of Breath     Shortness of breath for 2 weeks  Patient states she been dizzy for 1 month   Cardiology called her an told her to come to Urgent care for IV fluid  History of Present Illness       HPI   Reports intermittent dizziness and tachycardia x 1 month  Has a Hx of POTS syndrome and starts when she has a flare up she gets these symptoms  Also having SOB x 2 weeks  Usually when she gets increase in HR, the HR goes back to normal at rest  However her HR has been staying at 110 - 120 bpm  She called her cardiologist and she was advised by the staff there to get IV fluids  Says she was told to come to the urgent care clinic or go to the ED  Review of Systems   Review of Systems   Constitutional: Positive for fatigue  Respiratory: Positive for shortness of breath (x 2 weeks)  Cardiovascular: Negative for chest pain  Increase in heart rate   Gastrointestinal: Negative for nausea and vomiting  Neurological: Positive for light-headedness (previously, but not currently)           Current Medications       Current Outpatient Medications:   •  buPROPion (Wellbutrin XL) 300 mg 24 hr tablet, Take 1 tablet (300 mg total) by mouth daily, Disp: 90 tablet, Rfl: 1  •  Bystolic 5 MG tablet, TAKE 1 TABLET BY MOUTH TWICE A DAY, Disp: 60 tablet, Rfl: 5  •  clindamycin (CLEOCIN T) 1 %, APPLY TO ACNE TWICE DAILY, Disp: , Rfl: 5  •  desmopressin (DDAVP) 0 2 mg tablet, Take 1 tablet (0 2 mg total) by mouth 2 (two) times a day, Disp: 60 tablet, Rfl: 5  • fludrocortisone (FLORINEF) 0 1 mg tablet, TAKE 1 TABLET BY MOUTH TWICE A DAY, Disp: 60 tablet, Rfl: 5  •  levothyroxine 100 mcg tablet, Take 1 tablet (100 mcg total) by mouth daily, Disp: 90 tablet, Rfl: 0  •  midodrine (PROAMATINE) 10 MG tablet, Take 1 tablet (10 mg total) by mouth 3 (three) times a day, Disp: 270 tablet, Rfl: 0  •  naproxen (NAPROSYN) 500 mg tablet, Take 1 tablet (500 mg total) by mouth daily as needed for moderate pain, Disp: 30 tablet, Rfl: 1  •  norgestrel-ethinyl estradiol (Low-Ogestrel) 0 3 mg-30 mcg per tablet, Take 1 tablet by mouth daily, Disp: 84 tablet, Rfl: 4  •  potassium chloride (MICRO-K) 10 MEQ CR capsule, Take 1 capsule (10 mEq total) by mouth daily, Disp: 30 capsule, Rfl: 2  •  sertraline (Zoloft) 25 mg tablet, Take 1 tablet (25 mg total) by mouth daily, Disp: 30 tablet, Rfl: 2  •  traMADol-acetaminophen (ULTRACET) 37 5-325 mg per tablet, Take 1 tablet by mouth every 6 (six) hours as needed for moderate pain, Disp: 30 tablet, Rfl: 0  •  venlafaxine (EFFEXOR-XR) 150 mg 24 hr capsule, Take 1 capsule (150 mg total) by mouth daily, Disp: 90 capsule, Rfl: 1  •  Vitamin D, Cholecalciferol, 25 MCG (1000 UT) CAPS, Take 3,000 Units by mouth , Disp: , Rfl:     Current Allergies     Allergies as of 04/06/2023 - Reviewed 04/06/2023   Allergen Reaction Noted   • Epinephrine Dizziness 03/22/2017   • Fluoxetine  02/14/2018            The following portions of the patient's history were reviewed and updated as appropriate: allergies, current medications, past family history, past medical history, past social history, past surgical history and problem list      Past Medical History:   Diagnosis Date   • Congenital dislocation of hip    • Depression     LAST ASSESSED: 8/4/12   • Disease of thyroid gland    • Pectus excavatum    • Scoliosis        Past Surgical History:   Procedure Laterality Date   • DEVEN ACETABULAR OSTEOTOMY Left     ACETABULAR OSTEOTOMY       Family History   Problem Relation Age of Onset   • Asthma Mother    • Hyperlipidemia Mother    • Hypertension Mother    • Diabetes type II Mother    • Diabetes Mother    • Hyperlipidemia Father    • Hypertension Father    • No Known Problems Brother    • Melanoma Family         AMELANOTIC MELANOMA OF THE SKIN   • Hyperlipidemia Family    • Hypertension Family    • Prostate cancer Family         MALIGNANT PROSTATIC NEOPLASM G1   • Varicose Veins Family         OF LOWER EXTREMITIES         Medications have been verified  Objective   /53   Pulse (!) 120   Temp (!) 97 4 °F (36 3 °C) (Temporal)   Resp 20   Wt 62 1 kg (137 lb)   LMP 03/17/2023 (Approximate)   SpO2 98%   BMI 22 11 kg/m²   Patient's last menstrual period was 03/17/2023 (approximate)  Physical Exam     Physical Exam  Constitutional:       General: She is not in acute distress  Appearance: She is not diaphoretic  HENT:      Nose: No rhinorrhea  Mouth/Throat:      Mouth: Mucous membranes are moist       Pharynx: No posterior oropharyngeal erythema  Cardiovascular:      Rate and Rhythm: Regular rhythm  Tachycardia present  Heart sounds: Normal heart sounds  Pulmonary:      Effort: Pulmonary effort is normal    Musculoskeletal:      Cervical back: No rigidity

## 2023-04-06 NOTE — TELEPHONE ENCOUNTER
Spoke to Dr Kathy Wolfe thru tiger text patient sound like she may be dehydrated and needs IVF in the ER or urgent care   Patient is agreeable to advice and will be seen in the ER>

## 2023-04-07 DIAGNOSIS — M35.9 DIFFUSE CONNECTIVE TISSUE DISEASE (HCC): ICD-10-CM

## 2023-04-07 DIAGNOSIS — E03.9 PRIMARY HYPOTHYROIDISM: ICD-10-CM

## 2023-04-07 DIAGNOSIS — G93.32 CHRONIC FATIGUE SYNDROME: ICD-10-CM

## 2023-04-07 DIAGNOSIS — R76.8 ANA POSITIVE: Primary | ICD-10-CM

## 2023-04-07 LAB
ATRIAL RATE: 94 BPM
P AXIS: 87 DEGREES
PR INTERVAL: 148 MS
QRS AXIS: 122 DEGREES
QRSD INTERVAL: 64 MS
QT INTERVAL: 330 MS
QTC INTERVAL: 412 MS
T WAVE AXIS: 61 DEGREES
VENTRICULAR RATE: 94 BPM

## 2023-04-07 NOTE — DISCHARGE INSTRUCTIONS
Follow-up closely with your primary care physician, cardiologist, and stay alert for a call from pulmonology  They should call within about a week to set up an appointment

## 2023-04-07 NOTE — ED NOTES
"Pt ambulated per provider request, heart rate at 93 and oxgyen at 98 prior to ambulating  While ambulating pt states that \"she still feels a bit short of breath but that it definitely has improved since being here  Her heart rate was 113 and oxygen at 100 while ambulating  No complaints of dizziness or lightlessness  Provider made aware        Reyes Stringer RN  04/06/23 2120    "

## 2023-04-10 ENCOUNTER — APPOINTMENT (OUTPATIENT)
Dept: PULMONOLOGY | Facility: CLINIC | Age: 33
End: 2023-04-10

## 2023-04-10 NOTE — ED ATTENDING ATTESTATION
4/6/2023  IMeagan MD, saw and evaluated the patient  I have discussed the patient with the resident/non-physician practitioner and agree with the resident's/non-physician practitioner's findings, Plan of Care, and MDM as documented in the resident's/non-physician practitioner's note, except where noted  All available labs and Radiology studies were reviewed  I was present for key portions of any procedure(s) performed by the resident/non-physician practitioner and I was immediately available to provide assistance  At this point I agree with the current assessment done in the Emergency Department    I have conducted an independent evaluation of this patient a history and physical is as follows:see h and p abobe- ptis low risk for pe by breanna     ED Course  ED Course as of 04/10/23 1124   Thu Apr 06, 2023 2118 Er md note- 2/23 cardiac echo report reviewed by er md   2118 Er md medical decision making note- pt is low risk for pe by well sc ore- score of 1 5- fails perc by baseline tachycardia  which has resolved- will check d dimer and use peg ed study d dimer cutoff of 1000 for low risk pt's   2120 Cxr portable- compared to previous - no sign changes-- no acute findings    2121 Er md note 12 lead ecg reviewed and compared  by er md to previous 11/5/22- nsr rate of 94- no acute changes          Critical Care Time  Procedures

## 2023-04-11 PROBLEM — G25.81 RESTLESS LEGS: Status: ACTIVE | Noted: 2023-04-11

## 2023-04-11 PROBLEM — G47.33 OSA (OBSTRUCTIVE SLEEP APNEA): Status: ACTIVE | Noted: 2023-04-11

## 2023-04-11 PROBLEM — R06.02 SOB (SHORTNESS OF BREATH) ON EXERTION: Status: ACTIVE | Noted: 2023-04-11

## 2023-04-17 ENCOUNTER — APPOINTMENT (OUTPATIENT)
Dept: PULMONOLOGY | Facility: CLINIC | Age: 33
End: 2023-04-17

## 2023-04-22 DIAGNOSIS — I10 ESSENTIAL HYPERTENSION: ICD-10-CM

## 2023-04-24 ENCOUNTER — APPOINTMENT (OUTPATIENT)
Dept: PULMONOLOGY | Facility: CLINIC | Age: 33
End: 2023-04-24

## 2023-04-24 RX ORDER — NEBIVOLOL 5 MG/1
TABLET ORAL
Qty: 60 TABLET | Refills: 5 | Status: SHIPPED | OUTPATIENT
Start: 2023-04-24

## 2023-04-25 ENCOUNTER — HOSPITAL ENCOUNTER (OUTPATIENT)
Dept: PULMONOLOGY | Facility: HOSPITAL | Age: 33
Discharge: HOME | End: 2023-04-25
Attending: INTERNAL MEDICINE

## 2023-04-25 DIAGNOSIS — R06.02 SOB (SHORTNESS OF BREATH) ON EXERTION: ICD-10-CM

## 2023-04-25 DIAGNOSIS — F33.2 SEVERE EPISODE OF RECURRENT MAJOR DEPRESSIVE DISORDER, WITHOUT PSYCHOTIC FEATURES (HCC): ICD-10-CM

## 2023-04-25 RX ORDER — VENLAFAXINE HYDROCHLORIDE 75 MG/1
75 CAPSULE, EXTENDED RELEASE ORAL DAILY
Qty: 30 CAPSULE | Refills: 1 | Status: SHIPPED | OUTPATIENT
Start: 2023-04-25 | End: 2023-05-25

## 2023-04-25 RX ORDER — ALBUTEROL SULFATE 2.5 MG/3ML
2.5 SOLUTION RESPIRATORY (INHALATION) ONCE
Status: COMPLETED | OUTPATIENT
Start: 2023-04-25 | End: 2023-04-25

## 2023-04-25 RX ADMIN — ALBUTEROL SULFATE 2.5 MG: 2.5 SOLUTION RESPIRATORY (INHALATION) at 13:45

## 2023-04-26 ENCOUNTER — CLINICAL SUPPORT (OUTPATIENT)
Dept: PULMONOLOGY | Facility: CLINIC | Age: 33
End: 2023-04-26

## 2023-04-26 DIAGNOSIS — R06.09 POST-COVID CHRONIC DYSPNEA: Primary | ICD-10-CM

## 2023-04-26 DIAGNOSIS — U09.9 POST-COVID CHRONIC DYSPNEA: Primary | ICD-10-CM

## 2023-05-01 ENCOUNTER — APPOINTMENT (OUTPATIENT)
Dept: PULMONOLOGY | Facility: CLINIC | Age: 33
End: 2023-05-01
Payer: COMMERCIAL

## 2023-05-02 DIAGNOSIS — E87.6 LOW BLOOD POTASSIUM: ICD-10-CM

## 2023-05-02 RX ORDER — POTASSIUM CHLORIDE 750 MG/1
CAPSULE, EXTENDED RELEASE ORAL
Qty: 30 CAPSULE | Refills: 2 | Status: SHIPPED | OUTPATIENT
Start: 2023-05-02

## 2023-05-03 ENCOUNTER — CLINICAL SUPPORT (OUTPATIENT)
Dept: PULMONOLOGY | Facility: CLINIC | Age: 33
End: 2023-05-03

## 2023-05-03 DIAGNOSIS — U09.9 POST-COVID CHRONIC DYSPNEA: Primary | ICD-10-CM

## 2023-05-03 DIAGNOSIS — R06.09 POST-COVID CHRONIC DYSPNEA: Primary | ICD-10-CM

## 2023-05-08 ENCOUNTER — HOSPITAL ENCOUNTER (OUTPATIENT)
Dept: ULTRASOUND IMAGING | Facility: CLINIC | Age: 33
Discharge: HOME | End: 2023-05-08

## 2023-05-08 ENCOUNTER — HOSPITAL ENCOUNTER (OUTPATIENT)
Dept: MAMMOGRAPHY | Facility: CLINIC | Age: 33
Discharge: HOME | End: 2023-05-08

## 2023-05-08 VITALS — HEIGHT: 66 IN | BODY MASS INDEX: 22.18 KG/M2 | WEIGHT: 138 LBS

## 2023-05-08 DIAGNOSIS — N63.21 MASS OF UPPER OUTER QUADRANT OF LEFT BREAST: ICD-10-CM

## 2023-05-08 DIAGNOSIS — N63.0 BREAST MASS: ICD-10-CM

## 2023-05-10 ENCOUNTER — APPOINTMENT (OUTPATIENT)
Dept: PULMONOLOGY | Facility: CLINIC | Age: 33
End: 2023-05-10
Payer: COMMERCIAL

## 2023-05-16 NOTE — PSYCH
Virtual Regular Visit    Problem List Items Addressed This Visit    None  Visit Diagnoses     Social anxiety disorder        Relevant Medications    sertraline (ZOLOFT) 50 mg tablet    venlafaxine (EFFEXOR-XR) 37 5 mg 24 hr capsule        Reason for visit is   Chief Complaint   Patient presents with   • Medication Management     Encounter provider [facility] LEYDI Carr    Provider located at [address]   [address] [facility] [address]  [facility] [address] [phone]    Recent Visits  No visits were found meeting these conditions  Showing recent visits within past 7 days and meeting all other requirements  Today's Visits  Date Type Provider Dept   05/23/23 Telemedicine LEYDI [facility]   Showing today's visits and meeting all other requirements  Future Appointments  No visits were found meeting these conditions  Showing future appointments within next 150 days and meeting all other requirements       After connecting through [employer] the patient was identified by name and date of birth  Mayank Peterson was informed that this is a telemedicine visit and that the visit is being conducted through the [address] [place] [address] Now platform  She agrees to proceed  which may not be secure and therefore, might not be HIPAA-compliant  My office door was closed  No one else was in the room  She acknowledged consent and understanding of privacy and security of the video platform  The patient has agreed to participate and understands they can discontinue the visit at any time      MEDICATION MANAGEMENT NOTE        [address]      Name and Date of Birth:  Mayank Peterson 29 y o  1990 MRN: [id number]    Date of Visit: May 23, 2023    Reason for Visit:   Chief Complaint   Patient presents with   • Medication Management         SUBJECTIVE:    Mayank Peterson is a 29 y o  female with past psychiatric history significant for Major Depressive Disorder and Social Phobia who was personally seen and evaluated today at the [address] [place] [address] E outpatient clinic for follow-up and medication management  She presents as anxious, cooperative, calm  Her thoughts are organized, goal directed and completes psychiatric assessment without difficulty  James Kennedy endorses compliance with psychotropic medication regimen that consists of Zoloft, Effexor XR and Wellbutrin XL  She denies any current adverse medication side effects  At previous outpatient psychiatric appointment with this writer, Effexor XR was decreased with plan to further taper and discontinue  Zoloft was started  On evaluation today, James Kennedy reports tolerating recent medication changes well  She had not noticed any difference in social anxiety symptoms thus far  She has not put herself out there in terms of social situations due to recent POTS symptoms and currently having Strep throat  She feels her energy level has been mildly improved  Depressed mood has slightly improved  Sleep has been adequate  Appetite slightly decreased but adequate with no weight changes  She does have some ongoing daytime fatigue  No SI  No medication side effects  Major stressors lately have been physical symptoms of POTS affecting her ability to exercise or function at her normal level  Her parents have also recently been sick  She would like to be more social in the future when she is feeling better physically  Waldoyolanda Kennedy denies any further concerns today  Current Rating Scores:     None completed today      Review Of Systems:      Constitutional negative   ENT negative   Cardiovascular negative   Respiratory negative   Gastrointestinal negative   Genitourinary negative   Musculoskeletal negative   Integumentary negative   Neurological negative   Endocrine negative   Other Symptoms none, all other systems are negative       Historical information: (unchanged information from previous note copied and italicized) - Information that is bolded has been updated  Past Psychiatric History:    Past Inpatient Psychiatric Treatment:   No history of past inpatient psychiatric admissions  Past Outpatient Psychiatric Treatment:    Was in outpatient psychiatric treatment in the past with a psychiatrist  Past Suicide Attempts: no  Past Violent Behavior: no  Past Psychiatric Medication Trials: Prozac, Celexa, Effexor XR and Wellbutrin XL   Prozac-mood swings, Celexa-worsening fatigue      Traumatic History:       Abuse: no history of physical or sexual abuse, no history of emotional abuse  Other Traumatic Events: none      Family Psychiatric History:   Maternal side of family-strong hx of depression and anxiety  Paternal side of family-strong hx of D&A addiction     FH of suicide-denies      Substance Abuse History:   Tobacco/alcohol/caffeine: Denies tobacco use, Denies caffeine use, alcohol intake: social drinker  Illicit drugs: Denies history of illicit drug use     Social History:    Developmental: Denies a history of milestone/developmental delay  Denies a history of in-utero exposure to toxins/illicit substances  There is no documented history of IEP or need for special education  Education: college graduate   Currently unemployed, has not applied for disability  Worked from [id number], unable to continue working due to medical conditions  Living arrangement, social support: parents   Never , no children   Access to firearms: Denies direct access to weapons/firearms       Past Medical History:    Past Medical History:   Diagnosis Date   • Congenital dislocation of hip    • Depression     LAST ASSESSED: 8/4/12   • Disease of thyroid gland    • Pectus excavatum    • Scoliosis         Past Surgical History:   Procedure Laterality Date   • DEVEN ACETABULAR OSTEOTOMY Left     ACETABULAR OSTEOTOMY     Allergies   Allergen Reactions   • Epinephrine Dizziness     Causes dysautonomia   • Fluoxetine      Other reaction(s): Flushed       Substance Abuse History:    Social History     Substance and Sexual Activity   Alcohol Use Yes    Comment: SOCIAL     Social History     Substance and Sexual Activity   Drug Use No       Social History:    Social History     Socioeconomic History   • Marital status: Single     Spouse name: Not on file   • Number of children: Not on file   • Years of education: Not on file   • Highest education level: Not on file   Occupational History   • Occupation: UNEMPLOYED   Tobacco Use   • Smoking status: Never   • Smokeless tobacco: Never   Vaping Use   • Vaping Use: Never used   Substance and Sexual Activity   • Alcohol use: Yes     Comment: SOCIAL   • Drug use: No   • Sexual activity: Not Currently     Birth control/protection: OCP   Other Topics Concern   • Not on file   Social History Narrative    CURRENTLY IN SCHOOL: WILL BE STARTING  [address] 1/2014    LIVING WITH PARENTS     Social Determinants of Health     Financial Resource Strain: Not on file   Food Insecurity: Not on file   Transportation Needs: Not on file   Physical Activity: Not on file   Stress: Not on file   Social Connections: Not on file   Intimate Partner Violence: Not on file   Housing Stability: Not on file       Family Psychiatric History:     Family History   Problem Relation Age of Onset   • Asthma Mother    • Hyperlipidemia Mother    • Hypertension Mother    • Diabetes type II Mother    • Diabetes Mother    • Prostate cancer Father    • Hyperlipidemia Father    • Hypertension Father    • No Known Problems Brother    • Melanoma Family         AMELANOTIC MELANOMA OF THE SKIN   • Hyperlipidemia Family    • Hypertension Family    • Prostate cancer Family         MALIGNANT PROSTATIC NEOPLASM G1   • Varicose Veins Family         OF LOWER EXTREMITIES   • Breast cancer Neg Hx        History Review:  The following portions of the patient's history were reviewed and updated as appropriate: allergies, current medications, past family history, past medical history, past social history, past surgical history and problem list          OBJECTIVE:     Vital signs in last 24 hours: There were no vitals filed for this visit      Mental Status Evaluation:    Appearance age appropriate, casually dressed   Behavior cooperative, calm   Speech normal rate, normal volume, normal pitch   Mood anxious   Affect constricted   Thought Processes organized, goal directed   Associations intact associations   Thought Content no overt delusions   Perceptual Disturbances: no auditory hallucinations, no visual hallucinations   Abnormal Thoughts  Risk Potential Suicidal ideation - None  Homicidal ideation - None  Potential for aggression - No   Orientation oriented to person, place, time/date and situation   Memory recent and remote memory grossly intact   Consciousness alert and awake   Attention Span Concentration Span attention span and concentration are age appropriate   Intellect appears to be of average intelligence   Insight intact   Judgement intact   Muscle Strength and  Gait unable to assess today due to virtual visit   Motor activity unable to assess today due to virtual visit   Language no difficulty naming common objects, no difficulty repeating a phrase, no difficulty writing a sentence   Fund of Knowledge adequate knowledge of current events  adequate fund of knowledge regarding past history  adequate fund of knowledge regarding vocabulary    Pain none   Pain Scale 0       Laboratory Results: I have personally reviewed all pertinent laboratory/tests results    Recent Labs (last 2 months):   Office Visit on 05/19/2023   Component Date Value   • POCT SARS-CoV-2 Ag 05/19/2023 Negative    • VALID CONTROL 05/19/2023 Valid    •  RAPID STREP A 05/19/2023 Positive (A)    Admission on 04/06/2023, Discharged on 04/06/2023   Component Date Value   • WBC 04/06/2023 7 15    • RBC 04/06/2023 4 64 • Hemoglobin 04/06/2023 14 2    • Hematocrit 04/06/2023 42 5    • MCV 04/06/2023 92    • MCH 04/06/2023 30 6    • MCHC 04/06/2023 33 4    • RDW 04/06/2023 12 5    • MPV 04/06/2023 10 7    • Platelets 16/81/7594 283    • nRBC 04/06/2023 0    • Neutrophils Relative 04/06/2023 52    • Immat GRANS % 04/06/2023 0    • Lymphocytes Relative 04/06/2023 39    • Monocytes Relative 04/06/2023 7    • Eosinophils Relative 04/06/2023 1    • Basophils Relative 04/06/2023 1    • Neutrophils Absolute 04/06/2023 3 79    • Immature Grans Absolute 04/06/2023 0 01    • Lymphocytes Absolute 04/06/2023 2 75    • Monocytes Absolute 04/06/2023 0 48    • Eosinophils Absolute 04/06/2023 0 08    • Basophils Absolute 04/06/2023 0 04    • Sodium 04/06/2023 138    • Potassium 04/06/2023 4 0    • Chloride 04/06/2023 104    • CO2 04/06/2023 25    • ANION GAP 04/06/2023 9    • BUN 04/06/2023 16    • Creatinine 04/06/2023 0 88    • Glucose 04/06/2023 85    • Calcium 04/06/2023 9 5    • AST 04/06/2023 20    • ALT 04/06/2023 20    • Alkaline Phosphatase 04/06/2023 94    • Total Protein 04/06/2023 6 7    • Albumin 04/06/2023 4 4    • Total Bilirubin 04/06/2023 0 42    • eGFR 04/06/2023 88    • hs TnI 0hr 04/06/2023 2    • D-Dimer, Quant 04/06/2023 0 35    • Ventricular Rate 04/06/2023 94    • Atrial Rate 04/06/2023 94    • LA Interval 04/06/2023 148    • QRSD Interval 04/06/2023 64    • QT Interval 04/06/2023 330    • QTC Interval 04/06/2023 412    • P Axis 04/06/2023 87    • QRS Axis 04/06/2023 122    • T Wave Axis 04/06/2023 61        Suicide/Homicide Risk Assessment:    The following interventions are recommended: no intervention changes needed      Lethality Statement:    Based on today's assessment and clinical criteria, Mike Black contracts for safety and is not an imminent risk of harm to self or others  Outpatient level of care is deemed appropriate at this current time   Ray Davidson understands that if they can no longer contract for safety, they need to call the office or report to their nearest Emergency Room for immediate evaluation  Assessment/Plan:     Psychopharmacologically, Maria De Jesus Speak continues to tolerate current medications with no adverse effects  She reports ongoing anxiety in social situations and continues to avoid these situations  She is agreeable to continue dose taper of Effexor XR and continue to titrate Zoloft dose for anxiety symptoms and mood  Risks/benefits/alternativies to treatment discussed, including a myriad of potential adverse medication side effects, to which Maria De Jesus Speak voiced understanding and consented fully to treatment  Also, patient is amenable to calling/contacting the outpatient office including this writer if any acute adverse effects of their medication regimen arise in addition to any comments or concerns pertaining to their psychiatric management  Diagnoses and all orders for this visit:    Social anxiety disorder  -     sertraline (ZOLOFT) 50 mg tablet; Take 1 tablet (50 mg total) by mouth daily  -     venlafaxine (EFFEXOR-XR) 37 5 mg 24 hr capsule; Take 1 capsule (37 5 mg total) by mouth daily Take 1 capsule daily x1 month, then discontinue       Diagnosis/Treatment Recommendations  - Psychoeducation provided regarding the importance of exercise and health dietary choices and their impact on mood, energy, and motivation   - Counseled to avoid ETOH, illicit substances, and nicotine secondary to the detrimental effects of these substances on mental and physical health  - Encouraged to engage in non-verbal forms of therapy such as art therapy, music therapy, and mindfulness  Aware of 24 hour and weekend coverage for urgent situations accessed by calling [facility] main practice number    Plan:  1  Continue Wellbutrin  mg daily for depression  2  Increase Zoloft to 50 mg daily for depression and anxiety   3   Decrease Effexor XR to 37 5 mg daily for 1 month, then discontinue   4  Continue individual psychotherapy sessions  5  Follow up with primary care provider for ongoing medical care  6  Follow up with this provider in 2 months     Medications Risks/Benefits      Risks, Benefits And Possible Side Effects Of Medications:    Risks, benefits, and possible side effects of medications explained to [place] and she verbalizes understanding and agreement for treatment  Controlled Medication Discussion:     No recent records found for controlled prescriptions according to [place] Prescription Drug Monitoring Program    Psychotherapy Provided:     Individual psychotherapy provided: Yes  Counseling was provided during the session today for 16 minutes  Medication education provided to Franck Lara discussed during session  Importance of medication and treatment compliance reviewed with Courtney  Cognitive therapy was utilized during the session  Reassurance and supportive therapy provided  Crisis/safety plan discussed with Carmita Coleman     Treatment Plan:    Completed and signed during the session: Yes - Treatment Plan done but not signed at time of office visit due to:  Plan reviewed by video and verbal consent given due to [place] social distancing    Note Share Disclaimer:      This note was not shared with the patient due to reasonable likelihood of causing patient harm    Visit Time    Visit Start Time: 2:17 PM  Visit Stop Time: 2:33 PM  Total Visit Duration: 16 minutes    LEYDI Saunders 05/23/23

## 2023-05-17 ENCOUNTER — OFFICE VISIT (OUTPATIENT)
Dept: CARDIOLOGY CLINIC | Facility: MEDICAL CENTER | Age: 33
End: 2023-05-17

## 2023-05-17 ENCOUNTER — APPOINTMENT (OUTPATIENT)
Dept: PULMONOLOGY | Facility: CLINIC | Age: 33
End: 2023-05-17
Payer: COMMERCIAL

## 2023-05-17 VITALS
HEIGHT: 66 IN | BODY MASS INDEX: 22.02 KG/M2 | OXYGEN SATURATION: 97 % | WEIGHT: 137 LBS | SYSTOLIC BLOOD PRESSURE: 110 MMHG | DIASTOLIC BLOOD PRESSURE: 70 MMHG | HEART RATE: 104 BPM

## 2023-05-17 DIAGNOSIS — R00.0 SINUS TACHYCARDIA: ICD-10-CM

## 2023-05-17 DIAGNOSIS — R06.02 SOB (SHORTNESS OF BREATH) ON EXERTION: ICD-10-CM

## 2023-05-17 DIAGNOSIS — G90.A POTS (POSTURAL ORTHOSTATIC TACHYCARDIA SYNDROME): Primary | ICD-10-CM

## 2023-05-17 RX ORDER — IVABRADINE 5 MG/1
5 TABLET, FILM COATED ORAL 2 TIMES DAILY
Qty: 180 TABLET | Refills: 3 | Status: SHIPPED | OUTPATIENT
Start: 2023-05-17

## 2023-05-17 NOTE — PATIENT INSTRUCTIONS
Recommendations:  1  Start Corlanor 5mg 2x/day  2  Increase DDAVP to 0 4mg 2x/day  3  Continue remainder of medications  4  Await evaluation by Rheumatology  5  Is awaiting evaluation by [facility]  clinic  6  Follow up in 3 months

## 2023-05-17 NOTE — PROGRESS NOTES
Cardiology   Sanjeev Velasco 29 y o  female MRN: [id number]        Impression:  1  POTS - worsening symptoms   2  Nia-Danlos Type III  3  Dyslipidemia - borderline  Recheck lipids in 5 years  4  Sinus tachycardia - worsening over time       Recommendations:  1  Start Corlanor 5mg 2x/day  2  Increase DDAVP to 0 4mg 2x/day  3  Continue remainder of medications  4  Await evaluation by Rheumatology  5  Is awaiting evaluation by [place] [id number] [facility]  6  Follow up in 3 months  HPI: Sanjeev Velasco is a 29y o  year old female with Nia-Danlos Type III and POTS presents for follow up  Developed COVID-19 in 12/20, and has had worsening in fatigue, dyspnea, and concentration issues since   Has issues with fluctuating heart rate  Echo 7/21 - structurally normal heart   Had COVID again in 11/22  Has been having increased dyspnea  Has had worsening symptoms recently  Improved with IV fluids  Has borderline FE  Review of Systems   Constitutional: Negative  HENT: Negative  Eyes: Negative  Respiratory: Positive for shortness of breath  Negative for chest tightness  Cardiovascular: Positive for palpitations  Negative for chest pain and leg swelling  Gastrointestinal: Negative  Endocrine: Negative  Genitourinary: Negative  Musculoskeletal: Negative  Skin: Negative  Allergic/Immunologic: Negative  Neurological: Positive for dizziness  Hematological: Negative  Psychiatric/Behavioral: Negative  All other systems reviewed and are negative          Past Medical History:   Diagnosis Date   • Congenital dislocation of hip    • Depression     LAST ASSESSED: 8/4/12   • Disease of thyroid gland    • Pectus excavatum    • Scoliosis      Past Surgical History:   Procedure Laterality Date   • DEVEN ACETABULAR OSTEOTOMY Left     ACETABULAR OSTEOTOMY     Social History     Substance and Sexual Activity   Alcohol Use Yes    Comment: SOCIAL     Social History     Substance and Sexual Activity   Drug Use No     Social History     Tobacco Use   Smoking Status Never   Smokeless Tobacco Never     Family History   Problem Relation Age of Onset   • Asthma Mother    • Hyperlipidemia Mother    • Hypertension Mother    • Diabetes type II Mother    • Diabetes Mother    • Prostate cancer Father    • Hyperlipidemia Father    • Hypertension Father    • No Known Problems Brother    • Melanoma Family         AMELANOTIC MELANOMA OF THE SKIN   • Hyperlipidemia Family    • Hypertension Family    • Prostate cancer Family         MALIGNANT PROSTATIC NEOPLASM G1   • Varicose Veins Family         OF LOWER EXTREMITIES   • Breast cancer Neg Hx        Allergies:   Allergies   Allergen Reactions   • Epinephrine Dizziness     Causes dysautonomia   • Fluoxetine      Other reaction(s): Flushed       Medications:     Current Outpatient Medications:   •  buPROPion (Wellbutrin XL) 300 mg 24 hr tablet, Take 1 tablet (300 mg total) by mouth daily, Disp: 90 tablet, Rfl: 1  •  clindamycin (CLEOCIN T) 1 %, APPLY TO ACNE TWICE DAILY, Disp: , Rfl: 5  •  desmopressin (DDAVP) 0 2 mg tablet, Take 1 tablet (0 2 mg total) by mouth 2 (two) times a day, Disp: 60 tablet, Rfl: 5  •  fludrocortisone (FLORINEF) 0 1 mg tablet, TAKE 1 TABLET BY MOUTH TWICE A DAY, Disp: 60 tablet, Rfl: 5  •  levothyroxine 100 mcg tablet, Take 1 tablet (100 mcg total) by mouth daily, Disp: 90 tablet, Rfl: 1  •  midodrine (PROAMATINE) 10 MG tablet, Take 1 tablet (10 mg total) by mouth 3 (three) times a day, Disp: 270 tablet, Rfl: 0  •  naproxen (NAPROSYN) 500 mg tablet, Take 1 tablet (500 mg total) by mouth daily as needed for moderate pain, Disp: 30 tablet, Rfl: 1  •  nebivolol (BYSTOLIC) 5 mg tablet, TAKE 1 TABLET BY MOUTH TWICE A DAY, Disp: 60 tablet, Rfl: 5  •  norgestrel-ethinyl estradiol (Low-Ogestrel) 0 3 mg-30 mcg per tablet, Take 1 tablet by mouth daily, Disp: 84 tablet, Rfl: 4  •  potassium chloride (MICRO-K) 10 MEQ CR capsule, TAKE 1 CAPSULE BY MOUTH EVERY DAY, Disp: 30 capsule, Rfl: 2  •  sertraline (Zoloft) 25 mg tablet, Take 1 tablet (25 mg total) by mouth daily, Disp: 30 tablet, Rfl: 2  •  traMADol-acetaminophen (ULTRACET) 37 5-325 mg per tablet, Take 1 tablet by mouth every 6 (six) hours as needed for moderate pain, Disp: 30 tablet, Rfl: 0  •  venlafaxine (EFFEXOR-XR) 75 mg 24 hr capsule, Take 1 capsule (75 mg total) by mouth daily, Disp: 30 capsule, Rfl: 1  •  Vitamin D, Cholecalciferol, 25 MCG (1000 UT) CAPS, Take 3,000 Units by mouth , Disp: , Rfl:       Wt Readings from Last 3 Encounters:   05/17/23 62 1 kg (137 lb)   05/08/23 62 6 kg (138 lb)   04/17/23 63 kg (138 lb 12 8 oz)     Temp Readings from Last 3 Encounters:   04/17/23 97 8 °F (36 6 °C)   04/11/23 98 1 °F (36 7 °C)   04/06/23 98 2 °F (36 8 °C) (Oral)     BP Readings from Last 3 Encounters:   05/17/23 110/70   04/17/23 104/82   04/11/23 100/62     Pulse Readings from Last 3 Encounters:   05/17/23 104   04/17/23 102   04/11/23 (!) 107         Physical Exam  HENT:      Head: Atraumatic  Mouth/Throat:      Mouth: Mucous membranes are moist    Eyes:      Pupils: Pupils are equal, round, and reactive to light  Cardiovascular:      Rate and Rhythm: Normal rate and regular rhythm  Heart sounds: Normal heart sounds  Pulmonary:      Effort: Pulmonary effort is normal       Breath sounds: Normal breath sounds  Abdominal:      General: Abdomen is flat  Musculoskeletal:         General: Normal range of motion  Cervical back: Normal range of motion  Skin:     General: Skin is warm  Neurological:      General: No focal deficit present  Mental Status: She is alert and oriented to person, place, and time     Psychiatric:         Mood and Affect: Mood normal          Behavior: Behavior normal            Laboratory Studies:  CMP:  Lab Results   Component Value Date     01/20/2016    K 4 0 04/06/2023     04/06/2023    CO2 25 04/06/2023    BUN 16 04/06/2023 CREATININE 0 88 2023    AST 20 2023    ALT 20 2023    BILITOT 0 4 2016    EGFR 88 2023       Lipid Profile:   No results found for: CHOL  Lab Results   Component Value Date    HDL 53 2023     Lab Results   Component Value Date    LDLCALC 124 (H) 2023     Lab Results   Component Value Date    TRIG 100 2023       Cardiac testing:   EKG reviewed personally:   Results for orders placed during the hospital encounter of 21    Echo complete with contrast if indicated    Narrative  [facility], [address] [facility]  [phone]    Transthoracic Echocardiogram  2D, M-mode, Doppler, and Color Doppler    Study date:  2021    [facility]  MR number: [id number]  Account number: [de-identified]  : 1990  Age: 31 years  Gender: Female  Status: Outpatient  Location: [address] [place] [address]  Height: 66 in  Weight: 152 7 lb  BP: 122/ 84 mmHg    Indications: Assess left ventricular function  Diagnoses: R53 82 - Chronic fatigue, unspecified    Sonographer:  LEONILA Ruelas  Referring Physician:  Norm Sifuentes DO  Group:  [facility]  Interpreting Physician:  Lashon Kimble MD    SUMMARY    PROCEDURE INFORMATION:  This was a technically difficult study  No subcostal views noted  LEFT VENTRICLE:  Systolic function was normal  Ejection fraction was estimated to be 55 %  There were no regional wall motion abnormalities  RIGHT VENTRICLE:  Systolic function was normal     MITRAL VALVE:  There was trace regurgitation  AORTIC VALVE:  There was no evidence for stenosis  TRICUSPID VALVE:  There was trace regurgitation  PERICARDIUM:  There was no pericardial effusion  HISTORY: PRIOR HISTORY: Chronic fatigue s/p COVID-19, POTS, Ehler's Danlos, Pectus excavatum    PROCEDURE: The study was performed in the [address] [place] [address]  This was a routine study   The transthoracic approach was used  The study included complete 2D imaging, M-mode, complete spectral Doppler, and color Doppler  The  heart rate was 69 bpm, at the start of the study  Images were obtained from the parasternal, apical, subcostal, and suprasternal notch acoustic windows  This was a technically difficult study  No subcostal views noted  LEFT VENTRICLE: Size was normal  Systolic function was normal  Ejection fraction was estimated to be 55 %  There were no regional wall motion abnormalities  Wall thickness was normal  DOPPLER: Left ventricular diastolic function parameters  were normal     RIGHT VENTRICLE: The size was normal  Systolic function was normal     LEFT ATRIUM: Size was normal     RIGHT ATRIUM: Size was normal     MITRAL VALVE: Valve structure was normal  There was normal leaflet separation  DOPPLER: The transmitral velocity was within the normal range  There was no evidence for stenosis  There was trace regurgitation  AORTIC VALVE: The valve was trileaflet  Leaflets exhibited normal thickness and normal cuspal separation  DOPPLER: Transaortic velocity was within the normal range  There was no evidence for stenosis  There was no significant  regurgitation  TRICUSPID VALVE: The valve structure was normal  There was normal leaflet separation  DOPPLER: The transtricuspid velocity was within the normal range  There was no evidence for stenosis  There was trace regurgitation  PULMONIC VALVE: Leaflets exhibited normal thickness, no calcification, and normal cuspal separation  DOPPLER: The transpulmonic velocity was within the normal range  There was no significant regurgitation  PERICARDIUM: There was no pericardial effusion  AORTA: The root exhibited normal size  SYSTEMIC VEINS: IVC: The inferior vena cava was normal in size      SYSTEM MEASUREMENT TABLES    2D  %FS: 28 05 %  Ao Diam: 2 53 cm  EDV(Teich): 58 13 ml  EF(Teich): 55 12 %  ESV(Teich): 26 09 ml  IVSd: 0 87 cm  LA Area: 8 93 cm2  LA Diam: 2 68 cm  LVEDV MOD A4C: 44 94 ml  LVEF MOD A4C: 53 93 %  LVESV MOD A4C: 20 7 ml  LVIDd: 3 7 cm  LVIDs: 2 66 cm  LVLd A4C: 6 62 cm  LVLs A4C: 5 6 cm  LVPWd: 0 86 cm  RA Area: 4 31 cm2  RVIDd: 2 2 cm  SV MOD A4C: 24 24 ml  SV(Teich): 32 04 ml    CW  TR MaxPG: 15 36 mmHg  TR Vmax: 1 96 m/s    MM  TAPSE: 1 66 cm    PW  E' Sept: 0 12 m/s  E/E' Sept: 4 76  MV A Juan Jose: 0 44 m/s  MV Dec Addison: 2 46 m/s2  MV DecT: 234 86 ms  MV E Juan Jose: 0 58 m/s  MV E/A Ratio: 1 31  MV PHT: 68 11 ms  MVA By PHT: 3 23 cm2    [employer] Accredited Echocardiography [facility]    Prepared and electronically signed by    Laura Ragsdale MD  Signed 21-Jul-2021 16:55:18    No results found for this or any previous visit  No results found for this or any previous visit  No results found for this or any previous visit

## 2023-05-19 ENCOUNTER — OFFICE VISIT (OUTPATIENT)
Dept: FAMILY MEDICINE CLINIC | Facility: OTHER | Age: 33
End: 2023-05-19

## 2023-05-19 VITALS
HEART RATE: 78 BPM | RESPIRATION RATE: 13 BRPM | SYSTOLIC BLOOD PRESSURE: 102 MMHG | OXYGEN SATURATION: 99 % | TEMPERATURE: 98.2 F | HEIGHT: 66 IN | BODY MASS INDEX: 22.18 KG/M2 | DIASTOLIC BLOOD PRESSURE: 62 MMHG | WEIGHT: 138 LBS

## 2023-05-19 DIAGNOSIS — F40.10 SOCIAL ANXIETY DISORDER: ICD-10-CM

## 2023-05-19 DIAGNOSIS — J02.9 SORE THROAT: ICD-10-CM

## 2023-05-19 DIAGNOSIS — J02.0 STREP PHARYNGITIS: Primary | ICD-10-CM

## 2023-05-19 DIAGNOSIS — G90.A POTS (POSTURAL ORTHOSTATIC TACHYCARDIA SYNDROME): ICD-10-CM

## 2023-05-19 LAB
S PYO AG THROAT QL: POSITIVE
SARS-COV-2 AG UPPER RESP QL IA: NEGATIVE
VALID CONTROL: NORMAL

## 2023-05-19 RX ORDER — PENICILLIN V POTASSIUM 500 MG/1
500 TABLET ORAL EVERY 8 HOURS SCHEDULED
Qty: 30 TABLET | Refills: 0 | Status: SHIPPED | OUTPATIENT
Start: 2023-05-19 | End: 2023-05-29

## 2023-05-19 RX ORDER — SERTRALINE HYDROCHLORIDE 25 MG/1
25 TABLET, FILM COATED ORAL DAILY
Qty: 30 TABLET | Refills: 2 | Status: SHIPPED | OUTPATIENT
Start: 2023-05-19 | End: 2023-05-23 | Stop reason: SDUPTHER

## 2023-05-19 RX ORDER — DESMOPRESSIN ACETATE 0.2 MG/1
0.4 TABLET ORAL 2 TIMES DAILY
Qty: 120 TABLET | Refills: 5 | Status: SHIPPED | OUTPATIENT
Start: 2023-05-19 | End: 2023-11-15

## 2023-05-19 NOTE — ASSESSMENT & PLAN NOTE
Patient saw cardiology earlier this week who recommended increasing desmopressin to 0 4 mg twice daily

## 2023-05-19 NOTE — RESULT ENCOUNTER NOTE
Rapid strep A testing positive  Rapid COVID-19 testing negative  Discussed with patient during 5/19 office visit

## 2023-05-19 NOTE — ASSESSMENT & PLAN NOTE
Patient has been experiencing URI sx since last night  She denies fever  Positive sick contact  Patient has positive rapid strep test  COVID negative   -Penicillin V 500 mg q8h for 10 days   -Patient informed that she may be contagious for 24-48 hours after starting abx

## 2023-05-19 NOTE — PROGRESS NOTES
Name: Dionte Carter      : 1990      MRN: [id number]  Encounter Provider: Sia Emery MD  Encounter Date: 2023   Encounter department: [address] [place] [address] Dr [address]y  Strep pharyngitis  Assessment & Plan:  Patient has been experiencing URI sx since last night  She denies fever  Positive sick contact  Patient has positive rapid strep test  COVID negative   -Penicillin V 500 mg q8h for 10 days   -Patient informed that she may be contagious for 24-48 hours after starting abx  Orders:  -     penicillin V potassium (VEETID) 500 mg tablet; Take 1 tablet (500 mg total) by mouth every 8 (eight) hours for 10 days    2  Sore throat  -     POCT Rapid Covid Ag  -     POCT rapid strepA    3  POTS (postural orthostatic tachycardia syndrome)  Assessment & Plan:  Patient saw cardiology earlier this week who recommended increasing desmopressin to 0 4 mg twice daily  Orders:  -     desmopressin (DDAVP) 0 2 mg tablet; Take 2 tablets (0 4 mg total) by mouth 2 (two) times a day            Subjective      Courtney Parker, 29 yof, PMHx of POTS and Nia-Danlos syndrome, presents for sore throat, sinus congestion, and runny nose that started yesterday  She also has some discomfort while swallowing  She had one episode of loose stools at 3 AM  She has not had any stools since then  She denies fevers  Her father was diagnosed with pneumonia on Monday and was sick for a week prior to that  Review of Systems   Constitutional: Negative for chills, fatigue, fever and unexpected weight change  HENT: Positive for congestion, rhinorrhea, sinus pressure, sneezing and sore throat  Negative for ear pain and sinus pain  Eyes: Negative for visual disturbance  Respiratory: Positive for shortness of breath (chronic)  Negative for cough and wheezing  Cardiovascular: Negative for chest pain  Gastrointestinal: Positive for diarrhea (one episode)  Negative for abdominal pain     Skin: "Negative for rash  Allergic/Immunologic: Negative for environmental allergies  Neurological: Positive for headaches  All other systems reviewed and are negative  Current Outpatient Medications on File Prior to Visit   Medication Sig   • buPROPion (Wellbutrin XL) 300 mg 24 hr tablet Take 1 tablet (300 mg total) by mouth daily   • clindamycin (CLEOCIN T) 1 % APPLY TO ACNE TWICE DAILY   • fludrocortisone (FLORINEF) 0 1 mg tablet TAKE 1 TABLET BY MOUTH TWICE A DAY   • ivabradine HCl (Corlanor) 5 MG tablet Take 1 tablet (5 mg total) by mouth 2 (two) times a day   • levothyroxine 100 mcg tablet Take 1 tablet (100 mcg total) by mouth daily   • midodrine (PROAMATINE) 10 MG tablet Take 1 tablet (10 mg total) by mouth 3 (three) times a day   • naproxen (NAPROSYN) 500 mg tablet Take 1 tablet (500 mg total) by mouth daily as needed for moderate pain   • nebivolol (BYSTOLIC) 5 mg tablet TAKE 1 TABLET BY MOUTH TWICE A DAY   • norgestrel-ethinyl estradiol (Low-Ogestrel) 0 3 mg-30 mcg per tablet Take 1 tablet by mouth daily   • potassium chloride (MICRO-K) 10 MEQ CR capsule TAKE 1 CAPSULE BY MOUTH EVERY DAY   • sertraline (ZOLOFT) 25 mg tablet TAKE 1 TABLET (25 MG TOTAL) BY MOUTH DAILY  • traMADol-acetaminophen (ULTRACET) 37 5-325 mg per tablet Take 1 tablet by mouth every 6 (six) hours as needed for moderate pain   • venlafaxine (EFFEXOR-XR) 75 mg 24 hr capsule Take 1 capsule (75 mg total) by mouth daily   • Vitamin D, Cholecalciferol, 25 MCG (1000 UT) CAPS Take 3,000 Units by mouth    • [DISCONTINUED] desmopressin (DDAVP) 0 2 mg tablet Take 1 tablet (0 2 mg total) by mouth 2 (two) times a day   • [DISCONTINUED] sertraline (Zoloft) 25 mg tablet Take 1 tablet (25 mg total) by mouth daily       Objective     /62   Pulse 78   Temp 98 2 °F (36 8 °C)   Resp 13   Ht 5' 6\" (1 676 m)   Wt 62 6 kg (138 lb)   SpO2 99%   BMI 22 27 kg/m²     Physical Exam  Vitals reviewed     Constitutional:       General: She is not " in acute distress  Appearance: Normal appearance  She is not diaphoretic  HENT:      Head: Normocephalic and atraumatic  Right Ear: External ear normal       Left Ear: External ear normal       Nose:      Right Sinus: No maxillary sinus tenderness or frontal sinus tenderness  Left Sinus: No maxillary sinus tenderness or frontal sinus tenderness  Mouth/Throat:      Mouth: Mucous membranes are moist       Pharynx: Uvula midline  Posterior oropharyngeal erythema (mild) present  No pharyngeal swelling, oropharyngeal exudate or uvula swelling  Tonsils: No tonsillar exudate or tonsillar abscesses  1+ on the right  1+ on the left  Eyes:      Conjunctiva/sclera: Conjunctivae normal    Cardiovascular:      Rate and Rhythm: Normal rate and regular rhythm  Pulses: Normal pulses  Heart sounds: Normal heart sounds  No murmur heard  No friction rub  No gallop  Pulmonary:      Effort: Pulmonary effort is normal  No respiratory distress  Breath sounds: Normal breath sounds  No stridor  No wheezing, rhonchi or rales  Abdominal:      General: Abdomen is flat  Bowel sounds are normal  There is no distension  Palpations: Abdomen is soft  Musculoskeletal:      Cervical back: Normal range of motion and neck supple  Right lower leg: No edema  Left lower leg: No edema  Lymphadenopathy:      Cervical: No cervical adenopathy  Skin:     General: Skin is warm and dry  Findings: No rash  Neurological:      General: No focal deficit present  Mental Status: She is alert and oriented to person, place, and time     Psychiatric:         Mood and Affect: Mood normal          Behavior: Behavior normal        Elpidio Darnell MD

## 2023-05-19 NOTE — PATIENT INSTRUCTIONS
Strep Throat   AMBULATORY CARE:   Strep throat  is a throat infection caused by bacteria  It is easily spread from person to person  Common symptoms include the following:   Sore, red, and swollen throat    Fever and headache     Upset stomach, abdominal pain, or vomiting    White or yellow patches or blisters in the back of your throat    Tender, swollen lumps on the sides of your neck or jaw    Throat pain when you swallow    Call 911 for any of the following: You have trouble breathing  Seek care immediately if:   You have new symptoms like a bad headache, stiff neck, chest pain, or vomiting  You are drooling because you cannot swallow your spit  Contact your healthcare provider if:   You have a fever  You have a rash or ear pain  You have green, yellow-brown, or bloody mucus when you cough or blow your nose  You are unable to drink anything  You have questions or concerns about your condition or care  Treatment for strep throat  may include antibiotic medicine to treat your strep throat  You should feel better within 2 to 3 days after you start antibiotics  You may return to work or school 24 hours after you start antibiotics  Manage strep throat:   Use lozenges, ice, soft foods, or popsicles  to soothe your throat  Drink juice, milk shakes, or soup  if your throat is too sore to eat solid food  Drinking liquids can also help prevent dehydration  Gargle with salt water  Mix ¼ teaspoon salt in a glass of warm water and gargle  This may help reduce swelling in your throat  Do not smoke  Nicotine and other chemicals in cigarettes and cigars can cause lung damage and make your symptoms worse  Ask your healthcare provider for information if you currently smoke and need help to quit  E-cigarettes or smokeless tobacco still contain nicotine  Talk to your healthcare provider before you use these products  Prevent the spread of strep throat:   Wash your hands often    Use soap and water  Wash your hands after you use the bathroom, change a child's diapers, or sneeze  Wash your hands before you prepare or eat food  Do not share food or drinks  Replace your toothbrush after you have taken antibiotics for 24 hours  Follow up with your doctor as directed:  Write down your questions so you remember to ask them during your visits  © Copyright [employer] 2022 Information is for End User's use only and may not be sold, redistributed or otherwise used for commercial purposes  The above information is an  only  It is not intended as medical advice for individual conditions or treatments  Talk to your doctor, nurse or pharmacist before following any medical regimen to see if it is safe and effective for you

## 2023-05-23 ENCOUNTER — TELEMEDICINE (OUTPATIENT)
Dept: PSYCHIATRY | Facility: CLINIC | Age: 33
End: 2023-05-23

## 2023-05-23 DIAGNOSIS — F40.10 SOCIAL ANXIETY DISORDER: ICD-10-CM

## 2023-05-23 RX ORDER — VENLAFAXINE HYDROCHLORIDE 37.5 MG/1
37.5 CAPSULE, EXTENDED RELEASE ORAL DAILY
Qty: 30 CAPSULE | Refills: 0 | Status: SHIPPED | OUTPATIENT
Start: 2023-05-23

## 2023-05-24 ENCOUNTER — APPOINTMENT (OUTPATIENT)
Dept: PULMONOLOGY | Facility: CLINIC | Age: 33
End: 2023-05-24
Payer: COMMERCIAL

## 2023-05-31 ENCOUNTER — CLINICAL SUPPORT (OUTPATIENT)
Dept: PULMONOLOGY | Facility: CLINIC | Age: 33
End: 2023-05-31

## 2023-05-31 DIAGNOSIS — U09.9 POST-COVID CHRONIC DYSPNEA: Primary | ICD-10-CM

## 2023-05-31 DIAGNOSIS — R06.09 POST-COVID CHRONIC DYSPNEA: Primary | ICD-10-CM

## 2023-06-01 ENCOUNTER — APPOINTMENT (OUTPATIENT)
Dept: LAB | Facility: CLINIC | Age: 33
End: 2023-06-01
Payer: COMMERCIAL

## 2023-06-01 ENCOUNTER — OFFICE VISIT (OUTPATIENT)
Dept: RHEUMATOLOGY | Facility: CLINIC | Age: 33
End: 2023-06-01

## 2023-06-01 VITALS
SYSTOLIC BLOOD PRESSURE: 106 MMHG | HEIGHT: 66 IN | DIASTOLIC BLOOD PRESSURE: 70 MMHG | BODY MASS INDEX: 21.63 KG/M2 | WEIGHT: 134.6 LBS

## 2023-06-01 DIAGNOSIS — R76.8 ANA POSITIVE: Primary | ICD-10-CM

## 2023-06-01 DIAGNOSIS — M35.9 DIFFUSE CONNECTIVE TISSUE DISEASE (HCC): ICD-10-CM

## 2023-06-01 DIAGNOSIS — G89.29 CHRONIC BILATERAL LOW BACK PAIN WITHOUT SCIATICA: ICD-10-CM

## 2023-06-01 DIAGNOSIS — M54.50 CHRONIC BILATERAL LOW BACK PAIN WITHOUT SCIATICA: ICD-10-CM

## 2023-06-01 DIAGNOSIS — E03.9 PRIMARY HYPOTHYROIDISM: ICD-10-CM

## 2023-06-01 DIAGNOSIS — Q79.62 EHLERS-DANLOS SYNDROME, TYPE 3: Primary | ICD-10-CM

## 2023-06-01 DIAGNOSIS — M25.50 POLYARTHRALGIA: ICD-10-CM

## 2023-06-01 DIAGNOSIS — R76.8 ANA POSITIVE: ICD-10-CM

## 2023-06-01 LAB
ALBUMIN SERPL BCP-MCNC: 4.5 G/DL (ref 3.5–5)
ALP SERPL-CCNC: 96 U/L (ref 34–104)
ALT SERPL W P-5'-P-CCNC: 33 U/L (ref 7–52)
ANION GAP SERPL CALCULATED.3IONS-SCNC: 7 MMOL/L (ref 4–13)
AST SERPL W P-5'-P-CCNC: 26 U/L (ref 13–39)
BASOPHILS # BLD AUTO: 0.04 THOUSANDS/ÂΜL (ref 0–0.1)
BASOPHILS NFR BLD AUTO: 1 % (ref 0–1)
BILIRUB SERPL-MCNC: 0.36 MG/DL (ref 0.2–1)
BUN SERPL-MCNC: 20 MG/DL (ref 5–25)
C3 SERPL-MCNC: 138 MG/DL (ref 90–180)
C4 SERPL-MCNC: 31 MG/DL (ref 10–40)
CALCIUM SERPL-MCNC: 9.5 MG/DL (ref 8.4–10.2)
CHLORIDE SERPL-SCNC: 104 MMOL/L (ref 96–108)
CO2 SERPL-SCNC: 28 MMOL/L (ref 21–32)
CREAT SERPL-MCNC: 0.87 MG/DL (ref 0.6–1.3)
CRP SERPL QL: 3.4 MG/L
EOSINOPHIL # BLD AUTO: 0.14 THOUSAND/ÂΜL (ref 0–0.61)
EOSINOPHIL NFR BLD AUTO: 2 % (ref 0–6)
ERYTHROCYTE [DISTWIDTH] IN BLOOD BY AUTOMATED COUNT: 11.9 % (ref 11.6–15.1)
ERYTHROCYTE [SEDIMENTATION RATE] IN BLOOD: 8 MM/HOUR (ref 0–19)
GFR SERPL CREATININE-BSD FRML MDRD: 89 ML/MIN/1.73SQ M
GLUCOSE SERPL-MCNC: 87 MG/DL (ref 65–140)
HCT VFR BLD AUTO: 42.3 % (ref 34.8–46.1)
HGB BLD-MCNC: 13.8 G/DL (ref 11.5–15.4)
IMM GRANULOCYTES # BLD AUTO: 0.01 THOUSAND/UL (ref 0–0.2)
IMM GRANULOCYTES NFR BLD AUTO: 0 % (ref 0–2)
LYMPHOCYTES # BLD AUTO: 3.1 THOUSANDS/ÂΜL (ref 0.6–4.47)
LYMPHOCYTES NFR BLD AUTO: 38 % (ref 14–44)
MCH RBC QN AUTO: 30.8 PG (ref 26.8–34.3)
MCHC RBC AUTO-ENTMCNC: 32.6 G/DL (ref 31.4–37.4)
MCV RBC AUTO: 94 FL (ref 82–98)
MONOCYTES # BLD AUTO: 0.48 THOUSAND/ÂΜL (ref 0.17–1.22)
MONOCYTES NFR BLD AUTO: 6 % (ref 4–12)
NEUTROPHILS # BLD AUTO: 4.37 THOUSANDS/ÂΜL (ref 1.85–7.62)
NEUTS SEG NFR BLD AUTO: 53 % (ref 43–75)
NRBC BLD AUTO-RTO: 0 /100 WBCS
PLATELET # BLD AUTO: 277 THOUSANDS/UL (ref 149–390)
PMV BLD AUTO: 10.4 FL (ref 8.9–12.7)
POTASSIUM SERPL-SCNC: 4 MMOL/L (ref 3.5–5.3)
PROT SERPL-MCNC: 7.1 G/DL (ref 6.4–8.4)
RBC # BLD AUTO: 4.48 MILLION/UL (ref 3.81–5.12)
SODIUM SERPL-SCNC: 139 MMOL/L (ref 135–147)
T4 FREE SERPL-MCNC: 0.91 NG/DL (ref 0.61–1.12)
TSH SERPL DL<=0.05 MIU/L-ACNC: 5.37 UIU/ML (ref 0.45–4.5)
WBC # BLD AUTO: 8.14 THOUSAND/UL (ref 4.31–10.16)

## 2023-06-01 PROCEDURE — 86235 NUCLEAR ANTIGEN ANTIBODY: CPT

## 2023-06-01 PROCEDURE — 86147 CARDIOLIPIN ANTIBODY EA IG: CPT

## 2023-06-01 PROCEDURE — 83520 IMMUNOASSAY QUANT NOS NONAB: CPT

## 2023-06-01 PROCEDURE — 86037 ANCA TITER EACH ANTIBODY: CPT

## 2023-06-01 NOTE — PROGRESS NOTES
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 and has continued with physical therapy for many years for management of symptoms, but has never followed with rheumatology  Recently, due to complaint of fatigue, FE was ordered by the primary team and found to be positive at a titer of 1:80 speckled pattern  The patient does have a family history of autoimmunity with rheumatoid arthritis in both of her parents  Due to the positive lab findings, history, symptoms, this warrants a complete autoimmune work-up  I have ordered laboratory studies for her to obtain soon as possible      Follow-up interval determined after work-up    Plan:  Diagnoses and all orders for this visit:    Nia-Danlos syndrome, benign hypermobile form    Diffuse connective tissue disease (HCC)    Polyarthralgia  -     Anti-DNA antibody, double-stranded  -     C3 complement  -     C4 complement  -     Cancel: CBC and differential  -     Cancel: Comprehensive metabolic panel  -     C-reactive protein  -     Protein / creatinine ratio, urine  -     Sedimentation rate, automated  -     Urinalysis with microscopic  -     Cancel: ANCA Screen With MPO and PR3 With Reflex To ANCA Titer  -     Aldolase  -     Cancel: Anticardiolipin Ab, IgG/M, Qn  -     Beta-2 glycoprotein antibodies  -     Cancel: Sm and Sm/RNP Antibodies  -     Sjogren's Antibodies  -     RNA Polymerase III Ab  -     RF Screen w/ Reflex to Titer  -     Cancel: Protein / creatinine ratio, urine  -     PM/SCL ANTIBODIES  -     MISCELLANEOUS LAB TEST  -     Lupus anticoagulant  -     HLA-B27 antigen  -     Histone Antibody  -     Cyclic citrul peptide antibody, IgG  -     Centromere "Antibody    Chronic bilateral low back pain without sciatica  -     HLA-B27 antigen    FE positive  -     Anti-DNA antibody, double-stranded  -     C3 complement  -     C4 complement  -     Cancel: CBC and differential  -     Cancel: Comprehensive metabolic panel  -     C-reactive protein  -     Protein / creatinine ratio, urine  -     Sedimentation rate, automated  -     Urinalysis with microscopic  -     Cancel: ANCA Screen With MPO and PR3 With Reflex To ANCA Titer  -     Aldolase  -     Cancel: Anticardiolipin Ab, IgG/M, Qn  -     Beta-2 glycoprotein antibodies  -     Cancel: Sm and Sm/RNP Antibodies  -     Sjogren's Antibodies  -     RNA Polymerase III Ab  -     RF Screen w/ Reflex to Titer  -     Cancel: Protein / creatinine ratio, urine  -     PM/SCL ANTIBODIES  -     MISCELLANEOUS LAB TEST  -     Lupus anticoagulant  -     HLA-B27 antigen  -     Histone Antibody  -     Cyclic citrul peptide antibody, IgG  -     Centromere Antibody  -     TSH, 3rd generation with Free T4 reflex  -     Anti-thyroglobulin antibody  -     Thyroid stimulating immunoglobulin  -     T4, free    Primary hypothyroidism  -     TSH, 3rd generation with Free T4 reflex  -     Anti-thyroglobulin antibody  -     Thyroid stimulating immunoglobulin  -     T4, free        I have personally reviewed prior notes, recent laboratory results, and pertinent films in PACS  Activities as tolerated  Exercise: try to maintain a low impact exercise regimen as much as possible  Walk for 30 minutes a day for at least 3 days a week  Continue other medications as prescribed by PCP and other specialists  RTC TBD      Follow-up plan: TBD        Chief Complaint  No chief complaint on file  \" Chronic fatigue\"     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 and has continued with physical " "therapy for many years for management of symptoms, but has never followed with rheumatology  Today doing her exercises, she does still experience pain and stiffness in \" all of the joints\" (hands, wrists, elbows, shoulders, knees, ankles, feet)  She denies swelling or heat of the joints  Recently, due to complaint of fatigue (which is chronic, but worse after COVID infection a few months ago), FE was ordered by the primary team and found to be positive at a titer of 1:80 speckled pattern  The patient states that she has been doing cardiopulmonary rehab due to breathing issues after COVID which is due to suspected  ' long COVID '  Her POTS syndrome has also been worse since covid  The patient does have a family history of autoimmunity with rheumatoid arthritis in both of her parents     + Dry mouth, fingers/toes numb in cold weather (no color changes)  Denies denies fevers, unintentional weight loss, hair loss, dry eyes, inflammatory eye disease, persistent/recurrent skin rash, psoriasis, photosensitivity, mouth/nose ulcers, swollen glands, pleuritic chest pain, abdominal pain, vomiting, diarrhea, blood in stools, inflammatory bowel disease, blood clots, miscarriages (G0)  Review of Systems  Review of Systems  Constitutional: +fatigue  Negative for weight change, fevers, chills, night sweats  ENT/Mouth: Negative for hearing changes, ear pain, nasal congestion, sinus pain, hoarseness, sore throat, rhinorrhea, swallowing difficulty  Eyes: Negative for pain, redness, discharge, vision changes  Cardiovascular: Negative for chest pain, SOB, palpitations  Respiratory: +dyspnea  Gastrointestinal: Negative for nausea, vomiting, diarrhea, constipation, pain, heartburn  Genitourinary: Negative for dysuria, urinary frequency, hematuria  Musculoskeletal: As per HPI  Skin: Negative for skin rash, color changes  Neuro: Negative for weakness, numbness, tingling, loss of consciousness     Psych: " Negative for anxiety, depression  Heme/Lymph: Negative for easy bruising, bleeding, lymphadenopathy        Allergies  Allergies   Allergen Reactions   • Epinephrine Dizziness     Causes dysautonomia   • Fluoxetine      Other reaction(s): Flushed       Home Medications    Current Outpatient Medications:   •  buPROPion (Wellbutrin XL) 300 mg 24 hr tablet, Take 1 tablet (300 mg total) by mouth daily, Disp: 90 tablet, Rfl: 1  •  clindamycin (CLEOCIN T) 1 %, APPLY TO ACNE TWICE DAILY, Disp: , Rfl: 5  •  desmopressin (DDAVP) 0 2 mg tablet, Take 2 tablets (0 4 mg total) by mouth 2 (two) times a day, Disp: 120 tablet, Rfl: 5  •  fludrocortisone (FLORINEF) 0 1 mg tablet, TAKE 1 TABLET BY MOUTH TWICE A DAY, Disp: 60 tablet, Rfl: 5  •  ivabradine HCl (Corlanor) 5 MG tablet, Take 1 tablet (5 mg total) by mouth 2 (two) times a day, Disp: 180 tablet, Rfl: 3  •  levothyroxine 100 mcg tablet, Take 1 tablet (100 mcg total) by mouth daily, Disp: 90 tablet, Rfl: 1  •  midodrine (PROAMATINE) 10 MG tablet, Take 1 tablet (10 mg total) by mouth 3 (three) times a day, Disp: 270 tablet, Rfl: 0  •  naproxen (NAPROSYN) 500 mg tablet, Take 1 tablet (500 mg total) by mouth daily as needed for moderate pain, Disp: 30 tablet, Rfl: 1  •  nebivolol (BYSTOLIC) 5 mg tablet, TAKE 1 TABLET BY MOUTH TWICE A DAY, Disp: 60 tablet, Rfl: 5  •  norgestrel-ethinyl estradiol (Low-Ogestrel) 0 3 mg-30 mcg per tablet, Take 1 tablet by mouth daily, Disp: 84 tablet, Rfl: 4  •  potassium chloride (MICRO-K) 10 MEQ CR capsule, TAKE 1 CAPSULE BY MOUTH EVERY DAY, Disp: 30 capsule, Rfl: 2  •  sertraline (ZOLOFT) 50 mg tablet, Take 1 tablet (50 mg total) by mouth daily, Disp: 30 tablet, Rfl: 2  •  traMADol-acetaminophen (ULTRACET) 37 5-325 mg per tablet, Take 1 tablet by mouth every 6 (six) hours as needed for moderate pain, Disp: 30 tablet, Rfl: 0  •  venlafaxine (EFFEXOR-XR) 37 5 mg 24 hr capsule, Take 1 capsule (37 5 mg total) by mouth daily Take 1 capsule daily x1 "month, then discontinue, Disp: 30 capsule, Rfl: 0  •  Vitamin D, Cholecalciferol, 25 MCG (1000 UT) CAPS, Take 3,000 Units by mouth , Disp: , Rfl:     Past Medical History  Past Medical History:   Diagnosis Date   • Congenital dislocation of hip    • Depression     LAST ASSESSED: 8/4/12   • Disease of thyroid gland    • Pectus excavatum    • Scoliosis        Past Surgical History   Past Surgical History:   Procedure Laterality Date   • DEVEN ACETABULAR OSTEOTOMY Left     ACETABULAR OSTEOTOMY       Family History    Family History   Problem Relation Age of Onset   • Asthma Mother    • Hyperlipidemia Mother    • Hypertension Mother    • Diabetes type II Mother    • Diabetes Mother    • Prostate cancer Father    • Hyperlipidemia Father    • Hypertension Father    • No Known Problems Brother    • Melanoma Family         AMELANOTIC MELANOMA OF THE SKIN   • Hyperlipidemia Family    • Hypertension Family    • Prostate cancer Family         MALIGNANT PROSTATIC NEOPLASM G1   • Varicose Veins Family         OF LOWER EXTREMITIES   • Breast cancer Neg Hx      Mom & Dad with RA  Social History  Occupation: not working currently  Social History     Substance and Sexual Activity   Alcohol Use Yes    Comment: SOCIAL     Social History     Substance and Sexual Activity   Drug Use No     Social History     Tobacco Use   Smoking Status Never   Smokeless Tobacco Never       Objective:  Vitals:    06/01/23 1104   BP: 106/70   Weight: 61 1 kg (134 lb 9 6 oz)   Height: 5' 6\" (1 676 m)       Physical Exam  General: Well appearing, well nourished, in no acute distress  Oriented x 3, normal mood and affect  Ambulating without difficulty  Skin: Increased skin elasticity  Good turgor, no rash, unusual bruising or prominent lesions  Hair: Normal texture and distribution  Nails: Normal color, no deformities  HEENT:  Head: Normocephalic, atraumatic  Eyes: Conjunctiva clear, sclera non-icteric, EOM intact    Neck: Supple  Heart: Regular " rate and rhythm, no murmur or gallop  Lungs: Clear to auscultation, no crackles or wheezing  Extremities: No amputations or deformities, cyanosis, edema  Musculoskeletal:   No asymmetry or deformities noted of bilateral upper and lower extremities  No pain or swelling of joints bilaterally to include: shoulder, elbow, wrist, MCP I-V, PIP I-V, knee, ankle, MTP I-V  Neurologic: Alert and oriented  No focal neurological deficits appreciated  Psychiatric: Normal mood and affect  Reviewed labs and imaging  Imaging:   Mammo diagnostic bilateral w 3d & cad, US breast left limited (diagnostic)    Result Date: 5/8/2023  Narrative: DIAGNOSIS: Breast mass TECHNIQUE: Digital diagnostic mammography was performed  Computer Aided Detection (CAD) analyzed all applicable images  Ultrasound of the bilateral breast(s) was performed  COMPARISONS: None available  RELEVANT HISTORY: Family Breast Cancer History: History of breast cancer in Neg Hx  Family Medical History: No known relevant family medical history  Personal History: Hormone history includes birth control  No known relevant surgical history  No known relevant medical history  RISK ASSESSMENT: 5 Year Tyrer-Cuzick: 0 19 % 10 Year Tyrer-Cuzick: 0 63 % Lifetime Tyrer-Cuzick: 16 89 % TISSUE DENSITY: The breasts are heterogeneously dense, which may obscure small masses  INDICATION: Jose Lind is a 29 y o  female presenting for mammo  FINDINGS: Bilateral Mammo diagnostic bilateral w 3d & cad There are no suspicious masses, grouped microcalcifications or areas of unexplained architectural distortion  The skin and nipple areolar complex are unremarkable  The right breast is smaller than the left  The patient has pectus excavatum and some of the posterior inner right breast cannot be fully included in the field-of-view Left US breast left limited (diagnostic) There are no suspicious masses or areas of architectural distortion   The area of palpable concern is high along the top of the left breast 12-1 position about 15 cm from nipple  Impression: No worrisome abnormalities are seen today  Recommend comanagement for the area of palpable concern  Suggest screening mammography at age 38 and consideration for screening breast MRI at that time as well to ensure thorough coverage of the breast tissue  Discussed this with the patient today ASSESSMENT/BI-RADS CATEGORY:  Overall: 1 - Negative RECOMMENDATION:      - Routine screening mammogram at age 38 for both breasts  Workstation ID: [id number]        Labs:   Office Visit on 06/01/2023   Component Date Value Ref Range Status   • CRP 06/01/2023 3 4 (H)  <3 0 mg/L Final   • Sed Rate 06/01/2023 8  0 - 19 mm/hour Final   • TSH 3RD GENERATON 06/01/2023 5 367 (H)  0 450 - 4 500 uIU/mL Final    The recommended reference ranges for TSH during pregnancy are as follows:   First trimester 0 1 to 2 5 uIU/mL   Second trimester  0 2 to 3 0 uIU/mL   Third trimester 0 3 to 3 0 uIU/m    Note: Normal ranges may not apply to patients who are transgender, non-binary, or whose legal sex, sex at birth, and gender identity differ  Adult TSH (3rd generation) reference range follows the recommended guidelines of the American Thyroid Association, January, 2020  • Free T4 06/01/2023 0 91  0 61 - 1 12 ng/dL Final    Specimens with biotin concentrations > 10 ng/mL can lead to significant (> 10%) positive bias in result     Office Visit on 05/19/2023   Component Date Value Ref Range Status   • POCT SARS-CoV-2 Ag 05/19/2023 Negative  Negative Final   • VALID CONTROL 05/19/2023 Valid   Final   •  RAPID STREP A 05/19/2023 Positive (A)  Negative Final   Admission on 04/06/2023, Discharged on 04/06/2023   Component Date Value Ref Range Status   • WBC 04/06/2023 7 15  4 31 - 10 16 Thousand/uL Final   • RBC 04/06/2023 4 64  3 81 - 5 12 Million/uL Final   • Hemoglobin 04/06/2023 14 2  11 5 - 15 4 g/dL Final   • Hematocrit 04/06/2023 42 5  34 8 - 46 1 % Final   • MCV 04/06/2023 92  82 - 98 fL Final   • MCH 04/06/2023 30 6  26 8 - 34 3 pg Final   • MCHC 04/06/2023 33 4  31 4 - 37 4 g/dL Final   • RDW 04/06/2023 12 5  11 6 - 15 1 % Final   • MPV 04/06/2023 10 7  8 9 - 12 7 fL Final   • Platelets 41/15/6590 283  149 - 390 Thousands/uL Final   • nRBC 04/06/2023 0  /100 WBCs Final   • Neutrophils Relative 04/06/2023 52  43 - 75 % Final   • Immat GRANS % 04/06/2023 0  0 - 2 % Final   • Lymphocytes Relative 04/06/2023 39  14 - 44 % Final   • Monocytes Relative 04/06/2023 7  4 - 12 % Final   • Eosinophils Relative 04/06/2023 1  0 - 6 % Final   • Basophils Relative 04/06/2023 1  0 - 1 % Final   • Neutrophils Absolute 04/06/2023 3 79  1 85 - 7 62 Thousands/µL Final   • Immature Grans Absolute 04/06/2023 0 01  0 00 - 0 20 Thousand/uL Final   • Lymphocytes Absolute 04/06/2023 2 75  0 60 - 4 47 Thousands/µL Final   • Monocytes Absolute 04/06/2023 0 48  0 17 - 1 22 Thousand/µL Final   • Eosinophils Absolute 04/06/2023 0 08  0 00 - 0 61 Thousand/µL Final   • Basophils Absolute 04/06/2023 0 04  0 00 - 0 10 Thousands/µL Final   • Sodium 04/06/2023 138  135 - 147 mmol/L Final   • Potassium 04/06/2023 4 0  3 5 - 5 3 mmol/L Final   • Chloride 04/06/2023 104  96 - 108 mmol/L Final   • CO2 04/06/2023 25  21 - 32 mmol/L Final   • ANION GAP 04/06/2023 9  4 - 13 mmol/L Final   • BUN 04/06/2023 16  5 - 25 mg/dL Final   • Creatinine 04/06/2023 0 88  0 60 - 1 30 mg/dL Final    Standardized to IDMS reference method   • Glucose 04/06/2023 85  65 - 140 mg/dL Final    If the patient is fasting, the ADA then defines impaired fasting glucose as > 100 mg/dL and diabetes as > or equal to 123 mg/dL  • Calcium 04/06/2023 9 5  8 4 - 10 2 mg/dL Final   • AST 04/06/2023 20  13 - 39 U/L Final   • ALT 04/06/2023 20  7 - 52 U/L Final    Specimen collection should occur prior to Sulfasalazine administration due to the potential for falsely depressed results      • Alkaline Phosphatase 04/06/2023 94  34 - 104 "U/L Final   • Total Protein 04/06/2023 6 7  6 4 - 8 4 g/dL Final   • Albumin 04/06/2023 4 4  3 5 - 5 0 g/dL Final   • Total Bilirubin 04/06/2023 0 42  0 20 - 1 00 mg/dL Final    Use of this assay is not recommended for patients undergoing treatment with eltrombopag due to the potential for falsely elevated results  N-acetyl-p-benzoquinone imine (metabolite of Acetaminophen) will generate erroneously low results in samples for patients that have taken an overdose of Acetaminophen  • eGFR 04/06/2023 88  ml/min/1 73sq m Final   • hs TnI 0hr 04/06/2023 2  \"Refer to ACS Flowchart\"- see link ng/L Final    Comment:                                              Initial (time 0) result  If >=50 ng/L, Myocardial injury suggested ;  Type of myocardial injury and treatment strategy  to be determined  If 5-49 ng/L, a delta result at 2 hours and or 4 hours will be needed to further evaluate  If <4 ng/L, and chest pain has been >3 hours since onset, patient may qualify for discharge based on the HEART score in the ED  If <5 ng/L and <3hours since onset of chest pain, a delta result at 2 hours will be needed to further evaluate  HS Troponin 99th Percentile URL of a Health Population=12 ng/L with a 95% Confidence Interval of 8-18 ng/L  Second Troponin (time 2 hours)  If calculated delta >= 20 ng/L,  Myocardial injury suggested ; Type of myocardial injury and treatment strategy to be determined  If 5-49 ng/L and the calculated delta is 5-19 ng/L, consult medical service for evaluation  Continue evaluation for ischemia on ecg and other possible etiology and repeat hs troponin at 4 hours  If delta                            is <5 ng/L at 2 hours, consider discharge based on risk stratification via the HEART score (if in ED), or COLT risk score in IP/Observation  HS Troponin 99th Percentile URL of a Health Population=12 ng/L with a 95% Confidence Interval of 8-18 ng/L     • D-Dimer, Quant 04/06/2023 0 35  <0 50 ug/ml FEU " Final    Reference and upper limits to exclude DVT and PE are the same  Do not use to exclude if clinical symptoms are present  Pregnant women:  1st trimester:  <0 22 - 1 06 ug/ml FEU  2nd trimester:  <0 22 - 1 88 ug/ml FEU  3rd trimester:   0 24 - 3 28 ug/ml FEU    Note: Normal ranges may not apply to patients who are transgender, non-binary, or whose legal sex, sex at birth, and gender identity differ  • Ventricular Rate 04/06/2023 94  BPM Final   • Atrial Rate 04/06/2023 94  BPM Final   • MT Interval 04/06/2023 148  ms Final   • QRSD Interval 04/06/2023 64  ms Final   • QT Interval 04/06/2023 330  ms Final   • QTC Interval 04/06/2023 412  ms Final   • P Axis 04/06/2023 87  degrees Final   • QRS Axis 04/06/2023 122  degrees Final   • T Wave Galva 04/06/2023 61  degrees Final   Orders Only on 03/23/2023   Component Date Value Ref Range Status   • Total Cholesterol 03/23/2023 198  <200 mg/dL Final   • HDL 03/23/2023 53  > OR = 50 mg/dL Final   • Triglycerides 03/23/2023 100  <150 mg/dL Final   • LDL Calculated 03/23/2023 124 (H)  mg/dL (calc) Final    Comment: Reference range: <100     Desirable range <100 mg/dL for primary prevention;    <70 mg/dL for patients with CHD or diabetic patients   with > or = 2 CHD risk factors  LDL-C is now calculated using the Víctor-Berman   calculation, which is a validated novel method providing   better accuracy than the Friedewald equation in the   estimation of LDL-C  Akash Stacy al  [place] [id number];451(43): [phone]   (http://Selatra/faq/PLD719)     • Chol HDLC Ratio 03/23/2023 3 7  <5 0 (calc) Final   • Non-HDL Cholesterol 03/23/2023 145 (H)  <130 mg/dL (calc) Final    Comment: For patients with diabetes plus 1 major ASCVD risk   factor, treating to a non-HDL-C goal of <100 mg/dL   (LDL-C of <70 mg/dL) is considered a therapeutic   option       • Magnesium, Serum 03/23/2023 2 0  1 5 - 2 5 mg/dL Final   • Glucose, Random 03/23/2023 96  65 - 99 mg/dL Final    Comment:               Fasting reference interval        • BUN 03/23/2023 15  7 - 25 mg/dL Final   • Creatinine 03/23/2023 0 72  0 50 - 0 97 mg/dL Final   • eGFR 03/23/2023 115  > OR = 60 mL/min/1 73m2 Final    Comment: The eGFR is based on the CKD-EPI 2021 equation  To calculate   the new eGFR from a previous Creatinine or Cystatin C  result, go to [place] at  org/professionals/  kdoqi/[email or web address]     • SL AMB BUN/CREATININE RATIO 07/62/3847 NOT APPLICABLE  6 - 22 (calc) Final   • Sodium 03/23/2023 141  135 - 146 mmol/L Final   • Potassium 03/23/2023 4 1  3 5 - 5 3 mmol/L Final   • Chloride 03/23/2023 105  98 - 110 mmol/L Final   • CO2 03/23/2023 28  20 - 32 mmol/L Final   • Calcium 03/23/2023 9 6  8 6 - 10 2 mg/dL Final   • Protein, Total 03/23/2023 6 3  6 1 - 8 1 g/dL Final   • Albumin 03/23/2023 4 3  3 6 - 5 1 g/dL Final   • Globulin 03/23/2023 2 0  1 9 - 3 7 g/dL (calc) Final   • Albumin/Globulin Ratio 03/23/2023 2 2  1 0 - 2 5 (calc) Final   • TOTAL BILIRUBIN 03/23/2023 0 3  0 2 - 1 2 mg/dL Final   • Alkaline Phosphatase 03/23/2023 106  31 - 125 U/L Final   • AST 03/23/2023 20  10 - 30 U/L Final   • ALT 03/23/2023 22  6 - 29 U/L Final   • White Blood Cell Count 03/23/2023 6 4  3 8 - 10 8 Thousand/uL Final   • Red Blood Cell Count 03/23/2023 4 51  3 80 - 5 10 Million/uL Final   • Hemoglobin 03/23/2023 13 9  11 7 - 15 5 g/dL Final   • HCT 03/23/2023 40 8  35 0 - 45 0 % Final   • MCV 03/23/2023 90 5  80 0 - 100 0 fL Final   • MCH 03/23/2023 30 8  27 0 - 33 0 pg Final   • MCHC 03/23/2023 34 1  32 0 - 36 0 g/dL Final   • RDW 03/23/2023 11 8  11 0 - 15 0 % Final   • Platelet Count 21/90/8722 281  140 - 400 Thousand/uL Final   • SL AMB MPV 03/23/2023 11 2  7 5 - 12 5 fL Final   • Neutrophils (Absolute) 03/23/2023 2,938  1,500 - 7,800 cells/uL Final   • Lymphocytes (Absolute) 03/23/2023 2,931  850 - 3,900 cells/uL Final   • Monocytes (Absolute) 03/23/2023 410  200 - 950 cells/uL Final   • Eosinophils (Absolute) 03/23/2023 90  15 - 500 cells/uL Final   • Basophils ABS 03/23/2023 32  0 - 200 cells/uL Final   • Neutrophils 03/23/2023 45 9  % Final   • Lymphocytes 03/23/2023 45 8  % Final   • Monocytes 03/23/2023 6 4  % Final   • Eosinophils 03/23/2023 1 4  % Final   • Basophils PCT 03/23/2023 0 5  % Final   • FE Screen, IFA 03/23/2023 POSITIVE (A)  NEGATIVE Final    Comment: FE IFA is a first line screen for detecting the  presence of up to approximately 150 autoantibodies in  various autoimmune diseases  A positive FE IFA result  is suggestive of autoimmune disease and reflexes to  titer and pattern  Further laboratory testing may be  considered if clinically indicated  For additional information, please refer to  [email or web address]  (This link is being provided for informational/  educational purposes only )         • Rheumatoid Factor 03/23/2023 <14  <14 IU/mL Final   • Vitamin D, 25-Hydroxy, Serum 03/23/2023 44  30 - 100 ng/mL Final    Comment: Vitamin D Status         25-OH Vitamin D:     Deficiency:                    <20 ng/mL  Insufficiency:             20 - 29 ng/mL  Optimal:                 > or = 30 ng/mL     For 25-OH Vitamin D testing on patients on   D2-supplementation and patients for whom quantitation   of D2 and D3 fractions is required, the QuestAssureD(TM)  25-OH VIT D, (D2,D3), LC/MS/MS is recommended: order   code 96429 (patients >2yrs)  See Note 1     Note 1     For additional information, please refer to   [email or web address]   (This link is being provided for informational/  educational purposes only )     • SL AMB FE TITER 03/23/2023 1:80 (H)  titer Final    Comment: A low level FE titer may be present in pre-clinical  autoimmune diseases and normal individuals                    Reference Range                  <1:40        Negative                  1:40-1:80    Low Antibody Level                  >1:80 Elevated Antibody Level        • SL AMB FE PATTERN 03/23/2023 Nuclear, Speckled (A)   Final    Comment: Speckled pattern is associated with mixed connective  tissue disease (MCTD), systemic lupus erythematosus  (SLE), Sjogren's syndrome, dermatomyositis, and   systemic sclerosis/polymyositis overlap       AC-2,4,5,29: Speckled     International Consensus on FE Patterns  (https://doi org/10 [email or web address]/jkdy-1583-2453)     Hospital Outpatient Visit on 02/22/2023   Component Date Value Ref Range Status   • A4C EF 02/22/2023 58  % Final   • LVIDd 02/22/2023 3 90  cm Final   • LVIDS 02/22/2023 2 60  cm Final   • IVSd 02/22/2023 0 90  cm Final   • LVPWd 02/22/2023 0 90  cm Final   • FS 02/22/2023 33  28 - 44 Final   • MV E' Tissue Velocity Septal 02/22/2023 10  cm/s Final   • E wave deceleration time 02/22/2023 123  ms Final   • MV Peak E Juan Jose 02/22/2023 53  cm/s Final   • MV Peak A Juan Jose 02/22/2023 0 44  m/s Final   • RVID d 02/22/2023 2 1  cm Final   • LA size 02/22/2023 2 4  cm Final   • LA length (A2C) 02/22/2023 3 30  cm Final   • RA 2D Volume 02/22/2023 6 0  mL Final   • RAA A4C 02/22/2023 5 4  cm2 Final   • MV stenosis pressure 1/2 time 02/22/2023 36  ms Final   • MV valve area p 1/2 method 02/22/2023 6 11   Final   • Ao root 02/22/2023 2 50  cm Final   • Asc Ao 02/22/2023 2  cm Final   • Left ventricular stroke volume (2D) 02/22/2023 39 00  mL Final   • IVS 02/22/2023 0 9  cm Final   • LEFT VENTRICLE SYSTOLIC VOLUME (MO* 81/86/3779 26  mL Final   • LV DIASTOLIC VOLUME (MOD BIPLANE) * 02/22/2023 64  mL Final   • Left Atrium Area-systolic Four Nena* 09/84/4782 7 6  cm2 Final   • Left Atrium Area-systolic Apical T* 29/76/2902 7 5  cm2 Final   • LVSV, 2D 02/22/2023 39  mL Final   • LV EF 02/22/2023 55   Final         Eden Miller PA-C  Rheumatology

## 2023-06-02 LAB
ALDOLASE SERPL-CCNC: 4.7 U/L (ref 3.3–10.3)
CENTROMERE B AB SER-ACNC: <0.2 AI (ref 0–0.9)
DSDNA AB SER-ACNC: 2 IU/ML (ref 0–9)
ENA RNP AB SER-ACNC: <0.2 AI (ref 0–0.9)
ENA SM AB SER-ACNC: <0.2 AI (ref 0–0.9)
ENA SS-A AB SER-ACNC: <0.2 AI (ref 0–0.9)
ENA SS-B AB SER-ACNC: <0.2 AI (ref 0–0.9)
RHEUMATOID FACT SER QL LA: NEGATIVE
THYROGLOB AB SERPL-ACNC: <1 IU/ML (ref 0–0.9)

## 2023-06-03 LAB
APTT SCREEN TO CONFIRM RATIO: 1.01 RATIO (ref 0–1.34)
CONFIRM APTT/NORMAL: 40.3 SEC (ref 0–47.6)
LA PPP-IMP: NORMAL
SCREEN APTT: 38.7 SEC (ref 0–43.5)
SCREEN DRVVT: 45.8 SEC (ref 0–47)
THROMBIN TIME: 18.7 SEC (ref 0–23)
TSI SER-ACNC: <0.1 IU/L (ref 0–0.55)

## 2023-06-06 LAB
B2 GLYCOPROT1 IGA SERPL IA-ACNC: 0.7
B2 GLYCOPROT1 IGG SERPL IA-ACNC: 0.9
B2 GLYCOPROT1 IGM SERPL IA-ACNC: <2.4
C-ANCA TITR SER IF: NORMAL TITER
CARDIOLIPIN IGA SER IA-ACNC: 1.5
CARDIOLIPIN IGG SER IA-ACNC: 0.9
CARDIOLIPIN IGM SER IA-ACNC: 2.3
CCP AB SER IA-ACNC: 1.5
HISTONE IGG SER IA-ACNC: 0.4 UNITS (ref 0–0.9)
MISCELLANEOUS LAB TEST RESULT: NORMAL
MYELOPEROXIDASE AB SER IA-ACNC: 0.6 UNITS (ref 0–0.9)
P-ANCA ATYPICAL TITR SER IF: NORMAL TITER
P-ANCA TITR SER IF: NORMAL TITER
PROTEINASE3 AB SER IA-ACNC: <0.2 UNITS (ref 0–0.9)

## 2023-06-07 ENCOUNTER — APPOINTMENT (OUTPATIENT)
Dept: PULMONOLOGY | Facility: CLINIC | Age: 33
End: 2023-06-07
Payer: COMMERCIAL

## 2023-06-07 LAB — ENA PM/SCL AB SER-ACNC: <20 UNITS

## 2023-06-08 LAB — MISCELLANEOUS LAB TEST RESULT: NORMAL

## 2023-06-09 ENCOUNTER — CLINICAL SUPPORT (OUTPATIENT)
Dept: PULMONOLOGY | Facility: CLINIC | Age: 33
End: 2023-06-09
Payer: COMMERCIAL

## 2023-06-09 DIAGNOSIS — U09.9 POST-COVID CHRONIC DYSPNEA: Primary | ICD-10-CM

## 2023-06-09 DIAGNOSIS — R06.09 POST-COVID CHRONIC DYSPNEA: Primary | ICD-10-CM

## 2023-06-09 LAB — HLA-B27 QL NAA+PROBE: NEGATIVE

## 2023-06-09 PROCEDURE — 94625 PHY/QHP OP PULM RHB W/O MNTR: CPT

## 2023-06-14 ENCOUNTER — CLINICAL SUPPORT (OUTPATIENT)
Dept: PULMONOLOGY | Facility: CLINIC | Age: 33
End: 2023-06-14
Payer: COMMERCIAL

## 2023-06-14 DIAGNOSIS — U09.9 POST-COVID CHRONIC DYSPNEA: Primary | ICD-10-CM

## 2023-06-14 DIAGNOSIS — R06.09 POST-COVID CHRONIC DYSPNEA: Primary | ICD-10-CM

## 2023-06-14 PROCEDURE — 94625 PHY/QHP OP PULM RHB W/O MNTR: CPT

## 2023-06-14 NOTE — PROGRESS NOTES
Pulmonary Rehabilitation Plan of Care   180 Day Reassessment      Today's date: 2023   # of Exercise Sessions Completed: 21  Patient name: Helio Bruno      : 1990  Age: 29 y o  MRN: [id number]  Referring Physician: Ritu Medina DO  Pulmonologist: n/a   Cardiologist: Elsa Steve MD  Provider: Ian Preston  Clinician: Christofer Yousif MS, CEP     Dx:   Encounter Diagnosis   Name Primary? • Post-COVID chronic dyspnea Yes     Date of onset: 2022      SUMMARY OF PROGRESS:  Raquel Crowley continues Pulmonary Rehab for the diagnosis of chronic dyspnea post-COVID  She also has chronic fatigue, Nia-Danlos syndrome, and POTS  All of these are contributing to her weakness and fatigue  Which seems to have been exacerbated with COVID along with SOB  She has been attending 1x/week and has attended 21 sessions in the past 180 days and only 2 sessions in the past 30 days  Will start discharge planning in the next couple weeks as well  She is doing 1x/week right now due to her chronic fatigue  Since their diagnosis, the patient has been experiencing increased dyspnea and increased fatigue  They report dyspnea, weakness and fatigue when completing ADLs  Raquel Crowley reported today that has seen improvement overall with the program but has been a slow progression due to her chronic fatigue and POTs  Reviewed with patient today the plan for continuation and how to go about it with her symptoms  She has a home TRM she plans on completing small intervals daily to help not over doing it daily  She enjoys coming to rehab tolerated each week of exercise well and reduces workloads if needed  Pt reports the following physical limitations: chronic fatigue, EDS, hx of left hip surgery  61 Day Depression screening using the PHQ-9 interprets the patient's score of 15 15-19 = Moderately Severe Depression  Increased 1 level in the past 30 days   Anxiety screening using the ANNEL-7 interprets the patients score of 5 5-9 = Mild anxiety  No change in the past 30 days  When addressed, the patient reports having depression  Patient reports excellent social/emotional support and being compliant with medical therapy  She does feel that her depression has been worse since having COVID  PHQ-9 and ANNEL-7 was not reassessed at 180 days due to being medically managed and will be reassessed again discahrge  Patient admits to 100% medication compliance  At rest, the patient rated dyspnea 0/10 (reduced in the past 30 days)  with SpO2 96% on room air  She is completing 50 minutes of aerobic exercise reaching 3 8 METs on different modalities such as the TRM, UBE, and recumbent bike  She has started a light strength training program as well  The patient's rating of percieved dyspnea during exertion is 2-5/10 with SpO2 94-98% on room air  She is slowly increasing her workloads and durations well  Roselynn Guillaume Resting BP 94/70-98/60 with normal response to exercise 102//70  Education on smoking cessation, oxygen use, breathing techniques, pulmonary anatomy, exercise for the pulmonary patient, healthy eating, stress, and relaxation was provided  Patient goals include: Decrease SOB, make ADLs easier  She continues to work towards these goals and has met these goals for discharge! Will continue to educate, monitor, and progress as tolerated in the next 30 days         Medication compliance: Yes   Comments: Pt reports to be compliant with medications  Fall Risk: Low   Comments: Ambulates with a steady gait with no assist device, does have EDS    Smoking: Never used    RPD at Rest: 0/10  RPD with Exercise:  2-5/10    Assessment of progression of lung disease and functional status:  Shortness of breath questionnaire: 89/120      EXERCISE ASSESSMENT and PLAN    Current Exercise Program in Rehab:       Frequency: 2 days/week   Supplement with home exercise 2+ days/wk as tolerated       Minutes: 50     METS: 3 8              SpO2: 94-98% on RA              RPD: 2-5                      HR: 145-166   RPE: 4-5         Modalities: Treadmill, UBE and Recumbent bike      Exercise Progression 30 Day Goals :    Frequency: 2 days/week    Supplement with home exercise 2+ days/wk as tolerated       Minutes: 45-60       METS: 3 9-4 1              SpO2: >90% on RA               RPD: 0-2                      HR: RHR +30bpm or more   RPE: 4-5        Modalities: Treadmill, UBE, Lifecycle, NuStep and Recumbent bike     Strength trainin-3 days / week  12-15 repetitions  1-2 sets per modality    Modalities: Chest Press, Lateral Raise, Arm Curl, Sit to [employer] and Front Raises    Oxygen Needs: on room air at rest and on room air with exercise  Oxygen Goal: Maintain SpO2>90% during exercise    Home Exercise: none, Plans on using home TRM and strength training     Education: pursed lipped breathing, diaphragmatic breathing, benefits of exercise for pulmonary disease, RPE scale, RPD scale and energy conservation    Goals: reduced score on  USCD Shortness of Breath Questionnaire, Improved 6MW distance by 10%, reduced dyspnea during exercise (0-3/10), improved exercise tolerance (max METs tolerated in pulmonary rehab), SpO2 >90% during exercise, reduced RPD at rest, attend pulmonary rehab regularly, decrease sitting time at home and start a walking program    Progressing:  Pt is progressing and showing improvement  toward the following goals:  attending rehab regularly 1x/week, slow increase in strength and endurance with increasing duration and intensities, SpO2 >90% on room air at rest and exercise, strength training program being completed, and tolerating small increments of durations and workloads well with no concerns at this time  , Pt has not made progress toward the following goals: no home exercise but has plan   , Will continue to educate and progress as tolerated      Plan: learn to conserve energy with ADLs , practice breathing techniques 3x/day and reduce time sitting at home    Readiness to change: Action:  (Changing behavior)      NUTRITION ASSESSMENT AND PLAN    Weight control:    Starting weight: 143 6 lbs   Current weight:   134 6 lbs     Diabetes: N/A    Goals:Improved Rate Your Plate score  >54, choose lean meat (93-95%), increase intake of fish, shellfish, increase intake of meatless meals, reduce cheese intake or use reduced-fat, eat 3 or more servings of whole grains a day, Eat 4-5 cups of fruits and vegetables daily, choose healthy snacks: light popcorn, plain pretzels, Increase intake of nuts and seeds, seldom eat or choose low fat ice-cream, fruit juice bars or frozen yogurt  and eliminate or choose low-fat sweets  Education: heart healthy eating  hydration     Progressing:Pt is progressing and showing improvement  toward the following goals:  no changes to dietary choices and weight loss of 9 lbs  Increasing hydration using Liquid IV   , Patient will lose 1-2 lbs a week for weight loss per patient goal  in the next 30 days, Will continue to educate and progress as tolerated       Plan: switch to low fat cheeses, replace refined grain bread with whole grain bread, replace unhealthy snacks with fruits & vegs, eat fewer desserts and sweets, will try new grains like brown rice, quinoa, farro, drink more water and learn how to read food labels  Readiness to change: Action:  (Changing behavior)      PSYCHOSOCIAL ASSESSMENT AND PLAN    Emotional:  Depression assessment:  PHQ-9 = 15 15-19 = Moderately Severe Depression            Anxiety measure:  ANNEL-7 = 5  5-9 = Mild anxiety  Self-reported stress level: 5   Social support: Patient reports excellent emotional/social support from family    Goals:  Reduce perceived stress to 1-3/10, improved [facility] QOL < 27, PHQ-9 - reduced severity by one level, continue medical therapy, follow up with therapist, Feelings in [place] Score < 3, Physical Fitness in [place] Score < 3, Daily Activity in [place] Score < 3, Social Activities in [facility] Score < 3, Pain in [facility] Score < 3, Overall Health in [facility] Score < 3 and Quality of Life in [facility] Score < 3   Education: signs/sxs of depression, benefits of a positive support system, stress management techniques and benefits of mental health counseling    Progressing:Pt is progressing and showing improvement  toward the following goals:  ANNEL-7 stayed the same, improved QOL with improved SOB with doing ADLs, compliant with medical therapy, and great support system at home    , Patient will continue medical therapy in the next 30 days, Will continue to educate and progress as tolerated  Plan: Class: Stress and Your Health, Class: Relaxation, Practice relaxation techniques, Exercise, Enjoy a hobby, Return to previous social activity and Repeat PHQ-9 every 30 days if score >5  Readiness to change: Preparation:  (Getting ready to change)       OTHER CORE COMPONENTS     Tobacco:   Social History     Tobacco Use   Smoking Status Never   Smokeless Tobacco Never       Tobacco Use Intervention: Referral to tobacco expert:   N/A:  Patient is a non-smoker     Blood pressure:    Restin/70-98/60   Exercise: 102//70    Goals: consistent BP < 130/80, reduced dietary sodium <2300mg, moderate intensity exercise >150 mins/wk and medication compliance  Education:  inspiratory muscle training and environmental triggers     Progressing:Pt is progressing and showing improvement  toward the following goals:  medication compliant and blood pressures normal at rest and mostly normal response to exercise (does run on the lower side - POTs and hydration)   , Pt has not made progress toward the following goals: no home exercise with rehab   , Patient will monitor home HR and BP (looking into getting a cuff thats more accurate) and add in home walking  in the next 30 days, Will continue to educate and progress as tolerated       Plan: engage in regular exercise and monitor home BP  Readiness to change: Action:  (Changing behavior)

## 2023-06-16 ENCOUNTER — TELEPHONE (OUTPATIENT)
Dept: FAMILY MEDICINE CLINIC | Facility: OTHER | Age: 33
End: 2023-06-16

## 2023-06-16 NOTE — TELEPHONE ENCOUNTER
Pt called asking if she should have levothyroxine adjusted because her TSH levels were high. Please advise. Thank you.

## 2023-06-16 NOTE — TELEPHONE ENCOUNTER
Please schedule patient for an appointment with one of the available Residents. Since we did not order her thyroid labs, we need more information before making a recommendation. We try to avoid treating numbers and focus on treating the patient. Thanks!   Garfield Knows, DO

## 2023-06-16 NOTE — TELEPHONE ENCOUNTER
Patient scheduled for an appointment to discuss the thyroid medication   Has appt with dr Giorgi Sierra on 6/19

## 2023-06-19 ENCOUNTER — OFFICE VISIT (OUTPATIENT)
Dept: FAMILY MEDICINE CLINIC | Facility: OTHER | Age: 33
End: 2023-06-19
Payer: COMMERCIAL

## 2023-06-19 VITALS
SYSTOLIC BLOOD PRESSURE: 106 MMHG | OXYGEN SATURATION: 96 % | RESPIRATION RATE: 13 BRPM | TEMPERATURE: 98.7 F | BODY MASS INDEX: 21.53 KG/M2 | WEIGHT: 134 LBS | HEART RATE: 83 BPM | HEIGHT: 66 IN | DIASTOLIC BLOOD PRESSURE: 62 MMHG

## 2023-06-19 DIAGNOSIS — E03.9 PRIMARY HYPOTHYROIDISM: ICD-10-CM

## 2023-06-19 DIAGNOSIS — R79.89 ABNORMAL TSH: Primary | ICD-10-CM

## 2023-06-19 DIAGNOSIS — G93.32 CHRONIC FATIGUE SYNDROME: ICD-10-CM

## 2023-06-19 PROCEDURE — 99213 OFFICE O/P EST LOW 20 MIN: CPT | Performed by: FAMILY MEDICINE

## 2023-06-19 RX ORDER — LEVOTHYROXINE SODIUM 112 UG/1
112 TABLET ORAL DAILY
Qty: 90 TABLET | Refills: 1 | Status: SHIPPED | OUTPATIENT
Start: 2023-06-19 | End: 2023-12-16

## 2023-06-19 NOTE — PROGRESS NOTES
Name: Racheal Hunter      : 1990      MRN: [id number]  Encounter Provider: Sherley Banks MD  Encounter Date: 2023   Encounter department: [address] [place] [address]      1  Abnormal TSH  -     TSH, 3rd generation with Free T4 reflex; Future; Expected date: 2023    2  Chronic fatigue syndrome    3  Primary hypothyroidism  -     levothyroxine 112 mcg tablet; Take 1 tablet (112 mcg total) by mouth daily  -     TSH, 3rd generation with Free T4 reflex; Future; Expected date: 2023      Patient recently seen by rheumatology with negative work-up other than elevated TSH  Patient does endorse worse fatigue since November  She denies pregnancy  No other significant changes recently  Will increase levothyroxine to 112 mcg and check TSH in about 6 weeks  Subjective      Patient presents for evaluation of elevated TSH after she was recently seen by rheumatology due to positive FE with no other lab abnormalities  The patient reports a history of fatigue which seems to have worsened in November after her second episode of COVID-19  She says this has improved some since she was sick but still seems worse than before  She is currently treated with 100 mcg levothyroxine and only recent medication changes were increasing desmopressin and starting ivabradine by cardiology  The patient denies any possibility of pregnancy as well as any other new symptoms except for some discomfort due to gas at night and intentional weight loss  Review of Systems   Constitutional: Negative for chills, fever and unexpected weight change  HENT: Negative for sore throat  Eyes: Negative for visual disturbance  Respiratory: Negative for shortness of breath  Cardiovascular: Negative for chest pain and palpitations  Gastrointestinal: Negative for abdominal pain, constipation, diarrhea, nausea and vomiting  Genitourinary: Negative for difficulty urinating     Musculoskeletal: "Negative for neck pain  Skin: Negative for rash  Current Outpatient Medications on File Prior to Visit   Medication Sig   • buPROPion (Wellbutrin XL) 300 mg 24 hr tablet Take 1 tablet (300 mg total) by mouth daily   • clindamycin (CLEOCIN T) 1 % APPLY TO ACNE TWICE DAILY   • desmopressin (DDAVP) 0 2 mg tablet Take 2 tablets (0 4 mg total) by mouth 2 (two) times a day   • fludrocortisone (FLORINEF) 0 1 mg tablet TAKE 1 TABLET BY MOUTH TWICE A DAY   • ivabradine HCl (Corlanor) 5 MG tablet Take 1 tablet (5 mg total) by mouth 2 (two) times a day   • midodrine (PROAMATINE) 10 MG tablet Take 1 tablet (10 mg total) by mouth 3 (three) times a day   • naproxen (NAPROSYN) 500 mg tablet Take 1 tablet (500 mg total) by mouth daily as needed for moderate pain   • nebivolol (BYSTOLIC) 5 mg tablet TAKE 1 TABLET BY MOUTH TWICE A DAY   • norgestrel-ethinyl estradiol (Low-Ogestrel) 0 3 mg-30 mcg per tablet Take 1 tablet by mouth daily   • potassium chloride (MICRO-K) 10 MEQ CR capsule TAKE 1 CAPSULE BY MOUTH EVERY DAY   • sertraline (ZOLOFT) 50 mg tablet Take 1 tablet (50 mg total) by mouth daily   • traMADol-acetaminophen (ULTRACET) 37 5-325 mg per tablet Take 1 tablet by mouth every 6 (six) hours as needed for moderate pain   • venlafaxine (EFFEXOR-XR) 37 5 mg 24 hr capsule Take 1 capsule (37 5 mg total) by mouth daily Take 1 capsule daily x1 month, then discontinue   • Vitamin D, Cholecalciferol, 25 MCG (1000 UT) CAPS Take 3,000 Units by mouth    • [DISCONTINUED] levothyroxine 100 mcg tablet Take 1 tablet (100 mcg total) by mouth daily       Objective     /62   Pulse 83   Temp 98 7 °F (37 1 °C)   Resp 13   Ht 5' 6\" (1 676 m)   Wt 60 8 kg (134 lb)   SpO2 96%   BMI 21 63 kg/m²     Physical Exam  Vitals reviewed  Constitutional:       General: She is not in acute distress  Appearance: She is well-developed  She is not diaphoretic  HENT:      Head: Normocephalic and atraumatic        Right Ear: External ear " normal       Left Ear: External ear normal       Nose: Nose normal       Mouth/Throat:      Mouth: Mucous membranes are moist       Pharynx: Oropharynx is clear  Eyes:      Extraocular Movements: Extraocular movements intact  Conjunctiva/sclera: Conjunctivae normal       Pupils: Pupils are equal, round, and reactive to light  Cardiovascular:      Rate and Rhythm: Normal rate and regular rhythm  Pulses: Normal pulses  Heart sounds: Normal heart sounds  No murmur heard  No friction rub  No gallop  Pulmonary:      Effort: Pulmonary effort is normal  No respiratory distress  Breath sounds: Normal breath sounds  No stridor  No wheezing, rhonchi or rales  Abdominal:      General: Bowel sounds are normal  There is no distension  Palpations: Abdomen is soft  Tenderness: There is no abdominal tenderness  There is no guarding  Musculoskeletal:         General: Normal range of motion  Cervical back: Normal range of motion and neck supple  No tenderness  Right lower leg: No edema  Left lower leg: No edema  Lymphadenopathy:      Cervical: No cervical adenopathy  Skin:     General: Skin is warm and dry  Findings: No rash  Neurological:      General: No focal deficit present  Mental Status: She is alert and oriented to person, place, and time     Psychiatric:         Mood and Affect: Mood normal          Behavior: Behavior normal        Will Centeno MD

## 2023-06-19 NOTE — PATIENT INSTRUCTIONS
Hypothyroidism   AMBULATORY CARE:   Hypothyroidism  is a condition that develops when the thyroid gland does not make enough thyroid hormone  Thyroid hormones help control body temperature, heart rate, growth, and weight  Common signs and symptoms include the following: The signs and symptoms may develop slowly, sometimes over several years  Exhaustion, depression, or irritability    Sensitivity to cold    Muscle aches, headaches, weakness, or trouble concentrating    Dry, flaky skin, brittle nails, or thinning hair    Recent weight gain without overeating, or constipation    Heavy or irregular monthly periods    Puffiness around your eyes or swelling in your hands and feet    Low heart rate, changes in your blood pressure    Call your local emergency number (911 in the [address] [facility] [facility]) or have someone call if:   You have sudden chest pain or shortness of breath  You have a seizure  You feel like you are going to faint  Seek care immediately if:   You have diarrhea, tremors, or trouble sleeping  Your legs, ankles, or feet are swollen  Call your doctor or endocrinologist if:   You have a fever  You have chills, a cough, or feel weak and achy  You have pain and swelling in your muscles and joints  Your skin is itchy, swollen, or you have a rash  Your signs and symptoms return or get worse, even after treatment  You have questions or concerns about your condition or care  Treatment:  Thyroid hormone replacement medicine may bring your thyroid hormone level back to normal  You will need to take this medicine for the rest of your life to control hypothyroidism  It is important to take the medicine every day as directed  You will be given instructions for what to do if you miss a dose  Ask your healthcare provider for more information on other medicines you may need  Manage hypothyroidism:   Get more iodine  The thyroid gland uses iodine to work correctly and to make thyroid hormones   Your healthcare provider may tell you to eat foods that are rich in iodine  He or she will tell you how much of these foods to eat  Milk and seafood are good sources of iodine  You may also need iodine supplements  Keep track of your blood pressure and weight:      Check your blood pressure  and write it down as often as directed  It is important to measure your blood pressure on the same arm and in the same position each time  Keep track of your blood pressure readings, along with the date and time you took them  Take this record with you to your followup visits  Weigh yourself daily  before breakfast after you urinate  Weight gain may be a sign of extra fluid in your body  Keep track of your daily weights and take the record with you to your followup visits  Follow up with your doctor or endocrinologist as directed: You may need to return for more blood tests to check your thyroid hormone level  This will show if you are getting the right amount of thyroid medicine  Write down your questions so you remember to ask them during your visits  © Copyright Darius[employer] Lopez 2022 Information is for End User's use only and may not be sold, redistributed or otherwise used for commercial purposes  The above information is an  only  It is not intended as medical advice for individual conditions or treatments  Talk to your doctor, nurse or pharmacist before following any medical regimen to see if it is safe and effective for you  None

## 2023-06-19 NOTE — PROGRESS NOTES
"Name: Shane Villanueva      : 1990      MRN: [id number]  Encounter Provider: Kathy Cook MD  Encounter Date: 2023   Encounter department: [address] [place] [address] Dr Causey  Abnormal TSH    2   Chronic fatigue syndrome           Subjective      HPI   CC: elevated TSH      Review of Systems    Current Outpatient Medications on File Prior to Visit   Medication Sig   • buPROPion (Wellbutrin XL) 300 mg 24 hr tablet Take 1 tablet (300 mg total) by mouth daily   • clindamycin (CLEOCIN T) 1 % APPLY TO ACNE TWICE DAILY   • desmopressin (DDAVP) 0 2 mg tablet Take 2 tablets (0 4 mg total) by mouth 2 (two) times a day   • fludrocortisone (FLORINEF) 0 1 mg tablet TAKE 1 TABLET BY MOUTH TWICE A DAY   • ivabradine HCl (Corlanor) 5 MG tablet Take 1 tablet (5 mg total) by mouth 2 (two) times a day   • levothyroxine 100 mcg tablet Take 1 tablet (100 mcg total) by mouth daily   • midodrine (PROAMATINE) 10 MG tablet Take 1 tablet (10 mg total) by mouth 3 (three) times a day   • naproxen (NAPROSYN) 500 mg tablet Take 1 tablet (500 mg total) by mouth daily as needed for moderate pain   • nebivolol (BYSTOLIC) 5 mg tablet TAKE 1 TABLET BY MOUTH TWICE A DAY   • norgestrel-ethinyl estradiol (Low-Ogestrel) 0 3 mg-30 mcg per tablet Take 1 tablet by mouth daily   • potassium chloride (MICRO-K) 10 MEQ CR capsule TAKE 1 CAPSULE BY MOUTH EVERY DAY   • sertraline (ZOLOFT) 50 mg tablet Take 1 tablet (50 mg total) by mouth daily   • traMADol-acetaminophen (ULTRACET) 37 5-325 mg per tablet Take 1 tablet by mouth every 6 (six) hours as needed for moderate pain   • venlafaxine (EFFEXOR-XR) 37 5 mg 24 hr capsule Take 1 capsule (37 5 mg total) by mouth daily Take 1 capsule daily x1 month, then discontinue   • Vitamin D, Cholecalciferol, 25 MCG (1000 UT) CAPS Take 3,000 Units by mouth        Objective     /62   Pulse 83   Temp 98 7 °F (37 1 °C)   Resp 13   Ht 5' 6\" (1 676 m)   Wt 60 8 kg (134 lb)   SpO2 " 96%   BMI 21 63 kg/m²     Physical Exam  Abdirahman Valdovinos MD

## 2023-06-21 ENCOUNTER — CLINICAL SUPPORT (OUTPATIENT)
Dept: PULMONOLOGY | Facility: CLINIC | Age: 33
End: 2023-06-21
Payer: COMMERCIAL

## 2023-06-21 DIAGNOSIS — R06.09 POST-COVID CHRONIC DYSPNEA: Primary | ICD-10-CM

## 2023-06-21 DIAGNOSIS — U09.9 POST-COVID CHRONIC DYSPNEA: Primary | ICD-10-CM

## 2023-06-21 PROCEDURE — 94625 PHY/QHP OP PULM RHB W/O MNTR: CPT

## 2023-06-28 ENCOUNTER — CLINICAL SUPPORT (OUTPATIENT)
Dept: PULMONOLOGY | Facility: CLINIC | Age: 33
End: 2023-06-28
Payer: COMMERCIAL

## 2023-06-28 DIAGNOSIS — R06.09 POST-COVID CHRONIC DYSPNEA: Primary | ICD-10-CM

## 2023-06-28 DIAGNOSIS — U09.9 POST-COVID CHRONIC DYSPNEA: Primary | ICD-10-CM

## 2023-06-28 PROCEDURE — 94625 PHY/QHP OP PULM RHB W/O MNTR: CPT

## 2023-07-02 DIAGNOSIS — G90.A POTS (POSTURAL ORTHOSTATIC TACHYCARDIA SYNDROME): ICD-10-CM

## 2023-07-03 ENCOUNTER — OFFICE VISIT (OUTPATIENT)
Dept: PULMONOLOGY | Facility: CLINIC | Age: 33
End: 2023-07-03
Payer: COMMERCIAL

## 2023-07-03 VITALS
SYSTOLIC BLOOD PRESSURE: 98 MMHG | HEART RATE: 89 BPM | BODY MASS INDEX: 21.78 KG/M2 | OXYGEN SATURATION: 97 % | HEIGHT: 66 IN | WEIGHT: 135.5 LBS | DIASTOLIC BLOOD PRESSURE: 72 MMHG

## 2023-07-03 DIAGNOSIS — G25.81 RESTLESS LEGS: ICD-10-CM

## 2023-07-03 DIAGNOSIS — R06.02 SOB (SHORTNESS OF BREATH) ON EXERTION: ICD-10-CM

## 2023-07-03 DIAGNOSIS — J45.20 MILD INTERMITTENT ASTHMA WITHOUT COMPLICATION: ICD-10-CM

## 2023-07-03 DIAGNOSIS — G47.33 OSA (OBSTRUCTIVE SLEEP APNEA): Primary | ICD-10-CM

## 2023-07-03 PROCEDURE — 99214 OFFICE O/P EST MOD 30 MIN: CPT | Performed by: INTERNAL MEDICINE

## 2023-07-03 RX ORDER — ALBUTEROL SULFATE 90 UG/1
2 AEROSOL, METERED RESPIRATORY (INHALATION) EVERY 6 HOURS PRN
Qty: 18 G | Refills: 1 | Status: SHIPPED | OUTPATIENT
Start: 2023-07-03

## 2023-07-03 NOTE — ASSESSMENT & PLAN NOTE
She has shortness of breath on exertion and she states that this started mostly after COVID infection she had about a year back. This is not associated with any cough or phlegm or wheeze or chest pain. She has family history of asthma in her brother and mother. She has history of allergies. She is currently not on any inhalers or oxygen. On clinical examination her chest was clear to auscultation. The etiology of her shortness of breath is not clear at this point. This could be due to asthma or could be secondary to her chest deformity. Her PFT showed mild reduction in diffusion capacity. We will try her on albuterol. I had a long discussion with her and answered all her questions.

## 2023-07-03 NOTE — PROGRESS NOTES
Assessment/Plan:    SOB (shortness of breath) on exertion  She has shortness of breath on exertion and she states that this started mostly after COVID infection she had about a year back. This is not associated with any cough or phlegm or wheeze or chest pain. She has family history of asthma in her brother and mother. She has history of allergies. She is currently not on any inhalers or oxygen. On clinical examination her chest was clear to auscultation. The etiology of her shortness of breath is not clear at this point. This could be due to asthma or could be secondary to her chest deformity. Her PFT showed mild reduction in diffusion capacity. We will try her on albuterol. I had a long discussion with her and answered all her questions. CHARLEE (obstructive sleep apnea)  Ms.Brianna Parker has snoring and daytime tiredness and sleepiness. She has morning headache. Her sleep is interrupted. Usually goes to sleep around 2:00 and wakes up at around 10:00. She has history suggestive of restless legs. She has hypothyroidism as comorbidity. She had COVID before. She also has Erler Danlos syndrome. On clinical examination, her chest was clear to auscultation. She had mild oropharyngeal crowding. She likely has obstructive sleep apnea. We had ordered a sleep study and this is waiting to be done. I had a long discussion with her and answered all her questions. Restless legs  She has history suggestive of restless leg syndrome and leg cramps. This is severe at night and interferes with her sleep. He may have periodic limb movements during sleep. We will await the sleep study    Mild intermittent asthma without complication  She likely has mild intermittent asthma. I have started her on albuterol inhaler as needed for shortness of breath or wheeze.        Diagnoses and all orders for this visit:    CHARLEE (obstructive sleep apnea)    SOB (shortness of breath) on exertion    Restless legs    Mild intermittent asthma without complication  -     albuterol (Ventolin HFA) 90 mcg/act inhaler; Inhale 2 puffs every 6 (six) hours as needed for wheezing or shortness of breath          Subjective:      Patient ID: Hemanth Billingsley is a 29 y.o. female. Ms. Hemanth Billingsley came for follow-up for her shortness of breath and sleep apnea. She is suspected to have obstructive sleep apnea and is waiting to get a sleep study. She needs a in laboratory study because of her insurance. She had a pulmonary function test which showed some air trapping and mild reduction in diffusion capacity. There was no bronchodilator response. she states that her shortness of breath is better and her exercise tolerance is more than 2 blocks. She does not have any significant cough or phlegm or wheeze or chest pain. No swelling of feet. No fever or chills. Her appetite has been good. She denied any heartburn. She is currently not on any inhalers or oxygen. She denied any swelling of feet chest pain or palpitations. She is currently not working. We will give her a trial with albuterol. The following portions of the patient's history were reviewed and updated as appropriate: allergies, current medications, past family history, past medical history, past social history, past surgical history and problem list.    Review of Systems      Objective:      BP 98/72 (BP Location: Right arm, Patient Position: Sitting, Cuff Size: Adult)   Pulse 89   Ht 5' 6" (1.676 m)   Wt 61.5 kg (135 lb 8 oz)   SpO2 97%   BMI 21.87 kg/m²          Physical Exam  Vitals reviewed. Constitutional:       General: She is not in acute distress. Appearance: She is not ill-appearing, toxic-appearing or diaphoretic. HENT:      Head: Normocephalic. Mouth/Throat:      Mouth: Mucous membranes are moist.      Pharynx: Oropharynx is clear. Eyes:      General: No scleral icterus.      Conjunctiva/sclera: Conjunctivae normal.   Cardiovascular: Rate and Rhythm: Normal rate. Heart sounds: Normal heart sounds. No murmur heard. Pulmonary:      Effort: Pulmonary effort is normal. No respiratory distress. Breath sounds: Normal breath sounds. No stridor. No wheezing, rhonchi or rales. Chest:      Chest wall: No tenderness. Abdominal:      General: Bowel sounds are normal.      Palpations: Abdomen is soft. Tenderness: There is no abdominal tenderness. There is no guarding. Musculoskeletal:      Cervical back: No rigidity. Right lower leg: No edema. Left lower leg: No edema. Lymphadenopathy:      Cervical: No cervical adenopathy. Skin:     Coloration: Skin is not jaundiced or pale. Findings: No rash. Neurological:      Mental Status: She is alert and oriented to person, place, and time. Gait: Gait normal.   Psychiatric:         Mood and Affect: Mood normal.         Behavior: Behavior normal.         Thought Content:  Thought content normal.         Judgment: Judgment normal.

## 2023-07-05 ENCOUNTER — CLINICAL SUPPORT (OUTPATIENT)
Dept: PULMONOLOGY | Facility: CLINIC | Age: 33
End: 2023-07-05
Payer: COMMERCIAL

## 2023-07-05 DIAGNOSIS — R06.09 POST-COVID CHRONIC DYSPNEA: Primary | ICD-10-CM

## 2023-07-05 DIAGNOSIS — U09.9 POST-COVID CHRONIC DYSPNEA: Primary | ICD-10-CM

## 2023-07-05 PROBLEM — J45.20 MILD INTERMITTENT ASTHMA WITHOUT COMPLICATION: Status: ACTIVE | Noted: 2023-07-05

## 2023-07-05 PROCEDURE — 94625 PHY/QHP OP PULM RHB W/O MNTR: CPT

## 2023-07-05 NOTE — PROGRESS NOTES
Pulmonary Rehabilitation Plan of Care   Discharge      Today's date: 2023   # of Exercise Sessions Completed: 24  Patient name: Hemanth Billingsley      : 1990  Age: 29 y.o. MRN: [id number]  Referring Physician: Marin Canales DO  Pulmonologist: n/a   Cardiologist: Cheryle Carolina, MD  Provider: [facility]  Clinician: Jarod Ackerman MS, CEP     Dx:   Encounter Diagnosis   Name Primary? • Post-COVID chronic dyspnea Yes     Date of onset: 2022      SUMMARY OF PROGRESS:  Aaliyah Felix has been discharged from the Pulmonary Rehab Program for the diagnosis of chronic dyspnea post-COVID. She also has chronic fatigue, Nia-Danlos syndrome, and POTS. All of these are contributing to her weakness and fatigue. Which seems to have been exacerbated with COVID along with SOB. She  has attended 24 sessions with attending 1x/week. Since their diagnosis, the patient has been experiencing increased dyspnea and increased fatigue. They report dyspnea, weakness and fatigue when completing ADLs. She reported today that she does feel improvement but it is hard to tell due to slow progression. See outcomes report attached to see her improvement through our assessments. She has a home TRM she plans on completing small intervals daily to help not over doing it daily. She also told me she is looking into the [facility] as well for continuation. She is will also be closely monitoring at home with her POTS syndrome. Pt reports the following physical limitations: chronic fatigue, EDS, hx of left hip surgery. Depression screening using the PHQ-9 interprets the patient's score of 13 10-14 = Moderate Depression. Decreased 1 level since the initial assessment. Anxiety screening using the ANNEL-7 interprets the patients score of 3 0-4  = Not anxious. Decreased 1 level since her initial assessment. When addressed, the patient reports having depression. Patient reports excellent social/emotional support and being compliant with medical therapy. She does feel that her depression has been worse since having COVID. She is compliant with medical therapy. Patient admits to 100% medication compliance. At rest, the patient rated dyspnea 0/10 with SpO2 96% on room air. She is completing 50 minutes of aerobic exercise reaching 3.8 METs on different modalities such as the TRM, UBE, and recumbent bike. She has started a light strength training program as well. The patient's rating of percieved dyspnea during exertion is 2-5/10 with SpO2 94-98% on room air. She tolerated increasing durations and workloads well at a slow progression. Resting BP 94/70-98/60 with normal response to exercise 102//70. She completed her post 6MWT walking 1,140 feet, no rest breaks, SpO2 95-97% on room air, RPE: 5, and RPD: 3. Improvement of 26.7% and reduced RPE and RPD from initial 6MWT. Education on smoking cessation, oxygen use, breathing techniques, pulmonary anatomy, exercise for the pulmonary patient, healthy eating, stress, and relaxation was provided. Patient goals include: Decrease SOB, make ADLs easier. She has met these goals at discharge. Her SOB has decreased per SOBQ  and her [place] score improved for ADLs and she also reported it today (but small progression). Reviewed discharge instructions and verbalized understanding and see outcomes report attached.        Medication compliance: Yes   Comments: Pt reports to be compliant with medications  Fall Risk: Low   Comments: Ambulates with a steady gait with no assist device, does have EDS    Smoking: Never used    RPD at Rest: 0/10  RPD with Exercise:  2-5/10    Assessment of progression of lung disease and functional status:  Shortness of breath questionnaire: 54/120      EXERCISE ASSESSMENT and PLAN    Current Exercise Program in Rehab:       Frequency: 2 days/week   Supplement with home exercise 2+ days/wk as tolerated       Minutes: 50     METS: 3.8              SpO2: 94-98% on RA              RPD: 2-5 HR: 145-166   RPE: 4-5         Modalities: Treadmill, UBE and Recumbent bike      Exercise Progression after Discharge: Home TRM and [employer]   Frequency: 3-5 days of gym or home exercise   Minutes: 30-50  >150 mins/wk of moderate intensity exercise   METS: 2.5 - 3.8   HR: 142 - 175    RPE: 4-6   Modalities: Treadmill, UBE, NuStep and Recumbent bike    Strength trainin-3 days / week  12-15 repetitions  1-2 sets per modality    Modalities: Chest Press, Lateral Raise, Arm Curl, Sit to [employer] and Front Raises    Oxygen Needs: on room air at rest and on room air with exercise  Oxygen Goal: Maintain SpO2>90% during exercise    Home Exercise: Type: Home TRM and [employer], Frequency: 3-5 days/week, Duration: interval training due to chronic fatigue syndrome and POTS mins, reviewed plan with patient to help with getting exercise in and monitoring her symptoms     Education: pursed lipped breathing, diaphragmatic breathing, benefits of exercise for pulmonary disease, RPE scale, RPD scale and energy conservation    Goals: reduced score on  USCD Shortness of Breath Questionnaire, Improved 6MW distance by 10%, reduced dyspnea during exercise (0-3/10), improved exercise tolerance (max METs tolerated in pulmonary rehab), SpO2 >90% during exercise, reduced RPD at rest, attend pulmonary rehab regularly, decrease sitting time at home and start a walking program    Progressing:  Goals met: attended rehab 1x/week and completed the full 24 sessions, reduced score on SOBQ, improved 6MWT distance by 26.7% and reduced RPE and RPD, improved overall exercise tolerance with slow progression, SpO2 >90% on room air at rest and exercise, and has plan for continuation following the program ., Patient will be encouraged to focus on lifestyle modifications following discharge.     Plan: learn to conserve energy with ADLs , practice breathing techniques 3x/day and reduce time sitting at home    Readiness to change: Action:  (Changing behavior)      NUTRITION ASSESSMENT AND PLAN    Weight control:    Starting weight: 143.6 lbs   Current weight:   136.6 lbs     Diabetes: N/A    Goals:Improved Rate Your Plate score  >61, choose lean meat (93-95%), increase intake of fish, shellfish, increase intake of meatless meals, reduce cheese intake or use reduced-fat, eat 3 or more servings of whole grains a day, Eat 4-5 cups of fruits and vegetables daily, choose healthy snacks: light popcorn, plain pretzels, Increase intake of nuts and seeds, seldom eat or choose low fat ice-cream, fruit juice bars or frozen yogurt  and eliminate or choose low-fat sweets  Education: heart healthy eating  hydration     Progressing:Goals met: weight loss of 7 lbs since start of the program, increasing hyration, and watching portion sizes . , Patient will be encouraged to focus on lifestyle modifications following discharge.      Plan: switch to low fat cheeses, replace refined grain bread with whole grain bread, replace unhealthy snacks with fruits & vegs, eat fewer desserts and sweets, will try new grains like brown rice, quinoa, farro, drink more water and learn how to read food labels  Readiness to change: Action:  (Changing behavior)      PSYCHOSOCIAL ASSESSMENT AND PLAN    Emotional:  Depression assessment:  PHQ-9 = 13 10-14 = Moderate Depression            Anxiety measure:  ANNEL-7 = 3 0-4  = Not anxious  Self-reported stress level: 5   Social support: Patient reports excellent emotional/social support from family    Goals:  Reduce perceived stress to 1-3/10, improved [facility] QOL < 27, PHQ-9 - reduced severity by one level, continue medical therapy, follow up with therapist, Feelings in [place] Score < 3, Physical Fitness in [place] Score < 3, Daily Activity in [place] Score < 3, Social Activities in [place] Score < 3, Pain in [place] Score < 3, Overall Health in [place] Score < 3 and Quality of Life in [place] Score < 3   Education: signs/sxs of depression, benefits of a positive support system, stress management techniques and benefits of mental health counseling    Progressing:Goals met: reduced 1 level on PHQ-9 and ANNEL-7 since initial assessment, ongoing depression but is medically managing and compliant, and [facility] score overall improved. , Patient will be encouraged to focus on lifestyle modifications following discharge. Plan: Class: Stress and Your Health, Class: Relaxation, Practice relaxation techniques, Exercise, Enjoy a hobby, Return to previous social activity and Repeat PHQ-9 every 30 days if score >5  Readiness to change: Action:  (Changing behavior)      OTHER CORE COMPONENTS     Tobacco:   Social History     Tobacco Use   Smoking Status Never   Smokeless Tobacco Never       Tobacco Use Intervention: Referral to tobacco expert:   N/A:  Patient is a non-smoker     Blood pressure:    Restin/70-98/60   Exercise: 102//70    Goals: consistent BP < 130/80, reduced dietary sodium <2300mg, moderate intensity exercise >150 mins/wk and medication compliance  Education:  inspiratory muscle training and environmental triggers     Progressing:Goals met: blood pressures within normal limits at rest and normal response to exercise, medication compliant, and has plan for continuation following the program; Home TRM and [employer]., Patient will be encouraged to focus on lifestyle modifications following discharge.      Plan: engage in regular exercise and monitor home BP  Readiness to change: Action:  (Changing behavior)

## 2023-07-05 NOTE — ASSESSMENT & PLAN NOTE
She likely has mild intermittent asthma. I have started her on albuterol inhaler as needed for shortness of breath or wheeze.

## 2023-07-05 NOTE — ASSESSMENT & PLAN NOTE
She has history suggestive of restless leg syndrome and leg cramps. This is severe at night and interferes with her sleep. He may have periodic limb movements during sleep.   We will await the sleep study

## 2023-07-05 NOTE — ASSESSMENT & PLAN NOTE
Hung Bales has snoring and daytime tiredness and sleepiness. She has morning headache. Her sleep is interrupted. Usually goes to sleep around 2:00 and wakes up at around 10:00. She has history suggestive of restless legs. She has hypothyroidism as comorbidity. She had COVID before. She also has Erler Danlos syndrome. On clinical examination, her chest was clear to auscultation. She had mild oropharyngeal crowding. She likely has obstructive sleep apnea. We had ordered a sleep study and this is waiting to be done. I had a long discussion with her and answered all her questions.

## 2023-07-11 RX ORDER — MIDODRINE HYDROCHLORIDE 10 MG/1
TABLET ORAL
Qty: 90 TABLET | Refills: 2 | Status: SHIPPED | OUTPATIENT
Start: 2023-07-11

## 2023-07-13 NOTE — PSYCH
Virtual Regular Visit    Problem List Items Addressed This Visit    None  Visit Diagnoses     Social anxiety disorder    -  Primary    Relevant Medications    sertraline (ZOLOFT) 50 mg tablet    buPROPion (Wellbutrin XL) 300 mg 24 hr tablet    Mild episode of recurrent major depressive disorder (HCC)        Relevant Medications    sertraline (ZOLOFT) 50 mg tablet    buPROPion (Wellbutrin XL) 300 mg 24 hr tablet    Episode of recurrent major depressive disorder, unspecified depression episode severity (HCC)        Relevant Medications    sertraline (ZOLOFT) 50 mg tablet    buPROPion (Wellbutrin XL) 300 mg 24 hr tablet        Reason for visit is   Chief Complaint   Patient presents with   • Medication Management     Encounter provider [address], [address] [facility]    Provider located at [address] [place] [address] [address] [place] [id number] [phone]    Recent Visits  No visits were found meeting these conditions. Showing recent visits within past 7 days and meeting all other requirements  Today's Visits  Date Type Provider Dept   07/20/23 Telemedicine Danielle Quintana Diss, CRNP [facility]   Showing today's visits and meeting all other requirements  Future Appointments  No visits were found meeting these conditions. Showing future appointments within next 150 days and meeting all other requirements       After connecting through [employer], the patient was identified by name and date of birth. Brunilda Bumpers was informed that this is a telemedicine visit and that the visit is being conducted through the [address] [place] [address] [employer] platform. She agrees to proceed. which may not be secure and therefore, might not be HIPAA-compliant. My office door was closed. No one else was in the room. She acknowledged consent and understanding of privacy and security of the video platform.  The patient has agreed to participate and understands they can discontinue the visit at any time. MEDICATION MANAGEMENT NOTE        [facility] [address] [facility]      Name and Date of Birth:  Brunilda Bumpers 29 y.o. 1990 MRN: [id number]    Date of Visit: July 20, 2023    Reason for Visit:   Chief Complaint   Patient presents with   • Medication Management         SUBJECTIVE:    Brunilda Bumpers is a 29 y.o. female with past psychiatric history significant for Major Depressive Disorder and Social Phobia who was personally seen and evaluated today at the [address] [place] [address] outpatient clinic for follow-up and medication management. She presents as euthymic, cooperative, calm. Her thoughts are organized, goal directed and completes psychiatric assessment without difficulty. Anitha Serna endorses compliance with psychotropic medication regimen that consists of Zoloft, Effexor XR and Wellbutrin XL. She denies any current adverse medication side effects. At previous outpatient psychiatric appointment with this writer, Zoloft dose was increased. Effexor XR was decreased with plan to discontinue after 4 weeks. On evaluation today, Anitha Serna endorses mild improvement in depressive symptoms since last session. She reports feeling depressed less often and denies any periods of persistently depressed mood. She continues with anxiety in social situations. She continues to avoid social situations. She will go places where there are groups of people but will keep to herself for the most part. She recently completed PT and was happy she met a lot of her goals. She feels this helped to give her a mood boost. She has continued with exercise regularly although often does not feel motivated. She denies SI. Ongoing fatigue most days. Anitha Serna denies any further concerns today.      Current Rating Scores:     Current PHQ-9   PHQ-2/9 Depression Screening    Little interest or pleasure in doing things: 1 - several days  Feeling down, depressed, or hopeless: 1 - several days  Trouble falling or staying asleep, or sleeping too much: 3 - nearly every day  Feeling tired or having little energy: 3 - nearly every day  Poor appetite or overeatin - several days  Feeling bad about yourself - or that you are a failure or have let yourself or your family down: 2 - more than half the days  Trouble concentrating on things, such as reading the newspaper or watching television: 0 - not at all  Moving or speaking so slowly that other people could have noticed. Or the opposite - being so fidgety or restless that you have been moving around a lot more than usual: 0 - not at all         Review Of Systems:      Constitutional negative   ENT negative   Cardiovascular negative   Respiratory negative   Gastrointestinal negative   Genitourinary negative   Musculoskeletal negative   Integumentary negative   Neurological negative   Endocrine negative   Other Symptoms none, all other systems are negative       Historical information: (unchanged information from previous note copied and italicized) - Information that is bolded has been updated.      Past Psychiatric History:    Past Inpatient Psychiatric Treatment:   No history of past inpatient psychiatric admissions  Past Outpatient Psychiatric Treatment:    Was in outpatient psychiatric treatment in the past with a psychiatrist  Past Suicide Attempts: no  Past Violent Behavior: no  Past Psychiatric Medication Trials: Prozac, Celexa, Effexor XR and Wellbutrin XL   Prozac-mood swings, Celexa-worsening fatigue      Traumatic History:       Abuse: no history of physical or sexual abuse, no history of emotional abuse  Other Traumatic Events: none      Family Psychiatric History:   Maternal side of family-strong hx of depression and anxiety  Paternal side of family-strong hx of D&A addiction     FH of suicide-denies      Substance Abuse History:   Tobacco/alcohol/caffeine: Denies tobacco use, Denies caffeine use, alcohol intake: social drinker  Illicit drugs: Denies history of illicit drug use     Social History:    Developmental: Denies a history of milestone/developmental delay. Denies a history of in-utero exposure to toxins/illicit substances. There is no documented history of IEP or need for special education. Education: college graduate   Currently unemployed, has not applied for disability. Worked from [id number], unable to continue working due to medical conditions.   Living arrangement, social support: parents   Never , no children   Access to firearms: Denies direct access to weapons/firearms.      Past Medical History:    Past Medical History:   Diagnosis Date   • Congenital dislocation of hip    • Depression     LAST ASSESSED: 8/4/12   • Disease of thyroid gland    • Pectus excavatum    • Scoliosis         Past Surgical History:   Procedure Laterality Date   • DEVEN ACETABULAR OSTEOTOMY Left     ACETABULAR OSTEOTOMY     Allergies   Allergen Reactions   • Epinephrine Dizziness     Causes dysautonomia   • Fluoxetine      Other reaction(s): Flushed       Substance Abuse History:    Social History     Substance and Sexual Activity   Alcohol Use Yes    Comment: SOCIAL     Social History     Substance and Sexual Activity   Drug Use No       Social History:    Social History     Socioeconomic History   • Marital status: Single     Spouse name: Not on file   • Number of children: Not on file   • Years of education: Not on file   • Highest education level: Not on file   Occupational History   • Occupation: UNEMPLOYED   Tobacco Use   • Smoking status: Never   • Smokeless tobacco: Never   Vaping Use   • Vaping Use: Never used   Substance and Sexual Activity   • Alcohol use: Yes     Comment: SOCIAL   • Drug use: No   • Sexual activity: Not Currently     Birth control/protection: OCP   Other Topics Concern   • Not on file   Social History Narrative    CURRENTLY IN SCHOOL: WILL BE STARTING AT [address] 1/2014    LIVING WITH PARENTS     Social Determinants of Health     Financial Resource Strain: Not on file   Food Insecurity: Not on file   Transportation Needs: Not on file   Physical Activity: Not on file   Stress: Not on file   Social Connections: Not on file   Intimate Partner Violence: Not on file   Housing Stability: Not on file       Family Psychiatric History:     Family History   Problem Relation Age of Onset   • Asthma Mother    • Hyperlipidemia Mother    • Hypertension Mother    • Diabetes type II Mother    • Diabetes Mother    • Prostate cancer Father    • Hyperlipidemia Father    • Hypertension Father    • No Known Problems Brother    • Melanoma Family         AMELANOTIC MELANOMA OF THE SKIN   • Hyperlipidemia Family    • Hypertension Family    • Prostate cancer Family         MALIGNANT PROSTATIC NEOPLASM G1   • Varicose Veins Family         OF LOWER EXTREMITIES   • Breast cancer Neg Hx        History Review: The following portions of the patient's history were reviewed and updated as appropriate: allergies, current medications, past family history, past medical history, past social history, past surgical history and problem list.         OBJECTIVE:     Vital signs in last 24 hours: There were no vitals filed for this visit.     Mental Status Evaluation:    Appearance age appropriate, casually dressed   Behavior cooperative, calm   Speech normal rate, normal volume, normal pitch, scant   Mood anxious   Affect constricted   Thought Processes organized, goal directed   Associations intact associations   Thought Content no overt delusions   Perceptual Disturbances: no auditory hallucinations, no visual hallucinations   Abnormal Thoughts  Risk Potential Suicidal ideation - None  Homicidal ideation - None  Potential for aggression - No   Orientation oriented to person, place, time/date and situation   Memory recent and remote memory grossly intact   Consciousness alert and awake   Attention Span Concentration Span attention span and concentration are age appropriate   Intellect appears to be of average intelligence   Insight intact   Judgement intact   Muscle Strength and  Gait unable to assess today due to virtual visit   Motor activity unable to assess today due to virtual visit   Language no difficulty naming common objects, no difficulty repeating a phrase, no difficulty writing a sentence   Fund of Knowledge adequate knowledge of current events  adequate fund of knowledge regarding past history  adequate fund of knowledge regarding vocabulary    Pain none   Pain Scale 0       Laboratory Results: I have personally reviewed all pertinent laboratory/tests results    Recent Labs (last 2 months):   Appointment on 06/01/2023   Component Date Value   • MAXIMUS Vo (SM) Ab 06/01/2023 <0.2    • MAXIMUS RNP Ab 06/01/2023 <0.2    • ANTICARDIOLIPIN IGG ANTI* 06/01/2023 0.9    • ANTICARDIOLIPIN IGA ANTI* 06/01/2023 1.5    • ANTICARDIOLIPIN IGM ANTI* 06/01/2023 2.3    • C-ANCA 06/01/2023 <1:20    • Atypical pANCA 06/01/2023 <1:20    • MPO AB 06/01/2023 0.6    • IL-3 AB 06/01/2023 <0.2    • P-ANCA 06/01/2023 <1:20    Office Visit on 06/01/2023   Component Date Value   • ds DNA Ab 06/01/2023 2    • C3 Complement 06/01/2023 138.0    • C4, COMPLEMENT 06/01/2023 31.0    • CRP 06/01/2023 3.4 (H)    • Sed Rate 06/01/2023 8    • Aldolase 06/01/2023 4.7    • BETA 2 GLYCO 1 IGG 06/01/2023 0.9    • BETA 2 GLYCO 1 IGA 06/01/2023 0.7    • BETA 2 GLYCO 1 IGM 06/01/2023 <2.4    • SS-A (RO) Ab 06/01/2023 <0.2    • SS-B (LA) Ab 06/01/2023 <0.2    • Miscellaneous Lab Test R* 06/01/2023 SEE WRITTEN REPORT    • Rheumatoid Factor 06/01/2023 Negative    • PM-SCL Ab 06/01/2023 <20    • Miscellaneous Lab Test R* 06/01/2023 SEE WRITTEN REPORT    • PTT Lupus Anticoagulant 06/01/2023 38.7    • Dilute Viper Venom Time 06/01/2023 45.8    • DILUTE PROTHROMBIN TIME(* 06/01/2023 40.3    • THROMBIN TIME (DRVW) 06/01/2023 18.7    • DPT CONFIRM RATIO 06/01/2023 1.01    • LUPUS REFLEX INTERPRETAT* 06/01/2023 Comment:    • HLA B27 06/01/2023 Negative    • Histone Ab 86/78/5282 0.4    • Cyclic Citrullinated Pep* 06/01/2023 1.5    • Anti-Centromere B Antibo* 06/01/2023 <0.2    • TSH 3RD GENERATON 06/01/2023 5.367 (H)    • Thyroglobulin Ab 06/01/2023 <1.0    • TSI 06/01/2023 <0.10    • Free T4 06/01/2023 0.91        Suicide/Homicide Risk Assessment:    Risk of Harm to Self:    The following interventions are recommended: no intervention changes needed      Lethality Statement:    Based on today's assessment and clinical criteria, Hemanth Billingsley contracts for safety and is not an imminent risk of harm to self or others. Outpatient level of care is deemed appropriate at this current time. Aaliyah Felix understands that if they can no longer contract for safety, they need to call the office or report to their nearest Emergency Room for immediate evaluation. Assessment/Plan:     Psychopharmacologically, Aaliyah Felix continues to tolerate current medications with no adverse effects. She denies any discontinuation symptoms since stopping Effexor. She endorses mild improvement in depressive symptoms but continues to endorse social anxiety and avoidance of social situations. She is agreeable to further dose titration of Zoloft to help with anxiety. Risks/benefits/alternativies to treatment discussed, including a myriad of potential adverse medication side effects, to which Aaliyah Felix voiced understanding and consented fully to treatment. Also, patient is amenable to calling/contacting the outpatient office including this writer if any acute adverse effects of their medication regimen arise in addition to any comments or concerns pertaining to their psychiatric management. Diagnoses and all orders for this visit:    Social anxiety disorder  -     sertraline (ZOLOFT) 50 mg tablet;  Take 1.5 tablets (75 mg total) by mouth daily    Mild episode of recurrent major depressive disorder [facility])    Episode of recurrent major depressive disorder, unspecified depression episode severity ([address])  -     buPROPion (Wellbutrin XL) 300 mg 24 hr tablet; Take 1 tablet (300 mg total) by mouth daily       Diagnosis/Treatment Recommendations  - Psychoeducation provided regarding the importance of exercise and health dietary choices and their impact on mood, energy, and motivation.  - Counseled to avoid ETOH, illicit substances, and nicotine secondary to the detrimental effects of these substances on mental and physical health. - Encouraged to engage in non-verbal forms of therapy such as art therapy, music therapy, and mindfulness. Aware of 24 hour and weekend coverage for urgent situations accessed by calling [facility] main practice number    Plan:  1. Continue Wellbutrin  mg daily for depression   2. Increase Zoloft to 75 mg daily for depression and anxiety  3. Continue individual psychotherapy sessions  4. Follow up with primary care provider for ongoing medical care  5. Follow up with this provider in 6 weeks     Medications Risks/Benefits      Risks, Benefits And Possible Side Effects Of Medications:    Risks, benefits, and possible side effects of medications explained to [place] and she verbalizes understanding and agreement for treatment.     Controlled Medication Discussion:     Not applicable - controlled prescriptions are not prescribed by this practice    Psychotherapy Provided:     Individual psychotherapy provided: Yes  Counseling was provided during the session today for 16 minutes  Medication education provided to Kiana Sandoval discussed during session  Importance of medication and treatment compliance reviewed with Courtney  Cognitive therapy was utilized during the session  Reassurance and supportive therapy provided  Crisis/safety plan discussed with Brunilda Bumpers     Treatment Plan:    Completed and signed during the session: Yes - Treatment Plan done but not signed at time of office visit due to:  Plan reviewed by video and verbal consent given due to [facility] social distancing    Note Share Disclaimer:      This note was not shared with the patient due to reasonable likelihood of causing patient harm    Visit Time    Visit Start Time: 1:55 PM  Visit Stop Time: 2:12 PM  Total Visit Duration: 17 minutes    LEYDI Elise 07/20/23

## 2023-07-16 DIAGNOSIS — G90.A POTS (POSTURAL ORTHOSTATIC TACHYCARDIA SYNDROME): ICD-10-CM

## 2023-07-17 RX ORDER — FLUDROCORTISONE ACETATE 0.1 MG/1
TABLET ORAL
Qty: 60 TABLET | Refills: 5 | Status: SHIPPED | OUTPATIENT
Start: 2023-07-17

## 2023-07-20 ENCOUNTER — TELEMEDICINE (OUTPATIENT)
Dept: PSYCHIATRY | Facility: CLINIC | Age: 33
End: 2023-07-20
Payer: COMMERCIAL

## 2023-07-20 DIAGNOSIS — F40.10 SOCIAL ANXIETY DISORDER: Primary | ICD-10-CM

## 2023-07-20 DIAGNOSIS — F33.9 EPISODE OF RECURRENT MAJOR DEPRESSIVE DISORDER, UNSPECIFIED DEPRESSION EPISODE SEVERITY (HCC): ICD-10-CM

## 2023-07-20 DIAGNOSIS — F33.0 MILD EPISODE OF RECURRENT MAJOR DEPRESSIVE DISORDER (HCC): ICD-10-CM

## 2023-07-20 PROCEDURE — 99214 OFFICE O/P EST MOD 30 MIN: CPT

## 2023-07-20 RX ORDER — BUPROPION HYDROCHLORIDE 300 MG/1
300 TABLET ORAL DAILY
Qty: 90 TABLET | Refills: 1 | Status: SHIPPED | OUTPATIENT
Start: 2023-07-20

## 2023-07-20 NOTE — BH TREATMENT PLAN
TREATMENT PLAN (Medication Management Only)        [address] [facility]    Name and Date of Birth:  Eusebio Wang 29 y.o. 1990  Date of Treatment Plan: July 20, 2023  Diagnosis/Diagnoses:    1. Social anxiety disorder    2. Mild episode of recurrent major depressive disorder ([address])    3. Episode of recurrent major depressive disorder, unspecified depression episode severity ([address])      Strengths/Personal Resources for Self-Care: taking medications as prescribed, ability to communicate needs, ability to listen, ability to understand psychiatric illness, average or above intelligence, willingness to work on problems. Area/Areas of need (in own words): anxiety symptoms, depressive symptoms  1. Long Term Goal: continue improvement in anxiety. Target Date:6 weeks - 8/31/2023  Person/Persons responsible for completion of goal: Courtney  2. Short Term Objective (s) - How will we reach this goal?:   A. Provider new recommended medication/dosage changes and/or continue medication(s): continue current medications as prescribed. B. N/A.  C. N/A. Target Date:6 weeks - 8/31/2023  Person/Persons Responsible for Completion of Goal: Courtney  Progress Towards Goals: continuing treatment  Treatment Modality: medication management every 6 weeks  Review due 180 days from date of this plan: 6 months - 1/20/2024  Expected length of service: ongoing treatment  My Physician/PA/NP and I have developed this plan together and I agree to work on the goals and objectives. I understand the treatment goals that were developed for my treatment.

## 2023-07-31 DIAGNOSIS — E87.6 LOW BLOOD POTASSIUM: ICD-10-CM

## 2023-07-31 RX ORDER — POTASSIUM CHLORIDE 750 MG/1
CAPSULE, EXTENDED RELEASE ORAL
Qty: 30 CAPSULE | Refills: 2 | Status: SHIPPED | OUTPATIENT
Start: 2023-07-31

## 2023-08-22 ENCOUNTER — OFFICE VISIT (OUTPATIENT)
Dept: CARDIOLOGY CLINIC | Facility: MEDICAL CENTER | Age: 33
End: 2023-08-22
Payer: COMMERCIAL

## 2023-08-22 VITALS
HEART RATE: 84 BPM | WEIGHT: 133.6 LBS | BODY MASS INDEX: 21.56 KG/M2 | DIASTOLIC BLOOD PRESSURE: 70 MMHG | OXYGEN SATURATION: 96 % | SYSTOLIC BLOOD PRESSURE: 98 MMHG

## 2023-08-22 DIAGNOSIS — Q79.62 EHLERS-DANLOS SYNDROME, TYPE 3: ICD-10-CM

## 2023-08-22 DIAGNOSIS — G90.A POTS (POSTURAL ORTHOSTATIC TACHYCARDIA SYNDROME): Primary | ICD-10-CM

## 2023-08-22 PROCEDURE — 99214 OFFICE O/P EST MOD 30 MIN: CPT | Performed by: INTERNAL MEDICINE

## 2023-08-22 NOTE — PROGRESS NOTES
Cardiology   Mehran Turner 34 y.o. female MRN: [id number]        Impression:  1. POTS - improved since last visit on increase DDAVP and Corlanor. 2. Nia-Danlos Type III  3. Dyslipidemia - borderline. Recheck lipids in 5 years. 4. Sinus tachycardia - improving on Corlanor.      Recommendations:  1. Continue current medications. 2. Follow up in 6 months. HPI: Mehran Turner is a 34y.o. year old female with Nia-Danlos Type III and POTS presents for follow up. Improvement in symptoms since last time.   Has issues with fluctuating heart rate. Echo 7/21 - structurally normal heart.  Had COVID again in 11/22. Some lightheadedness and palpitations, but improved. Review of Systems   Constitutional: Negative. HENT: Negative. Eyes: Negative. Respiratory: Negative for chest tightness and shortness of breath. Cardiovascular: Positive for palpitations. Negative for chest pain and leg swelling. Gastrointestinal: Negative. Endocrine: Negative. Genitourinary: Negative. Musculoskeletal: Negative. Skin: Negative. Allergic/Immunologic: Negative. Neurological: Positive for light-headedness. Hematological: Negative. Psychiatric/Behavioral: Negative. All other systems reviewed and are negative.         Past Medical History:   Diagnosis Date   • Congenital dislocation of hip    • Depression     LAST ASSESSED: 8/4/12   • Disease of thyroid gland    • Pectus excavatum    • Scoliosis      Past Surgical History:   Procedure Laterality Date   • DEVEN ACETABULAR OSTEOTOMY Left     ACETABULAR OSTEOTOMY     Social History     Substance and Sexual Activity   Alcohol Use Yes    Comment: SOCIAL     Social History     Substance and Sexual Activity   Drug Use No     Social History     Tobacco Use   Smoking Status Never   Smokeless Tobacco Never     Family History   Problem Relation Age of Onset   • Asthma Mother    • Hyperlipidemia Mother    • Hypertension Mother    • Diabetes type II Mother    • Diabetes Mother    • Prostate cancer Father    • Hyperlipidemia Father    • Hypertension Father    • No Known Problems Brother    • Melanoma Family         AMELANOTIC MELANOMA OF THE SKIN   • Hyperlipidemia Family    • Hypertension Family    • Prostate cancer Family         MALIGNANT PROSTATIC NEOPLASM G1   • Varicose Veins Family         OF LOWER EXTREMITIES   • Breast cancer Neg Hx        Allergies:   Allergies   Allergen Reactions   • Epinephrine Dizziness     Causes dysautonomia   • Fluoxetine      Other reaction(s): Flushed       Medications:     Current Outpatient Medications:   •  albuterol (Ventolin HFA) 90 mcg/act inhaler, Inhale 2 puffs every 6 (six) hours as needed for wheezing or shortness of breath, Disp: 18 g, Rfl: 1  •  buPROPion (Wellbutrin XL) 300 mg 24 hr tablet, Take 1 tablet (300 mg total) by mouth daily, Disp: 90 tablet, Rfl: 1  •  clindamycin (CLEOCIN T) 1 %, APPLY TO ACNE TWICE DAILY, Disp: , Rfl: 5  •  desmopressin (DDAVP) 0.2 mg tablet, Take 2 tablets (0.4 mg total) by mouth 2 (two) times a day, Disp: 120 tablet, Rfl: 5  •  fludrocortisone (FLORINEF) 0.1 mg tablet, TAKE 1 TABLET BY MOUTH TWICE A DAY, Disp: 60 tablet, Rfl: 5  •  ivabradine HCl (Corlanor) 5 MG tablet, Take 1 tablet (5 mg total) by mouth 2 (two) times a day, Disp: 180 tablet, Rfl: 3  •  levothyroxine 112 mcg tablet, Take 1 tablet (112 mcg total) by mouth daily, Disp: 90 tablet, Rfl: 1  •  midodrine (PROAMATINE) 10 MG tablet, TAKE 1 TABLET BY MOUTH THREE TIMES A DAY, Disp: 90 tablet, Rfl: 2  •  naproxen (NAPROSYN) 500 mg tablet, Take 1 tablet (500 mg total) by mouth daily as needed for moderate pain, Disp: 30 tablet, Rfl: 1  •  nebivolol (BYSTOLIC) 5 mg tablet, TAKE 1 TABLET BY MOUTH TWICE A DAY, Disp: 60 tablet, Rfl: 5  •  norgestrel-ethinyl estradiol (Low-Ogestrel) 0.3 mg-30 mcg per tablet, Take 1 tablet by mouth daily, Disp: 84 tablet, Rfl: 4  •  potassium chloride (MICRO-K) 10 MEQ CR capsule, TAKE 1 CAPSULE BY MOUTH EVERY DAY, Disp: 30 capsule, Rfl: 2  •  sertraline (ZOLOFT) 50 mg tablet, Take 1.5 tablets (75 mg total) by mouth daily, Disp: 45 tablet, Rfl: 2  •  traMADol-acetaminophen (ULTRACET) 37.5-325 mg per tablet, Take 1 tablet by mouth every 6 (six) hours as needed for moderate pain, Disp: 30 tablet, Rfl: 0  •  Vitamin D, Cholecalciferol, 25 MCG (1000 UT) CAPS, Take 3,000 Units by mouth , Disp: , Rfl:       Wt Readings from Last 3 Encounters:   08/22/23 60.6 kg (133 lb 9.6 oz)   07/03/23 61.5 kg (135 lb 8 oz)   06/19/23 60.8 kg (134 lb)     Temp Readings from Last 3 Encounters:   06/19/23 98.7 °F (37.1 °C)   05/19/23 98.2 °F (36.8 °C)   04/17/23 97.8 °F (36.6 °C)     BP Readings from Last 3 Encounters:   08/22/23 98/70   07/03/23 98/72   06/19/23 106/62     Pulse Readings from Last 3 Encounters:   08/22/23 84   07/03/23 89   06/19/23 83         Physical Exam  HENT:      Head: Atraumatic. Mouth/Throat:      Mouth: Mucous membranes are moist.   Eyes:      Extraocular Movements: Extraocular movements intact. Cardiovascular:      Rate and Rhythm: Normal rate and regular rhythm. Heart sounds: Normal heart sounds. Pulmonary:      Effort: Pulmonary effort is normal.      Breath sounds: Normal breath sounds. Abdominal:      General: Abdomen is flat. Musculoskeletal:         General: Normal range of motion. Cervical back: Normal range of motion. Skin:     General: Skin is warm. Neurological:      General: No focal deficit present. Mental Status: She is alert and oriented to person, place, and time.    Psychiatric:         Mood and Affect: Mood normal.         Behavior: Behavior normal.           Laboratory Studies:  CMP:  Lab Results   Component Value Date     01/20/2016    K 4.0 06/01/2023     06/01/2023    CO2 28 06/01/2023    BUN 20 06/01/2023    CREATININE 0.87 06/01/2023    AST 26 06/01/2023    ALT 33 06/01/2023    BILITOT 0.4 01/20/2016    EGFR 89 06/01/2023       Lipid Profile:   No results found for: "CHOL"  Lab Results   Component Value Date    HDL 53 2023     Lab Results   Component Value Date    LDLCALC 124 (H) 2023     Lab Results   Component Value Date    TRIG 100 2023       Cardiac testing:   EKG reviewed personally:   Results for orders placed during the hospital encounter of 21    Echo complete with contrast if indicated    Narrative  [address], [address] [facility]  [phone]    Transthoracic Echocardiogram  2D, M-mode, Doppler, and Color Doppler    Study date:  2021    [facility]  MR number: [id number]  Account number: [de-identified]  : 1990  Age: 31 years  Gender: Female  Status: Outpatient  Location: [address] [place] [address]  Height: 66 in  Weight: 152.7 lb  BP: 122/ 84 mmHg    Indications: Assess left ventricular function. Diagnoses: R53.82 - Chronic fatigue, unspecified    Sonographer:  LEONILA Adame  Referring Physician:  Ishan Field DO  Group:  [facility]  Interpreting Physician:  Kayli Aguilar MD    SUMMARY    PROCEDURE INFORMATION:  This was a technically difficult study. No subcostal views noted. LEFT VENTRICLE:  Systolic function was normal. Ejection fraction was estimated to be 55 %. There were no regional wall motion abnormalities. RIGHT VENTRICLE:  Systolic function was normal.    MITRAL VALVE:  There was trace regurgitation. AORTIC VALVE:  There was no evidence for stenosis. TRICUSPID VALVE:  There was trace regurgitation. PERICARDIUM:  There was no pericardial effusion. HISTORY: PRIOR HISTORY: Chronic fatigue s/p COVID-19, POTS, Ehler's Danlos, Pectus excavatum    PROCEDURE: The study was performed in the [address] [place] [address]. This was a routine study. The transthoracic approach was used. The study included complete 2D imaging, M-mode, complete spectral Doppler, and color Doppler.  The  heart rate was 69 bpm, at the start of the study. Images were obtained from the parasternal, apical, subcostal, and suprasternal notch acoustic windows. This was a technically difficult study. No subcostal views noted. LEFT VENTRICLE: Size was normal. Systolic function was normal. Ejection fraction was estimated to be 55 %. There were no regional wall motion abnormalities. Wall thickness was normal. DOPPLER: Left ventricular diastolic function parameters  were normal.    RIGHT VENTRICLE: The size was normal. Systolic function was normal.    LEFT ATRIUM: Size was normal.    RIGHT ATRIUM: Size was normal.    MITRAL VALVE: Valve structure was normal. There was normal leaflet separation. DOPPLER: The transmitral velocity was within the normal range. There was no evidence for stenosis. There was trace regurgitation. AORTIC VALVE: The valve was trileaflet. Leaflets exhibited normal thickness and normal cuspal separation. DOPPLER: Transaortic velocity was within the normal range. There was no evidence for stenosis. There was no significant  regurgitation. TRICUSPID VALVE: The valve structure was normal. There was normal leaflet separation. DOPPLER: The transtricuspid velocity was within the normal range. There was no evidence for stenosis. There was trace regurgitation. PULMONIC VALVE: Leaflets exhibited normal thickness, no calcification, and normal cuspal separation. DOPPLER: The transpulmonic velocity was within the normal range. There was no significant regurgitation. PERICARDIUM: There was no pericardial effusion. AORTA: The root exhibited normal size. SYSTEMIC VEINS: IVC: The inferior vena cava was normal in size.     SYSTEM MEASUREMENT TABLES    2D  %FS: 28.05 %  Ao Diam: 2.53 cm  EDV(Teich): 58.13 ml  EF(Teich): 55.12 %  ESV(Teich): 26.09 ml  IVSd: 0.87 cm  LA Area: 8.93 cm2  LA Diam: 2.68 cm  LVEDV MOD A4C: 44.94 ml  LVEF MOD A4C: 53.93 %  LVESV MOD A4C: 20.7 ml  LVIDd: 3.7 cm  LVIDs: 2.66 cm  LVLd A4C: 6.62 cm  LVLs A4C: 5.6 cm  LVPWd: 0.86 cm  RA Area: 4.31 cm2  RVIDd: 2.2 cm  SV MOD A4C: 24.24 ml  SV(Teich): 32.04 ml    CW  TR MaxPG: 15.36 mmHg  TR Vmax: 1.96 m/s    MM  TAPSE: 1.66 cm    PW  E' Sept: 0.12 m/s  E/E' Sept: 4.76  MV A Juan Jose: 0.44 m/s  MV Dec [place]: 2.46 m/s2  MV DecT: 234.86 ms  MV E Juan Jose: 0.58 m/s  MV E/A Ratio: 1.31  MV PHT: 68.11 ms  MVA By PHT: 3.23 cm2    [employer] Accredited Echocardiography Laboratory    Prepared and electronically signed by    Magda Ortega MD  Signed 21-Jul-2021 16:55:18    No results found for this or any previous visit. No results found for this or any previous visit. No results found for this or any previous visit.

## 2023-09-20 ENCOUNTER — TELEPHONE (OUTPATIENT)
Dept: CARDIOLOGY CLINIC | Facility: CLINIC | Age: 33
End: 2023-09-20

## 2023-09-26 NOTE — TELEPHONE ENCOUNTER
Corlanor 5 mg BID prior authorization approved through [email or web address] until 9/20/2024  ID [id number]  [phone]

## 2023-10-09 ENCOUNTER — OFFICE VISIT (OUTPATIENT)
Dept: INTERNAL MEDICINE CLINIC | Facility: CLINIC | Age: 33
End: 2023-10-09
Payer: COMMERCIAL

## 2023-10-09 VITALS
BODY MASS INDEX: 20.57 KG/M2 | HEIGHT: 66 IN | WEIGHT: 128 LBS | TEMPERATURE: 98.7 F | DIASTOLIC BLOOD PRESSURE: 70 MMHG | OXYGEN SATURATION: 95 % | SYSTOLIC BLOOD PRESSURE: 100 MMHG | RESPIRATION RATE: 16 BRPM | HEART RATE: 80 BPM

## 2023-10-09 DIAGNOSIS — G90.A POTS (POSTURAL ORTHOSTATIC TACHYCARDIA SYNDROME): ICD-10-CM

## 2023-10-09 DIAGNOSIS — G47.33 OSA (OBSTRUCTIVE SLEEP APNEA): ICD-10-CM

## 2023-10-09 DIAGNOSIS — R06.02 SOB (SHORTNESS OF BREATH) ON EXERTION: ICD-10-CM

## 2023-10-09 DIAGNOSIS — E78.5 DYSLIPIDEMIA: ICD-10-CM

## 2023-10-09 DIAGNOSIS — E87.6 HYPOKALEMIA: ICD-10-CM

## 2023-10-09 DIAGNOSIS — M25.50 POLYARTHRALGIA: ICD-10-CM

## 2023-10-09 DIAGNOSIS — G25.81 RESTLESS LEGS: ICD-10-CM

## 2023-10-09 DIAGNOSIS — G93.32 CHRONIC FATIGUE SYNDROME: ICD-10-CM

## 2023-10-09 DIAGNOSIS — Q65.00: ICD-10-CM

## 2023-10-09 DIAGNOSIS — F33.41 RECURRENT MAJOR DEPRESSIVE DISORDER, IN PARTIAL REMISSION (HCC): ICD-10-CM

## 2023-10-09 DIAGNOSIS — J45.20 MILD INTERMITTENT ASTHMA WITHOUT COMPLICATION: ICD-10-CM

## 2023-10-09 DIAGNOSIS — Z23 NEED FOR PROPHYLACTIC VACCINATION AND INOCULATION AGAINST INFLUENZA: ICD-10-CM

## 2023-10-09 DIAGNOSIS — E55.9 VITAMIN D DEFICIENCY: ICD-10-CM

## 2023-10-09 DIAGNOSIS — Q79.62 EHLERS-DANLOS SYNDROME, TYPE 3: ICD-10-CM

## 2023-10-09 DIAGNOSIS — E03.9 PRIMARY HYPOTHYROIDISM: Primary | ICD-10-CM

## 2023-10-09 PROCEDURE — 90686 IIV4 VACC NO PRSV 0.5 ML IM: CPT | Performed by: INTERNAL MEDICINE

## 2023-10-09 PROCEDURE — 99213 OFFICE O/P EST LOW 20 MIN: CPT | Performed by: INTERNAL MEDICINE

## 2023-10-09 PROCEDURE — 90471 IMMUNIZATION ADMIN: CPT | Performed by: INTERNAL MEDICINE

## 2023-10-09 NOTE — ASSESSMENT & PLAN NOTE
She was seen by pulmonary specialist was advised for a sleep apnea study. Patient is scheduled to have sleep apnea study. Advised to follow with pulmonary specialist as scheduled.

## 2023-10-09 NOTE — ASSESSMENT & PLAN NOTE
She gets shortness of breath after walking about 10 minutes. She had a pulmonary rehabilitation. She has been seen and followed by cardiology as well as pulmonary specialist.  She had a pulmonary function test and also echocardiogram ejection fraction 55%. Had a COVID infection 2 times. Last chest x-ray 4/6/2023 unremarkable. Patient states that now she goes to the gym since he stopped for pulmonary mutation.

## 2023-10-09 NOTE — ASSESSMENT & PLAN NOTE
She was seen by pulmonary specialist was started on as needed albuterol.   Advised to follow with pulmonary specialist.

## 2023-10-09 NOTE — ASSESSMENT & PLAN NOTE
She has been seen and followed by psychiatrist.  On Zoloft for situational anxiety as well as depression and also on Wellbutrin as well. Advised to follow with a psychiatrist.  She also gets a psychological counseling every other week.

## 2023-10-09 NOTE — ASSESSMENT & PLAN NOTE
She was recently seen by rheumatologist.  Had a rheumatological work-up. Possible also underlying her Nia-Danlos syndrome. Occasionally she takes naproxen and very occasionally she takes a tramadol.

## 2023-10-09 NOTE — ASSESSMENT & PLAN NOTE
Vitamin D deficient resolved after being on vitamin D supplement last vitamin D level 44 so advised to continue same dose of vitamin D supplement.

## 2023-10-09 NOTE — ASSESSMENT & PLAN NOTE
Patient is having restless leg. She  was seen by pulmonary specialist.  Awaiting sleep apnea study to rule out  Periodic limb movements.

## 2023-10-09 NOTE — ASSESSMENT & PLAN NOTE
Patient states she was born with both hip dislocation but right hip did not require any surgical intervention. She had a left hip surgery at 3 year of age. She walks with a limp.

## 2023-10-09 NOTE — ASSESSMENT & PLAN NOTE
Her last TSH was 5.3 so her dose of levothyroxine was increased to 112 mcg June 2023. Patient advised to go for TSH blood test and will advise accordingly after blood test.  Continue present dose of levothyroxine.

## 2023-10-09 NOTE — ASSESSMENT & PLAN NOTE
She has a chronic fatigue could be part of her Nia-Danlos syndrome. I did rheumatological evaluation. TSH 5.3 June 2023 when dose of oral levothyroxine was increased to 112 mcg daily. We will follow TSH. Advised to maintain hydration.

## 2023-10-09 NOTE — PATIENT INSTRUCTIONS
Patient was advised to continue present medications. discussed with the patient medications and laboratory data in detail. Follow-up with me in 4 months or as advised. If any blood test was ordered please do 1 week prior to next appointment unless advise to get earlier.   If you have any questions please call the office [phone]

## 2023-10-09 NOTE — PROGRESS NOTES
Assessment/Plan:    1. Primary hypothyroidism  Assessment & Plan:  Her last TSH was 5.3 so her dose of levothyroxine was increased to 112 mcg June 2023. Patient advised to go for TSH blood test and will advise accordingly after blood test.  Continue present dose of levothyroxine. Orders:  -     TSH, 3rd generation with Free T4 reflex; Future    2. POTS (postural orthostatic tachycardia syndrome)  Assessment & Plan:  She has been seen and followed by cardiology. She gets  dizziness particular when she tried to stand up fast.  Advised to maintain hydration. Orders:  -     Basic metabolic panel; Future    3. CHARLEE (obstructive sleep apnea)  Assessment & Plan:  She was seen by pulmonary specialist was advised for a sleep apnea study. Patient is scheduled to have sleep apnea study. Advised to follow with pulmonary specialist as scheduled. 4. Nia-Danlos syndrome, benign hypermobile form  Assessment & Plan:  Patient states he has been diagnosed with Nia-Danlos syndrome since she was 17years of age. Orders:  -     Basic metabolic panel; Future    5. Dyslipidemia  Assessment & Plan:  Her last cholesterol 198, triglyceride 53, ,  advised for low-cholesterol diet. 6. Restless legs  Assessment & Plan:  Patient is having restless leg. She  was seen by pulmonary specialist.  Awaiting sleep apnea study to rule out  Periodic limb movements. Orders:  -     Basic metabolic panel; Future    7. Vitamin D deficiency  Assessment & Plan:  Vitamin D deficient resolved after being on vitamin D supplement last vitamin D level 44 so advised to continue same dose of vitamin D supplement. 8. SOB (shortness of breath) on exertion  Assessment & Plan:  She gets shortness of breath after walking about 10 minutes. She had a pulmonary rehabilitation.   She has been seen and followed by cardiology as well as pulmonary specialist.  She had a pulmonary function test and also echocardiogram ejection fraction 55%. Had a COVID infection 2 times. Last chest x-ray 4/6/2023 unremarkable. Patient states that now she goes to the gym since he stopped for pulmonary mutation. 9. Recurrent major depressive disorder, in partial remission [facility])  Assessment & Plan:  She has been seen and followed by psychiatrist.  On Zoloft for situational anxiety as well as depression and also on Wellbutrin as well. Advised to follow with a psychiatrist.  She also gets a psychological counseling every other week. 10. Polyarthralgia  Assessment & Plan:  She was recently seen by rheumatologist.  Had a rheumatological work-up. Possible also underlying her Nia-Danlos syndrome. Occasionally she takes naproxen and very occasionally she takes a tramadol. 11. Chronic fatigue syndrome  Assessment & Plan:  She has a chronic fatigue could be part of her Nia-Danlos syndrome. I did rheumatological evaluation. TSH 5.3 June 2023 when dose of oral levothyroxine was increased to 112 mcg daily. We will follow TSH. Advised to maintain hydration. 12. Need for prophylactic vaccination and inoculation against influenza  -     influenza vaccine, quadrivalent, 0.5 mL, preservative-free, for adult and pediatric patients 6 mos+ (AFLURIA, FLUARIX, [address] [place] [address], [address] [place] [address])    13. BMI 20.0-20.9, adult    14. Mild intermittent asthma without complication  Assessment & Plan:  She was seen by pulmonary specialist was started on as needed albuterol. Advised to follow with pulmonary specialist.      15. Hypokalemia  Assessment & Plan:  She is on potassium supplement. Last potassium level was 4.0. Will follow [facility]. Orders:  -     Basic metabolic panel; Future    16. Unilateral congenital dislocation of hip  Assessment & Plan:  Patient states she was born with both hip dislocation but right hip did not require any surgical intervention. She had a left hip surgery at 3 year of age. She walks with a limp.             Subjective: Patient presents to establish new primary care physician. Patient ID: Terese Feliciano is a 34 y.o. female. HPI   30-year-old white female patient first time came to see me establish new primary care physician. She gets her shortness of breath after walking about 10 minutes. She had a COVID-19 infection 2 times. He underwent pulmonary rehabilitation. She was seen by pulmonary specialist recently and had a pulmonary function test.  Had a chest x-ray. She was prescribed as needed albuterol. She is going for a sleep apnea study. She has been seen and followed by her cardiologist as well. She also has been seeing her psychiatrist and also getting psychological counseling every other week. she denies any chest pain or cough or fever. Denies  nausea vomiting diarrhea pain in abdomen. She gets also restless leg at night. He has a chronic fatigue. she was seen by  recently rheumatologist for polyarthralgia. The following portions of the patient's history were reviewed and updated as appropriate:     Past Medical History:  She has a past medical history of BMI 20.0-20.9, adult (10/9/2023), Congenital dislocation of hip, Depression, Disease of thyroid gland, Pectus excavatum, Polyarthralgia (10/9/2023), and Scoliosis. ,  _______________________________________________________________________  Past Surgical History:   has a past surgical history that includes Suleiman acetabular osteotomy (Left). ,  _______________________________________________________________________  Family History:  family history includes Asthma in her mother; Diabetes in her mother; Diabetes type II in her mother; Hyperlipidemia in her family, father, and mother; Hypertension in her family, father, and mother; Melanoma in her family; No Known Problems in her brother; Prostate cancer in her family and father; Varicose Veins in her family. ,  _______________________________________________________________________  Social History:   reports that she has never smoked. She has never used smokeless tobacco. She reports current alcohol use. She reports that she does not use drugs. ,  _______________________________________________________________________  Allergies:  is allergic to epinephrine and fluoxetine. .  _______________________________________________________________________  Current Outpatient Medications   Medication Sig Dispense Refill   • albuterol (Ventolin HFA) 90 mcg/act inhaler Inhale 2 puffs every 6 (six) hours as needed for wheezing or shortness of breath 18 g 1   • buPROPion (Wellbutrin XL) 300 mg 24 hr tablet Take 1 tablet (300 mg total) by mouth daily 90 tablet 1   • clindamycin (CLEOCIN T) 1 % APPLY TO ACNE TWICE DAILY  5   • desmopressin (DDAVP) 0.2 mg tablet Take 2 tablets (0.4 mg total) by mouth 2 (two) times a day 120 tablet 5   • fludrocortisone (FLORINEF) 0.1 mg tablet TAKE 1 TABLET BY MOUTH TWICE A DAY 60 tablet 5   • ivabradine HCl (Corlanor) 5 MG tablet Take 1 tablet (5 mg total) by mouth 2 (two) times a day 180 tablet 3   • levothyroxine 112 mcg tablet Take 1 tablet (112 mcg total) by mouth daily 90 tablet 1   • naproxen (NAPROSYN) 500 mg tablet Take 1 tablet (500 mg total) by mouth daily as needed for moderate pain 30 tablet 1   • nebivolol (BYSTOLIC) 5 mg tablet TAKE 1 TABLET BY MOUTH TWICE A DAY 60 tablet 5   • norgestrel-ethinyl estradiol (Low-Ogestrel) 0.3 mg-30 mcg per tablet Take 1 tablet by mouth daily 84 tablet 4   • potassium chloride (MICRO-K) 10 MEQ CR capsule TAKE 1 CAPSULE BY MOUTH EVERY DAY 30 capsule 2   • sertraline (ZOLOFT) 50 mg tablet Take 1.5 tablets (75 mg total) by mouth daily 45 tablet 2   • traMADol-acetaminophen (ULTRACET) 37.5-325 mg per tablet Take 1 tablet by mouth every 6 (six) hours as needed for moderate pain 30 tablet 0   • Vitamin D, Cholecalciferol, 25 MCG (1000 UT) CAPS Take 3,000 Units by mouth      • midodrine (PROAMATINE) 10 MG tablet TAKE 1 TABLET BY MOUTH THREE TIMES A DAY 90 tablet 2     No current facility-administered medications for this visit.     _______________________________________________________________________  Review of Systems   Constitutional: Positive for fatigue ( Has a chronic fatigue. ). Negative for chills and fever. HENT: Negative for congestion, ear pain, hearing loss, nosebleeds, sinus pain, sore throat and trouble swallowing. Eyes: Negative for discharge, redness and visual disturbance. Respiratory: Positive for shortness of breath ( On exertion. ). Negative for cough and chest tightness. Cardiovascular: Negative for chest pain and palpitations. Gastrointestinal: Negative for abdominal pain, blood in stool, constipation, diarrhea, nausea and vomiting. Genitourinary: Negative for dysuria, flank pain, frequency and hematuria. Musculoskeletal: Positive for arthralgias. Negative for myalgias and neck pain. Skin: Negative for color change and rash. Neurological: Positive for dizziness ( When she tries  to stand up fast.). Negative for speech difficulty, weakness and headaches. Hematological: Does not bruise/bleed easily. Psychiatric/Behavioral: Negative for agitation and behavioral problems. Objective:  Vitals:    10/09/23 1359   BP: 100/70   BP Location: Left arm   Patient Position: Sitting   Cuff Size: Standard   Pulse: 80   Resp: 16   Temp: 98.7 °F (37.1 °C)   TempSrc: Temporal   SpO2: 95%   Weight: 58.1 kg (128 lb)   Height: 5' 6" (1.676 m)     Body mass index is 20.66 kg/m². Physical Exam  Vitals and nursing note reviewed. Constitutional:       General: She is not in acute distress. Appearance: Normal appearance. HENT:      Head: Normocephalic and atraumatic. Right Ear: Ear canal and external ear normal.      Left Ear: Ear canal and external ear normal.      Nose: Nose normal.      Mouth/Throat:      Mouth: Mucous membranes are moist.   Eyes:      General: No scleral icterus. Right eye: No discharge.          Left eye: No discharge. Extraocular Movements: Extraocular movements intact. Conjunctiva/sclera: Conjunctivae normal.   Cardiovascular:      Rate and Rhythm: Normal rate and regular rhythm. Pulses: Normal pulses. Heart sounds: Normal heart sounds. No murmur heard. Pulmonary:      Effort: Pulmonary effort is normal. No respiratory distress. Breath sounds: Normal breath sounds. No wheezing, rhonchi or rales. Abdominal:      General: Bowel sounds are normal.      Palpations: Abdomen is soft. Tenderness: There is no abdominal tenderness. Musculoskeletal:         General: Normal range of motion. Cervical back: Normal range of motion and neck supple. No muscular tenderness. Right lower leg: No edema. Left lower leg: No edema. Comments: She walks with a limp patient states since she had a left hip surgery. Skin:     General: Skin is warm. Findings: No rash. Neurological:      General: No focal deficit present. Mental Status: She is alert and oriented to person, place, and time. Motor: No weakness. Psychiatric:         Mood and Affect: Mood normal.         Behavior: Behavior normal.         I spent 40 minutes with the patient today.   More than 50% time spent for reviewing of external notes, reviewing of the results of diagnostics test, management of care, patient education and ordering of test.

## 2023-10-09 NOTE — ASSESSMENT & PLAN NOTE
She has been seen and followed by cardiology. She gets  dizziness particular when she tried to stand up fast.  Advised to maintain hydration. Vee Dariusz [id number]    Date: 03/28/22  Type of SX:  INPATIENT  Location: [facility]  CPT: 40501   DX Code: M17.12  SX area: [place]  Insurance: ARLEEN RAYA   SX VALID FOR ONE DAY

## 2023-10-16 DIAGNOSIS — G90.A POTS (POSTURAL ORTHOSTATIC TACHYCARDIA SYNDROME): ICD-10-CM

## 2023-10-16 RX ORDER — MIDODRINE HYDROCHLORIDE 10 MG/1
TABLET ORAL
Qty: 90 TABLET | Refills: 2 | Status: SHIPPED | OUTPATIENT
Start: 2023-10-16

## 2023-10-23 DIAGNOSIS — E87.6 LOW BLOOD POTASSIUM: ICD-10-CM

## 2023-10-23 DIAGNOSIS — I10 ESSENTIAL HYPERTENSION: ICD-10-CM

## 2023-10-23 RX ORDER — POTASSIUM CHLORIDE 750 MG/1
CAPSULE, EXTENDED RELEASE ORAL
Qty: 30 CAPSULE | Refills: 2 | Status: SHIPPED | OUTPATIENT
Start: 2023-10-23

## 2023-10-23 RX ORDER — NEBIVOLOL 5 MG/1
TABLET ORAL
Qty: 60 TABLET | Refills: 5 | Status: SHIPPED | OUTPATIENT
Start: 2023-10-23

## 2023-11-18 LAB
BUN SERPL-MCNC: 13 MG/DL (ref 7–25)
BUN/CREAT SERPL: NORMAL (CALC) (ref 6–22)
CALCIUM SERPL-MCNC: 9.4 MG/DL (ref 8.6–10.2)
CHLORIDE SERPL-SCNC: 105 MMOL/L (ref 98–110)
CO2 SERPL-SCNC: 28 MMOL/L (ref 20–32)
CREAT SERPL-MCNC: 0.71 MG/DL (ref 0.5–0.97)
GFR/BSA.PRED SERPLBLD CYS-BASED-ARV: 117 ML/MIN/1.73M2
GLUCOSE SERPL-MCNC: 98 MG/DL (ref 65–99)
POTASSIUM SERPL-SCNC: 4 MMOL/L (ref 3.5–5.3)
SODIUM SERPL-SCNC: 141 MMOL/L (ref 135–146)
T4 FREE SERPL-MCNC: 1 NG/DL (ref 0.8–1.8)
TSH SERPL-ACNC: 5.96 MIU/L

## 2023-11-20 ENCOUNTER — TELEPHONE (OUTPATIENT)
Dept: INTERNAL MEDICINE CLINIC | Facility: CLINIC | Age: 33
End: 2023-11-20

## 2023-11-20 NOTE — TELEPHONE ENCOUNTER
----- Message from Delano Laura MD sent at 11/20/2023  8:22 AM EST -----  Please call her that her TSH is elevated to 5.96 so I would like to increase her levothyroxine from 112 to 125 mcg once a day. Other blood tests are within normal limit. Please ask her which pharmacy she would like me to send a new prescription of her levothyroxine which will be 125 mcg once a day. Repeat TSH after 3 months.

## 2023-11-21 ENCOUNTER — HOSPITAL ENCOUNTER (OUTPATIENT)
Dept: SLEEP CENTER | Facility: CLINIC | Age: 33
Discharge: HOME | End: 2023-11-21
Payer: COMMERCIAL

## 2023-11-21 ENCOUNTER — TELEPHONE (OUTPATIENT)
Dept: OTHER | Facility: OTHER | Age: 33
End: 2023-11-21

## 2023-11-21 DIAGNOSIS — E03.9 PRIMARY HYPOTHYROIDISM: Primary | ICD-10-CM

## 2023-11-21 DIAGNOSIS — G47.33 OSA (OBSTRUCTIVE SLEEP APNEA): ICD-10-CM

## 2023-11-21 PROCEDURE — 95810 POLYSOM 6/> YRS 4/> PARAM: CPT | Performed by: INTERNAL MEDICINE

## 2023-11-21 PROCEDURE — 95810 POLYSOM 6/> YRS 4/> PARAM: CPT

## 2023-11-21 RX ORDER — LEVOTHYROXINE SODIUM 0.12 MG/1
125 TABLET ORAL DAILY
COMMUNITY
End: 2023-11-21 | Stop reason: SDUPTHER

## 2023-11-21 RX ORDER — LEVOTHYROXINE SODIUM 0.12 MG/1
125 TABLET ORAL DAILY
Qty: 90 TABLET | Refills: 1 | Status: SHIPPED | OUTPATIENT
Start: 2023-11-21

## 2023-11-21 NOTE — TELEPHONE ENCOUNTER
Spoke with patient and made aware that new prescription was sent to the pharmacy. Patient denies any questions at this time.

## 2023-11-21 NOTE — TELEPHONE ENCOUNTER
Per Dr. Gabriel Pike, patient was advised that TSH is 5.96, Levothyroxine increased to 125 mcg., to repeat TSH in 3 months. Patient confirmed [facility], on [address]

## 2023-11-22 NOTE — PROGRESS NOTES
Sleep Study Documentation    Pre-Sleep Study       Sleep testing procedure explained to patient:YES    Patient napped prior to study:NO    Caffeine:Dayshift worker after 12PM.  Caffeine use:YES- chocolate  6 to 18 ounces    Alcohol:Dayshift workers after 5PM: Alcohol use:NO    Typical day for patient:YES       Study Documentation    Sleep Study Indications: Unrefreshing sleep, EDS, chronic or AM headache    Sleep Study: Diagnostic   Snore:None  Supplemental O2: no      Minimum SaO2 93%  Baseline SaO2 95%    EKG abnormalities: no     EEG abnormalities: no    Were abnormal behaviors in sleep observed:NO    Is Total Sleep Study Recording Time < 2 hours: N/A    Is Total Sleep Study Recording Time > 2 hours but study is incomplete: N/A    Is Total Sleep Study Recording Time 6 hours or more but sleep was not obtained: NO    Patient classification: employed       Post-Sleep Study    Medication used at bedtime or during sleep study:YES other prescription medications    Patient reports time it took to fall asleep:20 to 30 minutes    Patient reports waking up during study:3 or more times. Patient reports returning to sleep without difficulty. Patient reports sleeping 4 to 6 hours with dreaming. Does the Patient feel this is a typical night of sleep:worse than usual    Patient rated sleepiness: Very sleepy or tired    PAP treatment:no.

## 2023-11-27 DIAGNOSIS — G90.A POTS (POSTURAL ORTHOSTATIC TACHYCARDIA SYNDROME): ICD-10-CM

## 2023-11-27 RX ORDER — DESMOPRESSIN ACETATE 0.2 MG/1
0.4 TABLET ORAL 2 TIMES DAILY
Qty: 120 TABLET | Refills: 5 | OUTPATIENT
Start: 2023-11-27 | End: 2024-05-25

## 2023-11-27 NOTE — TELEPHONE ENCOUNTER
Pt switched PCP's    Reason for call:   [x] Refill   [] Prior Auth  [] Other:     Office:   [x] PCP/Provider - [facility] / Rosaline Sanders  [] Specialty/Provider -     Medication: desmopressin    Dose/Frequency: 0.2mg (2tabs bid)    Quantity: 120    Pharmacy: [id number] [place] - [id number]     Does the patient have enough for 3 days?    [x] Yes   [] No - Send as HP to POD

## 2023-11-29 DIAGNOSIS — G90.A POTS (POSTURAL ORTHOSTATIC TACHYCARDIA SYNDROME): ICD-10-CM

## 2023-11-29 RX ORDER — DESMOPRESSIN ACETATE 0.2 MG/1
0.4 TABLET ORAL 2 TIMES DAILY
Qty: 120 TABLET | Refills: 5 | Status: SHIPPED | OUTPATIENT
Start: 2023-11-29 | End: 2024-05-27

## 2023-12-12 ENCOUNTER — ANNUAL EXAM (OUTPATIENT)
Dept: OBGYN CLINIC | Facility: MEDICAL CENTER | Age: 33
End: 2023-12-12

## 2023-12-12 VITALS
SYSTOLIC BLOOD PRESSURE: 100 MMHG | BODY MASS INDEX: 20.84 KG/M2 | WEIGHT: 132.8 LBS | HEART RATE: 75 BPM | HEIGHT: 67 IN | DIASTOLIC BLOOD PRESSURE: 68 MMHG

## 2023-12-12 DIAGNOSIS — Z01.419 ROUTINE GYNECOLOGICAL EXAMINATION: Primary | ICD-10-CM

## 2023-12-12 DIAGNOSIS — Z30.41 ENCOUNTER FOR SURVEILLANCE OF CONTRACEPTIVE PILLS: ICD-10-CM

## 2023-12-12 DIAGNOSIS — Z11.51 SCREENING FOR HPV (HUMAN PAPILLOMAVIRUS): ICD-10-CM

## 2023-12-12 PROCEDURE — G0145 SCR C/V CYTO,THINLAYER,RESCR: HCPCS | Performed by: PHYSICIAN ASSISTANT

## 2023-12-12 PROCEDURE — G0476 HPV COMBO ASSAY CA SCREEN: HCPCS | Performed by: PHYSICIAN ASSISTANT

## 2023-12-12 RX ORDER — NORGESTREL AND ETHINYL ESTRADIOL 0.3-0.03MG
1 KIT ORAL DAILY
Qty: 84 TABLET | Refills: 4 | Status: SHIPPED | OUTPATIENT
Start: 2023-12-12

## 2023-12-12 NOTE — PROGRESS NOTES
Assessment   34 y.o. Joesph Arcos presenting for annual exam.     Plan   Diagnoses and all orders for this visit:    Routine gynecological examination  -     Liquid-based pap, screening    Normal findings on routine exam.  Encouraged 150 min of exercise per week. Reviewed balanced diet. Multivitamin encouraged   Breast awareness/SBE encouraged       Screening for HPV (human papillomavirus)  -     Liquid-based pap, screening    Encounter for surveillance of contraceptive pills  -     norgestrel-ethinyl estradiol (Low-Ogestrel) 0.3 mg-30 mcg per tablet; Take 1 tablet by mouth daily    Withdrawal bleeds are light and regular on current contraceptive. She is happy with this and desires to continue. Aware of symptoms to report. Refill sent to pharmacy on file. Pap - done today   Mammo - due @ 42     RTO one year for yearly exam or sooner as needed. __________________________________________________________________    Subjective     Baudilio Farooq is a 34 y.o. Joesph Arcos presenting for annual exam.     Not currently working. Enjoys playing video games and drawing (anime) in free time. Not exercising regularly. SCREENING  Last Pap: 10/26/2020 NILM  Last Mammo: 2023 - b/l diagnostic mammo and left breast USdone for palpable mass of left breast - BIRADS 1 - Negative - return to routine screening @ 42   Last Colonoscopy:n/a     GYN    Periods are regular every 4 weeks, lasting  3-4  days. Dysmenorrhea:mild, occurring first 1-2 days of flow. Cyclic symptoms include none    Sexually active: No - never   Concerns: denies pain, bleeding, dryness   Contraception: Low Ogestrel     Hx Abnormal pap: denies  We reviewed ASCCP guidelines for Pap testing today. Gardasil: She has completed the Gardasil series.     Denies vaginal discharge, itching, odor, dyspareunia, pelvic pain and vulvar/vaginal symptoms    OB         Complaints: denies   Denies urgency, frequency, hematuria, leakage / change in stream, difficulty urinating. BREAST  Complaints: denies   Denies: breast lump, breast tenderness, nipple discharge, skin color change, and skin lesion(s)    Pertinent Family Hx:   Family hx of breast cancer: no  Family hx of ovarian cancer: no  Family hx of colon cancer: no      GENERAL  PMH reviewed/updated and is as below.      Past Medical History:   Diagnosis Date    BMI 20.0-20.9, adult 10/9/2023    Congenital dislocation of hip     Depression     LAST ASSESSED: 8/4/12    Disease of thyroid gland     Pectus excavatum     Polyarthralgia 10/9/2023    Scoliosis        Past Surgical History:   Procedure Laterality Date    DEVEN ACETABULAR OSTEOTOMY Left     ACETABULAR OSTEOTOMY         Current Outpatient Medications:     buPROPion (Wellbutrin XL) 300 mg 24 hr tablet, Take 1 tablet (300 mg total) by mouth daily, Disp: 90 tablet, Rfl: 1    desmopressin (DDAVP) 0.2 mg tablet, TAKE 2 TABLETS (0.4 MG TOTAL) BY MOUTH 2 (TWO) TIMES A DAY, Disp: 120 tablet, Rfl: 5    fludrocortisone (FLORINEF) 0.1 mg tablet, TAKE 1 TABLET BY MOUTH TWICE A DAY, Disp: 60 tablet, Rfl: 5    ivabradine HCl (Corlanor) 5 MG tablet, Take 1 tablet (5 mg total) by mouth 2 (two) times a day, Disp: 180 tablet, Rfl: 3    levothyroxine 125 mcg tablet, Take 1 tablet (125 mcg total) by mouth daily, Disp: 90 tablet, Rfl: 1    midodrine (PROAMATINE) 10 MG tablet, TAKE 1 TABLET BY MOUTH THREE TIMES A DAY, Disp: 90 tablet, Rfl: 2    naproxen (NAPROSYN) 500 mg tablet, Take 1 tablet (500 mg total) by mouth daily as needed for moderate pain, Disp: 30 tablet, Rfl: 1    nebivolol (BYSTOLIC) 5 mg tablet, TAKE 1 TABLET BY MOUTH TWICE A DAY, Disp: 60 tablet, Rfl: 5    norgestrel-ethinyl estradiol (Low-Ogestrel) 0.3 mg-30 mcg per tablet, Take 1 tablet by mouth daily, Disp: 84 tablet, Rfl: 4    potassium chloride (MICRO-K) 10 MEQ CR capsule, TAKE 1 CAPSULE BY MOUTH EVERY DAY, Disp: 30 capsule, Rfl: 2    sertraline (ZOLOFT) 50 mg tablet, Take 1.5 tablets (75 mg total) by mouth daily, Disp: 45 tablet, Rfl: 2    traMADol-acetaminophen (ULTRACET) 37.5-325 mg per tablet, Take 1 tablet by mouth every 6 (six) hours as needed for moderate pain, Disp: 30 tablet, Rfl: 0    Vitamin D, Cholecalciferol, 25 MCG (1000 UT) CAPS, Take 3,000 Units by mouth , Disp: , Rfl:     albuterol (Ventolin HFA) 90 mcg/act inhaler, Inhale 2 puffs every 6 (six) hours as needed for wheezing or shortness of breath, Disp: 18 g, Rfl: 1    clindamycin (CLEOCIN T) 1 %, APPLY TO ACNE TWICE DAILY (Patient not taking: Reported on 12/12/2023), Disp: , Rfl: 5    Allergies   Allergen Reactions    Epinephrine Dizziness     Causes dysautonomia    Fluoxetine      Other reaction(s): Flushed       Social History     Socioeconomic History    Marital status: Single     Spouse name: Not on file    Number of children: Not on file    Years of education: Not on file    Highest education level: Not on file   Occupational History    Occupation: UNEMPLOYED   Tobacco Use    Smoking status: Never    Smokeless tobacco: Never   Vaping Use    Vaping Use: Never used   Substance and Sexual Activity    Alcohol use: Yes     Comment: SOCIAL    Drug use: No    Sexual activity: Not Currently     Birth control/protection: OCP   Other Topics Concern    Not on file   Social History Narrative    CURRENTLY IN SCHOOL: WILL BE STARTING AT [address] 1/2014    LIVING WITH PARENTS     Social Determinants of Health     Financial Resource Strain: Not on file   Food Insecurity: Not on file   Transportation Needs: Not on file   Physical Activity: Not on file   Stress: Not on file   Social Connections: Not on file   Intimate Partner Violence: Not on file   Housing Stability: Not on file       Review of Systems     Constitutional: Negative. Respiratory: Negative. Cardiovascular: Negative   Gastrointestinal: Negative   Breasts: As noted above. Genitourinary: As noted above.    Psychiatric: Negative     Objective      /68 (BP Location: Left arm, Patient Position: Sitting, Cuff Size: Standard)   Pulse 75   Ht 5' 6.5" (1.689 m)   Wt 60.2 kg (132 lb 12.8 oz)   LMP 12/09/2023   BMI 21.11 kg/m²     Physical Examination:    Patient appears well and is not in distress  Breasts are symmetrical without mass, tenderness, nipple discharge, skin changes or adenopathy. Pectus excavatum   Abdomen is soft and nontender without masses. External genitals are normal without lesions or rashes. Urethral meatus and urethra are normal  Bladder is normal to palpation  Vagina is normal without discharge or bleeding. Cervix is normal without discharge or lesion. Uterus is normal, mobile, nontender without palpable mass. Adnexa are normal, nontender, without palpable mass.

## 2023-12-13 LAB
HPV HR 12 DNA CVX QL NAA+PROBE: NEGATIVE
HPV16 DNA CVX QL NAA+PROBE: NEGATIVE
HPV18 DNA CVX QL NAA+PROBE: NEGATIVE

## 2023-12-18 LAB
LAB AP GYN PRIMARY INTERPRETATION: NORMAL
Lab: NORMAL

## 2023-12-20 ENCOUNTER — OFFICE VISIT (OUTPATIENT)
Dept: PULMONOLOGY | Facility: CLINIC | Age: 33
End: 2023-12-20
Payer: COMMERCIAL

## 2023-12-20 VITALS
TEMPERATURE: 97.6 F | DIASTOLIC BLOOD PRESSURE: 70 MMHG | WEIGHT: 129.4 LBS | HEART RATE: 93 BPM | SYSTOLIC BLOOD PRESSURE: 102 MMHG | BODY MASS INDEX: 20.31 KG/M2 | OXYGEN SATURATION: 98 % | HEIGHT: 67 IN

## 2023-12-20 DIAGNOSIS — G25.81 RESTLESS LEGS: ICD-10-CM

## 2023-12-20 DIAGNOSIS — G47.33 OSA (OBSTRUCTIVE SLEEP APNEA): Primary | ICD-10-CM

## 2023-12-20 DIAGNOSIS — J45.20 MILD INTERMITTENT ASTHMA WITHOUT COMPLICATION: ICD-10-CM

## 2023-12-20 PROCEDURE — 99214 OFFICE O/P EST MOD 30 MIN: CPT | Performed by: INTERNAL MEDICINE

## 2023-12-20 NOTE — PROGRESS NOTES
Assessment/Plan:    CHARLEE (obstructive sleep apnea)  Ms.Brianna Parker has snoring and daytime tiredness and sleepiness.  She has morning headache.  Her sleep is interrupted.  Usually goes to sleep around 2:00 and wakes up at around 10:00.  She has history suggestive of restless legs.  She has hypothyroidism as comorbidity.  She had COVID before.  She also has Erler Danlos syndrome.  On clinical examination, her chest was clear to auscultation.  She had mild oropharyngeal crowding.  Her overnight sleep study done recently however did not show any significant sleep apnea or oxygen desaturation or leg movements.  Currently she is not having much symptoms.  We will observe her at this time.  I had a long discussion with her and answered all her questions.    Mild intermittent asthma without complication  She has shortness of breath on exertion and she states that this started mostly after COVID infection she had about a year back.  This is not associated with any cough or phlegm or wheeze or chest pain.  She has family history of asthma in her brother and mother.  She has history of allergies.  She is currently not on any inhalers or oxygen.  On clinical examination her chest was clear to auscultation.  The etiology of her shortness of breath is not clear at this point.  This could be due to asthma or could be secondary to her chest deformity.  Her PFT showed mild reduction in diffusion capacity.  She did not benefit from albuterol and stopped using it.  Her shortness of breath is currently improved.  We will monitor her for now.  I had a long discussion with her and answered all her questions     Restless legs  She has some features of restless leg syndrome.  However the symptoms are not bothersome.  Her sleep study did not show any significant Pediotic limb movements.       Diagnoses and all orders for this visit:    CHARLEE (obstructive sleep apnea)    Mild intermittent asthma without complication    Restless legs           Subjective:      Patient ID: Courtney Parker is a 33 y.o. female.    Ms. Courtney Gupta came for follow-up for her suspected obstructive sleep apnea.  She had an overnight laboratory study which did not reveal any significant sleep events or oxygen desaturation or leg movements.  She currently denies any significant snoring though she feels tired.  She has no excessive daytime sleepiness now.  Her sleep is not interrupted now.  Her thyroxine has been increased recently.  She has some restless leg symptoms, but they are not bothersome.  Her shortness of breath is improved.  She tried albuterol inhaler which did not help.  Currently she denies any cough or phlegm or wheeze or chest pain.  No swelling of feet.  She has POTS syndrome.  She occasionally feels dizzy or lightheaded.  She used to work as a .  Currently she is not working.  Her appetite is good.  She has no significant weight loss or anorexia.  No fever or chills.        The following portions of the patient's history were reviewed and updated as appropriate: allergies, current medications, past family history, past medical history, past social history, past surgical history, and problem list.    Review of Systems   Constitutional:  Positive for fatigue. Negative for activity change, appetite change, chills and fever.   HENT:  Positive for rhinorrhea. Negative for hearing loss, postnasal drip, sneezing, sore throat and trouble swallowing.    Eyes:  Negative for visual disturbance.   Respiratory:  Positive for shortness of breath. Negative for cough, chest tightness and wheezing.    Cardiovascular:  Negative for chest pain, palpitations and leg swelling.   Gastrointestinal:  Negative for abdominal pain, constipation, diarrhea, nausea and vomiting.   Genitourinary:  Negative for dysuria, frequency and urgency.   Musculoskeletal:  Positive for back pain. Negative for arthralgias and gait problem.   Skin:  Positive for rash (acne  "rosacea).   Allergic/Immunologic: Negative for environmental allergies.   Neurological:  Positive for dizziness, syncope and light-headedness. Negative for headaches.   Psychiatric/Behavioral:  Negative for agitation, confusion and sleep disturbance.          Objective:      /70 (BP Location: Left arm, Patient Position: Sitting, Cuff Size: Standard)   Pulse 93   Temp 97.6 °F (36.4 °C) (Tympanic)   Ht 5' 6.5\" (1.689 m)   Wt 58.7 kg (129 lb 6.4 oz)   LMP 12/09/2023   SpO2 98%   BMI 20.57 kg/m²          Physical Exam  Vitals reviewed.   Constitutional:       General: She is not in acute distress.     Appearance: She is obese. She is not ill-appearing, toxic-appearing or diaphoretic.   HENT:      Head: Normocephalic.      Mouth/Throat:      Mouth: Mucous membranes are moist.   Eyes:      General: No scleral icterus.     Conjunctiva/sclera: Conjunctivae normal.   Cardiovascular:      Rate and Rhythm: Normal rate.      Heart sounds: Normal heart sounds. No murmur heard.  Pulmonary:      Effort: Pulmonary effort is normal. No respiratory distress.      Breath sounds: Normal breath sounds. No stridor. No wheezing, rhonchi or rales.   Chest:      Chest wall: No tenderness.   Abdominal:      General: Bowel sounds are normal.      Palpations: Abdomen is soft.      Tenderness: There is no abdominal tenderness. There is no guarding.   Musculoskeletal:      Cervical back: No rigidity.      Right lower leg: No edema.      Left lower leg: No edema.   Lymphadenopathy:      Cervical: No cervical adenopathy.   Skin:     Coloration: Skin is not jaundiced or pale.      Findings: No rash.   Neurological:      Mental Status: She is alert and oriented to person, place, and time.      Gait: Gait normal.   Psychiatric:         Mood and Affect: Mood normal.         Behavior: Behavior normal.         Thought Content: Thought content normal.         Judgment: Judgment normal.      I spent 30 minutes of time taking care of this " patient with complex medical issues.  The majority of this time was spent directly with the patient counseling as well as coordinating care.

## 2023-12-21 NOTE — ASSESSMENT & PLAN NOTE
Ms.Brianna Parker has snoring and daytime tiredness and sleepiness.  She has morning headache.  Her sleep is interrupted.  Usually goes to sleep around 2:00 and wakes up at around 10:00.  She has history suggestive of restless legs.  She has hypothyroidism as comorbidity.  She had COVID before.  She also has Erler Danlos syndrome.  On clinical examination, her chest was clear to auscultation.  She had mild oropharyngeal crowding.  Her overnight sleep study done recently however did not show any significant sleep apnea or oxygen desaturation or leg movements.  Currently she is not having much symptoms.  We will observe her at this time.  I had a long discussion with her and answered all her questions.

## 2023-12-21 NOTE — ASSESSMENT & PLAN NOTE
She has shortness of breath on exertion and she states that this started mostly after COVID infection she had about a year back.  This is not associated with any cough or phlegm or wheeze or chest pain.  She has family history of asthma in her brother and mother.  She has history of allergies.  She is currently not on any inhalers or oxygen.  On clinical examination her chest was clear to auscultation.  The etiology of her shortness of breath is not clear at this point.  This could be due to asthma or could be secondary to her chest deformity.  Her PFT showed mild reduction in diffusion capacity.  She did not benefit from albuterol and stopped using it.  Her shortness of breath is currently improved.  We will monitor her for now.  I had a long discussion with her and answered all her questions

## 2023-12-21 NOTE — ASSESSMENT & PLAN NOTE
She has some features of restless leg syndrome.  However the symptoms are not bothersome.  Her sleep study did not show any significant Pediotic limb movements.

## 2024-01-23 DIAGNOSIS — G90.A POTS (POSTURAL ORTHOSTATIC TACHYCARDIA SYNDROME): ICD-10-CM

## 2024-01-23 RX ORDER — MIDODRINE HYDROCHLORIDE 10 MG/1
TABLET ORAL
Qty: 90 TABLET | Refills: 2 | Status: SHIPPED | OUTPATIENT
Start: 2024-01-23

## 2024-01-23 RX ORDER — FLUDROCORTISONE ACETATE 0.1 MG/1
TABLET ORAL
Qty: 60 TABLET | Refills: 5 | Status: SHIPPED | OUTPATIENT
Start: 2024-01-23

## 2024-02-13 ENCOUNTER — TELEMEDICINE (OUTPATIENT)
Dept: INTERNAL MEDICINE CLINIC | Facility: CLINIC | Age: 34
End: 2024-02-13

## 2024-02-13 DIAGNOSIS — T75.3XXS MOTION SICKNESS, SEQUELA: ICD-10-CM

## 2024-02-13 DIAGNOSIS — G90.A POTS (POSTURAL ORTHOSTATIC TACHYCARDIA SYNDROME): ICD-10-CM

## 2024-02-13 DIAGNOSIS — R06.02 SOB (SHORTNESS OF BREATH) ON EXERTION: ICD-10-CM

## 2024-02-13 DIAGNOSIS — E55.9 VITAMIN D DEFICIENCY: ICD-10-CM

## 2024-02-13 DIAGNOSIS — G93.32 CHRONIC FATIGUE SYNDROME: ICD-10-CM

## 2024-02-13 DIAGNOSIS — E78.5 DYSLIPIDEMIA: ICD-10-CM

## 2024-02-13 DIAGNOSIS — Q79.62 EHLERS-DANLOS SYNDROME, TYPE 3: ICD-10-CM

## 2024-02-13 DIAGNOSIS — E87.6 HYPOKALEMIA: ICD-10-CM

## 2024-02-13 DIAGNOSIS — E03.9 PRIMARY HYPOTHYROIDISM: Primary | ICD-10-CM

## 2024-02-13 DIAGNOSIS — F33.9 RECURRENT MAJOR DEPRESSIVE DISORDER, REMISSION STATUS UNSPECIFIED (HCC): ICD-10-CM

## 2024-02-13 PROBLEM — F32.9 MAJOR DEPRESSIVE DISORDER: Status: ACTIVE | Noted: 2024-02-13

## 2024-02-13 PROBLEM — T75.3XXA MOTION SICKNESS: Status: ACTIVE | Noted: 2024-02-13

## 2024-02-13 PROCEDURE — 99213 OFFICE O/P EST LOW 20 MIN: CPT | Performed by: INTERNAL MEDICINE

## 2024-02-13 RX ORDER — MECLIZINE HCL 12.5 MG/1
12.5 TABLET ORAL EVERY 8 HOURS PRN
Qty: 20 TABLET | Refills: 0 | Status: SHIPPED | OUTPATIENT
Start: 2024-02-13

## 2024-02-13 NOTE — PROGRESS NOTES
Virtual Brief Visit    This Visit is being completed by telephone. The Patient is located at Home and in the following state in which I hold an active license [place]    The patient was identified by name and date of birth. Courtney Parker was informed that this is a telemedicine visit and that the visit is being conducted through Telephone.  My office door was closed. No one else was in the room.  She acknowledged consent and understanding of privacy and security of the video platform. The patient has agreed to participate and understands they can discontinue the visit at any time.    Patient is aware this summary/plan: A billable service.       Assessment/Plan:    Problem List Items Addressed This Visit          Endocrine    Primary hypothyroidism - Primary    Relevant Orders    CBC and differential    Comprehensive metabolic panel    TSH, 3rd generation       Cardiovascular and Mediastinum    POTS (postural orthostatic tachycardia syndrome)    Relevant Orders    CBC and differential    Comprehensive metabolic panel    Ambulatory Referral to Physical Therapy       Nervous and Auditory    Chronic fatigue syndrome    Relevant Orders    CBC and differential    Comprehensive metabolic panel    Ambulatory Referral to Physical Therapy       Musculoskeletal and Integument    Nia-Danlos syndrome, benign hypermobile form    Relevant Orders    Ambulatory Referral to Physical Therapy       Other    Vitamin D deficiency    Hypokalemia    Relevant Orders    Comprehensive metabolic panel    Dyslipidemia    Relevant Orders    Lipid panel    SOB (shortness of breath) on exertion    Relevant Orders    CBC and differential    Comprehensive metabolic panel    Ambulatory Referral to Physical Therapy    Motion sickness    Relevant Medications    meclizine (ANTIVERT) 12.5 MG tablet    Major depressive disorder    Relevant Orders    CBC and differential    Comprehensive metabolic panel     Discussion/summary/plan:    Her last TSH was  elevated to 5.96 so her levothyroxine was increased from 112 to 125 mcg once a day.  Will check TSH and advise accordingly.  Advised to continue present dose.  Patient has been seen and followed by cardiologist for her postural orthostatic tachycardia syndrome.  Patient states she gets some motion sickness particular when she rides in the car as a passenger in the backseat.  She has no problem when she drives in front seat.  Will prescribe meclizine 12.5 mg tablet to take 1 hour prior to traveling.  She has been seen and followed by her psychiatrist for management of her  depression.  She has a chronic fatigue syndrome and Nia-Danlos syndrome.  She gets shortness of breath on exertion since after she had a COVID 19 infection.  She was seen by pulmonary specialist has a pulmonary evaluation.  She tried albuterol inhaler but was not effective so stopped using.  Patient would like to start physical therapy as it helps for her chronic fatigue, Nia-Danlos, POLST and shortness of breath per patient.  She has a tried physical therapy in the past and was helping her so we will refer her for physical therapy.  Last cholesterol 198, triglyceride 100, HDL 53,  advised to continue low-cholesterol diet.  She takes potassium supplement for hypokalemia last potassium 4.0 advised to continue  present potassium supplement will follow electrolytes.  Vitamin D deficiency resolved on vitamin D supplement.  Last vitamin D level 41.      She was advised to get her last updated COVID-19 vaccination.          Subjective:  Patient presents for follow-up for medical problems.    HPI:  33-year-old female patient presents follow-up on medical problems.  Patient denies any chest pain.  Has chronic shortness of breath on exertion.  Has a chronic fatigue.  Denies any fever chills.  Denies nausea vomiting diarrhea.  She was Seen by pulmonary specialist.  Also she has been seen and followed by cardiologist as well as  psychiatrist.    Objective:  Patient was awake alert oriented x 3.  She was not any acute distress.  Medications, laboratory data, specialist notes reviewed.      Recent Visits  No visits were found meeting these conditions.  Showing recent visits within past 7 days and meeting all other requirements  Today's Visits  Date Type Provider Dept   02/13/24 Telemedicine Catherine Shirley MD [facility]   Showing today's visits and meeting all other requirements  Future Appointments  No visits were found meeting these conditions.  Showing future appointments within next 150 days and meeting all other requirements         Visit Time  Total Visit Duration: 20minutes

## 2024-04-02 DIAGNOSIS — E87.6 LOW BLOOD POTASSIUM: ICD-10-CM

## 2024-04-02 RX ORDER — POTASSIUM CHLORIDE 750 MG/1
CAPSULE, EXTENDED RELEASE ORAL
Qty: 30 CAPSULE | Refills: 2 | Status: SHIPPED | OUTPATIENT
Start: 2024-04-02

## 2024-04-02 NOTE — TELEPHONE ENCOUNTER
Patient called to request refill of potassium chloride 10mEq. She was informed that a refill request from [facility] is pending approval. She said she has enough med for 2 days. Rerouting with high priority    Note; med previously prescribed by former PCP. She has established care with Dr. Shirley at [facility]

## 2024-04-02 NOTE — TELEPHONE ENCOUNTER
I spoke with patient and advised to please complete blood work. Patient states that she goes to [facility] and has the orders that were given to her in February.

## 2024-04-03 DIAGNOSIS — F40.10 SOCIAL ANXIETY DISORDER: ICD-10-CM

## 2024-04-05 DIAGNOSIS — E03.9 PRIMARY HYPOTHYROIDISM: ICD-10-CM

## 2024-04-05 RX ORDER — LEVOTHYROXINE SODIUM 0.12 MG/1
125 TABLET ORAL DAILY
Qty: 90 TABLET | Refills: 1 | Status: SHIPPED | OUTPATIENT
Start: 2024-04-05

## 2024-04-10 LAB
ALBUMIN SERPL-MCNC: 4.4 G/DL (ref 3.5–5.7)
ALP SERPL-CCNC: 72 U/L (ref 35–120)
ALT SERPL-CCNC: 29 U/L
ANION GAP SERPL CALCULATED.3IONS-SCNC: 9 MMOL/L (ref 3–11)
AST SERPL-CCNC: 22 U/L
BASOPHILS # BLD AUTO: 0 THOU/CMM (ref 0–0.1)
BASOPHILS NFR BLD AUTO: 0 %
BILIRUB SERPL-MCNC: 0.7 MG/DL (ref 0.2–1)
BUN SERPL-MCNC: 11 MG/DL (ref 7–25)
CALCIUM SERPL-MCNC: 9.3 MG/DL (ref 8.5–10.1)
CHLORIDE SERPL-SCNC: 105 MMOL/L (ref 100–109)
CHOLEST SERPL-MCNC: 200 MG/DL
CHOLEST/HDLC SERPL: 3.8 {RATIO}
CO2 SERPL-SCNC: 26 MMOL/L (ref 21–31)
CREAT SERPL-MCNC: 0.67 MG/DL (ref 0.4–1.1)
CYTOLOGY CMNT CVX/VAG CYTO-IMP: NORMAL
DIFFERENTIAL METHOD BLD: ABNORMAL
EOSINOPHIL # BLD AUTO: 0.1 THOU/CMM (ref 0–0.5)
EOSINOPHIL NFR BLD AUTO: 2 %
ERYTHROCYTE [DISTWIDTH] IN BLOOD BY AUTOMATED COUNT: 13.2 % (ref 12–16)
GFR/BSA.PRED SERPLBLD CYS-BASED-ARV: 119 ML/MIN/{1.73_M2}
GLUCOSE SERPL-MCNC: 86 MG/DL (ref 65–99)
HCT VFR BLD AUTO: 39 % (ref 35–43)
HDLC SERPL-MCNC: 53 MG/DL (ref 23–92)
HGB BLD-MCNC: 13.3 G/DL (ref 11.5–14.5)
LDLC SERPL CALC-MCNC: 128 MG/DL
LYMPHOCYTES # BLD AUTO: 3.3 THOU/CMM (ref 1–3)
LYMPHOCYTES NFR BLD AUTO: 50 %
MCH RBC QN AUTO: 32.4 PG (ref 26–34)
MCHC RBC AUTO-ENTMCNC: 34.2 G/DL (ref 32–37)
MCV RBC AUTO: 95 FL (ref 80–100)
MONOCYTES # BLD AUTO: 0.4 THOU/CMM (ref 0.3–1)
MONOCYTES NFR BLD AUTO: 6 %
NEUTROPHILS # BLD AUTO: 2.7 THOU/CMM (ref 1.8–7.8)
NEUTROPHILS NFR BLD AUTO: 42 %
NONHDLC SERPL-MCNC: 147 MG/DL
PLATELET # BLD AUTO: 189 THOU/CMM (ref 140–350)
PMV BLD REES-ECKER: 10.1 FL (ref 7.5–11.3)
POTASSIUM SERPL-SCNC: 3.9 MMOL/L (ref 3.5–5.2)
PROT SERPL-MCNC: 6.4 G/DL (ref 6.3–8.3)
RBC # BLD AUTO: 4.1 MILL/CMM (ref 3.7–4.7)
SODIUM SERPL-SCNC: 140 MMOL/L (ref 135–145)
TRIGL SERPL-MCNC: 95 MG/DL
TSH SERPL-ACNC: 1.51 UIU/ML (ref 0.45–5.33)
WBC # BLD AUTO: 6.5 THOU/CMM (ref 4–10)

## 2024-04-11 ENCOUNTER — TELEPHONE (OUTPATIENT)
Dept: INTERNAL MEDICINE CLINIC | Facility: CLINIC | Age: 34
End: 2024-04-11

## 2024-04-11 NOTE — TELEPHONE ENCOUNTER
----- Message from Catherine Shirley MD sent at 4/11/2024 12:50 PM EDT -----  Please call patient that her cholesterol increased from 198 to 200 borderline elevated.  Normal should be less than 200.  So advised to watch diet for cholesterol.  All other blood tests are unremarkable and within normal limit.

## 2024-04-11 NOTE — TELEPHONE ENCOUNTER
LDM- advised patient to call with any further questions or concerns.    Zoryve Counseling:  I discussed with the patient that Zoryve is not for use in the eyes, mouth or vagina. The most commonly reported side effects include diarrhea, headache, insomnia, application site pain, upper respiratory tract infections, and urinary tract infections.  All of the patient's questions and concerns were addressed.

## 2024-05-26 DIAGNOSIS — G90.A POTS (POSTURAL ORTHOSTATIC TACHYCARDIA SYNDROME): ICD-10-CM

## 2024-05-26 DIAGNOSIS — I10 ESSENTIAL HYPERTENSION: ICD-10-CM

## 2024-05-26 RX ORDER — NEBIVOLOL 5 MG/1
TABLET ORAL
Qty: 60 TABLET | Refills: 2 | Status: SHIPPED | OUTPATIENT
Start: 2024-05-26

## 2024-05-28 RX ORDER — MIDODRINE HYDROCHLORIDE 10 MG/1
TABLET ORAL
Qty: 90 TABLET | Refills: 2 | Status: SHIPPED | OUTPATIENT
Start: 2024-05-28

## 2024-06-04 ENCOUNTER — OFFICE VISIT (OUTPATIENT)
Dept: OBGYN CLINIC | Facility: MEDICAL CENTER | Age: 34
End: 2024-06-04
Payer: COMMERCIAL

## 2024-06-04 VITALS
DIASTOLIC BLOOD PRESSURE: 62 MMHG | SYSTOLIC BLOOD PRESSURE: 96 MMHG | HEIGHT: 67 IN | WEIGHT: 123 LBS | BODY MASS INDEX: 19.3 KG/M2

## 2024-06-04 DIAGNOSIS — R10.2 VAGINAL PAIN: Primary | ICD-10-CM

## 2024-06-04 DIAGNOSIS — N94.10 DYSPAREUNIA IN FEMALE: ICD-10-CM

## 2024-06-04 PROCEDURE — 99213 OFFICE O/P EST LOW 20 MIN: CPT | Performed by: NURSE PRACTITIONER

## 2024-06-04 NOTE — PATIENT INSTRUCTIONS
Normal findings on pelvic exam  Referred to pelvic floor PT for evaluation.  Briefly discussed use of vaginal dilators.   Return to office for annual exam and as needed.

## 2024-06-04 NOTE — PROGRESS NOTES
"Assessment/Plan:  Normal findings on pelvic exam  Referred to pelvic floor PT for evaluation.  Briefly discussed use of vaginal dilators.   Return to office for annual exam and as needed.        1. Vaginal pain  -     Ambulatory referral to Physical Therapy; Future  2. Dyspareunia in female  -     Ambulatory referral to Physical Therapy; Future             Subjective:      Patient ID: Courtney Parker is a 33 y.o. female.    HPI    Courtney Parker is a 33 y.o.  female who is here today for a problem visit . Hx of amalia danlos syndrome, scoliosis, connective tissue disease.   Here with c/o  lifelong vaginal pain with any insertion. She recently tried to have penetrative sex for the first time and was unable to tolerated this.   He is able to insert one finger into her vagina which she said was \"somewhat painful and he said it's really tight.\"  Rates vaginal pain 5/10. Pain resolves within  3-4 hours. No other aggravating factors.     Menarche 13. Denies any history of sexual trauma.   Monthly menses x 4-5 days with mod flow. Menses is acceptable on her DARRIAN.    Uses pads with her menses.   Courtney Parker is not penetratively sexually active with male partner of 3 months.   She is  monogamous.   She uses DARRIAN  for contraception.   She is not interested in STD screening today.   She denies vaginal discharge, itching, pelvic pain.   She has no  urinary concerns, does not have incontinence.  No bowel concerns.       The following portions of the patient's history were reviewed and updated as appropriate: allergies, current medications, past family history, past medical history, past social history, past surgical history, and problem list.    Review of Systems   Constitutional: Negative.    Eyes:  Negative for visual disturbance.   Respiratory:  Negative for chest tightness.    Cardiovascular:  Negative for chest pain.   Gastrointestinal:  Negative for abdominal pain, constipation, diarrhea, nausea and " "vomiting.   Genitourinary:  Positive for dyspareunia. Negative for difficulty urinating, dysuria, genital sores, menstrual problem, pelvic pain, vaginal bleeding and vaginal discharge.   Musculoskeletal: Negative.    Skin: Negative.    Neurological:  Negative for weakness and headaches.   Psychiatric/Behavioral: Negative.     All other systems reviewed and are negative.        Objective:      BP 96/62 (BP Location: Left arm, Patient Position: Sitting, Cuff Size: Standard)   Ht 5' 6.5\" (1.689 m)   Wt 55.8 kg (123 lb)   LMP 05/18/2024 (Exact Date)   BMI 19.56 kg/m²          Physical Exam  Vitals and nursing note reviewed.   Constitutional:       Appearance: Normal appearance. She is well-developed.   Genitourinary:     General: Normal vulva.      Exam position: Lithotomy position.      Labia:         Right: No rash, tenderness, lesion or injury.         Left: No rash, tenderness, lesion or injury.       Urethra: No prolapse, urethral pain, urethral swelling or urethral lesion.      Vagina: Normal.      Cervix: Normal.      Uterus: Normal.       Adnexa: Right adnexa normal and left adnexa normal.        Right: No mass, tenderness or fullness.          Left: No mass, tenderness or fullness.        Rectum: No external hemorrhoid.      Comments: Slightly guarded during vaginal exam.   Utilized deep breathing techniques  Slightly narrow pubic arch noted.     Musculoskeletal:         General: Normal range of motion.   Lymphadenopathy:      Lower Body: No right inguinal adenopathy. No left inguinal adenopathy.   Skin:     General: Skin is warm and dry.   Neurological:      Mental Status: She is alert and oriented to person, place, and time.   Psychiatric:         Mood and Affect: Mood normal.         Behavior: Behavior normal.         "

## 2024-06-07 ENCOUNTER — EVALUATION (OUTPATIENT)
Dept: PHYSICAL THERAPY | Facility: REHABILITATION | Age: 34
End: 2024-06-07
Payer: COMMERCIAL

## 2024-06-07 DIAGNOSIS — R10.2 VAGINAL PAIN: ICD-10-CM

## 2024-06-07 DIAGNOSIS — N94.10 DYSPAREUNIA IN FEMALE: ICD-10-CM

## 2024-06-07 PROCEDURE — 97112 NEUROMUSCULAR REEDUCATION: CPT

## 2024-06-07 PROCEDURE — 97162 PT EVAL MOD COMPLEX 30 MIN: CPT
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
## 2024-06-13 ENCOUNTER — TELEPHONE (OUTPATIENT)
Age: 34
End: 2024-06-13

## 2024-06-13 ENCOUNTER — OFFICE VISIT (OUTPATIENT)
Dept: PHYSICAL THERAPY | Facility: REHABILITATION | Age: 34
End: 2024-06-13
Payer: COMMERCIAL

## 2024-06-13 DIAGNOSIS — R10.2 VAGINAL PAIN: Primary | ICD-10-CM

## 2024-06-13 DIAGNOSIS — N94.10 DYSPAREUNIA IN FEMALE: ICD-10-CM

## 2024-06-13 PROCEDURE — 97140 MANUAL THERAPY 1/> REGIONS: CPT

## 2024-06-13 PROCEDURE — 97110 THERAPEUTIC EXERCISES: CPT

## 2024-06-13 NOTE — TELEPHONE ENCOUNTER
Mike for patient to call back  Last seen 6/4/24 with heaven amaya  Current oral contraceptive conor preston

## 2024-06-13 NOTE — PROGRESS NOTES
Daily Note     Today's date: 2024  Patient name: Courtney Parker  : 1990  MRN: [id number]  Referring provider: Irma Bates CRNP  Dx:   Encounter Diagnosis     ICD-10-CM    1. Vaginal pain  R10.2       2. Dyspareunia in female  N94.10           Start Time: 1030  Stop Time: 1130  Total time in clinic (min): 60 minutes      Chief Complaint: Dyspareunia and painful penetration  HPI: Pt presents to PT with lifelong history of painful gynecologic exams and recent dyspareunia w/ onset of sexual function. Pt currently unable to tolerate complete PIV penetration and experiences mod-severe pain w/ speculum use. Pt pmhx positive for Nia-danlos syndrome.   Occupation: Not working- likes watching videos, drawing            Subjective: Pt reports adherence to PFM exercises w/ mod difficulty w/ coordination. Was able to purchase dilators for home use.       Objective: See treatment diary below      (2024)   Pelvic Floor Muscle Exam:       Pelvic floor muscle relaxation is incomplete.   0% pelvic floor relaxation        PERFECT Score   Power right: 1+/5   Power left: 1+/5   Endurance (seconds to max): 3    Fast flicks (in 10 seconds): 0   Perfect Score: High resting tone throughout   Unable to tolerate full digital penetration 2/2 pain (50%)   P! Present in layer 1-3   Unable to perform relaxation of PPFM       Assessment: Tolerated treatment well. Patient tolerated breath work well to address deficits related to PFM coordination. Pt performed breathing coordination with max cueing required w/ internal cueing. Pt continues to present with difficulty with PFM relaxation and digital penetration tolerance however improved since last session indicated by decreased pain provocation and 80% digital insertion. . Pt tolerated dilator training well with 100% insertion of size 1 dilator w/ pain from 0-3/10. Educated pt throughout on breathing mechanics, PFM education, and proper dilator progression tactics. Pt  "would continue to benefit from skilled physical therapy to guide progression of interventions related to deficits contributing to dyspareunia and painful vaginal insertion, limiting pt's experience during gynecologic exams and sexual function. Next visit, progress dilator training and continue w/ PFM stretching.         Plan: Continue per plan of care.      Precautions:   Patient Active Problem List   Diagnosis    POTS (postural orthostatic tachycardia syndrome)    Nia-Danlos syndrome, benign hypermobile form    Adolescent idiopathic scoliosis of thoracolumbar region    Allergic rhinitis    Chronic fatigue syndrome    Dermatographism    Hallucinations    Mast cell activation syndrome (HCC)    Palpitations    Pectus excavatum    Primary hypothyroidism    Recurrent major depressive disorder, in partial remission (HCC)    Sinus tachycardia    Vitamin D deficiency    Hypokalemia    Dyslipidemia    Diffuse connective tissue disease (HCC)    Scoliosis (and kyphoscoliosis), idiopathic    Unilateral congenital dislocation of hip    Severe episode of recurrent major depressive disorder, without psychotic features (HCC)    Benign paroxysmal positional vertigo    CHARLEE (obstructive sleep apnea)    Restless legs    SOB (shortness of breath) on exertion    Strep pharyngitis    Mild intermittent asthma without complication    BMI 20.0-20.9, adult    Polyarthralgia    Motion sickness    Major depressive disorder         Diagnosis:    POC expires (Date that your POC expires) Auth Status? (BOMN, approved, pending) Unit limit (Daily) Auth Start date Expiration date PT/OT + Visit Limit?   8/16/2024  BOMN         Date of Service 6/7 6/13       Visits Used 1 2       Visits Remaining           [employer] Created 6/7 6/13                Neuro Re-Ed         Urge deferral         Breathing Mechanics 4\"4\" breath work -   Max cueing w/ 0% accuracy for PFM relaxation   [HEP]        PFM Coordination   ** deepakk       PFM Down Training        "   Internal Cueing                  Ther Ex         PFM Strengthening         Hip strengthening         Functional Strengthening         Abdominal Strengthening         Dilator Training Provided handout  Educated and performed guided progression through size 1 30'   100% insertion tolerated        Aerobic         Therapeutic Rest Breaks                  Ther Activity         PFM Education         Voiding Diaries          Defecation Mechanics         Fluid Intake          Review of Sxs  Performed                 Manual Ther         PFM exam Performed         Ortho exam         PFM Manuals  30' PFM stretching (80% insertion)                          Modalities                           Patient Education                  Outcome Measure           PGQ - 3

## 2024-06-14 ENCOUNTER — NURSE TRIAGE (OUTPATIENT)
Age: 34
End: 2024-06-14

## 2024-06-14 ENCOUNTER — OFFICE VISIT (OUTPATIENT)
Dept: OBGYN CLINIC | Facility: MEDICAL CENTER | Age: 34
End: 2024-06-14
Payer: COMMERCIAL

## 2024-06-14 VITALS
BODY MASS INDEX: 19.64 KG/M2 | WEIGHT: 122.2 LBS | DIASTOLIC BLOOD PRESSURE: 76 MMHG | SYSTOLIC BLOOD PRESSURE: 90 MMHG | HEIGHT: 66 IN

## 2024-06-14 DIAGNOSIS — N92.6 IRREGULAR MENSES: Primary | ICD-10-CM

## 2024-06-14 PROCEDURE — 99213 OFFICE O/P EST LOW 20 MIN: CPT | Performed by: NURSE PRACTITIONER

## 2024-06-14 NOTE — TELEPHONE ENCOUNTER
"Patient calling to report cycle began 1 week early and she has been bleeding for 11 days. Her flow is mild-moderate with mild cramping and she is not passing any clots. She states it was heavier for one day this week where she had to change her pad every couple hrs but denies passing clots at that time. She restarted her OCP in December and this is the first time she's experienced any abnormal bleeding since. She denies possibility of pregnancy or STD, odor, or discharge. Recommended appointment within 2 weeks. Patient prefers [facility]. Reviewed availability. [facility] has several appts this afternoon. Patient scheduled.     Reason for Disposition   Periods last > 7 days    Answer Assessment - Initial Assessment Questions  1. AMOUNT: \"Describe the bleeding that you are having.\"     - SPOTTING: spotting, or pinkish / brownish mucous discharge; does not fill panti-liner or pad     - MILD:  less than 1 pad / hour; less than patient's usual menstrual bleeding    - MODERATE: 1-2 pads / hour; 1 menstrual cup every 6 hours; small-medium blood clots (e.g., pea, grape, small coin)    - SEVERE: soaking 2 or more pads/hour for 2 or more hours; 1 menstrual cup every 2 hours; bleeding not contained by pads or continuous red blood from vagina; large blood clots (e.g., golf ball, large coin)       Mild flow currently - Heavy 2 days ago (3 pads throughout the day) - Denies clots.  2. ONSET: \"When did the bleeding begin?\" \"Is it continuing now?\"      11 days - 1 week early  3. MENSTRUAL PERIOD: \"When was the last normal menstrual period?\" \"How is this different than your period?\"      Last Month  4. REGULARITY: \"How regular are your periods?\"      Very regular on OCP  5. ABDOMINAL PAIN: \"Do you have any pain?\" \"How bad is the pain?\"  (e.g., Scale 1-10; mild, moderate, or severe)    - MILD (1-3): doesn't interfere with normal activities, abdomen soft and not tender to touch     - MODERATE (4-7): interferes with normal activities or awakens " "from sleep, tender to touch     - SEVERE (8-10): excruciating pain, doubled over, unable to do any normal activities       Mild cramping  6. PREGNANCY: \"Could you be pregnant?\" \"Are you sexually active?\" \"Did you recently give birth?\"      Denies  7. BREASTFEEDING: \"Are you breastfeeding?\"      Denies  8. HORMONES: \"Are you taking any hormone medications, prescription or OTC?\" (e.g., birth control pills, estrogen)      OCP  9. BLOOD THINNERS: \"Do you take any blood thinners?\" (e.g., Coumadin/warfarin, Pradaxa/dabigatran, aspirin)      Denies  10. CAUSE: \"What do you think is causing the bleeding?\" (e.g., recent gyn surgery, recent gyn procedure; known bleeding disorder, cervical cancer, polycystic ovarian disease, fibroids)          Denies recent injury or procedure, recent start with pelvic floor physical therapy and started using dilators (started dilators yesterday on cycle day 10, Pelvic floor therapy started on day 4 of cycle)  11. HEMODYNAMIC STATUS: \"Are you weak or feeling lightheaded?\" If Yes, ask: \"Can you stand and walk normally?\"         Lightheadedness - normal with cycle due to history POTS  12. OTHER SYMPTOMS: \"What other symptoms are you having with the bleeding?\" (e.g., passed tissue, vaginal discharge, fever, menstrual-type cramps)        Denies discharge, odor, concern for STD. Denies missing any pills or taking pills late    Restarted OCP in December.    Protocols used: Vaginal Bleeding - Abnormal-ADULT-OH    "

## 2024-06-14 NOTE — PATIENT INSTRUCTIONS
Will monitor for any recurrence or continuation of bleeding.   Start new pack of birth control today.   Continue to take one pill daily at the same time.   Reviewed last TSH result.  May consider pelvic US with persistent abnormal bleeding.  Treatment for menstrual cramps/bleeding- Ibuprofen 600 mg by mouth with onset of bleeding or cramping, whichever is first. Take second dose of ibuprofen  400 mg by mouth with food and repeat every 6 hours x 3 days.  Discussed the effect on stress to menstrual period. Recommend stress relieving activities.   Return to office for annual exam and as needed  Recommend continuation of pelvic floor PT.

## 2024-06-14 NOTE — PROGRESS NOTES
Assessment/Plan:  Will monitor for any recurrence or continuation of bleeding.   Start new pack of birth control today.   Continue to take one pill daily at the same time.   Reviewed last TSH result.  May consider pelvic US with persistent abnormal bleeding.  Treatment for menstrual cramps/bleeding- Ibuprofen 600 mg by mouth with onset of bleeding or cramping, whichever is first. Take second dose of ibuprofen  400 mg by mouth with food and repeat every 6 hours x 3 days.  Discussed the effect on stress to menstrual period. Recommend stress relieving activities.   Return to office for annual exam and as needed  Recommend continuation of pelvic floor PT.            1. Irregular menses             Subjective:      Patient ID: Courtney Parker is a 33 y.o. female.    HPI    Courtney Parker is a 33 y.o.  female who is here today for a problem visit .   Last in office on 24 for problem visit-> vagianl pain and dyspareunia. She was referred to pelvic floor PT. She has already started PT and seeing improvement.     Here today with c/o irregular vaginal bleeding. Menses arrive one week earlier than planned and has been bleeding x 11 days. Flow is moderate with cramping.  Today her flow is light and possibly resolving. Denies abnormal vaginal discharge, pelvic pain. No pregnancy concerns. Compliant with her birth control and denies missing any doses. Partner uses condoms.   Currently on the placebo week of her DARRIAN.     4/10/24 normal TSH. Acceptable CBC.         The following portions of the patient's history were reviewed and updated as appropriate: allergies, current medications, past family history, past medical history, past social history, past surgical history, and problem list.    Review of Systems   Constitutional: Negative.    Respiratory:  Negative for chest tightness and shortness of breath.    Cardiovascular:  Negative for chest pain.   Gastrointestinal:  Negative for abdominal pain, constipation,  Pt is a 33 yo  @ 32w6d presented to [facility] for worsening left flank pain x 3 days.  Pt has established care with [facility] and was admitted to [facility] last weekend for 2 days. She was found to have  labor with cervix changed from closed cervix to 2 cm.  She was given tocolytic and BMZ and was discharged home.  Her renal US and UA were normal at the time    Pt presented here today with similar complaint.  Pt reported pain has been unchanged since she was at [facility].  Pt reported pain is contant but also occassionally worse, pain radiating from the left flank to groin.  She denies dysuria, blood in urine or fever or chills  + n/v which has slightly worsen for the past few days, she does have morning sickness but not taking her zofran  + constipation x 3-4 days   "diarrhea, nausea and vomiting.   Genitourinary:  Positive for menstrual problem and vaginal bleeding. Negative for dysuria and vaginal discharge.   Skin: Negative.    Neurological:  Negative for headaches.   Psychiatric/Behavioral: Negative.     All other systems reviewed and are negative.        Objective:      BP 90/76 (BP Location: Left arm, Patient Position: Sitting, Cuff Size: Standard)   Ht 5' 6.25\" (1.683 m)   Wt 55.4 kg (122 lb 3.2 oz)   LMP 06/04/2024 (Exact Date)   BMI 19.58 kg/m²          Physical Exam  Vitals and nursing note reviewed.   Constitutional:       Appearance: Normal appearance. She is well-developed.   Genitourinary:     General: Normal vulva.      Exam position: Lithotomy position.      Labia:         Right: No rash, tenderness, lesion or injury.         Left: No rash, tenderness, lesion or injury.       Urethra: No prolapse, urethral pain, urethral swelling or urethral lesion.      Vagina: No signs of injury and foreign body. Bleeding (light) present. No vaginal discharge, erythema, tenderness, lesions or prolapsed vaginal walls.      Cervix: Normal.      Uterus: Normal.       Adnexa: Right adnexa normal and left adnexa normal.        Right: No mass, tenderness or fullness.          Left: No mass, tenderness or fullness.        Rectum: No external hemorrhoid.      Comments: Utilized breathing techniques for pelvic exam.   Musculoskeletal:         General: Normal range of motion.   Lymphadenopathy:      Lower Body: No right inguinal adenopathy. No left inguinal adenopathy.   Skin:     General: Skin is warm and dry.   Neurological:      Mental Status: She is alert and oriented to person, place, and time.   Psychiatric:         Mood and Affect: Mood normal.         Behavior: Behavior normal.         "

## 2024-06-20 ENCOUNTER — OFFICE VISIT (OUTPATIENT)
Dept: PHYSICAL THERAPY | Facility: REHABILITATION | Age: 34
End: 2024-06-20
Payer: COMMERCIAL

## 2024-06-20 DIAGNOSIS — N94.10 DYSPAREUNIA IN FEMALE: ICD-10-CM

## 2024-06-20 DIAGNOSIS — R10.2 VAGINAL PAIN: Primary | ICD-10-CM

## 2024-06-20 PROCEDURE — 97110 THERAPEUTIC EXERCISES: CPT

## 2024-06-20 PROCEDURE — 97140 MANUAL THERAPY 1/> REGIONS: CPT

## 2024-06-20 NOTE — PROGRESS NOTES
Daily Note     Today's date: 2024  Patient name: Courtney Parker  : 1990  MRN: [id number]  Referring provider: Irma Bates CRNP  Dx:   Encounter Diagnosis     ICD-10-CM    1. Vaginal pain  R10.2       2. Dyspareunia in female  N94.10             Start Time: 1030  Stop Time: 1130  Total time in clinic (min): 60 minutes      Chief Complaint: Dyspareunia and painful penetration  HPI: Pt presents to PT with lifelong history of painful gynecologic exams and recent dyspareunia w/ onset of sexual function. Pt currently unable to tolerate complete PIV penetration and experiences mod-severe pain w/ speculum use. Pt pmhx positive for Nia-danlos syndrome.   Occupation: Not currently - Enjoys watching videos, movies, drawing, and goes to [employer].            Subjective: Pt reports adherence to PFM exercises w/ mod difficulty w/ coordination. Was able to purchase dilators for home use.       Objective: See treatment diary below      (2024)   Pelvic Floor Muscle Exam:       Pelvic floor muscle relaxation is incomplete.   0% pelvic floor relaxation        PERFECT Score   Power right: 1+/5   Power left: 1+/5   Endurance (seconds to max): 3    Fast flicks (in 10 seconds): 0   Perfect Score: High resting tone throughout   Unable to tolerate full digital penetration 2/2 pain (50%)   P! Present in layer 1-3   Unable to perform relaxation of PPFM       Assessment: Tolerated treatment well. Patient tolerated internal cueing well to address deficits of PFM overactivity. Pt performed PFMC w/ breathing coordination with max cueing required to promote relaxation. Pt continues to present with difficulty with insertion tolerance to (60% digital insertion). Pt was educated through dilator insertion and proper progression. Pt was able to tolerance insertion of size 1 dilator pain free and thus introduced dynamic stretching that posed increased difficulty. Progressed to size 2 w/ 50% insertion tolerated at 3/10 pain. Pt was  educated on proper breathing mechanics to incorporate PFMC to pt's current exercises at home. Pt would continue to benefit from skilled physical therapy to guide progression of interventions related to deficits contributing to dyspareunia and painful vaginal insertion, limiting pt's experience during gynecologic exams and sexual function. Next visit, progress dilator training and continue w/ PFM stretching and or continue to progress exercises as tolerated6/.         Plan: Continue per plan of care.      Precautions:   Patient Active Problem List   Diagnosis    POTS (postural orthostatic tachycardia syndrome)    Nia-Danlos syndrome, benign hypermobile form    Adolescent idiopathic scoliosis of thoracolumbar region    Allergic rhinitis    Chronic fatigue syndrome    Dermatographism    Hallucinations    Mast cell activation syndrome (HCC)    Palpitations    Pectus excavatum    Primary hypothyroidism    Recurrent major depressive disorder, in partial remission (HCC)    Sinus tachycardia    Vitamin D deficiency    Hypokalemia    Dyslipidemia    Diffuse connective tissue disease (HCC)    Scoliosis (and kyphoscoliosis), idiopathic    Unilateral congenital dislocation of hip    Severe episode of recurrent major depressive disorder, without psychotic features (HCC)    Benign paroxysmal positional vertigo    CHARLEE (obstructive sleep apnea)    Restless legs    SOB (shortness of breath) on exertion    Strep pharyngitis    Mild intermittent asthma without complication    BMI 20.0-20.9, adult    Polyarthralgia    Motion sickness    Major depressive disorder         Diagnosis:    POC expires (Date that your POC expires) Auth Status? (BOMN, approved, pending) Unit limit (Daily) Auth Start date Expiration date PT/OT + Visit Limit?   8/16/2024  BOMN         Date of Service 6/7 6/13 6/20      Visits Used 1 2 3      Visits Remaining           [facility] Created 6/7 6/13 6/20               Neuro Re-Ed         Urge deferral        "  Breathing Mechanics 4\"4\" breath work -   Max cueing w/ 0% accuracy for PFM relaxation   [HEP]        PFM Coordination   * * winbak      PFM Down Training          Internal Cueing                  Ther Ex         PFM Strengthening   PFMC + Exhale w/:   - Bridges   - Lunges on Bosu   - Bicep curls x20 #5       Hip strengthening         Functional Strengthening         Abdominal Strengthening         Dilator Training Provided handout  Educated and performed guided progression through size 1 30'   100% insertion tolerated  Educated and toleraed size 1 w/ 100% insertion and size 2 w/ 50%       Aerobic         Therapeutic Rest Breaks                  Ther Activity         PFM Education         Voiding Diaries          Defecation Mechanics         Fluid Intake          Review of Sxs  Performed                 Manual Ther         PFM exam Performed         Ortho exam         PFM Manuals  30' PFM stretching (80% insertion)  10' PFM stretching                        Modalities                           Patient Education                  Outcome Measure           PGQ - 3                  "

## 2024-06-24 DIAGNOSIS — E87.6 LOW BLOOD POTASSIUM: ICD-10-CM

## 2024-06-24 DIAGNOSIS — G90.A POTS (POSTURAL ORTHOSTATIC TACHYCARDIA SYNDROME): ICD-10-CM

## 2024-06-24 RX ORDER — POTASSIUM CHLORIDE 750 MG/1
CAPSULE, EXTENDED RELEASE ORAL
Qty: 30 CAPSULE | Refills: 5 | Status: SHIPPED | OUTPATIENT
Start: 2024-06-24

## 2024-06-25 RX ORDER — DESMOPRESSIN ACETATE 0.2 MG/1
0.4 TABLET ORAL 2 TIMES DAILY
Qty: 120 TABLET | Refills: 5 | Status: SHIPPED | OUTPATIENT
Start: 2024-06-25 | End: 2024-12-22

## 2024-06-27 ENCOUNTER — OFFICE VISIT (OUTPATIENT)
Dept: PHYSICAL THERAPY | Facility: REHABILITATION | Age: 34
End: 2024-06-27
Payer: COMMERCIAL

## 2024-06-27 DIAGNOSIS — R10.2 VAGINAL PAIN: Primary | ICD-10-CM

## 2024-06-27 DIAGNOSIS — N94.10 DYSPAREUNIA IN FEMALE: ICD-10-CM

## 2024-06-27 PROCEDURE — 97110 THERAPEUTIC EXERCISES: CPT

## 2024-06-27 PROCEDURE — 97140 MANUAL THERAPY 1/> REGIONS: CPT

## 2024-06-27 PROCEDURE — 97112 NEUROMUSCULAR REEDUCATION: CPT

## 2024-06-27 NOTE — PROGRESS NOTES
Daily Note     Today's date: 2024  Patient name: Courtney Parker  : 1990  MRN: [id number]  Referring provider: Irma Bates CRNP  Dx:   Encounter Diagnosis     ICD-10-CM    1. Vaginal pain  R10.2       2. Dyspareunia in female  N94.10               Start Time: 1100  Stop Time: 1145  Total time in clinic (min): 45 minutes      Chief Complaint: Dyspareunia and painful penetration  HPI: Pt presents to PT with lifelong history of painful gynecologic exams and recent dyspareunia w/ onset of sexual function. Pt currently unable to tolerate complete PIV penetration and experiences mod-severe pain w/ speculum use. Pt pmhx positive for Nia-danlos syndrome.   Occupation: Not currently - Enjoys watching videos, movies, drawing, and goes to [employer].            Subjective: Has used the dilators twice since last visit, no 2.  Is feeling more comfortable with it.     Objective: See treatment diary below      (2024)   Pelvic Floor Muscle Exam:       Pelvic floor muscle relaxation is incomplete.   0% pelvic floor relaxation        PERFECT Score   Power right: 1+/5   Power left: 1+/5   Endurance (seconds to max): 3    Fast flicks (in 10 seconds): 0   Perfect Score: High resting tone throughout   Unable to tolerate full digital penetration 2/2 pain (50%)   P! Present in layer 1-3   Unable to perform relaxation of PPFM       Assessment: Tolerated treatment well. As planned by primary PT introduced winback for manual techniques.  Able to detect heat well.  Combination of stretching and education how to contract and relax with pelvic elevators.  Encouraged to practice at home.  Also given different suggestions for positioning.       HEP: pelvic elevators and pelvic drops    Plan: Continue per plan of care.      Precautions:   Patient Active Problem List   Diagnosis    POTS (postural orthostatic tachycardia syndrome)    Nia-Danlos syndrome, benign hypermobile form    Adolescent idiopathic scoliosis of thoracolumbar  "region    Allergic rhinitis    Chronic fatigue syndrome    Dermatographism    Hallucinations    Mast cell activation syndrome (HCC)    Palpitations    Pectus excavatum    Primary hypothyroidism    Recurrent major depressive disorder, in partial remission (HCC)    Sinus tachycardia    Vitamin D deficiency    Hypokalemia    Dyslipidemia    Diffuse connective tissue disease (HCC)    Scoliosis (and kyphoscoliosis), idiopathic    Unilateral congenital dislocation of hip    Severe episode of recurrent major depressive disorder, without psychotic features (HCC)    Benign paroxysmal positional vertigo    CHARLEE (obstructive sleep apnea)    Restless legs    SOB (shortness of breath) on exertion    Strep pharyngitis    Mild intermittent asthma without complication    BMI 20.0-20.9, adult    Polyarthralgia    Motion sickness    Major depressive disorder         Diagnosis:    POC expires (Date that your POC expires) Auth Status? (BOMN, approved, pending) Unit limit (Daily) Auth Start date Expiration date PT/OT + Visit Limit?   8/16/2024  BOMN         Date of Service 6/7 6/13 6/20 6/27     Visits Used 1 2 3 4     Visits Remaining           [facility] Created 6/7 6/13 6/20               Neuro Re-Ed         Urge deferral         Breathing Mechanics 4\"4\" breath work -   Max cueing w/ 0% accuracy for PFM relaxation   [HEP]        PFM Coordination   * Pelvic elevators, C/R 10'      PFM Down Training          Internal Cueing                  Ther Ex         PFM Strengthening   PFMC + Exhale w/:   - Bridges   - Lunges on Bosu   - Bicep curls x20 #5       Hip strengthening         Functional Strengthening         Abdominal Strengthening         Dilator Training Provided handout  Educated and performed guided progression through size 1 30'   100% insertion tolerated  Educated and toleraed size 1 w/ 100% insertion and size 2 w/ 50%  # 2 10'     Aerobic         Therapeutic Rest Breaks                  Ther Activity         PFM Education       "   Voiding Diaries          Defecation Mechanics         Fluid Intake          Review of Sxs  Performed                 Manual Ther         PFM exam Performed         Ortho exam         PFM Manuals  30' PFM stretching (80% insertion)  10' PFM stretching 25' winback 30% & paused with sustained stretch                       Modalities                           Patient Education                  Outcome Measure           PGQ - 3

## 2024-07-03 ENCOUNTER — OFFICE VISIT (OUTPATIENT)
Dept: PHYSICAL THERAPY | Facility: REHABILITATION | Age: 34
End: 2024-07-03
Payer: COMMERCIAL

## 2024-07-03 DIAGNOSIS — R10.2 VAGINAL PAIN: Primary | ICD-10-CM

## 2024-07-03 DIAGNOSIS — N94.10 DYSPAREUNIA IN FEMALE: ICD-10-CM

## 2024-07-03 PROCEDURE — 97140 MANUAL THERAPY 1/> REGIONS: CPT

## 2024-07-03 PROCEDURE — 97110 THERAPEUTIC EXERCISES: CPT

## 2024-07-03 NOTE — PROGRESS NOTES
Daily Note     Today's date: 7/3/2024  Patient name: Courtney Parker  : 1990  MRN: [id number]  Referring provider: Irma Bates CRNP  Dx:   Encounter Diagnosis     ICD-10-CM    1. Vaginal pain  R10.2       2. Dyspareunia in female  N94.10                 Start Time: 1200  Stop Time: 1245  Total time in clinic (min): 45 minutes      Chief Complaint: Dyspareunia and painful penetration  HPI: Pt presents to PT with lifelong history of painful gynecologic exams and recent dyspareunia w/ onset of sexual function. Pt currently unable to tolerate complete PIV penetration and experiences mod-severe pain w/ speculum use. Pt pmhx positive for Nia-danlos syndrome.   Occupation: Not currently - Enjoys watching videos, movies, drawing, and goes to [employer].            Subjective: Feels ready to use the dilator no. 3, is surprised how well she has progressed.      Objective: See treatment diary below      (2024)   Pelvic Floor Muscle Exam:       Pelvic floor muscle relaxation is incomplete.   0% pelvic floor relaxation        PERFECT Score   Power right: 1+/5   Power left: 1+/5   Endurance (seconds to max): 3    Fast flicks (in 10 seconds): 0   Perfect Score: High resting tone throughout   Unable to tolerate full digital penetration 2/2 pain (50%)   P! Present in layer 1-3   Unable to perform relaxation of PPFM       Assessment: Tolerated treatment well. Although there is no change with the Pelvic Girdle Questionnaire, pt feels more hopeful and optimistic at the idea of returning to sexual activity in the future.  Following winback manual techniques, trialed no. 3 which reproduced pain.  Encouraged to just rest it at the entrance and to focus on her breathing.  Pt also encouraged to practiced DB with happy baby stretch when she gets home. Scheduled for a re-evaluation next session.     HEP: pelvic elevators, pelvic drops and happy baby pre and post self stretching    Plan: Continue per plan of care.     "  Precautions:   Patient Active Problem List   Diagnosis    POTS (postural orthostatic tachycardia syndrome)    Nia-Danlos syndrome, benign hypermobile form    Adolescent idiopathic scoliosis of thoracolumbar region    Allergic rhinitis    Chronic fatigue syndrome    Dermatographism    Hallucinations    Mast cell activation syndrome (HCC)    Palpitations    Pectus excavatum    Primary hypothyroidism    Recurrent major depressive disorder, in partial remission (HCC)    Sinus tachycardia    Vitamin D deficiency    Hypokalemia    Dyslipidemia    Diffuse connective tissue disease (HCC)    Scoliosis (and kyphoscoliosis), idiopathic    Unilateral congenital dislocation of hip    Severe episode of recurrent major depressive disorder, without psychotic features (HCC)    Benign paroxysmal positional vertigo    CHARLEE (obstructive sleep apnea)    Restless legs    SOB (shortness of breath) on exertion    Strep pharyngitis    Mild intermittent asthma without complication    BMI 20.0-20.9, adult    Polyarthralgia    Motion sickness    Major depressive disorder         Diagnosis:    POC expires (Date that your POC expires) Auth Status? (BOMN, approved, pending) Unit limit (Daily) Auth Start date Expiration date PT/OT + Visit Limit?   8/16/2024  BOMN         Date of Service 6/7 6/13 6/20 6/27 7/3    Visits Used 1 2 3 4 5    Visits Remaining           [facility] Created 6/7 6/13 6/20               Neuro Re-Ed         Urge deferral         Breathing Mechanics 4\"4\" breath work -   Max cueing w/ 0% accuracy for PFM relaxation   [HEP]        PFM Coordination   * Pelvic elevators, C/R 10'      PFM Down Training          Internal Cueing                  Ther Ex         PFM Strengthening   PFMC + Exhale w/:   - Bridges   - Lunges on Bosu   - Bicep curls x20 #5       Hip strengthening         Functional Strengthening         Abdominal Strengthening         Dilator Training Provided handout  Educated and performed guided progression through " size 1 30'   100% insertion tolerated  Educated and toleraed size 1 w/ 100% insertion and size 2 w/ 50%  # 2 10' # 8'    Aerobic         Therapeutic Rest Breaks                  Ther Activity         PFM Education         Voiding Diaries          Defecation Mechanics         Fluid Intake          Review of Sxs  Performed                 Manual Ther         PFM exam Performed         Ortho exam         PFM Manuals  30' PFM stretching (80% insertion)  10' PFM stretching 25' winback 30% & paused with sustained stretch 30' winback 10-20%                      Modalities                           Patient Education                  Outcome Measure           PGQ - 3

## 2024-07-10 ENCOUNTER — APPOINTMENT (OUTPATIENT)
Dept: PHYSICAL THERAPY | Facility: REHABILITATION | Age: 34
End: 2024-07-10
Payer: COMMERCIAL

## 2024-07-17 ENCOUNTER — EVALUATION (OUTPATIENT)
Dept: PHYSICAL THERAPY | Facility: REHABILITATION | Age: 34
End: 2024-07-17
Payer: COMMERCIAL

## 2024-07-17 DIAGNOSIS — N94.10 DYSPAREUNIA IN FEMALE: ICD-10-CM

## 2024-07-17 DIAGNOSIS — R10.2 VAGINAL PAIN: Primary | ICD-10-CM

## 2024-07-17 PROCEDURE — 97112 NEUROMUSCULAR REEDUCATION: CPT

## 2024-07-17 PROCEDURE — 97140 MANUAL THERAPY 1/> REGIONS: CPT

## 2024-07-17 PROCEDURE — 97110 THERAPEUTIC EXERCISES: CPT

## 2024-07-17 NOTE — PROGRESS NOTES
Daily Note     Today's date: 2024  Patient name: Courtney Parker  : 1990  MRN: [id number]  Referring provider: Irma Bates CRNP  Dx:   Encounter Diagnosis     ICD-10-CM    1. Vaginal pain  R10.2       2. Dyspareunia in female  N94.10                   Start Time: 1055  Stop Time: 1155  Total time in clinic (min): 60 minutes      Chief Complaint: Dyspareunia and painful penetration  HPI: Pt presents to PT with lifelong history of painful gynecologic exams and recent dyspareunia w/ onset of sexual function. Pt currently unable to tolerate complete PIV penetration and experiences mod-severe pain w/ speculum use. Pt pmhx positive for Nia-danlos syndrome.   Occupation: Not currently - Enjoys watching videos, movies, drawing, and goes to [employer].            Subjective: Reports her romantic relationship has come to end which has added an element of emotional distress. Pt reports her goals of PT have no changed and she intends to continue PT. Reports increased difficulty w/ size 3 over past week and has continued using size 2 again.     Objective: See treatment diary below      (2024)   Pelvic Floor Muscle Exam:       Pelvic floor muscle relaxation is incomplete.   0% pelvic floor relaxation        PERFECT Score   Power right: 1+/5   Power left: 1+/5   Endurance (seconds to max): 3    Fast flicks (in 10 seconds): 0   Perfect Score: High resting tone throughout   Unable to tolerate full digital penetration 2/2 pain (50%)   P! Present in layer 1-3   Unable to perform relaxation of PPFM       Assessment: Tolerated treatment well. PT tolerated PFM stretch well w/ pain 3/10 or less throughout. Reinforced proper breathing mechanics, pelvic drops, and pelvic elevators. Incorporated TE for global relaxation w/ improved PFM coordination reported in child's pose and butterfly.     HEP: pelvic elevators, pelvic drops and happy baby pre and post self stretching    Plan: Continue per plan of care.      Precautions:  "  Patient Active Problem List   Diagnosis    POTS (postural orthostatic tachycardia syndrome)    Nia-Danlos syndrome, benign hypermobile form    Adolescent idiopathic scoliosis of thoracolumbar region    Allergic rhinitis    Chronic fatigue syndrome    Dermatographism    Hallucinations    Mast cell activation syndrome (HCC)    Palpitations    Pectus excavatum    Primary hypothyroidism    Recurrent major depressive disorder, in partial remission (HCC)    Sinus tachycardia    Vitamin D deficiency    Hypokalemia    Dyslipidemia    Diffuse connective tissue disease (HCC)    Scoliosis (and kyphoscoliosis), idiopathic    Unilateral congenital dislocation of hip    Severe episode of recurrent major depressive disorder, without psychotic features (HCC)    Benign paroxysmal positional vertigo    CHARLEE (obstructive sleep apnea)    Restless legs    SOB (shortness of breath) on exertion    Strep pharyngitis    Mild intermittent asthma without complication    BMI 20.0-20.9, adult    Polyarthralgia    Motion sickness    Major depressive disorder         Diagnosis:    POC expires (Date that your POC expires) Auth Status? (BOMN, approved, pending) Unit limit (Daily) Auth Start date Expiration date PT/OT + Visit Limit?   8/16/2024  BOMN         Date of Service 6/7 6/13 6/20 6/27 7/3 7/17   Visits Used 1 2 3 4 5 6   Visits Remaining           [facility] Created 6/7 6/13 6/20 7/17            Neuro Re-Ed         Urge deferral         Breathing Mechanics 4\"4\" breath work -   Max cueing w/ 0% accuracy for PFM relaxation   [HEP]        PFM Coordination   * Pelvic elevators, C/R 10'   Pelvic drops, breathing mechanics, and pelvic elevators 10'    PFM Down Training          Internal Cueing                  Ther Ex         PFM Strengthening   PFMC + Exhale w/:   - Bridges   - Lunges on Bosu   - Bicep curls x20 #5       Hip strengthening         Functional Strengthening         Abdominal Strengthening         Dilator Training Provided " handout  Educated and performed guided progression through size 1 30'   100% insertion tolerated  Educated and toleraed size 1 w/ 100% insertion and size 2 w/ 50%  # 2 10' # 8'    Aerobic         Therapeutic Rest Breaks         Mobility      Lizard pose (difficult)     Holger pose    Butterfly     15'   Ther Activity         PFM Education         Voiding Diaries          Defecation Mechanics         Fluid Intake          Review of Sxs  Performed                 Manual Ther         PFM exam Performed         Ortho exam         PFM Manuals  30' PFM stretching (80% insertion)  10' PFM stretching 25' winback 30% & paused with sustained stretch 30' winback 10-20% 35'                      Modalities                           Patient Education                  Outcome Measure           PGQ - 3

## 2024-07-24 ENCOUNTER — OFFICE VISIT (OUTPATIENT)
Dept: PHYSICAL THERAPY | Facility: REHABILITATION | Age: 34
End: 2024-07-24
Payer: COMMERCIAL

## 2024-07-24 DIAGNOSIS — N94.10 DYSPAREUNIA IN FEMALE: ICD-10-CM

## 2024-07-24 DIAGNOSIS — R10.2 VAGINAL PAIN: Primary | ICD-10-CM

## 2024-07-24 PROCEDURE — 97110 THERAPEUTIC EXERCISES: CPT

## 2024-07-24 PROCEDURE — 97140 MANUAL THERAPY 1/> REGIONS: CPT

## 2024-07-24 PROCEDURE — 97112 NEUROMUSCULAR REEDUCATION: CPT

## 2024-07-24 NOTE — PROGRESS NOTES
Daily Note     Today's date: 2024  Patient name: Courtney Parker  : 1990  MRN: [id number]  Referring provider: Irma Bates CRNP  Dx:   Encounter Diagnosis     ICD-10-CM    1. Vaginal pain  R10.2       2. Dyspareunia in female  N94.10                     Start Time: 1100  Stop Time: 1200  Total time in clinic (min): 60 minutes      Chief Complaint: Dyspareunia and painful penetration  HPI: Pt presents to PT with lifelong history of painful gynecologic exams and recent dyspareunia w/ onset of sexual function. Pt currently unable to tolerate complete PIV penetration and experiences mod-severe pain w/ speculum use. Pt pmhx positive for Nia-danlos syndrome.   Occupation: Not currently - Enjoys watching videos, movies, drawing, and goes to [employer].            Subjective: Reports adherence to dilator training x5 this week and able to insert size 3 to 50%.     Objective: See treatment diary below      (2024)   Pelvic Floor Muscle Exam:       Pelvic floor muscle relaxation is incomplete.   0% pelvic floor relaxation        PERFECT Score   Power right: 1+/5   Power left: 1+/5   Endurance (seconds to max): 3    Fast flicks (in 10 seconds): 0   Perfect Score: High resting tone throughout   Unable to tolerate full digital penetration 2/2 pain (50%)   P! Present in layer 1-3   Unable to perform relaxation of PPFM       Assessment: Tolerated treatment well. PT tolerated PFM stretch well w/ pain 3/10 or less throughout. Reinforced proper breathing mechanics throughout. Reinforced pelvic drops w/ continued difficulty. Educated through dilator training w/ 50% insertion tolerated of size 3 dilator. Reviewed global stretches for PFM relaxation and mobility.     HEP: pelvic elevators, pelvic drops and happy baby pre and post self stretching    Plan: Continue per plan of care.      Precautions:   Patient Active Problem List   Diagnosis    POTS (postural orthostatic tachycardia syndrome)    Nia-Danlos syndrome,  "benign hypermobile form    Adolescent idiopathic scoliosis of thoracolumbar region    Allergic rhinitis    Chronic fatigue syndrome    Dermatographism    Hallucinations    Mast cell activation syndrome (HCC)    Palpitations    Pectus excavatum    Primary hypothyroidism    Recurrent major depressive disorder, in partial remission (HCC)    Sinus tachycardia    Vitamin D deficiency    Hypokalemia    Dyslipidemia    Diffuse connective tissue disease (HCC)    Scoliosis (and kyphoscoliosis), idiopathic    Unilateral congenital dislocation of hip    Severe episode of recurrent major depressive disorder, without psychotic features (HCC)    Benign paroxysmal positional vertigo    CHARLEE (obstructive sleep apnea)    Restless legs    SOB (shortness of breath) on exertion    Strep pharyngitis    Mild intermittent asthma without complication    BMI 20.0-20.9, adult    Polyarthralgia    Motion sickness    Major depressive disorder         Diagnosis:    POC expires (Date that your POC expires) Auth Status? (BOMN, approved, pending) Unit limit (Daily) Auth Start date Expiration date PT/OT + Visit Limit?   8/16/2024  BOMN         Date of Service 6/7 6/13 6/20 6/27 7/3 7/17 7/24   Visits Used 1 2 3 4 5 6    Visits Remaining            [facility] Created 6/7 6/13 6/20 7/17              Neuro Re-Ed          Urge deferral          Breathing Mechanics 4\"4\" breath work -   Max cueing w/ 0% accuracy for PFM relaxation   [HEP]         PFM Coordination   * Pelvic elevators, C/R 10'   Pelvic drops, breathing mechanics, and pelvic elevators 10'  Pelvic drops, breathing mechanics, and pelvic elevators 10'    PFM Down Training           Internal Cueing                    Ther Ex          PFM Strengthening   PFMC + Exhale w/:   - Bridges   - Lunges on Bosu   - Bicep curls x20 #5        Hip strengthening          Functional Strengthening          Abdominal Strengthening          Dilator Training Provided handout  Educated and performed guided " progression through size 1 30'   100% insertion tolerated  Educated and toleraed size 1 w/ 100% insertion and size 2 w/ 50%  # 2 10' # 8'  10' size 3    Aerobic          Therapeutic Rest Breaks          Mobility      Lizard pose (difficult)     Holger pose    Butterfly     15' Reviewed deep squat, holger pose, and cat cow 5'    Ther Activity          PFM Education          Voiding Diaries           Defecation Mechanics          Fluid Intake           Review of Sxs  Performed                   Manual Ther          PFM exam Performed          Ortho exam          PFM Manuals  30' PFM stretching (80% insertion)  10' PFM stretching 25' winback 30% & paused with sustained stretch 30' winback 10-20% 35'  25'                        Modalities                              Patient Education                    Outcome Measure            PGQ - 3

## 2024-07-31 ENCOUNTER — OFFICE VISIT (OUTPATIENT)
Dept: PHYSICAL THERAPY | Facility: REHABILITATION | Age: 34
End: 2024-07-31
Payer: COMMERCIAL

## 2024-07-31 DIAGNOSIS — N94.10 DYSPAREUNIA IN FEMALE: ICD-10-CM

## 2024-07-31 DIAGNOSIS — R10.2 VAGINAL PAIN: Primary | ICD-10-CM

## 2024-07-31 PROCEDURE — 97140 MANUAL THERAPY 1/> REGIONS: CPT

## 2024-07-31 PROCEDURE — 97112 NEUROMUSCULAR REEDUCATION: CPT

## 2024-07-31 NOTE — PROGRESS NOTES
Daily Note     Today's date: 2024  Patient name: Courtney Parker  : 1990  MRN: [id number]  Referring provider: Irma Bates CRNP  Dx:   Encounter Diagnosis     ICD-10-CM    1. Vaginal pain  R10.2       2. Dyspareunia in female  N94.10                       Start Time: 1100  Stop Time: 1200  Total time in clinic (min): 60 minutes      Chief Complaint: Dyspareunia and painful penetration  HPI: Pt presents to PT with lifelong history of painful gynecologic exams and recent dyspareunia w/ onset of sexual function. Pt currently unable to tolerate complete PIV penetration and experiences mod-severe pain w/ speculum use. Pt pmhx positive for Nia-danlos syndrome.   Occupation: Not currently - Enjoys watching videos, movies, drawing, and goes to [employer].            Subjective: Reports adherence to dilator training x1 this week 2/2 stress.     Objective: See treatment diary below      (2024)   Pelvic Floor Muscle Exam:       Pelvic floor muscle relaxation is incomplete.   0% pelvic floor relaxation        PERFECT Score   Power right: 1+/5   Power left: 1+/5   Endurance (seconds to max): 3    Fast flicks (in 10 seconds): 0   Perfect Score: High resting tone throughout   Unable to tolerate full digital penetration 2/2 pain (50%)   P! Present in layer 1-3   Unable to perform relaxation of PPFM       Assessment: Tolerated treatment well. PT tolerated PFM stretch well w/ pain 3/10 or less throughout. Reinforced proper breathing mechanics throughout. Reinforced pelvic drops w/ continued difficulty. Reviewed proper breathing mechanics w/ external cues w/ good to fair performance, increased difficulty in standing but improved w/ towel cues.     HEP: pelvic elevators, pelvic drops and happy baby pre and post self stretching    Plan: Continue per plan of care.      Precautions:   Patient Active Problem List   Diagnosis    POTS (postural orthostatic tachycardia syndrome)    Nia-Danlos syndrome, benign hypermobile  "form    Adolescent idiopathic scoliosis of thoracolumbar region    Allergic rhinitis    Chronic fatigue syndrome    Dermatographism    Hallucinations    Mast cell activation syndrome (HCC)    Palpitations    Pectus excavatum    Primary hypothyroidism    Recurrent major depressive disorder, in partial remission (HCC)    Sinus tachycardia    Vitamin D deficiency    Hypokalemia    Dyslipidemia    Diffuse connective tissue disease (HCC)    Scoliosis (and kyphoscoliosis), idiopathic    Unilateral congenital dislocation of hip    Severe episode of recurrent major depressive disorder, without psychotic features (HCC)    Benign paroxysmal positional vertigo    CHARLEE (obstructive sleep apnea)    Restless legs    SOB (shortness of breath) on exertion    Strep pharyngitis    Mild intermittent asthma without complication    BMI 20.0-20.9, adult    Polyarthralgia    Motion sickness    Major depressive disorder         Diagnosis:    POC expires (Date that your POC expires) Auth Status? (BOMN, approved, pending) Unit limit (Daily) Auth Start date Expiration date PT/OT + Visit Limit?   8/16/2024  BOMN         Date of Service 6/7 6/13 6/20 6/27 7/3 7/17 7/24 7/31   Visits Used 1 2 3 4 5 6 7 8   Visits Remaining             [facility] Created 6/7 6/13 6/20 7/17 7/31              Neuro Re-Ed           Urge deferral           Breathing Mechanics 4\"4\" breath work -   Max cueing w/ 0% accuracy for PFM relaxation   [HEP]      Breathing mechanics in:   Hook lying     Sitting     Standing  15'   (Increased difficulty in standing)  Breathing mechanics in:   Hook lying     Sitting     Standing  15'   (Increased difficulty in standing)    PFM Coordination   * Pelvic elevators, C/R 10'   Pelvic drops, breathing mechanics, and pelvic elevators 10'  Pelvic drops, breathing mechanics, and pelvic elevators  In standing  10'  Pelvic drops, breathing mechanics, and pelvic elevators  In standing  10'    PFM Down Training            Internal Cueing     "                  Ther Ex           PFM Strengthening   PFMC + Exhale w/:   - Bridges   - Lunges on Bosu   - Bicep curls x20 #5         Hip strengthening           Functional Strengthening           Abdominal Strengthening           Dilator Training Provided handout  Educated and performed guided progression through size 1 30'   100% insertion tolerated  Educated and toleraed size 1 w/ 100% insertion and size 2 w/ 50%  # 2 10' # 8'  10' size 3     Aerobic           Therapeutic Rest Breaks           Mobility      Lizard pose (difficult)     Holger pose    Butterfly     15' Reviewed deep squat, holger pose, and cat cow 5'     Ther Activity           PFM Education           Voiding Diaries            Defecation Mechanics           Fluid Intake            Review of Sxs  Performed                     Manual Ther           PFM exam Performed           Ortho exam           PFM Manuals  30' PFM stretching (80% insertion)  10' PFM stretching 25' winback 30% & paused with sustained stretch 30' winback 10-20% 35'  30'  30'                         Modalities                                 Patient Education                      Outcome Measure             PGQ - 3

## 2024-08-07 ENCOUNTER — OFFICE VISIT (OUTPATIENT)
Dept: PHYSICAL THERAPY | Facility: REHABILITATION | Age: 34
End: 2024-08-07
Payer: COMMERCIAL

## 2024-08-07 DIAGNOSIS — N94.10 DYSPAREUNIA IN FEMALE: ICD-10-CM

## 2024-08-07 DIAGNOSIS — R10.2 VAGINAL PAIN: Primary | ICD-10-CM

## 2024-08-07 PROCEDURE — 97110 THERAPEUTIC EXERCISES: CPT

## 2024-08-07 NOTE — PROGRESS NOTES
Daily Note     Today's date: 2024  Patient name: Courtney Parker  : 1990  MRN: [id number]  Referring provider: Irma Bates CRNP  Dx:   Encounter Diagnosis     ICD-10-CM    1. Vaginal pain  R10.2       2. Dyspareunia in female  N94.10                       Start Time: 1100  Stop Time: 1200  Total time in clinic (min): 60 minutes      Chief Complaint: Dyspareunia and painful penetration  HPI: Pt presents to PT with lifelong history of painful gynecologic exams and recent dyspareunia w/ onset of sexual function. Pt currently unable to tolerate complete PIV penetration and experiences mod-severe pain w/ speculum use. Pt pmhx positive for Nia-danlos syndrome.   Occupation: Not currently - Enjoys watching videos, movies, drawing, and goes to [employer].            Subjective: Unable to perform dilator training this week 2/2 stress - however able to performed external breathing strategies w/ reported good performance.     Objective: See treatment diary below      (2024)   Pelvic Floor Muscle Exam:       Pelvic floor muscle relaxation is incomplete.   0% pelvic floor relaxation        PERFECT Score   Power right: 1+/5   Power left: 1+/5   Endurance (seconds to max): 3    Fast flicks (in 10 seconds): 0   Perfect Score: High resting tone throughout   Unable to tolerate full digital penetration 2/2 pain (50%)   P! Present in layer 1-3   Unable to perform relaxation of PPFM       Assessment: Tolerated treatment well. Pt was educated through dilator training. Pt tolerated size 2 to 10% insertion w/ 0/10 pain. Dynamic stretching revealed tenderness of 3 or below of layers 2/3. Educated through insertion of size 3 to ~ 1/3 insertion w/ 3/10 p! Reinforce pain management and breathing tactics throughout.   Patient tolerated therapeutic exercises well on reformer with fatigue noted in target muscle groups. Pt performed TE with proper breathing coordination with min cueing required and reported PFMC w/ exhale. Pt  showed difficulty w/ motor control in tall kneel w/ reformer (min A required but able to recover balance -subsequent Neymar/ close guard through remainder of exercise). Pt would continue to benefit from skilled physical therapy to guide progression of interventions related to deficits contributing to impaired vaginal penetration 2/2 PFMD, limiting pt's gynecologic care, sexual function, and increasing distress. Next visit, continue to progress PFM coordination as tolerated. .         Plan: Continue per plan of care.      Precautions:   Patient Active Problem List   Diagnosis    POTS (postural orthostatic tachycardia syndrome)    Nia-Danlos syndrome, benign hypermobile form    Adolescent idiopathic scoliosis of thoracolumbar region    Allergic rhinitis    Chronic fatigue syndrome    Dermatographism    Hallucinations    Mast cell activation syndrome (HCC)    Palpitations    Pectus excavatum    Primary hypothyroidism    Recurrent major depressive disorder, in partial remission (HCC)    Sinus tachycardia    Vitamin D deficiency    Hypokalemia    Dyslipidemia    Diffuse connective tissue disease (HCC)    Scoliosis (and kyphoscoliosis), idiopathic    Unilateral congenital dislocation of hip    Severe episode of recurrent major depressive disorder, without psychotic features (HCC)    Benign paroxysmal positional vertigo    CHARLEE (obstructive sleep apnea)    Restless legs    SOB (shortness of breath) on exertion    Strep pharyngitis    Mild intermittent asthma without complication    BMI 20.0-20.9, adult    Polyarthralgia    Motion sickness    Major depressive disorder         Diagnosis:    POC expires (Date that your POC expires) Auth Status? (BOMN, approved, pending) Unit limit (Daily) Auth Start date Expiration date PT/OT + Visit Limit?   8/16/2024  BOMN         Date of Service 6/7 6/13 6/20 6/27 7/3 7/17 7/24 7/31 8/7   Visits Used 1 2 3 4 5 6 7 8    Visits Remaining              [facility] Created 6/7 6/13 6/20 7/17   "7/31 8/7               Neuro Re-Ed            Urge deferral            Breathing Mechanics 4\"4\" breath work -   Max cueing w/ 0% accuracy for PFM relaxation   [HEP]      Breathing mechanics in:   Hook lying     Sitting     Standing  15'   (Increased difficulty in standing)  Breathing mechanics in:   Hook lying     Sitting     Standing  15'   (Increased difficulty in standing)     PFM Coordination   * Pelvic elevators, C/R 10'   Pelvic drops, breathing mechanics, and pelvic elevators 10'  Pelvic drops, breathing mechanics, and pelvic elevators  In standing  10'  Pelvic drops, breathing mechanics, and pelvic elevators  In standing  10'     PFM Down Training             Internal Cueing                        Ther Ex            PFM Strengthening   PFMC + Exhale w/:   - Bridges   - Lunges on Bosu   - Bicep curls x20 #5          Hip strengthening            Functional Strengthening            Abdominal Strengthening                        Dilator Training Provided handout  Educated and performed guided progression through size 1 30'   100% insertion tolerated  Educated and toleraed size 1 w/ 100% insertion and size 2 w/ 50%  # 2 10' # 8'  10' size 3   30' 2 and 3 size    Aerobic            Therapeutic Rest Breaks                     Reformer:     Bridges     SL leg press     DL leg press     Resisted trunk rotation in tall kneel     Lat pull down w/ DL lift    25'    Mobility      Lizard pose (difficult)     Holger pose    Butterfly     15' Reviewed deep squat, holger pose, and cat cow 5'      Ther Activity            PFM Education            Voiding Diaries             Defecation Mechanics            Fluid Intake             Review of Sxs  Performed                       Manual Ther            PFM exam Performed            Ortho exam            PFM Manuals  30' PFM stretching (80% insertion)  10' PFM stretching 25' winback 30% & paused with sustained stretch 30' winback 10-20% 35'  30'  30'                          "   Modalities                                    Patient Education                        Outcome Measure              PGQ - 3

## 2024-08-14 ENCOUNTER — OFFICE VISIT (OUTPATIENT)
Dept: PHYSICAL THERAPY | Facility: REHABILITATION | Age: 34
End: 2024-08-14
Payer: COMMERCIAL

## 2024-08-14 DIAGNOSIS — N94.10 DYSPAREUNIA IN FEMALE: ICD-10-CM

## 2024-08-14 DIAGNOSIS — R10.2 VAGINAL PAIN: Primary | ICD-10-CM

## 2024-08-14 PROCEDURE — 97110 THERAPEUTIC EXERCISES: CPT

## 2024-08-14 PROCEDURE — 97140 MANUAL THERAPY 1/> REGIONS: CPT

## 2024-08-14 NOTE — PROGRESS NOTES
Daily Note     Today's date: 2024  Patient name: Courtney Parker  : 1990  MRN: [id number]  Referring provider: Irma Bates CRNP  Dx:   Encounter Diagnosis     ICD-10-CM    1. Vaginal pain  R10.2       2. Dyspareunia in female  N94.10                       Start Time: 1100  Stop Time: 1200  Total time in clinic (min): 60 minutes      Chief Complaint: Dyspareunia and painful penetration  HPI: Pt presents to PT with lifelong history of painful gynecologic exams and recent dyspareunia w/ onset of sexual function. Pt currently unable to tolerate complete PIV penetration and experiences mod-severe pain w/ speculum use. Pt pmhx positive for Nia-danlos syndrome.   Occupation: Not currently - Enjoys watching videos, movies, drawing, and goes to [employer].            Subjective: reports distress associated w/ increasing to size 3 dilator given pain during removal at last session. Past week was menstruating and felt like that was contributing to increase in POTs dx.     Objective: See treatment diary below      (2024)   Pelvic Floor Muscle Exam:       Pelvic floor muscle relaxation is incomplete.   0% pelvic floor relaxation        PERFECT Score   Power right: 1+/5   Power left: 1+/5   Endurance (seconds to max): 3    Fast flicks (in 10 seconds): 0   Perfect Score: High resting tone throughout   Unable to tolerate full digital penetration 2/2 pain (50%)   P! Present in layer 1-3   Unable to perform relaxation of PPFM       Assessment: Tolerated treatment well. Patient tolerated PFM stretching well w/ 3/10 or less pain noted throughout. Noted increased soreness in L sided IC w/ production of pain noted during dilator training. Pt presented w/ increased AROM of PFM relaxation. Performed multiple positions to promote breathing tactics - pt reported dizziness during standing PFMC and ceased. Pt would continue to benefit from skilled physical therapy to guide progression of interventions related to deficits  "contributing to impaired vaginal penetration 2/2 PFMD, limiting pt's gynecologic care, sexual function, and increasing distress. Next visit is last visit prior to pt's 1 month vacation. Next visit, will re-evaluate pt goals and solidify HEP for transition to self management.         Plan: Continue per plan of care.      Precautions:   Patient Active Problem List   Diagnosis    POTS (postural orthostatic tachycardia syndrome)    Nia-Danlos syndrome, benign hypermobile form    Adolescent idiopathic scoliosis of thoracolumbar region    Allergic rhinitis    Chronic fatigue syndrome    Dermatographism    Hallucinations    Mast cell activation syndrome (HCC)    Palpitations    Pectus excavatum    Primary hypothyroidism    Recurrent major depressive disorder, in partial remission (HCC)    Sinus tachycardia    Vitamin D deficiency    Hypokalemia    Dyslipidemia    Diffuse connective tissue disease (HCC)    Scoliosis (and kyphoscoliosis), idiopathic    Unilateral congenital dislocation of hip    Severe episode of recurrent major depressive disorder, without psychotic features (HCC)    Benign paroxysmal positional vertigo    CHARLEE (obstructive sleep apnea)    Restless legs    SOB (shortness of breath) on exertion    Strep pharyngitis    Mild intermittent asthma without complication    BMI 20.0-20.9, adult    Polyarthralgia    Motion sickness    Major depressive disorder         Diagnosis:    POC expires (Date that your POC expires) Auth Status? (BOMN, approved, pending) Unit limit (Daily) Auth Start date Expiration date PT/OT + Visit Limit?   8/16/2024  BOMN         Date of Service 6/7 6/13 6/20 6/27 7/3 7/17 7/24 7/31 8/7 8/14   Visits Used 1 2 3 4 5 6 7 8 9 10   Visits Remaining               [facility] Created 6/7 6/13 6/20 7/17 7/31 8/7 8/14                Neuro Re-Ed             Urge deferral             Breathing Mechanics 4\"4\" breath work -   Max cueing w/ 0% accuracy for PFM relaxation   [HEP]      Breathing " mechanics in:   Hook lying     Sitting     Standing  15'   (Increased difficulty in standing)  Breathing mechanics in:   Hook lying     Sitting     Standing  15'   (Increased difficulty in standing)      PFM Coordination   * Pelvic elevators, C/R 10'   Pelvic drops, breathing mechanics, and pelvic elevators 10'  Pelvic drops, breathing mechanics, and pelvic elevators  In standing  10'  Pelvic drops, breathing mechanics, and pelvic elevators  In standing  10'      PFM Down Training              Internal Cueing                          Ther Ex             PFM Strengthening   PFMC + Exhale w/:   - Bridges   - Lunges on Bosu   - Bicep curls x20 #5        Happy baby     Sitting     Sitting w/ knees elevated     Standing (D.c)   10'    Hip strengthening             Functional Strengthening             Abdominal Strengthening                          Dilator Training Provided handout  Educated and performed guided progression through size 1 30'   100% insertion tolerated  Educated and toleraed size 1 w/ 100% insertion and size 2 w/ 50%  # 2 10' # 8'  10' size 3   30' 2 and 3 size     Aerobic             Therapeutic Rest Breaks                      Reformer:     Bridges     SL leg press     DL leg press     Resisted trunk rotation in tall kneel     Lat pull down w/ DL lift    25'     Mobility      Lizard pose (difficult)     Holger pose    Butterfly     15' Reviewed deep squat, holger pose, and cat cow 5'       Ther Activity             PFM Education             Voiding Diaries              Defecation Mechanics             Fluid Intake              Review of Sxs  Performed                         Manual Ther             PFM exam Performed             Ortho exam             PFM Manuals  30' PFM stretching (80% insertion)  10' PFM stretching 25' winback 30% & paused with sustained stretch 30' winback 10-20% 35'  30'  30'  45' windback 10% and 20%                              Modalities                                        Patient Education                          Outcome Measure               PGQ - 3

## 2024-08-15 ENCOUNTER — TELEPHONE (OUTPATIENT)
Age: 34
End: 2024-08-15

## 2024-08-20 ENCOUNTER — TELEPHONE (OUTPATIENT)
Age: 34
End: 2024-08-20

## 2024-08-20 NOTE — TELEPHONE ENCOUNTER
Patient called asking if PCP would fill her medication for now until she sees a new Psychiatrist. Her doctor left the practice and they now have her on a waiting list to set up another appt . She will be out of her Bupropion XL  300 mg daily and Sertraline 50 mg 1 1/2 daily ( 75 mg) to [facility]. Has only 2-3 days left

## 2024-08-21 ENCOUNTER — OFFICE VISIT (OUTPATIENT)
Dept: INTERNAL MEDICINE CLINIC | Facility: CLINIC | Age: 34
End: 2024-08-21
Payer: COMMERCIAL

## 2024-08-21 ENCOUNTER — OFFICE VISIT (OUTPATIENT)
Dept: PHYSICAL THERAPY | Facility: REHABILITATION | Age: 34
End: 2024-08-21
Payer: COMMERCIAL

## 2024-08-21 VITALS
BODY MASS INDEX: 20.67 KG/M2 | OXYGEN SATURATION: 99 % | HEART RATE: 91 BPM | RESPIRATION RATE: 16 BRPM | WEIGHT: 128.6 LBS | DIASTOLIC BLOOD PRESSURE: 68 MMHG | HEIGHT: 66 IN | SYSTOLIC BLOOD PRESSURE: 96 MMHG | TEMPERATURE: 98.7 F

## 2024-08-21 DIAGNOSIS — N94.10 DYSPAREUNIA IN FEMALE: ICD-10-CM

## 2024-08-21 DIAGNOSIS — H81.10 BENIGN PAROXYSMAL POSITIONAL VERTIGO, UNSPECIFIED LATERALITY: ICD-10-CM

## 2024-08-21 DIAGNOSIS — E78.5 DYSLIPIDEMIA: ICD-10-CM

## 2024-08-21 DIAGNOSIS — G93.32 CHRONIC FATIGUE SYNDROME: ICD-10-CM

## 2024-08-21 DIAGNOSIS — R10.2 VAGINAL PAIN: Primary | ICD-10-CM

## 2024-08-21 DIAGNOSIS — F40.10 SOCIAL ANXIETY DISORDER: ICD-10-CM

## 2024-08-21 DIAGNOSIS — Q79.62 EHLERS-DANLOS SYNDROME, TYPE 3: ICD-10-CM

## 2024-08-21 DIAGNOSIS — E55.9 VITAMIN D DEFICIENCY: ICD-10-CM

## 2024-08-21 DIAGNOSIS — E03.9 PRIMARY HYPOTHYROIDISM: Primary | ICD-10-CM

## 2024-08-21 DIAGNOSIS — F33.9 EPISODE OF RECURRENT MAJOR DEPRESSIVE DISORDER, UNSPECIFIED DEPRESSION EPISODE SEVERITY (HCC): ICD-10-CM

## 2024-08-21 DIAGNOSIS — G90.A POTS (POSTURAL ORTHOSTATIC TACHYCARDIA SYNDROME): ICD-10-CM

## 2024-08-21 PROBLEM — T75.3XXA MOTION SICKNESS: Status: RESOLVED | Noted: 2024-02-13 | Resolved: 2024-08-21

## 2024-08-21 PROCEDURE — 97140 MANUAL THERAPY 1/> REGIONS: CPT

## 2024-08-21 PROCEDURE — 97110 THERAPEUTIC EXERCISES: CPT

## 2024-08-21 PROCEDURE — 99214 OFFICE O/P EST MOD 30 MIN: CPT | Performed by: INTERNAL MEDICINE

## 2024-08-21 RX ORDER — MECLIZINE HCL 12.5 MG 12.5 MG/1
12.5 TABLET ORAL EVERY 6 HOURS PRN
Qty: 20 TABLET | Refills: 0 | Status: SHIPPED | OUTPATIENT
Start: 2024-08-21 | End: 2024-08-21 | Stop reason: CLARIF

## 2024-08-21 RX ORDER — MECLIZINE HCL 12.5 MG 12.5 MG/1
12.5 TABLET ORAL EVERY 6 HOURS PRN
COMMUNITY
End: 2024-08-21 | Stop reason: SDUPTHER

## 2024-08-21 RX ORDER — BUPROPION HYDROCHLORIDE 300 MG/1
300 TABLET ORAL DAILY
Qty: 30 TABLET | Refills: 0 | Status: SHIPPED | OUTPATIENT
Start: 2024-08-21

## 2024-08-21 RX ORDER — BUPROPION HYDROCHLORIDE 300 MG/1
300 TABLET ORAL DAILY
Qty: 90 TABLET | Refills: 1 | Status: CANCELLED | OUTPATIENT
Start: 2024-08-21

## 2024-08-21 RX ORDER — MECLIZINE HCL 12.5 MG 12.5 MG/1
12.5 TABLET ORAL EVERY 6 HOURS PRN
Qty: 20 TABLET | Refills: 0 | Status: SHIPPED | OUTPATIENT
Start: 2024-08-21

## 2024-08-21 RX ORDER — MULTIVITAMIN
1 TABLET ORAL DAILY
COMMUNITY

## 2024-08-21 NOTE — PATIENT INSTRUCTIONS
Patient was advised to continue present medications. discussed with the patient medications and laboratory data in detail.  Follow-up with me in 3 months or as advised.  If any blood test was ordered please do 1 week prior to next appointment unless advise to get earlier.  If you have any questions please call the office [phone]

## 2024-08-21 NOTE — PROGRESS NOTES
Discharge     Today's date: 2024  Patient name: Courtney Parker  : 1990  MRN: [id number]  Referring provider: Irma Bates CRNP  Dx:   Encounter Diagnosis     ICD-10-CM    1. Vaginal pain  R10.2       2. Dyspareunia in female  N94.10           Start Time: 1100  Stop Time: 1200  Total time in clinic (min): 60 minutes      Chief Complaint: Dyspareunia and painful penetration  HPI: Pt presents to PT with lifelong history of painful gynecologic exams and recent dyspareunia w/ onset of sexual function. Pt currently unable to tolerate complete PIV penetration and experiences mod-severe pain w/ speculum use. Pt pmhx positive for Nia-danlos syndrome.   Occupation: Not currently - Enjoys watching videos, movies, drawing, and goes to [employer].            Subjective: reports she feels she has made progress w/ PT.     Objective: See treatment diary below        PERFECT Score   Power right: 3/5   Power left: 3/5   Endurance (seconds to max): 7   Fast flicks (in 10 seconds): 4 (difficulty)   P! Present in layer 1-3 (1/10 p!)   High resting tone in PRPC bilaterally and pain - poor stretch, BIC pain free and good stretch tolerance      Home Exercise Interventions:   Pelvic floor coordination  Inhale and relax, exhale and contract   Relaxation of the pelvic muscles should feel like a down and out (towards your feet)   Contraction of the pelvic floor should feel like an up and in   You can use a towel between your legs to provide external feedback   You can add these to exercises (exhale with the harder portion)  Think exhale against gravity  Dilator Training  You can progress dilator size when:   You are able to insert to 90% or greater w/ 2 or less pain   You can use a smaller size dilator to perform targeted stretching or movement stretching   For now, you may benefit from targeted stretching of the highlighted muscles  Use the tactics of inhale/ relax, contract relax, and slight angle shifts during dilator  insertion  Fast Flicks   Full contract and full relaxation of the pelvic floor muscles but performing as fast as you are able   You can practice in sitting, standing, or lying (1 min at a time)   The emphasis should be on the focused on the relaxation but without pushing from the abdomen  Pelvic drops   A pelvic drop should be relaxing the muscles without a contraction first   “Drop the marble” 12   Pelvic Elevators  Pelvic Elevators are to help with coordination   Perform a 50% contraction, then additional contraction to 100%   Then 50% relaxation, then additional relaxation to 100%   Breath throughout   Take breaks as you need!! You are going to do great!! 12     Assessment: Tolerated treatment well. Pt is leaving for 1 month trip and thus will d/c PT for now. Tolerated speculum training, internal exam, and dilator training. Review HEP and provided print out. Educated on sxs and time frame that would constitute return to skilled PT care. Able to tolerate speculum insertion and opening to 50% w/ 5/10 p!.  Pt verbalized understanding of HEP and proper progression of interventions. Plan to discharge at this time.           Plan: Discharge     Precautions:   Patient Active Problem List   Diagnosis    POTS (postural orthostatic tachycardia syndrome)    Nia-Danlos syndrome, benign hypermobile form    Adolescent idiopathic scoliosis of thoracolumbar region    Allergic rhinitis    Chronic fatigue syndrome    Dermatographism    Hallucinations    Mast cell activation syndrome (HCC)    Palpitations    Pectus excavatum    Primary hypothyroidism    Recurrent major depressive disorder, in partial remission (HCC)    Sinus tachycardia    Vitamin D deficiency    Hypokalemia    Dyslipidemia    Diffuse connective tissue disease (HCC)    Scoliosis (and kyphoscoliosis), idiopathic    Unilateral congenital dislocation of hip    Severe episode of recurrent major depressive disorder, without psychotic features (HCC)    Benign  paroxysmal positional vertigo    CHARLEE (obstructive sleep apnea)    Restless legs    SOB (shortness of breath) on exertion    Strep pharyngitis    Mild intermittent asthma without complication    BMI 20.0-20.9, adult    Polyarthralgia    Motion sickness    Major depressive disorder         Diagnosis:    POC expires (Date that your POC expires) Auth Status? (BOMN, approved, pending) Unit limit (Daily) Auth Start date Expiration date PT/OT + Visit Limit?   8/16/2024  BOMN         Date of Service 7/17 7/24 7/31 8/7 8/14 8/21   Visits Used 6 7 8 9 10 11   Visits Remaining           [facility] Created 7/17 7/31 8/7 8/14 8/21            Neuro Re-Ed         Urge deferral         Breathing Mechanics  Breathing mechanics in:   Hook lying     Sitting     Standing  15'   (Increased difficulty in standing)  Breathing mechanics in:   Hook lying     Sitting     Standing  15'   (Increased difficulty in standing)       PFM Coordination Pelvic drops, breathing mechanics, and pelvic elevators 10'  Pelvic drops, breathing mechanics, and pelvic elevators  In standing  10'  Pelvic drops, breathing mechanics, and pelvic elevators  In standing  10'       PFM Down Training          Internal Cueing                  Ther Ex         PFM Strengthening     Happy baby     Sitting     Sitting w/ knees elevated     Standing (D.c)   10'  Reviewed fast flicks, pelvic drop, breathing mechanics in sitting, standing, and on physioball    Hip strengthening         Functional Strengthening         Abdominal Strengthening                  Dilator Training  10' size 3   30' 2 and 3 size   15' Size 3   Aerobic         Therapeutic Rest Breaks             Reformer:     Bridges     SL leg press     DL leg press     Resisted trunk rotation in tall kneel     Lat pull down w/ DL lift    25'      Mobility Lizard pose (difficult)     Holger pose    Butterfly     15' Reviewed deep squat, holger pose, and cat cow 5'        Ther Activity         PFM Education          Voiding Diaries          Defecation Mechanics         Fluid Intake          Review of Sxs                  Manual Ther         PFM exam      Performed    Ortho exam         PFM Manuals 35'  30'  30'  45' windback 10% and 20%           Speculum training 15'   Reinforced breathing mechanics throughout             Modalities                           Patient Education                  Outcome Measure

## 2024-08-21 NOTE — PROGRESS NOTES
Assessment/Plan:    1. Primary hypothyroidism  -     TSH, 3rd generation; Future  -     TSH, 3rd generation  -     CBC and differential; Future  -     CBC and differential  2. Episode of recurrent major depressive disorder, unspecified depression episode severity (HCC)  -     buPROPion (Wellbutrin XL) 300 mg 24 hr tablet; Take 1 tablet (300 mg total) by mouth daily  -     Comprehensive metabolic panel; Future  -     Comprehensive metabolic panel  -     CBC and differential; Future  -     CBC and differential  3. Social anxiety disorder  -     sertraline (ZOLOFT) 50 mg tablet; Take 1.5 tablets (75 mg total) by mouth daily  -     Comprehensive metabolic panel; Future  -     Comprehensive metabolic panel  -     CBC and differential; Future  -     CBC and differential  4. POTS (postural orthostatic tachycardia syndrome)  -     Comprehensive metabolic panel; Future  -     Comprehensive metabolic panel  -     CBC and differential; Future  -     CBC and differential  5. Nia-Danlos syndrome, benign hypermobile form  -     CBC and differential; Future  -     CBC and differential  6. Chronic fatigue syndrome  -     Comprehensive metabolic panel; Future  -     Comprehensive metabolic panel  -     CBC and differential; Future  -     CBC and differential  7. Vitamin D deficiency  -     Comprehensive metabolic panel; Future  -     Vitamin D 25 hydroxy; Future  -     Comprehensive metabolic panel  -     Vitamin D 25 hydroxy  8. Dyslipidemia  -     Lipid panel; Future  -     Lipid panel  9. BMI 20.0-20.9, adult  10. Benign paroxysmal positional vertigo, unspecified laterality  -     meclizine (ANTIVERT) 12.5 MG tablet; Take 1 tablet (12.5 mg total) by mouth every 6 (six) hours as needed for dizziness     Discussion/summary/plan:    Her TSH 1.5 therapeutic so advised to continue same dose of levothyroxine.  Will follow TSH.  She had established  psychiatrist for anxiety depression management.  Patient states person she was  getting treatment left the practice and she is awaiting to get another appointment with other provider.  She has only 2 to 3 days left of her bupropion and Zoloft and she would like me to prescribe these 2 medications while awaiting to see psychiatrist.  Patient states she called the psychiatrist office and they said she has to wait to be seen by provider prior to getting these medications per patient.  Patient states she has been taking bupropion for last 3 years and sertraline more than 1 year ago and both medications are effective and she would like to continue both medications.  She occasionally takes tramadol maybe 1 to 2 tablets/month particularly when she gets menstrual cramps or joint pain , and occasionally takes naproxen as well only when she gets some joint pain.  She was seen by rheumatologist.  She was advised to follow with a cardiologist for POLST.  Hypokalemia resolved on potassium supplement last potassium level 3.9 advised to continue same dose and will follow electrolyte renal function.  Her last cholesterol 200, triglyceride 95, HDL 53,  advised for low-cholesterol low-carb diet will follow lipid panel.  Sometimes he gets vertigo.  She did use meclizine in the past so prescribed meclizine.  Patient states she know show to do Epley maneuver.  Vitamin D deficiency is on vitamin D supplement vitamin D level 44 advised to continue same dose of vitamin D supplement will follow vitamin D level.  She has Nia-Danlos syndrome diagnosed at the age of 15.    Subjective: Patient present for follow-up and refill of her anxiety depression medication.      Patient ID: Courtney Parker is a 34 y.o. female.    HPI  34-year-old white female patient presents to follow-up her medical problems and she would like to have a prescription for her anxiety depression medicine as her psychiatrist left the practice and awaiting to get appointment with another provider per patient.  She denies any chest pain,  shortness of breath, pain in abdomen.  Denies cough, fever, chills.  Denies nausea vomiting diarrhea.  Sometimes she gets vertigo.    The following portions of the patient's history were reviewed and updated as appropriate:     Past Medical History:  She has a past medical history of BMI 20.0-20.9, adult (10/09/2023), Collagen disorder (HCC) (2005), Congenital dislocation of hip, Depression, Disease of thyroid gland, Hypothyroidism (2008), Major depressive disorder (02/13/2024), Migraine (2007), Motion sickness (02/13/2024), Pectus excavatum, Polyarthralgia (10/09/2023), and Scoliosis.,  _______________________________________________________________________  Past Surgical History:   has a past surgical history that includes Suleiman acetabular osteotomy (Left).,  _______________________________________________________________________  Family History:  family history includes Asthma in her brother and mother; Cancer in her father and maternal grandmother; Diabetes in her mother; Diabetes type II in her mother; Heart attack in her paternal grandfather; Hyperlipidemia in her family, father, and mother; Hypertension in her family, father, and mother; Melanoma in her family; No Known Problems in her brother; Osteoarthritis in her mother; Prostate cancer in her family and father; Varicose Veins in her family.,  _______________________________________________________________________  Social History:   reports that she has never smoked. She has never used smokeless tobacco. She reports current alcohol use. She reports that she does not use drugs.,  _______________________________________________________________________  Allergies:  is allergic to epinephrine and fluoxetine..  _______________________________________________________________________  Current Outpatient Medications   Medication Sig Dispense Refill   • buPROPion (Wellbutrin XL) 300 mg 24 hr tablet Take 1 tablet (300 mg total) by mouth daily 30 tablet 0   •  desmopressin (DDAVP) 0.2 mg tablet TAKE 2 TABLETS (0.4 MG TOTAL) BY MOUTH 2 (TWO) TIMES A  tablet 5   • fludrocortisone (FLORINEF) 0.1 mg tablet TAKE 1 TABLET BY MOUTH TWICE A DAY 60 tablet 5   • ivabradine HCl (Corlanor) 5 MG tablet Take 1 tablet (5 mg total) by mouth 2 (two) times a day 180 tablet 3   • levothyroxine 125 mcg tablet TAKE 1 TABLET BY MOUTH EVERY DAY 90 tablet 1   • meclizine (ANTIVERT) 12.5 MG tablet Take 1 tablet (12.5 mg total) by mouth every 6 (six) hours as needed for dizziness 20 tablet 0   • midodrine (PROAMATINE) 10 MG tablet TAKE 1 TABLET BY MOUTH THREE TIMES A DAY 90 tablet 2   • Multiple Vitamin (multivitamin) tablet Take 1 tablet by mouth daily     • naproxen (NAPROSYN) 500 mg tablet Take 1 tablet (500 mg total) by mouth daily as needed for moderate pain 30 tablet 1   • nebivolol (BYSTOLIC) 5 mg tablet TAKE 1 TABLET BY MOUTH TWICE A DAY 60 tablet 2   • norgestrel-ethinyl estradiol (Low-Ogestrel) 0.3 mg-30 mcg per tablet Take 1 tablet by mouth daily 84 tablet 4   • potassium chloride (MICRO-K) 10 MEQ CR capsule TAKE 1 CAPSULE BY MOUTH EVERY DAY 30 capsule 5   • sertraline (ZOLOFT) 50 mg tablet Take 1.5 tablets (75 mg total) by mouth daily 45 tablet 0   • traMADol-acetaminophen (ULTRACET) 37.5-325 mg per tablet Take 1 tablet by mouth every 6 (six) hours as needed for moderate pain 30 tablet 0   • Vitamin D, Cholecalciferol, 25 MCG (1000 UT) CAPS Take 3,000 Units by mouth        No current facility-administered medications for this visit.     _______________________________________________________________________  Review of Systems   Constitutional:  Negative for chills and fever.   HENT:  Negative for congestion, ear pain, hearing loss, nosebleeds, sinus pain, sore throat and trouble swallowing.    Eyes:  Negative for discharge, redness and visual disturbance.   Respiratory:  Negative for cough, chest tightness and shortness of breath.    Cardiovascular:  Negative for chest pain and  "palpitations.   Gastrointestinal:  Negative for abdominal pain, blood in stool, constipation, diarrhea, nausea and vomiting.   Genitourinary:  Negative for dysuria, flank pain and hematuria.   Musculoskeletal:  Positive for arthralgias (Symptoms suggest pain in the joints.). Negative for neck pain.   Skin:  Negative for color change and rash.   Neurological:  Negative for dizziness, syncope, speech difficulty, weakness, light-headedness and headaches.   Hematological:  Does not bruise/bleed easily.   Psychiatric/Behavioral:  Negative for agitation, behavioral problems, self-injury and suicidal ideas.          Objective:  Vitals:    08/21/24 1000   BP: 96/68   BP Location: Left arm   Patient Position: Sitting   Cuff Size: Standard   Pulse: 91   Resp: 16   Temp: 98.7 °F (37.1 °C)   SpO2: 99%   Weight: 58.3 kg (128 lb 9.6 oz)   Height: 5' 6.25\" (1.683 m)     Body mass index is 20.6 kg/m².     Physical Exam  Vitals and nursing note reviewed.   Constitutional:       General: She is not in acute distress.     Appearance: Normal appearance.   HENT:      Head: Normocephalic and atraumatic.      Right Ear: External ear normal.      Left Ear: External ear normal.      Nose: Nose normal.      Mouth/Throat:      Mouth: Mucous membranes are moist.   Eyes:      General: No scleral icterus.        Right eye: No discharge.         Left eye: No discharge.      Extraocular Movements: Extraocular movements intact.      Conjunctiva/sclera: Conjunctivae normal.   Cardiovascular:      Rate and Rhythm: Normal rate and regular rhythm.      Pulses: Normal pulses.      Heart sounds: Normal heart sounds. No murmur heard.  Pulmonary:      Effort: Pulmonary effort is normal. No respiratory distress.      Breath sounds: Normal breath sounds. No wheezing, rhonchi or rales.   Abdominal:      General: Bowel sounds are normal. There is no distension.      Palpations: Abdomen is soft.      Tenderness: There is no abdominal tenderness. "   Musculoskeletal:         General: Normal range of motion.      Cervical back: Normal range of motion and neck supple. No muscular tenderness.      Right lower leg: No edema.      Left lower leg: No edema.   Skin:     General: Skin is warm.      Findings: No rash.   Neurological:      General: No focal deficit present.      Mental Status: She is alert and oriented to person, place, and time.      Motor: No weakness.   Psychiatric:         Mood and Affect: Mood normal.         Behavior: Behavior normal.         Thought Content: Thought content normal.         Judgment: Judgment normal.

## 2024-08-28 DIAGNOSIS — G90.A POTS (POSTURAL ORTHOSTATIC TACHYCARDIA SYNDROME): ICD-10-CM

## 2024-08-28 DIAGNOSIS — I10 ESSENTIAL HYPERTENSION: ICD-10-CM

## 2024-08-28 RX ORDER — MIDODRINE HYDROCHLORIDE 10 MG/1
TABLET ORAL
Qty: 90 TABLET | Refills: 2 | Status: SHIPPED | OUTPATIENT
Start: 2024-08-28

## 2024-08-28 RX ORDER — NEBIVOLOL 5 MG/1
TABLET ORAL
Qty: 60 TABLET | Refills: 2 | Status: SHIPPED | OUTPATIENT
Start: 2024-08-28

## 2024-08-29 RX ORDER — FLUDROCORTISONE ACETATE 0.1 MG/1
TABLET ORAL
Qty: 60 TABLET | Refills: 5 | Status: SHIPPED | OUTPATIENT
Start: 2024-08-29

## 2024-09-17 DIAGNOSIS — F33.9 EPISODE OF RECURRENT MAJOR DEPRESSIVE DISORDER, UNSPECIFIED DEPRESSION EPISODE SEVERITY (HCC): ICD-10-CM

## 2024-09-17 DIAGNOSIS — F40.10 SOCIAL ANXIETY DISORDER: ICD-10-CM

## 2024-09-18 RX ORDER — BUPROPION HYDROCHLORIDE 300 MG/1
300 TABLET ORAL DAILY
Qty: 30 TABLET | Refills: 5 | Status: SHIPPED | OUTPATIENT
Start: 2024-09-18

## 2024-10-07 ENCOUNTER — TELEPHONE (OUTPATIENT)
Age: 34
End: 2024-10-07

## 2024-10-07 NOTE — TELEPHONE ENCOUNTER
Courtney called to check status of wait list.  She has no Provider preference or office preference.

## 2024-10-16 ENCOUNTER — TELEPHONE (OUTPATIENT)
Age: 34
End: 2024-10-16

## 2024-10-16 NOTE — TELEPHONE ENCOUNTER
Contacted patient off of Medication Management  wait list to verify needs of services in attempts to notify patient of insurance denial due to [employer] no longer being in-PAR with [employer] and offer extra resource options. Spoke to patient who stated that she will be working on switching insurance at this time, writer asked her to call us back with updated info. Pt requested extra resources to be sent over to email on file.     Attempt #1

## 2024-11-19 DIAGNOSIS — G90.A POTS (POSTURAL ORTHOSTATIC TACHYCARDIA SYNDROME): ICD-10-CM

## 2024-11-19 LAB
ALBUMIN SERPL-MCNC: 4.5 G/DL (ref 3.5–5.7)
ALP SERPL-CCNC: 84 U/L (ref 35–120)
ALT SERPL-CCNC: 18 U/L
ANION GAP SERPL CALCULATED.3IONS-SCNC: 7 MMOL/L (ref 3–11)
AST SERPL-CCNC: 19 U/L
BASOPHILS # BLD AUTO: 0 THOU/CMM (ref 0–0.1)
BASOPHILS NFR BLD AUTO: 1 %
BILIRUB SERPL-MCNC: 0.4 MG/DL (ref 0.2–1)
BUN SERPL-MCNC: 12 MG/DL (ref 7–25)
CALCIUM SERPL-MCNC: 9.6 MG/DL (ref 8.5–10.5)
CHLORIDE SERPL-SCNC: 104 MMOL/L (ref 100–109)
CHOLEST SERPL-MCNC: 201 MG/DL
CHOLEST/HDLC SERPL: 4 {RATIO}
CO2 SERPL-SCNC: 30 MMOL/L (ref 21–31)
CREAT SERPL-MCNC: 0.91 MG/DL (ref 0.4–1.1)
CYTOLOGY CMNT CVX/VAG CYTO-IMP: NORMAL
DIFFERENTIAL METHOD BLD: NORMAL
EOSINOPHIL # BLD AUTO: 0 THOU/CMM (ref 0–0.5)
EOSINOPHIL NFR BLD AUTO: 0 %
ERYTHROCYTE [DISTWIDTH] IN BLOOD BY AUTOMATED COUNT: 13.2 % (ref 12–16)
GFR/BSA.PRED SERPLBLD CYS-BASED-ARV: 86 ML/MIN/{1.73_M2}
GLUCOSE SERPL-MCNC: 84 MG/DL (ref 65–99)
HCT VFR BLD AUTO: 41.7 % (ref 35–43)
HDLC SERPL-MCNC: 50 MG/DL (ref 23–92)
HGB BLD-MCNC: 14.1 G/DL (ref 11.5–14.5)
LDLC SERPL CALC-MCNC: 138 MG/DL
LYMPHOCYTES # BLD AUTO: 1.9 THOU/CMM (ref 1–3)
LYMPHOCYTES NFR BLD AUTO: 32 %
MCH RBC QN AUTO: 31.1 PG (ref 26–34)
MCHC RBC AUTO-ENTMCNC: 33.7 G/DL (ref 32–37)
MCV RBC AUTO: 92 FL (ref 80–100)
MONOCYTES # BLD AUTO: 0.3 THOU/CMM (ref 0.3–1)
MONOCYTES NFR BLD AUTO: 5 %
NEUTROPHILS # BLD AUTO: 3.8 THOU/CMM (ref 1.8–7.8)
NEUTROPHILS NFR BLD AUTO: 62 %
NONHDLC SERPL-MCNC: 151 MG/DL
PLATELET # BLD AUTO: 227 THOU/CMM (ref 140–350)
PMV BLD REES-ECKER: 9.5 FL (ref 7.5–11.3)
POTASSIUM SERPL-SCNC: 4.4 MMOL/L (ref 3.5–5.2)
PROT SERPL-MCNC: 6.7 G/DL (ref 6.3–8.3)
RBC # BLD AUTO: 4.53 MILL/CMM (ref 3.7–4.7)
SODIUM SERPL-SCNC: 141 MMOL/L (ref 135–145)
TRIGL SERPL-MCNC: 64 MG/DL
TSH SERPL-ACNC: 1.65 UIU/ML (ref 0.45–5.33)
WBC # BLD AUTO: 6.1 THOU/CMM (ref 4–10)

## 2024-11-20 RX ORDER — MIDODRINE HYDROCHLORIDE 10 MG/1
10 TABLET ORAL 3 TIMES DAILY
Qty: 90 TABLET | Refills: 2 | Status: SHIPPED | OUTPATIENT
Start: 2024-11-20

## 2024-12-04 ENCOUNTER — OFFICE VISIT (OUTPATIENT)
Dept: INTERNAL MEDICINE CLINIC | Facility: CLINIC | Age: 34
End: 2024-12-04
Payer: COMMERCIAL

## 2024-12-04 VITALS
WEIGHT: 129 LBS | BODY MASS INDEX: 20.73 KG/M2 | OXYGEN SATURATION: 99 % | HEART RATE: 79 BPM | DIASTOLIC BLOOD PRESSURE: 64 MMHG | RESPIRATION RATE: 16 BRPM | TEMPERATURE: 98.1 F | HEIGHT: 66 IN | SYSTOLIC BLOOD PRESSURE: 94 MMHG

## 2024-12-04 DIAGNOSIS — E78.00 HYPERCHOLESTEROLEMIA: ICD-10-CM

## 2024-12-04 DIAGNOSIS — E87.6 HYPOKALEMIA: ICD-10-CM

## 2024-12-04 DIAGNOSIS — G93.32 CHRONIC FATIGUE SYNDROME: ICD-10-CM

## 2024-12-04 DIAGNOSIS — G90.A POTS (POSTURAL ORTHOSTATIC TACHYCARDIA SYNDROME): ICD-10-CM

## 2024-12-04 DIAGNOSIS — F33.9 EPISODE OF RECURRENT MAJOR DEPRESSIVE DISORDER, UNSPECIFIED DEPRESSION EPISODE SEVERITY (HCC): Primary | ICD-10-CM

## 2024-12-04 DIAGNOSIS — F40.10 SOCIAL ANXIETY DISORDER: ICD-10-CM

## 2024-12-04 DIAGNOSIS — Q79.62 EHLERS-DANLOS SYNDROME, TYPE 3: ICD-10-CM

## 2024-12-04 DIAGNOSIS — E03.9 PRIMARY HYPOTHYROIDISM: ICD-10-CM

## 2024-12-04 PROCEDURE — 99214 OFFICE O/P EST MOD 30 MIN: CPT | Performed by: INTERNAL MEDICINE

## 2024-12-04 RX ORDER — BUPROPION HYDROCHLORIDE 300 MG/1
300 TABLET ORAL DAILY
Qty: 30 TABLET | Refills: 2 | Status: SHIPPED | OUTPATIENT
Start: 2024-12-04

## 2024-12-04 NOTE — PATIENT INSTRUCTIONS
Patient was advised to continue present medications. discussed with the patient medications and laboratory data in detail.  Follow-up with me as advised.  If any blood test was ordered please do 1 week prior to next appointment unless advise to get earlier.  If you have any questions please call the office [phone]

## 2024-12-04 NOTE — ASSESSMENT & PLAN NOTE
Hypokalemia resolved on potassium supplement potassium level 4.2 advised to continue same supplement

## 2024-12-04 NOTE — ASSESSMENT & PLAN NOTE
She has been seen and followed by cardiologist for this problem.  Presently stable asymptomatic

## 2024-12-04 NOTE — PROGRESS NOTES
Name: Courtney Parker      : 1990      MRN: [id number]  Encounter Provider: Catherine Shirley MD  Encounter Date: 2024   Encounter department: [facility] (formerly [facility]) INTERNAL MEDICINE  :  Assessment & Plan  POTS (postural orthostatic tachycardia syndrome)  She has been seen and followed by cardiologist for this problem.  Presently stable asymptomatic       Primary hypothyroidism  TSH therapeutic 1.65 so advised to continue same dose of levothyroxine.       Hypercholesterolemia  Cholesterol 201, triglyceride 64, HDL 50, .  Advised for low-cholesterol, low carbs diet.       Hypokalemia  Hypokalemia resolved on potassium supplement potassium level 4.2 advised to continue same supplement       BMI 20.0-20.9, adult         Nia-Danlos syndrome, benign hypermobile form         Chronic fatigue syndrome         Episode of recurrent major depressive disorder, unspecified depression episode severity (HCC)  Depression Screening Follow-up Plan: Patient's depression screening was positive with a PHQ-9 score of 9. Patient with underlying depression and was advised to continue current medications as prescribed.  Patient states her symptoms are well-controlled on present dose of Wellbutrin.  She was advised to see a psychiatrist but patient states she is in waiting list to see psychiatrist.  She would like to continue same dose of Wellbutrin as it is effective and has no side effect.  She will contact other psychiatrist if she can get appointment earlier.  Will continue present medication.  Orders:    buPROPion (WELLBUTRIN XL) 300 mg 24 hr tablet; Take 1 tablet (300 mg total) by mouth daily    Social anxiety disorder  Patient states her symptoms are controlled on sertraline 75 mg once a day.  She would like to continue same dose as it is effective and has no side effect.  As mentioned above she has been waiting to be seen by psychiatrist.  Will continue present medication.  Orders:     sertraline (ZOLOFT) 50 mg tablet; Take 1.5 tablets (75 mg total) by mouth daily          Depression Screening and Follow-up Plan: Patient with underlying depression and was advised to continue current medications as prescribed.       History of Present Illness     HPI  Patient presents for follow-up.  Review of Systems   Constitutional:  Positive for fatigue. Negative for chills and fever.   HENT:  Negative for congestion, ear pain, hearing loss, nosebleeds, sinus pain, sore throat and trouble swallowing.    Eyes:  Negative for discharge, redness and visual disturbance.   Respiratory:  Negative for cough, chest tightness and shortness of breath.    Cardiovascular:  Negative for chest pain and palpitations.   Gastrointestinal:  Negative for abdominal pain, blood in stool, constipation, diarrhea, nausea and vomiting.   Genitourinary:  Negative for dysuria, flank pain and hematuria.   Musculoskeletal:  Positive for arthralgias (Sometimes he gets pain in the hips had a prior left hip surgery as a child.). Negative for neck pain.   Skin:  Negative for color change and rash.   Neurological:  Negative for dizziness, speech difficulty, weakness and headaches.   Hematological:  Does not bruise/bleed easily.   Psychiatric/Behavioral:  Negative for agitation, behavioral problems, self-injury and suicidal ideas.        Pertinent Medical History   As above.    Medical History Reviewed by provider this encounter:  Tobacco  Allergies  Meds  Problems  Med Hx  Surg Hx  Fam Hx     .  Current Outpatient Medications on File Prior to Visit   Medication Sig Dispense Refill    desmopressin (DDAVP) 0.2 mg tablet TAKE 2 TABLETS (0.4 MG TOTAL) BY MOUTH 2 (TWO) TIMES A  tablet 5    fludrocortisone (FLORINEF) 0.1 mg tablet TAKE 1 TABLET BY MOUTH TWICE A DAY 60 tablet 5    ivabradine HCl (Corlanor) 5 MG tablet Take 1 tablet (5 mg total) by mouth 2 (two) times a day 180 tablet 3    levothyroxine 125 mcg tablet TAKE 1 TABLET BY  "MOUTH EVERY DAY 90 tablet 1    meclizine (ANTIVERT) 12.5 MG tablet Take 1 tablet (12.5 mg total) by mouth every 6 (six) hours as needed for dizziness 20 tablet 0    midodrine (PROAMATINE) 10 MG tablet TAKE 1 TABLET BY MOUTH THREE TIMES A DAY 90 tablet 2    Multiple Vitamin (multivitamin) tablet Take 1 tablet by mouth daily      naproxen (NAPROSYN) 500 mg tablet Take 1 tablet (500 mg total) by mouth daily as needed for moderate pain 30 tablet 1    nebivolol (BYSTOLIC) 5 mg tablet TAKE 1 TABLET BY MOUTH TWICE A DAY 60 tablet 2    norgestrel-ethinyl estradiol (Low-Ogestrel) 0.3 mg-30 mcg per tablet Take 1 tablet by mouth daily 84 tablet 4    potassium chloride (MICRO-K) 10 MEQ CR capsule TAKE 1 CAPSULE BY MOUTH EVERY DAY 30 capsule 5    traMADol-acetaminophen (ULTRACET) 37.5-325 mg per tablet Take 1 tablet by mouth every 6 (six) hours as needed for moderate pain 30 tablet 0    Vitamin D, Cholecalciferol, 25 MCG (1000 UT) CAPS Take 3,000 Units by mouth       [DISCONTINUED] buPROPion (WELLBUTRIN XL) 300 mg 24 hr tablet TAKE 1 TABLET BY MOUTH EVERY DAY 30 tablet 5    [DISCONTINUED] sertraline (ZOLOFT) 50 mg tablet TAKE 1 AND 1/2 TABLETS BY MOUTH DAILY 45 tablet 5     No current facility-administered medications on file prior to visit.      Social History     Tobacco Use    Smoking status: Never    Smokeless tobacco: Never   Vaping Use    Vaping status: Never Used   Substance and Sexual Activity    Alcohol use: Yes     Comment: on average, drinks 1-2 days a month    Drug use: Never    Sexual activity: Yes     Partners: Male     Birth control/protection: Condom Male, OCP        Objective   BP 94/64 (BP Location: Left arm, Patient Position: Sitting, Cuff Size: Standard)   Pulse 79   Temp 98.1 °F (36.7 °C) (Temporal)   Resp 16   Ht 5' 6.25\" (1.683 m)   Wt 58.5 kg (129 lb)   SpO2 99%   BMI 20.66 kg/m²      Physical Exam  Vitals and nursing note reviewed.   Constitutional:       General: She is not in acute distress.     " Appearance: Normal appearance. She is well-developed.   HENT:      Head: Normocephalic and atraumatic.      Right Ear: External ear normal.      Left Ear: External ear normal.      Mouth/Throat:      Mouth: Mucous membranes are moist.      Pharynx: Oropharynx is clear.   Eyes:      General: No scleral icterus.        Right eye: No discharge.         Left eye: No discharge.      Extraocular Movements: Extraocular movements intact.      Conjunctiva/sclera: Conjunctivae normal.   Cardiovascular:      Rate and Rhythm: Normal rate and regular rhythm.      Pulses: Normal pulses.   Pulmonary:      Effort: Pulmonary effort is normal. No respiratory distress.      Breath sounds: Normal breath sounds. No wheezing.   Abdominal:      General: Bowel sounds are normal. There is no distension.      Palpations: Abdomen is soft.      Tenderness: There is no abdominal tenderness.   Musculoskeletal:         General: No swelling. Normal range of motion.      Cervical back: Normal range of motion and neck supple.      Right lower leg: No edema.      Left lower leg: No edema.   Skin:     General: Skin is warm and dry.      Capillary Refill: Capillary refill takes less than 2 seconds.   Neurological:      General: No focal deficit present.      Mental Status: She is alert and oriented to person, place, and time.   Psychiatric:         Mood and Affect: Mood normal.         Behavior: Behavior normal.

## 2024-12-04 NOTE — ASSESSMENT & PLAN NOTE
Depression Screening Follow-up Plan: Patient's depression screening was positive with a PHQ-9 score of 9. Patient with underlying depression and was advised to continue current medications as prescribed.  Patient states her symptoms are well-controlled on present dose of Wellbutrin.  She was advised to see a psychiatrist but patient states she is in waiting list to see psychiatrist.  She would like to continue same dose of Wellbutrin as it is effective and has no side effect.  She will contact other psychiatrist if she can get appointment earlier.  Will continue present medication.  Orders:    buPROPion (WELLBUTRIN XL) 300 mg 24 hr tablet; Take 1 tablet (300 mg total) by mouth daily

## 2024-12-16 DIAGNOSIS — I10 ESSENTIAL HYPERTENSION: ICD-10-CM

## 2024-12-16 DIAGNOSIS — E87.6 LOW BLOOD POTASSIUM: ICD-10-CM

## 2024-12-16 DIAGNOSIS — G90.A POTS (POSTURAL ORTHOSTATIC TACHYCARDIA SYNDROME): ICD-10-CM

## 2024-12-17 RX ORDER — NEBIVOLOL 5 MG/1
5 TABLET ORAL 2 TIMES DAILY
Qty: 60 TABLET | Refills: 2 | Status: SHIPPED | OUTPATIENT
Start: 2024-12-17

## 2024-12-17 RX ORDER — POTASSIUM CHLORIDE 750 MG/1
10 CAPSULE, EXTENDED RELEASE ORAL DAILY
Qty: 30 CAPSULE | Refills: 5 | Status: SHIPPED | OUTPATIENT
Start: 2024-12-17

## 2024-12-17 RX ORDER — DESMOPRESSIN ACETATE 0.2 MG/1
0.4 TABLET ORAL 2 TIMES DAILY
Qty: 120 TABLET | Refills: 5 | Status: SHIPPED | OUTPATIENT
Start: 2024-12-17 | End: 2025-06-15

## 2024-12-19 ENCOUNTER — ANNUAL EXAM (OUTPATIENT)
Dept: OBGYN CLINIC | Facility: MEDICAL CENTER | Age: 34
End: 2024-12-19
Payer: COMMERCIAL

## 2024-12-19 VITALS
HEIGHT: 66 IN | BODY MASS INDEX: 20.73 KG/M2 | DIASTOLIC BLOOD PRESSURE: 72 MMHG | WEIGHT: 129 LBS | SYSTOLIC BLOOD PRESSURE: 94 MMHG

## 2024-12-19 DIAGNOSIS — Z30.41 ENCOUNTER FOR SURVEILLANCE OF CONTRACEPTIVE PILLS: ICD-10-CM

## 2024-12-19 DIAGNOSIS — E03.9 PRIMARY HYPOTHYROIDISM: ICD-10-CM

## 2024-12-19 DIAGNOSIS — Z01.419 ENCNTR FOR GYN EXAM (GENERAL) (ROUTINE) W/O ABN FINDINGS: Primary | ICD-10-CM

## 2024-12-19 PROCEDURE — 99395 PREV VISIT EST AGE 18-39: CPT | Performed by: NURSE PRACTITIONER

## 2024-12-19 RX ORDER — LEVOTHYROXINE SODIUM 125 UG/1
125 TABLET ORAL DAILY
Qty: 90 TABLET | Refills: 1 | Status: SHIPPED | OUTPATIENT
Start: 2024-12-19

## 2024-12-19 RX ORDER — NORGESTREL AND ETHINYL ESTRADIOL 0.3-0.03MG
1 KIT ORAL DAILY
Qty: 84 TABLET | Refills: 4 | Status: SHIPPED | OUTPATIENT
Start: 2024-12-19

## 2024-12-19 NOTE — PROGRESS NOTES
"Assessment/Plan:  Pap with high risk HPV testing every 5 years, if normal. Due   Sexually transmitted infection testing as indicated.Declined.   Exercise most days of week-minimum of 150-300 minutes per week.   Obtain appropriate diet and hydration.   Calcium 1000 mg (in divided doses-max 600 mg at one time) + 600 vit D daily.  Birth control refilled as requested and sent to pharmacy on file. Take as directed (ACHES reviewed). Benefits, risks and alternatives discussed/reviewed.   HPV 9 vaccine  series completed.   Annual mammogram starting at age 40, monthly breast self exam recommended.   Consideration of restarted pelvic floor PT exercises as desired.   Call your insurance company to verify coverage prior to completing any ordered tests.   Return to office in one year or sooner, if needed.        1. Encntr for gyn exam (general) (routine) w/o abn findings  2. Encounter for surveillance of contraceptive pills  -     norgestrel-ethinyl estradiol (Low-Ogestrel) 0.3 mg-30 mcg per tablet; Take 1 tablet by mouth daily             Subjective:      Patient ID: Courtney Parker is a 34 y.o. female.    HPI    Courtney Parker is a 34 y.o.  female who is here today for her annual visit. No gynecologic health concerns.   Hx of amalia danlos syndrome, scoliosis, connective tissue disease. POTS had been \"mostly\" stable.   Last in office on 24 for irregular bleeding.   Hx of vaginal pain and dypareunia. She had been following with PT until August when she was vacationing in [place] for one month/visiting her brother.  She has not been compliant with use of her vaginal dilators.   Monthly menses x 4 days with mod flow. Menses is acceptable.  Exercise- 1 time per week for 70 minutes.   Not currently working. Volunteering at [employer]    Courtney Parker is not sexually active or in a relationship.   Compliant with her DARRIAN. Desires a refill.   She is not interested in STD screening today.   She " "denies vaginal discharge, itching or pelvic pain.   She has no urinary concerns, does not have incontinence.  No bowel concerns.  No breast concerns.     Last pap:   10/26/20 normal   12/12/2023 normal with negative HR HPV   Mammogram: 05/08/2023 diagnostic mammogram->normal with heterogeneously dense breast tissue [facility] 16.89%  Colonoscopy: Not on file  DEXA scan: Not on file  HPV vaccine series:Completed.     Family history of cancer:   Cancer-related family history includes Cancer in her father and maternal grandmother; Melanoma in her family; Prostate cancer in her family and father. There is no history of Breast cancer.      The following portions of the patient's history were reviewed and updated as appropriate: allergies, current medications, past family history, past medical history, past social history, past surgical history, and problem list.    Review of Systems   Constitutional: Negative.  Negative for activity change, appetite change, chills, diaphoresis, fatigue, fever and unexpected weight change.   HENT:  Negative for congestion, dental problem, sneezing, sore throat and trouble swallowing.    Eyes:  Negative for visual disturbance.   Respiratory:  Positive for shortness of breath (with exertion; follows with PCP). Negative for chest tightness.    Cardiovascular:  Negative for chest pain and leg swelling.   Gastrointestinal:  Negative for abdominal pain, constipation, diarrhea, nausea and vomiting.   Genitourinary:  Negative for difficulty urinating, dysuria, frequency, hematuria, menstrual problem, pelvic pain, urgency, vaginal bleeding, vaginal discharge and vaginal pain.   Musculoskeletal:  Negative for back pain and neck pain.   Skin: Negative.    Allergic/Immunologic: Negative.    Neurological:  Positive for dizziness (with POTS flare). Negative for weakness and headaches.   Hematological:  Negative for adenopathy.   Psychiatric/Behavioral: Negative.           Objective:      Ht 5' 6.25\" (1.683 m) "   Wt 58.5 kg (129 lb)   LMP 12/05/2024 (Exact Date)   BMI 20.66 kg/m²          Physical Exam  Vitals and nursing note reviewed.   Constitutional:       Appearance: Normal appearance. She is well-developed.   HENT:      Head: Normocephalic and atraumatic.   Eyes:      General:         Right eye: No discharge.         Left eye: No discharge.   Neck:      Thyroid: No thyromegaly.      Trachea: Trachea normal.   Cardiovascular:      Rate and Rhythm: Normal rate and regular rhythm.      Heart sounds: Normal heart sounds.   Pulmonary:      Effort: Pulmonary effort is normal.      Breath sounds: Normal breath sounds.   Chest:   Breasts:     Breasts are symmetrical.      Right: Normal. No inverted nipple, mass, nipple discharge, skin change or tenderness.      Left: Normal. No inverted nipple, mass, nipple discharge, skin change or tenderness.      Comments: Pectus excavatum   Abdominal:      Palpations: Abdomen is soft.   Genitourinary:     General: Normal vulva.      Exam position: Lithotomy position.      Labia:         Right: No rash, tenderness, lesion or injury.         Left: No rash, tenderness, lesion or injury.       Urethra: No prolapse, urethral pain, urethral swelling or urethral lesion.      Vagina: Normal. No signs of injury and foreign body. No vaginal discharge, erythema, tenderness or bleeding.      Cervix: Normal.      Uterus: Normal.       Adnexa:         Right: No mass, tenderness or fullness.          Left: No mass, tenderness or fullness.        Rectum: No external hemorrhoid.      Comments: Slighlty guarded during vaginal exam. Pediatric speculum used.   Musculoskeletal:         General: Normal range of motion.      Cervical back: Normal range of motion and neck supple.   Lymphadenopathy:      Head:      Right side of head: No submental, submandibular or tonsillar adenopathy.      Left side of head: No submental, submandibular or tonsillar adenopathy.      Cervical: No cervical adenopathy.       Upper Body:      Right upper body: No supraclavicular or axillary adenopathy.      Left upper body: No supraclavicular or axillary adenopathy.      Lower Body: No right inguinal adenopathy. No left inguinal adenopathy.   Skin:     General: Skin is warm and dry.   Neurological:      Mental Status: She is alert and oriented to person, place, and time.   Psychiatric:         Mood and Affect: Mood normal.         Behavior: Behavior normal.

## 2024-12-19 NOTE — PATIENT INSTRUCTIONS
Pap with high risk HPV testing every 5 years, if normal. Due 2028  Sexually transmitted infection testing as indicated.Declined.   Exercise most days of week-minimum of 150-300 minutes per week.   Obtain appropriate diet and hydration.   Calcium 1000 mg (in divided doses-max 600 mg at one time) + 600 vit D daily.  Birth control refilled as requested and sent to pharmacy on file. Take as directed (ACHES reviewed). Benefits, risks and alternatives discussed/reviewed.   HPV 9 vaccine  series completed.   Annual mammogram starting at age 40, monthly breast self exam recommended.   Consideration of restarted pelvic floor PT exercises as desired.   Call your insurance company to verify coverage prior to completing any ordered tests.   Return to office in one year or sooner, if needed.

## 2025-02-05 DIAGNOSIS — G90.A POTS (POSTURAL ORTHOSTATIC TACHYCARDIA SYNDROME): ICD-10-CM

## 2025-02-07 RX ORDER — MIDODRINE HYDROCHLORIDE 10 MG/1
10 TABLET ORAL 3 TIMES DAILY
Qty: 258 TABLET | Refills: 2 | Status: SHIPPED | OUTPATIENT
Start: 2025-02-07

## 2025-03-06 ENCOUNTER — OFFICE VISIT (OUTPATIENT)
Dept: INTERNAL MEDICINE CLINIC | Facility: CLINIC | Age: 35
End: 2025-03-06
Payer: COMMERCIAL

## 2025-03-06 VITALS
HEART RATE: 105 BPM | RESPIRATION RATE: 16 BRPM | BODY MASS INDEX: 20.89 KG/M2 | SYSTOLIC BLOOD PRESSURE: 112 MMHG | HEIGHT: 66 IN | OXYGEN SATURATION: 99 % | TEMPERATURE: 98.5 F | DIASTOLIC BLOOD PRESSURE: 64 MMHG | WEIGHT: 130 LBS

## 2025-03-06 DIAGNOSIS — E78.00 HYPERCHOLESTEROLEMIA: ICD-10-CM

## 2025-03-06 DIAGNOSIS — E03.9 PRIMARY HYPOTHYROIDISM: ICD-10-CM

## 2025-03-06 DIAGNOSIS — F33.9 RECURRENT MAJOR DEPRESSIVE DISORDER, REMISSION STATUS UNSPECIFIED (HCC): ICD-10-CM

## 2025-03-06 DIAGNOSIS — E87.6 HYPOKALEMIA: ICD-10-CM

## 2025-03-06 DIAGNOSIS — Z00.00 ANNUAL PHYSICAL EXAM: Primary | ICD-10-CM

## 2025-03-06 DIAGNOSIS — H91.93 HEARING IMPAIRED PERSON, BILATERAL: ICD-10-CM

## 2025-03-06 DIAGNOSIS — Q79.62 EHLERS-DANLOS SYNDROME, TYPE 3: ICD-10-CM

## 2025-03-06 DIAGNOSIS — E55.9 VITAMIN D DEFICIENCY: ICD-10-CM

## 2025-03-06 DIAGNOSIS — G90.A POTS (POSTURAL ORTHOSTATIC TACHYCARDIA SYNDROME): ICD-10-CM

## 2025-03-06 PROCEDURE — 99213 OFFICE O/P EST LOW 20 MIN: CPT | Performed by: INTERNAL MEDICINE

## 2025-03-06 PROCEDURE — 99395 PREV VISIT EST AGE 18-39: CPT | Performed by: INTERNAL MEDICINE

## 2025-03-06 NOTE — ASSESSMENT & PLAN NOTE
Vitamin D deficient is on vitamin D supplement vitamin D level 44.  Will follow vitamin D level  Orders:    Vitamin D 25 hydroxy; Future

## 2025-03-06 NOTE — ASSESSMENT & PLAN NOTE
Hypokalemia resolved on potassium supplement.  Potassium level 4.4.  Advised to continue present supplement and will follow electrolytes renal function.  Orders:    Basic metabolic panel; Future

## 2025-03-06 NOTE — ASSESSMENT & PLAN NOTE
Depression Screening Follow-up Plan: Patient's depression screening was positive with a PHQ-9 score of 7. Patient with underlying depression and was advised to continue current medications as prescribed.  States her symptoms are better controlled on Wellbutrin and sertraline as prescribed.  She was referred to behavioral health but patient states that she is not getting appointment.  Will refer to psychiatrist.  Since her symptoms are controlled on present medication we will continue present medications.   Orders:    Basic metabolic panel; Future    Ambulatory referral to Psych Services; Future    UA (URINE) with reflex to Scope; Future

## 2025-03-06 NOTE — PATIENT INSTRUCTIONS
Patient was advised to continue present medications. discussed with the patient medications and laboratory data in detail.  Follow-up with me in 3 months or as advised.  If any blood test was ordered please do 1 week prior to next appointment unless advise to get earlier.  If you have any questions please call the office [phone]

## 2025-03-06 NOTE — ASSESSMENT & PLAN NOTE
Patient, no hearing impairment.  On exam ears are clear no wax.  Will refer to ENT specialist.  Orders:    Ambulatory Referral to Otolaryngology; Future

## 2025-03-06 NOTE — ASSESSMENT & PLAN NOTE
Last cholesterol 201, triglyceride 64, HDL 50, .  Advised for low-cholesterol diet.  Will follow lipid panel.  Orders:    Lipid panel; Future

## 2025-03-06 NOTE — PROGRESS NOTES
Adult Annual Physical  Name: Courtney Parker      : 1990      MRN: [id number]  Encounter Provider: Catherine Shirley MD  Encounter Date: 3/6/2025   Encounter department: [facility] INTERNAL MEDICINE    Assessment & Plan  Primary hypothyroidism  Last TSH therapeutic advised to continue same dose of levothyroxine will follow TSH.  Orders:    TSH, 3rd generation; Future    POTS (postural orthostatic tachycardia syndrome)  Patient was advised to follow with a cardiologist.  Presently asymptomatic.  Advised to continue present medications.  Orders:    Basic metabolic panel; Future    UA (URINE) with reflex to Scope; Future    Nia-Danlos syndrome, benign hypermobile form  Stable.  No new symptoms.  Had seen a rheumatologist.  Orders:    Basic metabolic panel; Future    UA (URINE) with reflex to Scope; Future    Recurrent major depressive disorder, remission status unspecified (HCC)  Depression Screening Follow-up Plan: Patient's depression screening was positive with a PHQ-9 score of 7. Patient with underlying depression and was advised to continue current medications as prescribed.  States her symptoms are better controlled on Wellbutrin and sertraline as prescribed.  She was referred to behavioral health but patient states that she is not getting appointment.  Will refer to psychiatrist.  Since her symptoms are controlled on present medication we will continue present medications.   Orders:    Basic metabolic panel; Future    Ambulatory referral to Psych Services; Future    UA (URINE) with reflex to Scope; Future    Hypercholesterolemia  Last cholesterol 201, triglyceride 64, HDL 50, .  Advised for low-cholesterol diet.  Will follow lipid panel.  Orders:    Lipid panel; Future    Hypokalemia  Hypokalemia resolved on potassium supplement.  Potassium level 4.4.  Advised to continue present supplement and will follow electrolytes renal function.  Orders:    Basic metabolic  panel; Future    Vitamin D deficiency  Vitamin D deficient is on vitamin D supplement vitamin D level 44.  Will follow vitamin D level  Orders:    Vitamin D 25 hydroxy; Future    Hearing impaired person, bilateral  Patient, no hearing impairment.  On exam ears are clear no wax.  Will refer to ENT specialist.  Orders:    Ambulatory Referral to Otolaryngology; Future    Annual physical exam  Patient states she has been seen and followed by GYN for GYN examination.       Immunizations and preventive care screenings were discussed with patient today. Appropriate education was printed on patient's after visit summary.    Counseling:  Alcohol/drug use: discussed moderation in alcohol intake, the recommendations for healthy alcohol use, and avoidance of illicit drug use.  Dental Health: discussed importance of regular tooth brushing, flossing, and dental visits.  Injury prevention: discussed safety/seat belts, safety helmets, smoke detectors, carbon monoxide detectors, and smoking near bedding or upholstery.  Sexual health: discussed sexually transmitted diseases, partner selection, use of condoms, avoidance of unintended pregnancy, and contraceptive alternatives.  Exercise: the importance of regular exercise/physical activity was discussed. Recommend exercise 3-5 times per week for at least 30 minutes.       Depression Screening and Follow-up Plan:   Patient with underlying depression and was advised to continue current medications as prescribed.         History of Present Illness     Adult Annual Physical  34-year-old white female patient presents for annual physical exam as well as follow-up for medical problems.      Current Outpatient Medications:     buPROPion (WELLBUTRIN XL) 300 mg 24 hr tablet, Take 1 tablet (300 mg total) by mouth daily, Disp: 30 tablet, Rfl: 2    desmopressin (DDAVP) 0.2 mg tablet, TAKE 2 TABLETS (0.4 MG TOTAL) BY MOUTH 2 (TWO) TIMES A DAY, Disp: 120 tablet, Rfl: 5    fludrocortisone (FLORINEF)  0.1 mg tablet, TAKE 1 TABLET BY MOUTH TWICE A DAY, Disp: 60 tablet, Rfl: 5    ivabradine HCl (Corlanor) 5 MG tablet, Take 1 tablet (5 mg total) by mouth 2 (two) times a day, Disp: 180 tablet, Rfl: 3    levothyroxine 125 mcg tablet, TAKE 1 TABLET BY MOUTH EVERY DAY, Disp: 90 tablet, Rfl: 1    midodrine (PROAMATINE) 10 MG tablet, TAKE 1 TABLET BY MOUTH THREE TIMES A DAY, Disp: 258 tablet, Rfl: 2    Multiple Vitamin (multivitamin) tablet, Take 1 tablet by mouth daily, Disp: , Rfl:     naproxen (NAPROSYN) 500 mg tablet, Take 1 tablet (500 mg total) by mouth daily as needed for moderate pain, Disp: 30 tablet, Rfl: 1    nebivolol (BYSTOLIC) 5 mg tablet, TAKE 1 TABLET BY MOUTH TWICE A DAY, Disp: 60 tablet, Rfl: 2    norgestrel-ethinyl estradiol (Low-Ogestrel) 0.3 mg-30 mcg per tablet, Take 1 tablet by mouth daily, Disp: 84 tablet, Rfl: 4    potassium chloride (MICRO-K) 10 MEQ CR capsule, TAKE 1 CAPSULE BY MOUTH EVERY DAY, Disp: 30 capsule, Rfl: 5    sertraline (ZOLOFT) 50 mg tablet, Take 1.5 tablets (75 mg total) by mouth daily, Disp: 45 tablet, Rfl: 2    traMADol-acetaminophen (ULTRACET) 37.5-325 mg per tablet, Take 1 tablet by mouth every 6 (six) hours as needed for moderate pain, Disp: 30 tablet, Rfl: 0    Vitamin D, Cholecalciferol, 25 MCG (1000 UT) CAPS, Take 3,000 Units by mouth , Disp: , Rfl:    Review of Systems   Constitutional:  Negative for chills and fever.   HENT:  Positive for hearing loss. Negative for congestion, ear pain, nosebleeds, sinus pain, sore throat and trouble swallowing.    Eyes:  Negative for discharge, redness and visual disturbance.   Respiratory:  Negative for cough, chest tightness and shortness of breath.    Cardiovascular:  Negative for chest pain and palpitations.   Gastrointestinal:  Negative for abdominal pain, blood in stool, constipation, diarrhea, nausea and vomiting.   Genitourinary:  Negative for dysuria, flank pain, frequency and hematuria.   Musculoskeletal:  Negative for  arthralgias and neck pain.   Skin:  Negative for color change and rash.   Neurological:  Negative for dizziness, speech difficulty, weakness and headaches.   Hematological:  Does not bruise/bleed easily.   Psychiatric/Behavioral:  Negative for agitation, behavioral problems, self-injury and suicidal ideas.        Pertinent Medical History   Past Medical History:   Diagnosis Date    Annual physical exam 03/06/2025    BMI 20.0-20.9, adult 10/09/2023    Collagen disorder (HCC) 2005    Nia-Danlos Syndrome    Congenital dislocation of hip     Depression     LAST ASSESSED: 8/4/12    Disease of thyroid gland     Hypercholesterolemia 12/04/2024    Hypothyroidism 2008    Major depressive disorder 02/13/2024    Migraine 2007    Motion sickness 02/13/2024    Pectus excavatum     Polyarthralgia 10/09/2023    Scoliosis       As above.    Medical History Reviewed by provider this encounter:  Tobacco  Allergies  Meds  Problems  Med Hx  Surg Hx  Fam Hx     .  Current Outpatient Medications on File Prior to Visit   Medication Sig Dispense Refill    desmopressin (DDAVP) 0.2 mg tablet TAKE 2 TABLETS (0.4 MG TOTAL) BY MOUTH 2 (TWO) TIMES A  tablet 5    fludrocortisone (FLORINEF) 0.1 mg tablet TAKE 1 TABLET BY MOUTH TWICE A DAY 60 tablet 5    ivabradine HCl (Corlanor) 5 MG tablet Take 1 tablet (5 mg total) by mouth 2 (two) times a day 180 tablet 3    levothyroxine 125 mcg tablet TAKE 1 TABLET BY MOUTH EVERY DAY 90 tablet 1    meclizine (ANTIVERT) 12.5 MG tablet Take 1 tablet (12.5 mg total) by mouth every 6 (six) hours as needed for dizziness 20 tablet 0    midodrine (PROAMATINE) 10 MG tablet TAKE 1 TABLET BY MOUTH THREE TIMES A DAY 90 tablet 2    Multiple Vitamin (multivitamin) tablet Take 1 tablet by mouth daily      naproxen (NAPROSYN) 500 mg tablet Take 1 tablet (500 mg total) by mouth daily as needed for moderate pain 30 tablet 1    nebivolol (BYSTOLIC) 5 mg tablet TAKE 1 TABLET BY MOUTH TWICE A DAY 60 tablet 2     norgestrel-ethinyl estradiol (Low-Ogestrel) 0.3 mg-30 mcg per tablet Take 1 tablet by mouth daily 84 tablet 4    potassium chloride (MICRO-K) 10 MEQ CR capsule TAKE 1 CAPSULE BY MOUTH EVERY DAY 30 capsule 5    traMADol-acetaminophen (ULTRACET) 37.5-325 mg per tablet Take 1 tablet by mouth every 6 (six) hours as needed for moderate pain 30 tablet 0    Vitamin D, Cholecalciferol, 25 MCG (1000 UT) CAPS Take 3,000 Units by mouth       [DISCONTINUED] buPROPion (WELLBUTRIN XL) 300 mg 24 hr tablet TAKE 1 TABLET BY MOUTH EVERY DAY 30 tablet 5    [DISCONTINUED] sertraline (ZOLOFT) 50 mg tablet TAKE 1 AND 1/2 TABLETS BY MOUTH DAILY 45 tablet 5     No current facility-administered medications on file prior to visit.      Social History     Tobacco Use    Smoking status: Never    Smokeless tobacco: Never   Vaping Use    Vaping status: Never Used   Substance and Sexual Activity    Alcohol use: Yes     Comment: on average, drinks 1-2 days a month    Drug use: Never    Sexual activity: Yes     Partners: Male     Birth control/protection: Condom Male, OCP            Medical History Reviewed by provider this encounter:  Tobacco  Allergies  Meds  Problems  Med Hx  Surg Hx  Fam Hx     .  Current Outpatient Medications on File Prior to Visit   Medication Sig Dispense Refill    buPROPion (WELLBUTRIN XL) 300 mg 24 hr tablet Take 1 tablet (300 mg total) by mouth daily 30 tablet 2    desmopressin (DDAVP) 0.2 mg tablet TAKE 2 TABLETS (0.4 MG TOTAL) BY MOUTH 2 (TWO) TIMES A  tablet 5    fludrocortisone (FLORINEF) 0.1 mg tablet TAKE 1 TABLET BY MOUTH TWICE A DAY 60 tablet 5    ivabradine HCl (Corlanor) 5 MG tablet Take 1 tablet (5 mg total) by mouth 2 (two) times a day 180 tablet 3    levothyroxine 125 mcg tablet TAKE 1 TABLET BY MOUTH EVERY DAY 90 tablet 1    midodrine (PROAMATINE) 10 MG tablet TAKE 1 TABLET BY MOUTH THREE TIMES A  tablet 2    Multiple Vitamin (multivitamin) tablet Take 1 tablet by mouth daily       "naproxen (NAPROSYN) 500 mg tablet Take 1 tablet (500 mg total) by mouth daily as needed for moderate pain 30 tablet 1    nebivolol (BYSTOLIC) 5 mg tablet TAKE 1 TABLET BY MOUTH TWICE A DAY 60 tablet 2    norgestrel-ethinyl estradiol (Low-Ogestrel) 0.3 mg-30 mcg per tablet Take 1 tablet by mouth daily 84 tablet 4    potassium chloride (MICRO-K) 10 MEQ CR capsule TAKE 1 CAPSULE BY MOUTH EVERY DAY 30 capsule 5    sertraline (ZOLOFT) 50 mg tablet Take 1.5 tablets (75 mg total) by mouth daily 45 tablet 2    traMADol-acetaminophen (ULTRACET) 37.5-325 mg per tablet Take 1 tablet by mouth every 6 (six) hours as needed for moderate pain 30 tablet 0    Vitamin D, Cholecalciferol, 25 MCG (1000 UT) CAPS Take 3,000 Units by mouth       [DISCONTINUED] meclizine (ANTIVERT) 12.5 MG tablet Take 1 tablet (12.5 mg total) by mouth every 6 (six) hours as needed for dizziness 20 tablet 0     No current facility-administered medications on file prior to visit.      Social History     Tobacco Use    Smoking status: Never    Smokeless tobacco: Never   Vaping Use    Vaping status: Never Used   Substance and Sexual Activity    Alcohol use: Yes     Comment: on average, drinks 1-2 days a month    Drug use: Never    Sexual activity: Yes     Partners: Male     Birth control/protection: Condom Male, OCP       Objective   /64 (BP Location: Left arm, Patient Position: Sitting, Cuff Size: Standard)   Pulse 105   Temp 98.5 °F (36.9 °C) (Temporal)   Resp 16   Ht 5' 6.25\" (1.683 m)   Wt 59 kg (130 lb)   SpO2 99%   BMI 20.82 kg/m²     Physical Exam  Vitals and nursing note reviewed.   Constitutional:       General: She is not in acute distress.     Appearance: Normal appearance. She is well-developed.   HENT:      Head: Normocephalic and atraumatic.      Right Ear: Tympanic membrane, ear canal and external ear normal. There is no impacted cerumen.      Left Ear: Tympanic membrane, ear canal and external ear normal. There is no impacted " cerumen.      Nose: Nose normal.      Mouth/Throat:      Mouth: Mucous membranes are moist.      Pharynx: Oropharynx is clear.   Eyes:      General: No scleral icterus.        Right eye: No discharge.         Left eye: No discharge.      Extraocular Movements: Extraocular movements intact.      Conjunctiva/sclera: Conjunctivae normal.      Pupils: Pupils are equal, round, and reactive to light.   Cardiovascular:      Rate and Rhythm: Normal rate and regular rhythm.      Pulses: Normal pulses.      Heart sounds: No murmur heard.  Pulmonary:      Effort: Pulmonary effort is normal. No respiratory distress.      Breath sounds: Normal breath sounds. No wheezing.   Abdominal:      General: Bowel sounds are normal. There is no distension.      Palpations: Abdomen is soft.      Tenderness: There is no abdominal tenderness.   Musculoskeletal:         General: No swelling. Normal range of motion.      Cervical back: Normal range of motion and neck supple.      Right lower leg: No edema.      Left lower leg: No edema.   Skin:     General: Skin is warm and dry.      Findings: No rash.   Neurological:      General: No focal deficit present.      Mental Status: She is alert and oriented to person, place, and time.   Psychiatric:         Mood and Affect: Mood normal.         Behavior: Behavior normal.

## 2025-03-06 NOTE — ASSESSMENT & PLAN NOTE
Stable.  No new symptoms.  Had seen a rheumatologist.  Orders:    Basic metabolic panel; Future    UA (URINE) with reflex to Scope; Future

## 2025-03-06 NOTE — ASSESSMENT & PLAN NOTE
Last TSH therapeutic advised to continue same dose of levothyroxine will follow TSH.  Orders:    TSH, 3rd generation; Future

## 2025-03-06 NOTE — ASSESSMENT & PLAN NOTE
Patient was advised to follow with a cardiologist.  Presently asymptomatic.  Advised to continue present medications.  Orders:    Basic metabolic panel; Future    UA (URINE) with reflex to Scope; Future

## 2025-03-08 DIAGNOSIS — G90.A POTS (POSTURAL ORTHOSTATIC TACHYCARDIA SYNDROME): ICD-10-CM

## 2025-03-10 RX ORDER — DESMOPRESSIN ACETATE 0.2 MG/1
0.4 TABLET ORAL 2 TIMES DAILY
Qty: 360 TABLET | Refills: 2 | Status: SHIPPED | OUTPATIENT
Start: 2025-03-10 | End: 2025-09-06

## 2025-05-26 DIAGNOSIS — F40.10 SOCIAL ANXIETY DISORDER: ICD-10-CM

## 2025-05-26 DIAGNOSIS — F33.9 EPISODE OF RECURRENT MAJOR DEPRESSIVE DISORDER, UNSPECIFIED DEPRESSION EPISODE SEVERITY (HCC): ICD-10-CM

## 2025-05-27 RX ORDER — BUPROPION HYDROCHLORIDE 300 MG/1
300 TABLET ORAL DAILY
Qty: 30 TABLET | Refills: 2 | Status: SHIPPED | OUTPATIENT
Start: 2025-05-27

## 2025-06-20 DIAGNOSIS — E03.9 PRIMARY HYPOTHYROIDISM: ICD-10-CM

## 2025-06-20 RX ORDER — LEVOTHYROXINE SODIUM 125 UG/1
125 TABLET ORAL DAILY
Qty: 90 TABLET | Refills: 1 | Status: SHIPPED | OUTPATIENT
Start: 2025-06-20

## 2025-07-19 LAB
25(OH)D3 SERPL-MCNC: 61 NG/ML (ref 30–100)
APPEARANCE UR: CLEAR
BACTERIA UR QL AUTO: ABNORMAL /HPF
BILIRUB UR QL STRIP: NEGATIVE
BUN SERPL-MCNC: 14 MG/DL (ref 7–25)
BUN/CREAT SERPL: NORMAL (CALC) (ref 6–22)
CALCIUM SERPL-MCNC: 9.7 MG/DL (ref 8.6–10.2)
CHLORIDE SERPL-SCNC: 105 MMOL/L (ref 98–110)
CHOLEST SERPL-MCNC: 198 MG/DL
CHOLEST/HDLC SERPL: 3.4 (CALC)
CO2 SERPL-SCNC: 26 MMOL/L (ref 20–32)
COLOR UR: YELLOW
CREAT SERPL-MCNC: 0.78 MG/DL (ref 0.5–0.97)
GFR/BSA.PRED SERPLBLD CYS-BASED-ARV: 103 ML/MIN/1.73M2
GLUCOSE SERPL-MCNC: 99 MG/DL (ref 65–99)
GLUCOSE UR QL STRIP: NEGATIVE
HDLC SERPL-MCNC: 59 MG/DL
HGB UR QL STRIP: NEGATIVE
HYALINE CASTS #/AREA URNS LPF: ABNORMAL /LPF
KETONES UR QL STRIP: ABNORMAL
LDLC SERPL CALC-MCNC: 120 MG/DL (CALC)
LEUKOCYTE ESTERASE UR QL STRIP: NEGATIVE
NITRITE UR QL STRIP: NEGATIVE
NONHDLC SERPL-MCNC: 139 MG/DL (CALC)
PH UR STRIP: 6 [PH] (ref 5–8)
POTASSIUM SERPL-SCNC: 4.3 MMOL/L (ref 3.5–5.3)
PROT UR QL STRIP: NEGATIVE
RBC #/AREA URNS HPF: ABNORMAL /HPF
SODIUM SERPL-SCNC: 140 MMOL/L (ref 135–146)
SP GR UR STRIP: 1.02 (ref 1–1.03)
SQUAMOUS #/AREA URNS HPF: ABNORMAL /HPF
TRIGL SERPL-MCNC: 88 MG/DL
TSH SERPL-ACNC: 1.86 MIU/L
WBC #/AREA URNS HPF: ABNORMAL /HPF

## 2025-07-22 ENCOUNTER — OFFICE VISIT (OUTPATIENT)
Dept: INTERNAL MEDICINE CLINIC | Facility: CLINIC | Age: 35
End: 2025-07-22
Payer: COMMERCIAL

## 2025-07-22 ENCOUNTER — TELEPHONE (OUTPATIENT)
Age: 35
End: 2025-07-22

## 2025-07-22 VITALS
BODY MASS INDEX: 21.05 KG/M2 | DIASTOLIC BLOOD PRESSURE: 74 MMHG | WEIGHT: 131 LBS | HEART RATE: 88 BPM | RESPIRATION RATE: 16 BRPM | OXYGEN SATURATION: 98 % | HEIGHT: 66 IN | TEMPERATURE: 97.5 F | SYSTOLIC BLOOD PRESSURE: 100 MMHG

## 2025-07-22 DIAGNOSIS — E55.9 VITAMIN D DEFICIENCY: ICD-10-CM

## 2025-07-22 DIAGNOSIS — E87.6 HYPOKALEMIA: ICD-10-CM

## 2025-07-22 DIAGNOSIS — F40.10 SOCIAL ANXIETY DISORDER: ICD-10-CM

## 2025-07-22 DIAGNOSIS — F33.9 RECURRENT MAJOR DEPRESSIVE DISORDER, REMISSION STATUS UNSPECIFIED (HCC): Primary | ICD-10-CM

## 2025-07-22 DIAGNOSIS — E03.9 PRIMARY HYPOTHYROIDISM: ICD-10-CM

## 2025-07-22 DIAGNOSIS — G90.A POTS (POSTURAL ORTHOSTATIC TACHYCARDIA SYNDROME): ICD-10-CM

## 2025-07-22 DIAGNOSIS — E78.5 DYSLIPIDEMIA: ICD-10-CM

## 2025-07-22 DIAGNOSIS — Q79.62 EHLERS-DANLOS SYNDROME, TYPE 3: ICD-10-CM

## 2025-07-22 PROBLEM — F33.2 SEVERE EPISODE OF RECURRENT MAJOR DEPRESSIVE DISORDER, WITHOUT PSYCHOTIC FEATURES (HCC): Status: RESOLVED | Noted: 2019-08-06 | Resolved: 2025-07-22

## 2025-07-22 PROCEDURE — 99213 OFFICE O/P EST LOW 20 MIN: CPT | Performed by: INTERNAL MEDICINE

## 2025-07-22 RX ORDER — BUPROPION HYDROCHLORIDE 300 MG/1
300 TABLET ORAL DAILY
Qty: 30 TABLET | Refills: 2 | Status: SHIPPED | OUTPATIENT
Start: 2025-07-22

## 2025-07-22 NOTE — TELEPHONE ENCOUNTER
Patient has been added to the Medication Management wait list with a referral.    Insurance: [employer]  Insurance Type:    Commercial []   Medicaid [x]   [facility] (if applicable)   Medicare []  Location Preference: [email or web address]  Provider Preference: any  Virtual: Yes [x] No []  Were outside resources sent: Yes [x] No []

## 2025-07-22 NOTE — PATIENT INSTRUCTIONS
Patient was advised to continue present medications. discussed with the patient medications and laboratory data in detail.  Follow-up with me in 3 months or as advised.  If any blood test was ordered please do 1 week prior to next appointment unless advise to get earlier.  If you have any questions please call the office [phone]

## 2025-07-22 NOTE — ASSESSMENT & PLAN NOTE
Cholesterol decreased from 201-198 triglyceride 88 HDL 59 LDL decreased from 130-120 advised to continue on low-cholesterol low sugar low-carb diet.  Orders:  •  Ambulatory Referral to Cardiology; Future

## 2025-07-22 NOTE — ASSESSMENT & PLAN NOTE
Patient had seen rheumatology.  She takes tramadol once or twice a month for pain in the joints and sometimes premenstrual pain.  Orders:  •  Ambulatory Referral to Cardiology; Future

## 2025-07-22 NOTE — PROGRESS NOTES
Name: Courtney Parker      : 1990      MRN: [id number]  Encounter Provider: Catherine Shirley MD  Encounter Date: 2025   Encounter department: [facility] INTERNAL MEDICINE  :  Assessment & Plan  Recurrent major depressive disorder, remission status unspecified (HCC)  Patient after her symptoms are better controlled on bupropion 300 mg once a day.  Patient would like to continue same dose as it is effective and has no side effect.  Patient states she tried to contact psychiatry Dr. Guido but she does not accept her insurance per patient.  She stated she has been still on waiting list to see behavioral health at [facility].  Will resent  referral.    Orders:  •  buPROPion (WELLBUTRIN XL) 300 mg 24 hr tablet; Take 1 tablet (300 mg total) by mouth daily  •  Ambulatory referral to Psych Services; Future    Primary hypothyroidism  TSH is therapeutic 1.86 advised to continue same dose of levothyroxine.       POTS (postural orthostatic tachycardia syndrome)  She has been seen and followed by cardiologist.  Discussed with the patient she needs to see cardiologist as has not seen more than a year her cardiologist.  Patient is stable on present medications.  Orders:  •  Ambulatory Referral to Cardiology; Future    Dyslipidemia  Cholesterol decreased from 201-198 triglyceride 88 HDL 59 LDL decreased from 130-120 advised to continue on low-cholesterol low sugar low-carb diet.  Orders:  •  Ambulatory Referral to Cardiology; Future    Vitamin D deficiency  Vitamin D deficiency resolved on vitamin D supplement vitamin D level 61 advised to continue same supplement.       Nia-Danlos syndrome, benign hypermobile form  Patient had seen rheumatology.  She takes tramadol once or twice a month for pain in the joints and sometimes premenstrual pain.  Orders:  •  Ambulatory Referral to Cardiology; Future    Hypokalemia  Hypokalemia resolved on potassium supplement potassium level 4.3  "advised to continue same supplement.       Social anxiety disorder  Her symptoms are controlled on sertraline 75 mg once a day.  Patient states it is effective and has no side effects so she would like to continue same dose.  Still awaiting to see psychiatrist at behavioral health with [facility].  Orders:  •  sertraline (ZOLOFT) 50 mg tablet; Take 1.5 tablets (75 mg total) by mouth daily           History of Present Illness   HPI  34-year-old white female patient presents for follow-up of her medical problems.  Does not have any new complaints.    Current Medications[1]     Past Medical History[1]       Review of Systems   Constitutional:  Negative for chills and fever.   HENT:  Negative for congestion, ear pain, hearing loss, nosebleeds, sinus pain, sore throat and trouble swallowing.    Eyes:  Negative for discharge, redness and visual disturbance.   Respiratory:  Negative for cough, chest tightness and shortness of breath.    Cardiovascular:  Negative for chest pain and palpitations.   Gastrointestinal:  Negative for abdominal pain, blood in stool, constipation, diarrhea, nausea and vomiting.   Genitourinary:  Negative for dysuria, flank pain and hematuria.   Musculoskeletal:  Positive for arthralgias (Sometimes she gets pain in joints.). Negative for myalgias and neck pain.   Skin:  Negative for rash.   Neurological:  Negative for dizziness, syncope, speech difficulty, weakness, light-headedness and headaches.   Hematological:  Does not bruise/bleed easily.   Psychiatric/Behavioral:  Negative for agitation, behavioral problems, self-injury and suicidal ideas. The patient is not nervous/anxious.        Objective   /74 (BP Location: Left arm, Patient Position: Sitting, Cuff Size: Standard)   Pulse 88   Temp 97.5 °F (36.4 °C) (Temporal)   Resp 16   Ht 5' 6.25\" (1.683 m)   Wt 59.4 kg (131 lb)   SpO2 98%   BMI 20.98 kg/m²      Physical Exam  Vitals and nursing note reviewed.   Constitutional:  "      General: She is not in acute distress.     Appearance: Normal appearance. She is well-developed.   HENT:      Head: Normocephalic and atraumatic.      Right Ear: External ear normal.      Left Ear: External ear normal.      Nose: Nose normal.      Mouth/Throat:      Pharynx: Oropharynx is clear.     Eyes:      General: No scleral icterus.        Right eye: No discharge.         Left eye: No discharge.      Extraocular Movements: Extraocular movements intact.      Conjunctiva/sclera: Conjunctivae normal.       Cardiovascular:      Rate and Rhythm: Normal rate and regular rhythm.      Pulses: Normal pulses.      Heart sounds: No murmur heard.  Pulmonary:      Effort: Pulmonary effort is normal. No respiratory distress.      Breath sounds: Normal breath sounds. No wheezing.   Abdominal:      General: There is no distension.      Palpations: Abdomen is soft.      Tenderness: There is no abdominal tenderness.     Musculoskeletal:         General: No swelling. Normal range of motion.      Cervical back: Normal range of motion and neck supple.      Right lower leg: No edema.      Left lower leg: No edema.      Comments: She is ambulating well without any assistance.     Skin:     General: Skin is warm and dry.      Capillary Refill: Capillary refill takes less than 2 seconds.      Findings: No rash.     Neurological:      General: No focal deficit present.      Mental Status: She is alert and oriented to person, place, and time.      Motor: No weakness.     Psychiatric:         Mood and Affect: Mood normal.         Behavior: Behavior normal.                [1]    Current Outpatient Medications:   •  buPROPion (WELLBUTRIN XL) 300 mg 24 hr tablet, Take 1 tablet (300 mg total) by mouth daily, Disp: 30 tablet, Rfl: 2  •  desmopressin (DDAVP) 0.2 mg tablet, TAKE 2 TABLETS (0.4 MG TOTAL) BY MOUTH 2 (TWO) TIMES A DAY, Disp: 360 tablet, Rfl: 2  •  fludrocortisone (FLORINEF) 0.1 mg tablet, TAKE 1 TABLET BY MOUTH TWICE A DAY,  Disp: 60 tablet, Rfl: 5  •  ivabradine HCl (Corlanor) 5 MG tablet, Take 1 tablet (5 mg total) by mouth 2 (two) times a day, Disp: 180 tablet, Rfl: 3  •  levothyroxine 125 mcg tablet, TAKE 1 TABLET BY MOUTH EVERY DAY, Disp: 90 tablet, Rfl: 1  •  midodrine (PROAMATINE) 10 MG tablet, TAKE 1 TABLET BY MOUTH THREE TIMES A DAY, Disp: 258 tablet, Rfl: 2  •  Multiple Vitamin (multivitamin) tablet, Take 1 tablet by mouth in the morning., Disp: , Rfl:   •  naproxen (NAPROSYN) 500 mg tablet, Take 1 tablet (500 mg total) by mouth daily as needed for moderate pain, Disp: 30 tablet, Rfl: 1  •  nebivolol (BYSTOLIC) 5 mg tablet, TAKE 1 TABLET BY MOUTH TWICE A DAY, Disp: 60 tablet, Rfl: 2  •  norgestrel-ethinyl estradiol (Low-Ogestrel) 0.3 mg-30 mcg per tablet, Take 1 tablet by mouth daily, Disp: 84 tablet, Rfl: 4  •  potassium chloride (MICRO-K) 10 MEQ CR capsule, TAKE 1 CAPSULE BY MOUTH EVERY DAY, Disp: 30 capsule, Rfl: 5  •  sertraline (ZOLOFT) 50 mg tablet, Take 1.5 tablets (75 mg total) by mouth daily, Disp: 45 tablet, Rfl: 2  •  traMADol-acetaminophen (ULTRACET) 37.5-325 mg per tablet, Take 1 tablet by mouth every 6 (six) hours as needed for moderate pain, Disp: 30 tablet, Rfl: 0  •  Vitamin D, Cholecalciferol, 25 MCG (1000 UT) CAPS, Take 3,000 Units by mouth, Disp: , Rfl: [1]  Past Medical History:  Diagnosis Date   • Annual physical exam 03/06/2025   • BMI 20.0-20.9, adult 10/09/2023   • Collagen disorder (HCC) 2005    Nia-Danlos Syndrome   • Congenital dislocation of hip    • Depression     LAST ASSESSED: 8/4/12   • Disease of thyroid gland    • Hypercholesterolemia 12/04/2024   • Hypothyroidism 2008   • Major depressive disorder 02/13/2024   • Migraine 2007   • Motion sickness 02/13/2024   • Pectus excavatum    • Polyarthralgia 10/09/2023   • Scoliosis

## 2025-07-22 NOTE — ASSESSMENT & PLAN NOTE
Hypokalemia resolved on potassium supplement potassium level 4.3 advised to continue same supplement.

## 2025-07-22 NOTE — ASSESSMENT & PLAN NOTE
Patient after her symptoms are better controlled on bupropion 300 mg once a day.  Patient would like to continue same dose as it is effective and has no side effect.  Patient states she tried to contact psychiatry Dr. Guido but she does not accept her insurance per patient.  She stated she has been still on waiting list to see behavioral health at [facility].  Will resent  referral.    Orders:  •  buPROPion (WELLBUTRIN XL) 300 mg 24 hr tablet; Take 1 tablet (300 mg total) by mouth daily  •  Ambulatory referral to Psych Services; Future

## 2025-07-22 NOTE — ASSESSMENT & PLAN NOTE
Vitamin D deficiency resolved on vitamin D supplement vitamin D level 61 advised to continue same supplement.

## 2025-07-22 NOTE — ASSESSMENT & PLAN NOTE
Her symptoms are controlled on sertraline 75 mg once a day.  Patient states it is effective and has no side effects so she would like to continue same dose.  Still awaiting to see psychiatrist at behavioral health with [facility].  Orders:  •  sertraline (ZOLOFT) 50 mg tablet; Take 1.5 tablets (75 mg total) by mouth daily

## 2025-07-22 NOTE — ASSESSMENT & PLAN NOTE
She has been seen and followed by cardiologist.  Discussed with the patient she needs to see cardiologist as has not seen more than a year her cardiologist.  Patient is stable on present medications.  Orders:  •  Ambulatory Referral to Cardiology; Future